# Patient Record
Sex: MALE | Race: WHITE | NOT HISPANIC OR LATINO | Employment: FULL TIME | URBAN - METROPOLITAN AREA
[De-identification: names, ages, dates, MRNs, and addresses within clinical notes are randomized per-mention and may not be internally consistent; named-entity substitution may affect disease eponyms.]

---

## 2017-01-03 ENCOUNTER — TELEPHONE (OUTPATIENT)
Dept: RHEUMATOLOGY | Facility: PHYSICIAN GROUP | Age: 52
End: 2017-01-03

## 2017-01-03 DIAGNOSIS — M06.9 RHEUMATOID ARTHRITIS, INVOLVING UNSPECIFIED SITE, UNSPECIFIED RHEUMATOID FACTOR PRESENCE: ICD-10-CM

## 2017-01-03 DIAGNOSIS — R79.89 LFT ELEVATION: ICD-10-CM

## 2017-01-03 NOTE — TELEPHONE ENCOUNTER
Call patient notified patient of elevated liver functions, patient states he was drinking a lot of alcohol over the holiday and that the LV liver functions most likely secondary to alcohol, as patient to hold any alcohol for at least 3 days so that we can recheck his liver functions, liver functions continue be elevated he'll need to do further evaluation for hepatitis C as well as a liver ultrasound.  Patient states understanding

## 2017-01-03 NOTE — TELEPHONE ENCOUNTER
Was the patient seen in the last year in this department? Yes    LABS IN YOUR IN BOX- NEG QUANT GOLD  Does patient have an active prescription for medications requested? No     Received Request Via: Patient

## 2017-01-12 ENCOUNTER — OFFICE VISIT (OUTPATIENT)
Dept: RHEUMATOLOGY | Facility: PHYSICIAN GROUP | Age: 52
End: 2017-01-12
Payer: COMMERCIAL

## 2017-01-12 VITALS — SYSTOLIC BLOOD PRESSURE: 120 MMHG | BODY MASS INDEX: 30.83 KG/M2 | WEIGHT: 218 LBS | DIASTOLIC BLOOD PRESSURE: 80 MMHG

## 2017-01-12 DIAGNOSIS — E29.1 HYPOGONADISM IN MALE: ICD-10-CM

## 2017-01-12 DIAGNOSIS — M05.9 SEROPOSITIVE RHEUMATOID ARTHRITIS (HCC): ICD-10-CM

## 2017-01-12 DIAGNOSIS — Z96.643 S/P HIP REPLACEMENT, BILATERAL: ICD-10-CM

## 2017-01-12 DIAGNOSIS — M51.9 LUMBAR DISC DISEASE: ICD-10-CM

## 2017-01-12 DIAGNOSIS — M06.9 RHEUMATOID ARTHRITIS, INVOLVING UNSPECIFIED SITE, UNSPECIFIED RHEUMATOID FACTOR PRESENCE: ICD-10-CM

## 2017-01-12 PROCEDURE — 99214 OFFICE O/P EST MOD 30 MIN: CPT | Performed by: INTERNAL MEDICINE

## 2017-01-12 NOTE — PROGRESS NOTES
Chief Complaint- joint pain    Subjective:   Milton Aguila is a 51 y.o. male here today for follow up of rheumatological issues     This is a follow-up visit for this patient who was last seen here in this clinic about 9 months ago March 28, 2016, for rheumatoid arthritis, patient is on Enbrel 50 mg subcutaneous every week, takes occasional Celebrex and hydrocodone, but note patient also takes Xanax daily at bedtime for sleeping. Patient status post bilateral HERVE about 2008 years ago patient states that his rheumatoid arthritis started at about 31 years of age. Patient denies any iritis uveitis but does have some dry eye, has some occasional back issues for which he takes Soma when necessary, also sees [facility] spine for tender point injections.    Very long discussion today regarding patient's Xanax and Norco, patient adamant about getting Norco for his injuries, especially his back issues, we had a long discussion regarding the FDA regulations now recommending against combining Xanax and Norco because of risk of coma and recommendation to change Xanax to a non-benzodiazepine antianxiety medication, patient resistant and chooses not to change from Xanax. Patient also admits to daily alcohol (during the holidays)  use most likely resulting in his elevated liver function seen at last lab, and patient also admits to marijuana use. Patient also states that his father was an alcoholic but denies any alcoholic tendencies himself.    S/p Humira  S/p Plaquenil  S/p MTX    Bilateral HERVE     Uric acid 6.7 6/2016 [facility]  Hep B neg 6/2016 [facility]  Quantiferon Gold neg 12/2016 [facility]    Current medicines (including changes today)  Current Outpatient Prescriptions   Medication Sig Dispense Refill   • Etanercept (ENBREL SURECLICK) 50 MG/ML Solution Auto-injector Inject 50 mg as instructed every 7 days. 12 Syringe 1   • celecoxib (CELEBREX) 200 MG Cap Take 1 Cap by mouth 2 times a day. 180 Cap 0   •  carisoprodol (SOMA) 350 MG Tab 1 tab up to tid prn severe muscle spasm 90 Tab 0   • alprazolam (XANAX) 0.25 MG Tab Take 1 Tab by mouth at bedtime as needed for Sleep. 30 Tab 0   • alprazolam (XANAX) 1 MG Tab Take 1 Tab by mouth at bedtime as needed for Sleep. Indications: Feeling Anxious 30 Tab 3   • TESTOSTERONE by Does not apply route.       No current facility-administered medications for this visit.     He  has a past medical history of Arthritis, rheumatic, acute or subacute.    ROS   Other than what is mentioned in HPI or physical exam, there is no history of headaches, double vision or blurred vision. No temporal tenderness or jaw claudication. No history of cataracts or glaucoma. No trouble swallowing difficulties or sore throats. No history of thyroid disease. No chest complaints including chest pain, cough or sputum production. No shortness of breath. No GI complaints including nausea, vomiting, change in bowel habits, or past peptic ulcer disease. No history of blood in the stools. No urinary complaints including dysuria or frequency. No history of rash including psoriasis. No history of alopecia, photosensitivity, oral ulcerations, Raynaud's phenomena, or swollen joints. No history of gout. No back complaints. No history of low blood counts.       Objective:     Blood pressure 120/80, weight 98.884 kg (218 lb). Body mass index is 30.83 kg/(m^2).   Physical Exam:  GENERAL: Alert and oriented x 3, No apparent distress, well-developed, well-nourished SKIN: No inflammation, normal turgor, no rashes. HEENT: Atraumatic, unremarkable. PERRLA, extraocular movements are intact. Conjunctiva clear. Fundi not examined. Temporal arteries are palpable and non-tender. THROAT: Clear. NECK: Supple with good range of motion including flexion, extension, lateral rotation and bending. No JVD, thyromegaly or adenopathy is appreciated. Carotids are palpable, no bruits. CHEST: Clear to ausculation and percussion bilaterally,  no rales or rhonchi. Respiratory efforts good. BREASTS: Not examined. COR: Regular rate and rhythm. No murmurs, rubs or gallops. ABDOMEN: Soft, non-tender, non-distended. Normal active bowel sounds. No organomegaly appreciated. No hepatomegaly. EXTREMITIES: No clubbing, cyanosis, edema. MUSCULOSKELETAL: Both shoulders have full range of motion including internal and external rotation and abduction. Both elbows have full range of motion without active active synovitis. The wrists have good range of motion without limitations. The small joints of the hands are unremarkable without significant swelling or tenderness. There are no tophi or nodules appreciated. The hips, knees, ankles and feet all have good range of motion. Patient able to walk on toes and heels without any problem, excellent balance. Low back is normal. There is no SI tenderness to compression or palpation. There is good lumbar flexion. There are tattoos bilateral ventral wrists, and excoriation right wrist RECTAL: Not done. : Not done. NEUROLOGICAL: Grossly intact. Motor and sensory examinations are normal. PULSES. Intact    Lab Results   Component Value Date/Time    SODIUM 142 01/30/2013 11:25 AM    POTASSIUM 3.8 01/30/2013 11:25 AM    CHLORIDE 104 01/30/2013 11:25 AM    CO2 32 01/30/2013 11:25 AM    GLUCOSE 77 01/30/2013 11:25 AM    BUN 8 01/30/2013 11:25 AM    CREATININE 0.89 01/30/2013 11:25 AM      Lab Results   Component Value Date/Time    WBC 5.2 01/30/2013 11:25 AM    RBC 5.14 01/30/2013 11:25 AM    HEMOGLOBIN 16.5 01/30/2013 11:25 AM    HEMATOCRIT 49.4 01/30/2013 11:25 AM    MCV 96.1 01/30/2013 11:25 AM    MCH 32.1 01/30/2013 11:25 AM    MCHC 33.4 01/30/2013 11:25 AM    MPV 8.1 01/30/2013 11:25 AM    NEUTROPHILS-POLYS 65.2 01/30/2013 11:25 AM    LYMPHOCYTES 23.2 01/30/2013 11:25 AM    MONOCYTES 8.9 01/30/2013 11:25 AM    EOSINOPHILS 2.4 01/30/2013 11:25 AM    BASOPHILS 0.4 01/30/2013 11:25 AM      Lab Results   Component Value Date/Time     CALCIUM 9.2 01/30/2013 11:25 AM    AST(SGOT) 25 02/07/2008 07:15 PM    ALT(SGPT) 41 02/07/2008 07:15 PM    ALKALINE PHOSPHATASE 51 02/07/2008 07:15 PM    TOTAL BILIRUBIN 1.7* 02/07/2008 07:15 PM    ALBUMIN 3.9 02/07/2008 07:15 PM    TOTAL PROTEIN 6.9 02/07/2008 07:15 PM     Lab Results   Component Value Date/Time    COLOR Lt. Yellow 01/30/2013 11:25 AM    SPECIFIC GRAVITY 1.009 01/30/2013 11:25 AM    PH 7.0 01/30/2013 11:25 AM    GLUCOSE Negative 01/30/2013 11:25 AM    KETONES Negative 01/30/2013 11:25 AM    PROTEIN Negative 01/30/2013 11:25 AM         Assessment and Plan:     1. Rheumatoid arthritis, involving unspecified site, unspecified rheumatoid factor presence (HCC)  Clinically doing well with Enbrel 50 mg subcutaneous every week, patient's clinical exam today shows no inflammation at all, patient's been noncompliant and has not done x-rays requested March 2016 of hands and feet for documentation of erosive arthritis as well as monitoring for any changes.  - Etanercept (ENBREL SURECLICK) 50 MG/ML Solution Auto-injector; Inject 50 mg as instructed every 7 days.  Dispense: 12 Syringe; Refill: 1    2. Lumbar disc disease  Patient is followed by [facility], patient wanted [place] refills from this clinic, but patient takes Xanax daily at bedtime, recommend patient change from a benzodiazepine antianxiety medication to a non-benzodiazepine medications and we can refill narcotics but cannot refill narcotics until he is no longer on benzodiazepine medications.     3. S/P hip replacement, bilateral  Patient still skis and jumps horses and hikes quite a bit    4. Anxiety  Patient continues to take Xanax daily at bedtime, refuses to consider alternatives. Recommend that if patient wants to have Norco for back pain then needs to change his antianxiety medication to a non-benzodiazepine medication per Federal Government FDA recommendations, recommended check with his primary care doctor Dr Jaime Parada for  alternatives.    5. Elevated LFTs  Patient admits to daily drinking over the holidays, patient has recently redone his labs and has abstained from alcohol for the last 3 days.  Patient states that his father was alcoholic but denies having any tendencies to alcoholism himself.    Followup: Return in about 6 months (around 7/12/2017). or sooner prn    Patient was seen 30 minutes face-to-face of which more than 50% of the time was spent counseling the patient (excluding time for procedures)  regarding  rheumatological condition and care. Therapy was discussed in detail.    Please note that this dictation was created using voice recognition software. I have made every reasonable attempt to correct obvious errors, but I expect that there are errors of grammar and possibly content that I did not discover before finalizing the note.               RF 14.2 (0-13.9) 10/2007 [facility]  CCP 18 ( 0-5) 10/2007 [facility]  MRI pelvis 7/2007-indicates advanced inflammatory arthropathy compatible's rheumatoid arthritis involving both hips with hypertrophic synovitis-[facility]  MRI LS spine 9/2009-indicates disc desiccation L3-4, L4-5, and L5-S1, left-sided neural foraminal narrowing at L5-S1, mild central stenosis at L4, and L3-4-[facility]  MRI Right hand/wrist 11/2009-negative, no erosions, no synovitis, no pathology-[facility]    3/7/2017 Phone call with patient today, patient says is not taking any benzodiazepine and not taking any Xanax,  Also called patient's primary care doctor in [place], [place] Dr. Dino Parada at [phone] and asked not to prescribe any benzodiazepine or any narcotics for this patient if this clinic is going to be prescribing Norco, nurse took message and stated complete understanding and will place notice on patient's chart at Dr. Parada's office.

## 2017-01-12 NOTE — MR AVS SNAPSHOT
Milton Aguila   2017 11:15 AM   Office Visit   MRN: [id number]    Department:  Rheumatology Med [facility]   Dept Phone:  [phone]    Description:  Male : 1965   Provider:  Aydee Barkley M.D.           Reason for Visit     Follow-Up           Allergies as of 2017     No Known Allergies      You were diagnosed with     Rheumatoid arthritis, involving unspecified site, unspecified rheumatoid factor presence (HCC)   [4016471]       Lumbar disc disease   [118880]       Hypogonadism in male   [6594117]       S/P hip replacement, bilateral   [[id number]]         Vital Signs     Blood Pressure Weight Smoking Status             120/80 mmHg 98.884 kg (218 lb) Never Smoker          Basic Information     Date Of Birth Sex Race Ethnicity Preferred Language    1965 Male White Non- English      Problem List              ICD-10-CM Priority Class Noted - Resolved    Seropositive rheumatoid arthritis (HCC) M05.9   3/31/2014 - Present    Hypogonadism [id number]   3/31/2014 - Present    Lumbar disc disease M51.9   2014 - Present    Overweight E66.3   12/15/2015 - Present    Rheumatoid arthritis (HCC) M06.9   12/15/2015 - Present    Insomnia G47.00   12/15/2015 - Present    S/P hip replacement Z96.649   12/15/2015 - Present    Hypogonadism in male E29.1   3/28/2016 - Present      Health Maintenance        Date Due Completion Dates    IMM DTaP/Tdap/Td Vaccine (1 - Tdap) 10/7/1985 ---    IMM INFLUENZA (1) 2016 10/1/2007    COLONOSCOPY 10/7/2016 ---            Current Immunizations     Influenza TIV (IM) 10/1/2007    Pneumococcal Vaccine (UF)Historical Data 10/1/2007      Below and/or attached are the medications your provider expects you to take. Review all of your home medications and newly ordered medications with your provider and/or pharmacist. Follow medication instructions as directed by your provider and/or pharmacist. Please keep your medication list with you and share with your provider.  Update the information when medications are discontinued, doses are changed, or new medications (including over-the-counter products) are added; and carry medication information at all times in the event of emergency situations     Allergies:  No Known Allergies          Medications  Valid as of: January 12, 2017 - 11:33 AM    Generic Name Brand Name Tablet Size Instructions for use    ALPRAZolam (Tab) XANAX 0.25 MG Take 1 Tab by mouth at bedtime as needed for Sleep.        ALPRAZolam (Tab) XANAX 1 MG Take 1 Tab by mouth at bedtime as needed for Sleep. Indications: Feeling Anxious        Carisoprodol   Take 350 mg by mouth as needed.        Carisoprodol (Tab) SOMA 350 MG 1 tab up to tid prn severe muscle spasm        Celecoxib (Cap) CELEBREX 200 MG Take 1 Cap by mouth 2 times a day.        Etanercept (Solution Auto-injector) Etanercept 50 MG/ML Inject 50 mg as instructed every 7 days.        Hydrocodone-Acetaminophen (Tab) NORCO 7.5-325 MG Take 1 Tab by mouth 2 times a day as needed.        Testosterone   by Does not apply route.        .                 Medicines prescribed today were sent to:     [id number] - [place], [place] - [address] AT [facility] [address] & [place] [address] [place] [address]    Phone: [phone] Fax: [phone]    Open 24 Hours?: No    [facility] - [place] [address]    Phone: [phone] Fax: [phone]    Open 24 Hours?: No    [id number] - [place] - [address] AT [facility]    [address] [place] [address]    Phone: [phone] Fax: [phone]    Open 24 Hours?: Yes    [facility] - [place] [address]    Phone: [phone] Fax: [phone]    Open 24 Hours?: No      Medication refill instructions:       If your prescription bottle indicates you have  medication refills left, it is not necessary to call your provider’s office. Please contact your pharmacy and they will refill your medication.    If your prescription bottle indicates you do not have any refills left, you may request refills at any time through one of the following ways: The online [facility] system (except Urgent Care), by calling your provider’s office, or by asking your pharmacy to contact your provider’s office with a refill request. Medication refills are processed only during regular business hours and may not be available until the next business day. Your provider may request additional information or to have a follow-up visit with you prior to refilling your medication.   *Please Note: Medication refills are assigned a new Rx number when refilled electronically. Your pharmacy may indicate that no refills were authorized even though a new prescription for the same medication is available at the pharmacy. Please request the medicine by name with the pharmacy before contacting your provider for a refill.           [facility] Access Code: [email or web address]  Expires: 2/11/2017 11:33 AM    Your email address is not on file at [facility].  Email Addresses are required for you to sign up for [facility], please contact [phone] to verify your personal information and to provide your email address prior to attempting to register for [facility].    Milton Aguila  [address] [place] [address]    [facility]  A secure, online tool to manage your health information     [facility]’s [facility]® is a secure, online tool that connects you to your personalized health information from the privacy of your home -- day or night - making it very easy for you to manage your healthcare. Once the activation process is completed, you can even access your medical information using the [facility] maria esther, which is available for free in the Apple Maria Esther store or Google Play store.     To learn more about [facility], visit  [email or web address]    There are two levels of access available (as shown below):   My Chart Features  [facility] Primary [facility] Doctor [facility]  Specialists [facility]  Urgent  Care Non-[facility] Primary Care Doctor   Email your healthcare team securely and privately 24/7 X X X    Manage appointments: schedule your next appointment; view details of past/upcoming appointments X      Request prescription refills. X      View recent personal medical records, including lab and immunizations X X X X   View health record, including health history, allergies, medications X X X X   Read reports about your outpatient visits, procedures, consult and ER notes X X X X   See your discharge summary, which is a recap of your hospital and/or ER visit that includes your diagnosis, lab results, and care plan X X  X     How to register for [facility] [employer]:  Once your e-mail address has been verified, follow the following steps to sign up for [employer].     1. Go to  [email or web address]  2. Click on the Sign Up Now box, which takes you to the New Member Sign Up page. You will need to provide the following information:  a. Enter your [employer] Access Code exactly as it appears at the top of this page. (You will not need to use this code after you’ve completed the sign-up process. If you do not sign up before the expiration date, you must request a new code.)   b. Enter your date of birth.   c. Enter your home email address.   d. Click Submit, and follow the next screen’s instructions.  3. Create a [employer] ID. This will be your [employer] login ID and cannot be changed, so think of one that is secure and easy to remember.  4. Create a [employer] password. You can change your password at any time.  5. Enter your Password Reset Question and Answer. This can be used at a later time if you forget your password.   6. Enter your e-mail address. This allows you to receive e-mail notifications when new information is available in [employer].  7. Click Sign Up. You can  now view your health information.    For assistance activating your [facility] [employer] [facility] account, call [phone]

## 2017-01-12 NOTE — Clinical Note
[facility]   [address], [address]  [place] [address]  Phone: [phone]  Fax: [phone]              Encounter Date: 1/12/2017    Dear Dr. Song ref. provider found,    It was a pleasure seeing your patient, Milton Aguila, on 1/12/2017. Diagnoses of Rheumatoid arthritis, involving unspecified site, unspecified rheumatoid factor presence (HCC), Lumbar disc disease, Hypogonadism in male, and S/P hip replacement, bilateral were pertinent to this visit.     Please find attached progress note which includes the history I obtained from Mr. Aguila, my physical examination findings, my impression and recommendations.      Once again, it was a pleasure participating in your patient's care.  Please feel free to contact me if you have any questions or if I can be of any further assistance to your patients.      Sincerely,    Aydee Barkley M.D.  Electronically Signed          PROGRESS NOTE:  No notes on file

## 2017-01-13 ENCOUNTER — TELEPHONE (OUTPATIENT)
Dept: RHEUMATOLOGY | Facility: PHYSICIAN GROUP | Age: 52
End: 2017-01-13

## 2017-01-13 DIAGNOSIS — M06.9 RHEUMATOID ARTHRITIS, INVOLVING UNSPECIFIED SITE, UNSPECIFIED RHEUMATOID FACTOR PRESENCE: ICD-10-CM

## 2017-01-13 DIAGNOSIS — R79.89 LFT ELEVATION: ICD-10-CM

## 2017-01-13 NOTE — TELEPHONE ENCOUNTER
Call patient, patient's liver functions have much improved although ALT still 81 which is twice normal, AST 42 with upper limit of normal 40,  Patient resolved to significantly cut down on alcohol now, and when he does labs he will wait at least 3 days from his last alcohol drink to do his blood tests.  Patient is interested in getting rechecked again, will recheck again in 2 weeks. Labs ordered in the computer.  Aryan, please mail lab orders to patient

## 2017-01-13 NOTE — TELEPHONE ENCOUNTER
Pt says he is on pins & needles and worried about the outcome of his lab results. He said he had them done at [facility]. Please call him ASAP, to discuss results.

## 2017-02-10 DIAGNOSIS — G47.00 INSOMNIA, UNSPECIFIED TYPE: ICD-10-CM

## 2017-02-10 RX ORDER — ALPRAZOLAM 1 MG/1
1 TABLET ORAL NIGHTLY PRN
Qty: 30 TAB | Refills: 3 | OUTPATIENT
Start: 2017-02-10

## 2017-02-10 NOTE — TELEPHONE ENCOUNTER
Was the patient seen in the last year in this department? Yes   Recent labs    Does patient have an active prescription for medications requested? No     Received Request Via: Patient     Pt said he is leaving for [place] in two days and needs a refill on his Xanax. He states he isnt taking the Norco anymore so you should be able to fill his xanax that he takes for sleep.  Thank you

## 2017-03-06 ENCOUNTER — TELEPHONE (OUTPATIENT)
Dept: RHEUMATOLOGY | Facility: PHYSICIAN GROUP | Age: 52
End: 2017-03-06

## 2017-03-06 DIAGNOSIS — M06.9 RHEUMATOID ARTHRITIS, INVOLVING UNSPECIFIED SITE, UNSPECIFIED RHEUMATOID FACTOR PRESENCE: ICD-10-CM

## 2017-03-06 NOTE — TELEPHONE ENCOUNTER
Pt called stating since being off of Enbrel his Right hand is flaring and he is having a lot of back pain. He wants RX for steroid, muscle relaxer and pain meds. And he would like a call from you.

## 2017-03-07 ENCOUNTER — TELEPHONE (OUTPATIENT)
Dept: RHEUMATOLOGY | Facility: PHYSICIAN GROUP | Age: 52
End: 2017-03-07

## 2017-03-07 DIAGNOSIS — M06.9 RHEUMATOID ARTHRITIS, INVOLVING UNSPECIFIED SITE, UNSPECIFIED RHEUMATOID FACTOR PRESENCE: ICD-10-CM

## 2017-03-07 DIAGNOSIS — M06.9 RHEUMATOID ARTHRITIS INVOLVING MULTIPLE SITES, UNSPECIFIED RHEUMATOID FACTOR PRESENCE: ICD-10-CM

## 2017-03-07 PROBLEM — F11.90 NARCOTIC DRUG USE: Status: ACTIVE | Noted: 2017-03-07

## 2017-03-07 RX ORDER — METHYLPREDNISOLONE 4 MG/1
TABLET ORAL
Qty: 21 TAB | Refills: 0 | Status: SHIPPED | OUTPATIENT
Start: 2017-03-07 | End: 2017-05-19

## 2017-03-07 RX ORDER — CARISOPRODOL 350 MG/1
TABLET ORAL
Qty: 90 TAB | Refills: 0 | Status: SHIPPED | OUTPATIENT
Start: 2017-03-07 | End: 2018-08-07

## 2017-03-07 RX ORDER — HYDROCODONE BITARTRATE AND ACETAMINOPHEN 5; 325 MG/1; MG/1
TABLET ORAL
Qty: 60 TAB | Refills: 0 | Status: SHIPPED | OUTPATIENT
Start: 2017-03-07 | End: 2018-02-21

## 2017-03-07 NOTE — TELEPHONE ENCOUNTER
Called pt, left message, will refill the enbrel and soma pt has on file in medication list  Asked pt to let us know about the need for prednisone

## 2017-03-07 NOTE — TELEPHONE ENCOUNTER
Phone call with patient today, patient says is not taking any benzodiazepine and not taking any Xanax,  Also called patient's primary care doctor in [place], [place] Dr. Dino Parada at [phone] and asked not to prescribe any benzodiazepine or any narcotics for this patient if this clinic is going to be prescribing Norco, nurse took message and stated complete understanding and will place notice on patient's chart at Dr. Parada's office.    [place] prescription written for patient, please notify patient.

## 2017-05-19 ENCOUNTER — OFFICE VISIT (OUTPATIENT)
Dept: RHEUMATOLOGY | Facility: PHYSICIAN GROUP | Age: 52
End: 2017-05-19
Payer: COMMERCIAL

## 2017-05-19 VITALS
OXYGEN SATURATION: 95 % | TEMPERATURE: 99.3 F | SYSTOLIC BLOOD PRESSURE: 148 MMHG | WEIGHT: 229 LBS | BODY MASS INDEX: 32.38 KG/M2 | RESPIRATION RATE: 14 BRPM | HEART RATE: 100 BPM | DIASTOLIC BLOOD PRESSURE: 80 MMHG

## 2017-05-19 DIAGNOSIS — R79.89 ELEVATED LFTS: ICD-10-CM

## 2017-05-19 DIAGNOSIS — M05.79 RHEUMATOID ARTHRITIS INVOLVING MULTIPLE SITES WITH POSITIVE RHEUMATOID FACTOR (HCC): ICD-10-CM

## 2017-05-19 DIAGNOSIS — M54.50 CHRONIC BILATERAL LOW BACK PAIN WITHOUT SCIATICA: ICD-10-CM

## 2017-05-19 DIAGNOSIS — Z96.643 S/P HIP REPLACEMENT, BILATERAL: ICD-10-CM

## 2017-05-19 DIAGNOSIS — Z78.9 ALCOHOL USE: ICD-10-CM

## 2017-05-19 DIAGNOSIS — F11.90 NARCOTIC DRUG USE: ICD-10-CM

## 2017-05-19 DIAGNOSIS — G89.29 CHRONIC BILATERAL LOW BACK PAIN WITHOUT SCIATICA: ICD-10-CM

## 2017-05-19 PROCEDURE — 99214 OFFICE O/P EST MOD 30 MIN: CPT | Performed by: INTERNAL MEDICINE

## 2017-05-19 RX ORDER — METHYLPREDNISOLONE 4 MG/1
TABLET ORAL
Qty: 21 TAB | Refills: 0 | Status: SHIPPED | OUTPATIENT
Start: 2017-05-19 | End: 2017-08-05 | Stop reason: SDUPTHER

## 2017-05-19 NOTE — Clinical Note
[facility]   [address], [address]  [place] [address]  Phone: [phone]  Fax: [phone]              Encounter Date: 5/19/2017    Dear Dr. Song ref. provider found,    It was a pleasure seeing your patient, Milton Aguila, on 5/19/2017. Diagnoses of Rheumatoid arthritis involving multiple sites with positive rheumatoid factor (CMS-[facility]), S/P hip replacement, bilateral, Chronic bilateral low back pain without sciatica, Narcotic drug use, Elevated LFTs, and Alcohol use were pertinent to this visit.     Please find attached progress note which includes the history I obtained from Mr. Aguila, my physical examination findings, my impression and recommendations.      Once again, it was a pleasure participating in your patient's care.  Please feel free to contact me if you have any questions or if I can be of any further assistance to your patients.      Sincerely,    Aydee Barkley M.D.  Electronically Signed          PROGRESS NOTE:  No notes on file

## 2017-05-19 NOTE — PROGRESS NOTES
Chief Complaint- joint pain    Subjective:   Milton Aguila is a 51 y.o. male here today for follow up of rheumatological issues    This is a follow-up visit for this patient who was last seen here in this clinic 3/7/2017 for rheumatoid arthritis, patient is on Enbrel 50 mg subcutaneous every week, takes occasional Celebrex and hydrocodone. Patient status post bilateral HERVE about 2008 years ago patient states that his rheumatoid arthritis started at about 31 years of age. Patient denies any iritis uveitis but does have some dry eye, has some occasional back issues for which he takes Soma when necessary, also sees [facility] spine for tender point injections. Patient fortunately missed 2 weeks of Inderal because of lack of compliance with getting labs, but patient now back on Enbrel but feels it isn't working as well as it had been in the past. Wonders about alternative treatments or possibly supplemental treatments. Patient states he is feeling more stiffness in his hands and his wrists and knees than previously.    Last visit (3/7/2017), we had a very long discussion today regarding patient's Xanax and Norco, patient adamant about getting Norco for his injuries, especially his back issues, we had a long discussion regarding the FDA regulations now recommending against combining Xanax and Norco because of risk of coma and recommendation to change Xanax to a non-benzodiazepine antianxiety medication, initially patient was patient resistant to changing the Xanax, however later that day patient called stating he would stop the Xanax and we had called patient's primary care doctor Dr Dino Parada at [phone] notifying to not prescribe any benzodiazepines if we are to prescribe any narcotics for this patient.  Patient also admits to daily alcohol (during the holidays)  use most likely resulting in his elevated liver function seen at last lab, and patient also admits to marijuana use. Patient also states  that his father was an alcoholic but denies any alcoholic tendencies himself.    S/p Humira  S/p Plaquenil  S/p MTX-contraindicated because of daily alcohol use  S/p leflunamide-contraindicated because of daily alcohol use    Bilateral HERVE     Uric acid 6.7 6/2016 [facility]  Hep B neg 6/2016 [facility]  Quantiferon Gold neg 12/2016 [facility]  RF 14.2 (0-13.9) 10/2007 [facility]  CCP 18 ( 0-5) 10/2007 [facility]  MRI pelvis 7/2007-indicates advanced inflammatory arthropathy compatible's rheumatoid arthritis involving both hips with hypertrophic synovitis-[facility]  MRI LS spine 9/2009-indicates disc desiccation L3-4, L4-5, and L5-S1, left-sided neural foraminal narrowing at L5-S1, mild central stenosis at L4, and L3-4-[facility]  MRI Right hand/wrist 11/2009-negative, no erosions, no synovitis, no pathology-[facility]       Current medicines (including changes today)  Current Outpatient Prescriptions   Medication Sig Dispense Refill   • methylPREDNISolone (MEDROL) 4 MG Tab 6 tabs po one day then 5 tabs po one day then 4 tabs po one day then 3 tabs po one day then 2 tabs po one day then 1 tab po for one day 21 Tab 0   • Etanercept (ENBREL SURECLICK) 50 MG/ML Solution Auto-injector Inject 50 mg as instructed every 7 days. 12 Syringe 1   • hydrocodone-acetaminophen (NORCO) 5-325 MG Tab per tablet 1 tab po bid prn SEVERE pain, NO DRIVING and NO ALCOHOL within 24 hrs of taking this medication, NO benzodiazepine medications, no selling or giving this medication to any other persons 60 Tab 0   • celecoxib (CELEBREX) 200 MG Cap Take 1 Cap by mouth 2 times a day. 180 Cap 0   • TESTOSTERONE by Does not apply route.     • carisoprodol (SOMA) 350 MG Tab 1 tab up to tid prn severe muscle spasm 90 Tab 0     No current facility-administered medications for this visit.     He  has a past medical history of Arthritis, rheumatic, acute or subacute.    ROS   Other than what is  mentioned in HPI or physical exam, there is no history of headaches, double vision or blurred vision. No temporal tenderness or jaw claudication. No history of cataracts or glaucoma. No trouble swallowing difficulties or sore throats. No history of thyroid disease. No chest complaints including chest pain, cough or sputum production. No shortness of breath. No GI complaints including nausea, vomiting, change in bowel habits, or past peptic ulcer disease. No history of blood in the stools. No urinary complaints including dysuria or frequency. No history of rash including psoriasis. No history of alopecia, photosensitivity, oral ulcerations, Raynaud's phenomena, or swollen joints. No history of gout. No back complaints. No history of low blood counts.       Objective:     Blood pressure 148/80, pulse 100, temperature 37.4 °C (99.3 °F), resp. rate 14, weight 103.874 kg (229 lb), SpO2 95 %. Body mass index is 32.38 kg/(m^2).   Physical Exam:  GENERAL: Alert and oriented x 3, No apparent distress, well-developed, well-nourished SKIN: No inflammation, normal turgor, no rashes, patient states he has some white patches on his chest but did not show them to us today.. HEENT: Atraumatic, unremarkable. PERRLA, extraocular movements are intact. Conjunctiva clear. Fundi not examined. Temporal arteries are palpable and non-tender. THROAT: Clear. NECK: Supple with good range of motion including flexion, extension, lateral rotation and bending. No JVD, thyromegaly or adenopathy is appreciated. Carotids are palpable, no bruits. CHEST: Clear to ausculation and percussion bilaterally, no rales or rhonchi. Respiratory efforts good. BREASTS: Not examined. COR: Regular rate and rhythm. No murmurs, rubs or gallops. ABDOMEN: Soft, non-tender, non-distended. Normal active bowel sounds. No organomegaly appreciated. No hepatomegaly. EXTREMITIES: No clubbing, cyanosis, edema. MUSCULOSKELETAL: Both shoulders have full range of motion including  internal and external rotation and abduction. Both elbows have full range of motion without active active synovitis. The wrists have good range of motion without limitations. The small joints of the hands are unremarkable without significant swelling or tenderness. There are no tophi or nodules appreciated. The hips, knees, ankles and feet all have good range of motion. Patient able to walk on toes and heels without any problem, excellent balance. Low back is normal. There is no SI tenderness to compression or palpation. There is good lumbar flexion. There are tattoos bilateral ventral wrists, and excoriation right wrist RECTAL: Not done. : Not done. NEUROLOGICAL: Grossly intact. Motor and sensory examinations are normal. PULSES. Intact     Lab Results   Component Value Date/Time    SODIUM 142 01/30/2013 11:25 AM    POTASSIUM 3.8 01/30/2013 11:25 AM    CHLORIDE 104 01/30/2013 11:25 AM    CO2 32 01/30/2013 11:25 AM    GLUCOSE 77 01/30/2013 11:25 AM    BUN 8 01/30/2013 11:25 AM    CREATININE 0.89 01/30/2013 11:25 AM      Lab Results   Component Value Date/Time    WBC 5.2 01/30/2013 11:25 AM    RBC 5.14 01/30/2013 11:25 AM    HEMOGLOBIN 16.5 01/30/2013 11:25 AM    HEMATOCRIT 49.4 01/30/2013 11:25 AM    MCV 96.1 01/30/2013 11:25 AM    MCH 32.1 01/30/2013 11:25 AM    MCHC 33.4 01/30/2013 11:25 AM    MPV 8.1 01/30/2013 11:25 AM    NEUTROPHILS-POLYS 65.2 01/30/2013 11:25 AM    LYMPHOCYTES 23.2 01/30/2013 11:25 AM    MONOCYTES 8.9 01/30/2013 11:25 AM    EOSINOPHILS 2.4 01/30/2013 11:25 AM    BASOPHILS 0.4 01/30/2013 11:25 AM      Lab Results   Component Value Date/Time    CALCIUM 9.2 01/30/2013 11:25 AM    AST(SGOT) 25 02/07/2008 07:15 PM    ALT(SGPT) 41 02/07/2008 07:15 PM    ALKALINE PHOSPHATASE 51 02/07/2008 07:15 PM    TOTAL BILIRUBIN 1.7* 02/07/2008 07:15 PM    ALBUMIN 3.9 02/07/2008 07:15 PM    TOTAL PROTEIN 6.9 02/07/2008 07:15 PM       Assessment and Plan:     1. Rheumatoid arthritis involving multiple sites with  positive rheumatoid factor (CMS-HCC)  At this point continue Enbrel 50 mg subcutaneous every week, did provide information for the patient regarding sulfasalazine possibly combination with Enbrel or possibly change in the biologic to Xeljanz or other. We did give patient a Medrol Dosepak to use when necessary, and when patient feels comfortable we will switch patient's biologic or add sulfasalazine. Of note, methotrexate and leflunomide contraindicated this patient does drink daily.  - methylPREDNISolone (MEDROL) 4 MG Tab; 6 tabs po one day then 5 tabs po one day then 4 tabs po one day then 3 tabs po one day then 2 tabs po one day then 1 tab po for one day  Dispense: 21 Tab; Refill: 0    2. S/P hip replacement, bilateral    3. Chronic bilateral low back pain without sciatica  [facility]    4. Narcotic drug use  3/7/2017 Phone call with patient , patient says is not taking any benzodiazepine and not taking any Xanax,  Also called patient's primary care doctor in [place], [place] Dr. Dino Parada at [phone] and asked not to prescribe any benzodiazepine or any narcotics for this patient if this clinic is going to be prescribing Norco, nurse took message and stated complete understanding and will place notice on patient's chart at Dr. Parada's office.    5. Elevated LFTs  Probably exacerbated by alcohol use    6. Alcohol use  [place]enders DMARD methotrexate and leflunomide contraindicated    Followup: Return in about 3 months (around 8/19/2017). or sooner prn    Patient was seen 30 minutes face-to-face of which more than 50% of the time was spent counseling the patient (excluding time for procedures)  regarding  rheumatological condition and care. Therapy was discussed in detail.    Please note that this dictation was created using voice recognition software. I have made every reasonable attempt to correct obvious errors, but I expect that there are errors of grammar and possibly content that  I did not discover before finalizing the note.               S/p sulfasalazine-possibly elevated liver functions (however patient also drinks alcohol quite heavily)

## 2017-05-19 NOTE — MR AVS SNAPSHOT
Milton Aguila   2017 10:45 AM   Office Visit   MRN: [id number]    Department:  Rheumatology Med [facility]   Dept Phone:  [phone]    Description:  Male : 1965   Provider:  Aydee Barkley M.D.           Reason for Visit     Follow-Up           Allergies as of 2017     No Known Allergies      You were diagnosed with     Rheumatoid arthritis involving multiple sites with positive rheumatoid factor (CMS-HCC)   [[id number]]         Vital Signs     Blood Pressure Pulse Temperature Respirations Weight Oxygen Saturation    148/80 mmHg 100 37.4 °C (99.3 °F) 14 103.874 kg (229 lb) 95%    Smoking Status                   Never Smoker            Basic Information     Date Of Birth Sex Race Ethnicity Preferred Language    1965 Male White Non- English      Problem List              ICD-10-CM Priority Class Noted - Resolved    Seropositive rheumatoid arthritis (CMS-HCC) M05.9   3/31/2014 - Present    Hypogonadism [id number]   3/31/2014 - Present    Lumbar disc disease M51.9   2014 - Present    Overweight E66.3   12/15/2015 - Present    Rheumatoid arthritis (CMS-HCC) M06.9   12/15/2015 - Present    Insomnia G47.00   12/15/2015 - Present    S/P hip replacement Z96.649   12/15/2015 - Present    Hypogonadism in male E29.1   3/28/2016 - Present    Narcotic drug use F11.90   3/7/2017 - Present      Health Maintenance        Date Due Completion Dates    IMM DTaP/Tdap/Td Vaccine (1 - Tdap) 10/7/1985 ---    COLONOSCOPY 10/7/2016 ---            Current Immunizations     Influenza TIV (IM) 10/1/2007    Pneumococcal Vaccine (UF)Historical Data 10/1/2007      Below and/or attached are the medications your provider expects you to take. Review all of your home medications and newly ordered medications with your provider and/or pharmacist. Follow medication instructions as directed by your provider and/or pharmacist. Please keep your medication list with you and share with your provider. Update the  information when medications are discontinued, doses are changed, or new medications (including over-the-counter products) are added; and carry medication information at all times in the event of emergency situations     Allergies:  No Known Allergies          Medications  Valid as of: May 19, 2017 - 11:41 AM    Generic Name Brand Name Tablet Size Instructions for use    Carisoprodol (Tab) SOMA 350 MG 1 tab up to tid prn severe muscle spasm        Celecoxib (Cap) CELEBREX 200 MG Take 1 Cap by mouth 2 times a day.        Etanercept (Solution Auto-injector) Etanercept 50 MG/ML Inject 50 mg as instructed every 7 days.        Hydrocodone-Acetaminophen (Tab) NORCO 5-325 MG 1 tab po bid prn SEVERE pain, NO DRIVING and NO ALCOHOL within 24 hrs of taking this medication, NO benzodiazepine medications, no selling or giving this medication to any other persons        MethylPREDNISolone (Tab) MEDROL 4 MG 6 tabs po one day then 5 tabs po one day then 4 tabs po one day then 3 tabs po one day then 2 tabs po one day then 1 tab po for one day        Testosterone   by Does not apply route.        .                 Medicines prescribed today were sent to:     [id number] - [place] - [address] [facility]    [facility] [place] [address]    Phone: [phone] Fax: [phone]    Open 24 Hours?: No    [facility] - [place], [place] - [address] [place] [address]    Phone: [phone] Fax: [phone]    Open 24 Hours?: No    [facility] - [place] - [address] [facility]    [address] [place] [address]    Phone: [phone] Fax: [phone]    Open 24 Hours?: Yes    [facility] - [place], [place] - [address] [place] [address]    Phone: [phone] Fax: [phone]    Open 24 Hours?: No      Medication refill instructions:       If  your prescription bottle indicates you have medication refills left, it is not necessary to call your provider’s office. Please contact your pharmacy and they will refill your medication.    If your prescription bottle indicates you do not have any refills left, you may request refills at any time through one of the following ways: The online [employer] system (except Urgent Care), by calling your provider’s office, or by asking your pharmacy to contact your provider’s office with a refill request. Medication refills are processed only during regular business hours and may not be available until the next business day. Your provider may request additional information or to have a follow-up visit with you prior to refilling your medication.   *Please Note: Medication refills are assigned a new Rx number when refilled electronically. Your pharmacy may indicate that no refills were authorized even though a new prescription for the same medication is available at the pharmacy. Please request the medicine by name with the pharmacy before contacting your provider for a refill.           [employer] Access Code: [email or web address]  Expires: 6/18/2017 11:41 AM    Your email address is not on file at [facility].  Email Addresses are required for you to sign up for [employer], please contact [phone] to verify your personal information and to provide your email address prior to attempting to register for [employer].    Milton Aguila  [address] [place] [address]    [employer]  A secure, online tool to manage your health information     [facility]’s [employer]® is a secure, online tool that connects you to your personalized health information from the privacy of your home -- day or night - making it very easy for you to manage your healthcare. Once the activation process is completed, you can even access your medical information using the [employer] maria esther, which is available for free in the Apple Maria Esther store or Google Play store.     To  learn more about [facility], visit [email or web address]    There are two levels of access available (as shown below):   My Chart Features  [facility] Doctor [facility]  Specialists [facility]  Urgent  Care Non-[facility] Primary Care Doctor   Email your healthcare team securely and privately 24/7 X X X    Manage appointments: schedule your next appointment; view details of past/upcoming appointments X      Request prescription refills. X      View recent personal medical records, including lab and immunizations X X X X   View health record, including health history, allergies, medications X X X X   Read reports about your outpatient visits, procedures, consult and ER notes X X X X   See your discharge summary, which is a recap of your hospital and/or ER visit that includes your diagnosis, lab results, and care plan X X  X     How to register for [employer]:  Once your e-mail address has been verified, follow the following steps to sign up for [employer].     1. Go to  [email or web address]  2. Click on the Sign Up Now box, which takes you to the New Member Sign Up page. You will need to provide the following information:  a. Enter your [employer] Access Code exactly as it appears at the top of this page. (You will not need to use this code after you’ve completed the sign-up process. If you do not sign up before the expiration date, you must request a new code.)   b. Enter your date of birth.   c. Enter your home email address.   d. Click Submit, and follow the next screen’s instructions.  3. Create a [employer] ID. This will be your [employer] login ID and cannot be changed, so think of one that is secure and easy to remember.  4. Create a [employer] password. You can change your password at any time.  5. Enter your Password Reset Question and Answer. This can be used at a later time if you forget your password.   6. Enter your e-mail address. This allows you to receive e-mail notifications when new information is available in  Dot.  7. Click Sign Up. You can now view your health information.    For assistance activating your [employer] account, call [phone]

## 2017-05-23 ENCOUNTER — TELEPHONE (OUTPATIENT)
Dept: RHEUMATOLOGY | Facility: PHYSICIAN GROUP | Age: 52
End: 2017-05-23

## 2017-05-23 DIAGNOSIS — M06.9 RHEUMATOID ARTHRITIS INVOLVING MULTIPLE SITES, UNSPECIFIED RHEUMATOID FACTOR PRESENCE: ICD-10-CM

## 2017-05-23 RX ORDER — SULFASALAZINE 500 MG/1
TABLET ORAL
Qty: 360 TAB | Refills: 0 | Status: SHIPPED | OUTPATIENT
Start: 2017-05-23 | End: 2017-08-18

## 2017-05-23 NOTE — TELEPHONE ENCOUNTER
Pt says you discussed starting him on Sulfasalazine, he said he is willing to try it. Please send RX to [facility] in [place].

## 2017-05-23 NOTE — TELEPHONE ENCOUNTER
Please notify patient we have called in the sulfasalazine 500 mg one tablet by mouth twice a day for one week then 2 tablets by mouth twice a day  If call that in to [facility] in [place]

## 2017-08-05 DIAGNOSIS — M05.9 SEROPOSITIVE RHEUMATOID ARTHRITIS (HCC): Primary | ICD-10-CM

## 2017-08-07 RX ORDER — METHYLPREDNISOLONE 4 MG/1
TABLET ORAL
Qty: 21 TAB | Refills: 0 | Status: SHIPPED | OUTPATIENT
Start: 2017-08-07 | End: 2018-02-21

## 2017-08-07 NOTE — TELEPHONE ENCOUNTER
Was the patient seen in the last year in this department? Yes  5/19/17, lab 1/13/17    Does patient have an active prescription for medications requested? Yes     Received Request Via: Pharmacy

## 2017-08-14 DIAGNOSIS — M05.9 SEROPOSITIVE RHEUMATOID ARTHRITIS (HCC): ICD-10-CM

## 2017-08-14 RX ORDER — CELECOXIB 200 MG/1
200 CAPSULE ORAL 2 TIMES DAILY
Qty: 180 CAP | Refills: 0 | OUTPATIENT
Start: 2017-08-14

## 2017-08-14 NOTE — TELEPHONE ENCOUNTER
Was the patient seen in the last year in this department? Yes     Does patient have an active prescription for medications requested? No     Received Request Via: Patient     Pt states he is having labs done Wed of this week that he has been waiting to have them done when he is not drinking so his liver numbers are normal... He states he is really enflamed and wants it refilled even if its just a half months worth of Celebrex.  Please see the 7/13/17 encounter to see the previous notes on this last request.  Thank you.

## 2017-08-18 DIAGNOSIS — R74.8 LIVER ENZYME ELEVATION: ICD-10-CM

## 2017-08-18 DIAGNOSIS — M05.9 SEROPOSITIVE RHEUMATOID ARTHRITIS (HCC): ICD-10-CM

## 2017-08-18 RX ORDER — CELECOXIB 200 MG/1
200 CAPSULE ORAL 2 TIMES DAILY
Qty: 180 CAP | Refills: 0 | OUTPATIENT
Start: 2017-08-18

## 2017-08-18 NOTE — TELEPHONE ENCOUNTER
Was the patient seen in the last year in this department? Yes   Labs in media from Aug    Does patient have an active prescription for medications requested? No     Received Request Via: Pharmacy

## 2017-08-18 NOTE — TELEPHONE ENCOUNTER
(sigh, patient probably an alcoholic) Please notify patient we got the Celebrex request and his liver functions from his last blood tests are extremely high, it would be dangerous for him to take Celebrex with liver function so high, I have ordered a CT scan of the abdomen to take a look at his liver and have referred him to a liver specialist.

## 2017-08-18 NOTE — TELEPHONE ENCOUNTER
Spoke with patient and relayed your message. He stated he had found an old prescription of Diclofenac and he had been taking that in place of the Celebrex that we wouldn't refill. I advised the pt not to take that medication either as it can increase his liver numbers just like the Celebrex. Jan wasn't happy and was very argumentative and didn't like what was said. I made him a follow up with Dr Barkley on Monday to discuss his options with her.   Thank you

## 2017-08-18 NOTE — TELEPHONE ENCOUNTER
Also, please have patient stop the sulfasalazine and we will recheck his liver functions again Monday or Tuesday, labs ordered at the computer

## 2017-08-23 ENCOUNTER — HOSPITAL ENCOUNTER (OUTPATIENT)
Dept: LAB | Facility: MEDICAL CENTER | Age: 52
End: 2017-08-23
Attending: INTERNAL MEDICINE
Payer: COMMERCIAL

## 2017-08-23 DIAGNOSIS — R74.8 LIVER ENZYME ELEVATION: ICD-10-CM

## 2017-08-23 DIAGNOSIS — M05.9 SEROPOSITIVE RHEUMATOID ARTHRITIS (HCC): ICD-10-CM

## 2017-08-23 LAB
ALT SERPL-CCNC: 203 U/L (ref 2–50)
AST SERPL-CCNC: 97 U/L (ref 12–45)

## 2017-08-23 PROCEDURE — 84450 TRANSFERASE (AST) (SGOT): CPT

## 2017-08-23 PROCEDURE — 36415 COLL VENOUS BLD VENIPUNCTURE: CPT

## 2017-08-23 PROCEDURE — 84460 ALANINE AMINO (ALT) (SGPT): CPT

## 2017-08-24 ENCOUNTER — TELEPHONE (OUTPATIENT)
Dept: RHEUMATOLOGY | Facility: PHYSICIAN GROUP | Age: 52
End: 2017-08-24

## 2017-08-24 DIAGNOSIS — R74.8 ELEVATED LIVER ENZYMES: ICD-10-CM

## 2017-08-24 NOTE — TELEPHONE ENCOUNTER
Discussion with patient, avoid NSAIDs especially with still elevated liver functions, give tumeric a trial, if needed we can do Medrol 4 mg by mouth daily but patient will need to be very careful about sugars including carbohydrates as Medrol in combination with high blood sugar will cause fatty liver, patient states understanding  Highly recommended patient avoid all alcohol, patient stated understanding  Patient would prefer to do ultrasound versus CT scan to avoid radiation if needed of liver when he gets back, will order ultrasound for patient.

## 2017-08-24 NOTE — TELEPHONE ENCOUNTER
Unfortunately all of the NSAIDs are potentially toxic to the liver, diclofenac especially  Patient can try holistic approach using turmeric  400-600 mg 3 times a day, this is used in holistic medicine as an anti-inflammatory and I don't believe ir affects the liver

## 2017-08-24 NOTE — TELEPHONE ENCOUNTER
I called pt and relayed your message. He was extremely rude and said you are making a choice for him to lose a joint in his hand.. And again repeated that you were making a life decision for him to lose a joint. I reminded him that with his liver function numbers so high that he couldnt live without a liver. He still wants/demands that you write a script for an N-Said. He stated he is leaving tomorrow for [place] and wants something called in.  He was very rude and very pushy.  Please Advise.

## 2017-08-24 NOTE — TELEPHONE ENCOUNTER
Was the patient seen in the last year in this department? Yes   yesterday    Does patient have an active prescription for medications requested? No     Received Request Via: Patient     Pt wants a refill on an N-said, he said if there is one that has less liver toxicity that would be fine to change him to that... He never did stop taking the old script of the Dicofenac.

## 2017-09-12 ENCOUNTER — HOSPITAL ENCOUNTER (OUTPATIENT)
Dept: LAB | Facility: MEDICAL CENTER | Age: 52
End: 2017-09-12
Attending: INTERNAL MEDICINE
Payer: COMMERCIAL

## 2017-09-12 DIAGNOSIS — R74.8 ELEVATED LIVER ENZYMES: ICD-10-CM

## 2017-09-12 LAB
ALT SERPL-CCNC: 98 U/L (ref 2–50)
AST SERPL-CCNC: 41 U/L (ref 12–45)
HBV SURFACE AG SER QL: NEGATIVE
HCV AB SER QL: NEGATIVE

## 2017-09-12 PROCEDURE — 84450 TRANSFERASE (AST) (SGOT): CPT

## 2017-09-12 PROCEDURE — 87340 HEPATITIS B SURFACE AG IA: CPT

## 2017-09-12 PROCEDURE — 84460 ALANINE AMINO (ALT) (SGPT): CPT

## 2017-09-12 PROCEDURE — 86803 HEPATITIS C AB TEST: CPT

## 2017-09-12 PROCEDURE — 36415 COLL VENOUS BLD VENIPUNCTURE: CPT

## 2017-09-13 ENCOUNTER — APPOINTMENT (OUTPATIENT)
Dept: RADIOLOGY | Facility: MEDICAL CENTER | Age: 52
End: 2017-09-13
Attending: INTERNAL MEDICINE
Payer: COMMERCIAL

## 2017-09-14 DIAGNOSIS — M05.9 SEROPOSITIVE RHEUMATOID ARTHRITIS (HCC): ICD-10-CM

## 2017-09-14 DIAGNOSIS — R74.01 ALT (SGPT) LEVEL RAISED: ICD-10-CM

## 2017-09-14 RX ORDER — CELECOXIB 200 MG/1
200 CAPSULE ORAL 2 TIMES DAILY
Qty: 180 CAP | Refills: 0 | OUTPATIENT
Start: 2017-09-14

## 2017-09-14 NOTE — TELEPHONE ENCOUNTER
Pt. Called in and left a VM stating that he would like his lab results released so that he can see them in [facility]. Thank you!

## 2017-09-14 NOTE — TELEPHONE ENCOUNTER
Was the patient seen in the last year in this department? Yes     Does patient have an active prescription for medications requested? No     Received Request Via: Patient     PT STATES HE WANTS HIS CELEBREX REFILLED AS HIS LIVER NUMBERS LOOK BETTER. IF YOUR NOT GOING TO REFILL IT HE WANTS TO TALK WITH YOU.

## 2017-09-15 NOTE — TELEPHONE ENCOUNTER
Reviewed chart    Called patient as requested.  Discussed notes regarding concern that NSAIDS can still cause elevated liver function.  His ALT is still elevated so will not be refilling this medication.  He states he has tried tumeric and it does not work.  He also states he has medrol but has not tried this.  He feels his numbers have normalized, however ALT is still elevated.    We will repeat LFT in a few weeks.  He will have ultrasound of the abdomen completed on Tuesday.

## 2017-09-20 ENCOUNTER — HOSPITAL ENCOUNTER (OUTPATIENT)
Dept: RADIOLOGY | Facility: MEDICAL CENTER | Age: 52
End: 2017-09-20
Attending: INTERNAL MEDICINE
Payer: COMMERCIAL

## 2017-09-22 ENCOUNTER — TELEPHONE (OUTPATIENT)
Dept: RHEUMATOLOGY | Facility: PHYSICIAN GROUP | Age: 52
End: 2017-09-22

## 2017-09-22 ENCOUNTER — HOSPITAL ENCOUNTER (OUTPATIENT)
Dept: RADIOLOGY | Facility: MEDICAL CENTER | Age: 52
End: 2017-09-22
Attending: INTERNAL MEDICINE
Payer: COMMERCIAL

## 2017-09-22 DIAGNOSIS — R74.8 ELEVATED LIVER ENZYMES: ICD-10-CM

## 2017-09-22 PROCEDURE — 76700 US EXAM ABDOM COMPLETE: CPT

## 2017-09-22 NOTE — TELEPHONE ENCOUNTER
Pt called stating he had US of liver done this am and wants the results of it, and wants you to release them so he can view them on my chart.   Thank you

## 2017-10-06 DIAGNOSIS — M06.9 RHEUMATOID ARTHRITIS INVOLVING MULTIPLE SITES, UNSPECIFIED RHEUMATOID FACTOR PRESENCE: ICD-10-CM

## 2017-10-20 DIAGNOSIS — M05.9 SEROPOSITIVE RHEUMATOID ARTHRITIS (HCC): ICD-10-CM

## 2017-10-20 RX ORDER — CELECOXIB 200 MG/1
200 CAPSULE ORAL 2 TIMES DAILY
Qty: 180 CAP | Refills: 0 | Status: SHIPPED | OUTPATIENT
Start: 2017-10-20 | End: 2018-02-21 | Stop reason: SDUPTHER

## 2018-02-21 ENCOUNTER — OFFICE VISIT (OUTPATIENT)
Dept: RHEUMATOLOGY | Facility: PHYSICIAN GROUP | Age: 53
End: 2018-02-21
Payer: COMMERCIAL

## 2018-02-21 VITALS
TEMPERATURE: 98.2 F | HEART RATE: 100 BPM | WEIGHT: 219 LBS | OXYGEN SATURATION: 93 % | RESPIRATION RATE: 14 BRPM | SYSTOLIC BLOOD PRESSURE: 120 MMHG | DIASTOLIC BLOOD PRESSURE: 80 MMHG | BODY MASS INDEX: 30.98 KG/M2

## 2018-02-21 DIAGNOSIS — M51.9 LUMBAR DISC DISEASE: ICD-10-CM

## 2018-02-21 DIAGNOSIS — Z96.643 STATUS POST BILATERAL HIP REPLACEMENTS: ICD-10-CM

## 2018-02-21 DIAGNOSIS — M05.79 RHEUMATOID ARTHRITIS INVOLVING MULTIPLE SITES WITH POSITIVE RHEUMATOID FACTOR (HCC): ICD-10-CM

## 2018-02-21 DIAGNOSIS — Z79.620 ON ETANERCEPT THERAPY: ICD-10-CM

## 2018-02-21 PROCEDURE — 99214 OFFICE O/P EST MOD 30 MIN: CPT | Performed by: INTERNAL MEDICINE

## 2018-02-21 RX ORDER — CELECOXIB 200 MG/1
200 CAPSULE ORAL 2 TIMES DAILY
Qty: 180 CAP | Refills: 1 | Status: SHIPPED | OUTPATIENT
Start: 2018-02-21 | End: 2018-08-19 | Stop reason: SDUPTHER

## 2018-02-21 NOTE — PROGRESS NOTES
Chief Complaint- joint pain    Subjective:   Milton Aguila is a 52 y.o. male here today for follow up of rheumatological issues    This is a follow-up visit for this patient who was last seen here in this clinic 5/19/2017 for rheumatoid arthritis, patient is on Enbrel 50 mg subcutaneous every week, takes occasional Celebrex. Patient status post bilateral HERVE about 2008 years ago patient states that his rheumatoid arthritis started at about 31 years of age. Patient denies any iritis uveitis but does have some dry eye.  Patient states he is feeling more stiffness in his hands and his wrists and knees than previously. At last visit we talked about changing Biologics but patient at that time wanted to stay on Enbrel so we added sulfasalazine but that caused elevation of his liver functions along with his alcohol intake.    Additional comorbidities include  occasional back issues for which he takes Soma when necessary, also sees [facility] for tender point injections. Patient also states that his mother just recently diagnosed with fibromuscular dysplasia.       Patient also admits to daily alcohol (during the holidays) which in the past had caused significant elevation of liver functions, and patient also admits to marijuana use. Patient also states that his father was an alcoholic but denies any alcoholic tendencies himself. Because of these risks, I consider patient at high risk abuser and will not prescribe narcotics for this patient this clinic. Will defer to pain management if needed.     S/p Humira  S/p Plaquenil  S/p MTX-contraindicated because of daily alcohol use  S/p leflunamide-contraindicated because of daily alcohol use  S/p sulfasalazine-elevated LFTs (with ETOH)       Bilateral HERVE     Uric acid 6.7 6/2016 [facility]  Hep B neg 6/2016 [facility]; HB neg 9/2017  Hep C neg 9/2017  Quantiferon Gold neg 12/2016 [facility]  RF 14.2 (0-13.9) 10/2007 [facility]  CCP 18 ( 0-5) 10/2007 [facility]  MRI  pelvis 7/2007-indicates advanced inflammatory arthropathy compatible's rheumatoid arthritis involving both hips with hypertrophic synovitis-[facility]  MRI LS spine 9/2009-indicates disc desiccation L3-4, L4-5, and L5-S1, left-sided neural foraminal narrowing at L5-S1, mild central stenosis at L4, and L3-4-[facility]  MRI Right hand/wrist 11/2009-negative, no erosions, no synovitis, no pathology-[facility]          Current medicines (including changes today)  Current Outpatient Prescriptions   Medication Sig Dispense Refill   • Tofacitinib Citrate 5 MG Tab Take 5 mg by mouth 2 Times a Day. 180 Tab 1   • celecoxib (CELEBREX) 200 MG Cap Take 1 Cap by mouth 2 times a day. 180 Cap 1   • ENBREL SURECLICK 50 MG/ML Solution Auto-injector INJECT 50 MG EVERY 7 DAYS AS INSTRUCTED 11.76 mL 1   • carisoprodol (SOMA) 350 MG Tab 1 tab up to tid prn severe muscle spasm 90 Tab 0   • TESTOSTERONE by Does not apply route.       No current facility-administered medications for this visit.      He  has a past medical history of Arthritis, rheumatic, acute or subacute.    ROS   Other than what is mentioned in HPI or physical exam, there is no history of headaches, double vision or blurred vision. No temporal tenderness or jaw claudication. No history of cataracts or glaucoma. No trouble swallowing difficulties or sore throats. No history of thyroid disease. No chest complaints including chest pain, cough or sputum production. No shortness of breath. No GI complaints including nausea, vomiting, change in bowel habits, or past peptic ulcer disease. No history of blood in the stools. No urinary complaints including dysuria or frequency. No history of rash including psoriasis. No history of alopecia, photosensitivity, oral ulcerations, Raynaud's phenomena, or swollen joints. No history of gout. No back complaints. No history of low blood counts.       Objective:     Blood  pressure 120/80, pulse 100, temperature 36.8 °C (98.2 °F), resp. rate 14, weight 99.3 kg (219 lb), SpO2 93 %. Body mass index is 30.98 kg/m².   Physical Exam:  GENERAL: Alert and oriented x 3, No apparent distress, well-developed, well-nourished SKIN: No inflammation, normal turgor, no rashes HEENT: Atraumatic, unremarkable. PERRLA, extraocular movements are intact. Conjunctiva clear. Fundi not examined. Temporal arteries are palpable and non-tender. THROAT: Clear. NECK: Supple with good range of motion including flexion, extension, lateral rotation and bending. No JVD, thyromegaly or adenopathy is appreciated. Carotids are palpable, no bruits. CHEST: Clear to ausculation and percussion bilaterally, no rales or rhonchi. Respiratory efforts good. BREASTS: Not examined. COR: Regular rate and rhythm. No murmurs, rubs or gallops. ABDOMEN: Soft, non-tender, non-distended. Normal active bowel sounds. No organomegaly appreciated. No hepatomegaly. EXTREMITIES: No clubbing, cyanosis, edema. MUSCULOSKELETAL: Both shoulders have full range of motion including internal and external rotation and abduction. Both elbows have full range of motion without active synovitis. The wrists have good range of motion without limitations. There is some tenderness to palpation along the MCP joints both hands and also along wrist joint lines but no lencho effusions.  There are no tophi or nodules appreciated. The hips, knees, ankles and feet all have good range of motion. Patient able to walk on toes and heels without any problem, excellent balance. Low back is normal. There is no SI tenderness to compression or palpation. There is good lumbar flexion. There are tattoos bilateral ventral wrists. RECTAL: Not done. : Not done. NEUROLOGICAL: Grossly intact. Motor and sensory examinations are normal. PULSES. Intact        Lab Results   Component Value Date/Time    HEPBSAG Negative 09/12/2017 10:20 AM     Lab Results   Component Value Date/Time     HEPCAB Negative 09/12/2017 10:20 AM     Lab Results   Component Value Date/Time    SODIUM 142 01/30/2013 11:25 AM    POTASSIUM 3.8 01/30/2013 11:25 AM    CHLORIDE 104 01/30/2013 11:25 AM    CO2 32 01/30/2013 11:25 AM    GLUCOSE 77 01/30/2013 11:25 AM    BUN 8 01/30/2013 11:25 AM    CREATININE 0.89 01/30/2013 11:25 AM    CREATININE 0.8 02/09/2008 08:50 AM      Lab Results   Component Value Date/Time    WBC 5.2 01/30/2013 11:25 AM    RBC 5.14 01/30/2013 11:25 AM    HEMOGLOBIN 16.5 01/30/2013 11:25 AM    HEMATOCRIT 49.4 01/30/2013 11:25 AM    MCV 96.1 01/30/2013 11:25 AM    MCH 32.1 01/30/2013 11:25 AM    MCHC 33.4 01/30/2013 11:25 AM    MPV 8.1 01/30/2013 11:25 AM    NEUTSPOLYS 65.2 01/30/2013 11:25 AM    LYMPHOCYTES 23.2 01/30/2013 11:25 AM    MONOCYTES 8.9 01/30/2013 11:25 AM    EOSINOPHILS 2.4 01/30/2013 11:25 AM    BASOPHILS 0.4 01/30/2013 11:25 AM      Lab Results   Component Value Date/Time    CALCIUM 9.2 01/30/2013 11:25 AM    ASTSGOT 41 09/12/2017 10:20 AM    ALTSGPT 98 (H) 09/12/2017 10:20 AM    ALKPHOSPHAT 51 02/07/2008 07:15 PM    TBILIRUBIN 1.7 (H) 02/07/2008 07:15 PM    ALBUMIN 3.9 02/07/2008 07:15 PM    TOTPROTEIN 6.9 02/07/2008 07:15 PM      Assessment and Plan:     1. Rheumatoid arthritis involving multiple sites with positive rheumatoid factor (CMS-HCC)  Discussion with patient today in regards to the continued use of Enbrel, patient is interested in trying something different we opted on doing a trial of XELJANZ at 5 mg by mouth twice a day, patient takes Celebrex on an intermittent basis, patient is due for labs, labs to be done every 6 months we'll also check rheumatoid factor and CCP as well  We will also do bilateral hand and feet x-rays compared to previous x-rays for evaluation of progression of arthritis.  - Tofacitinib Citrate 5 MG Tab; Take 5 mg by mouth 2 Times a Day.  Dispense: 180 Tab; Refill: 1  - CBC WITH DIFFERENTIAL; Future  - COMP METABOLIC PANEL; Future  - WESTERGREN SED RATE;  Future  - RHEUMATOID ARTHRITIS FACTOR; Future  - CCP ANTIBODY; Future  - celecoxib (CELEBREX) 200 MG Cap; Take 1 Cap by mouth 2 times a day.  Dispense: 180 Cap; Refill: 1  - DX-JOINT SURVEY-FEET SINGLE VIEW; Future  - DX-JOINT SURVEY-HANDS SINGLE VIEW; Future    2. On etanercept therapy  Were switching this to XELJANZ 5 mg by mouth twice a day with both medications risks were reviewed and information on XELJANZ was given to patient, patient up to date on monitoring and screening labs.    3. Lumbar disc disease  Followed intermittently by [facility]    4. Status post bilateral hip replacements    Followup: Return in about 3 months (around 5/21/2018). or sooner prn    Patient was seen 30 minutes face-to-face of which more than 50% of the time was spent counseling the patient (excluding time for procedures)  regarding  rheumatological condition and care. Therapy was discussed in detail.    Please note that this dictation was created using voice recognition software. I have made every reasonable attempt to correct obvious errors, but I expect that there are errors of grammar and possibly content that I did not discover before finalizing the note.

## 2018-02-21 NOTE — LETTER
[facility]   [address], [address]  [place] [address]  Phone: [phone]  Fax: [phone]              Encounter Date: 2/21/2018    Dear Dr. Song ref. provider found,    It was a pleasure seeing your patient, Milton Aguila, on 2/21/2018. Diagnoses of Rheumatoid arthritis involving multiple sites with positive rheumatoid factor (CMS-[facility]), On etanercept therapy, Lumbar disc disease, and Status post bilateral hip replacements were pertinent to this visit.     Please find attached progress note which includes the history I obtained from Mr. Aguila, my physical examination findings, my impression and recommendations.      Once again, it was a pleasure participating in your patient's care.  Please feel free to contact me if you have any questions or if I can be of any further assistance to your patients.      Sincerely,    Aydee Barkley M.D.  Electronically Signed          PROGRESS NOTE:  No notes on file

## 2018-03-02 ENCOUNTER — HOSPITAL ENCOUNTER (OUTPATIENT)
Dept: LAB | Facility: MEDICAL CENTER | Age: 53
End: 2018-03-02
Attending: INTERNAL MEDICINE
Payer: COMMERCIAL

## 2018-03-02 ENCOUNTER — HOSPITAL ENCOUNTER (OUTPATIENT)
Dept: LAB | Facility: MEDICAL CENTER | Age: 53
End: 2018-03-02
Attending: EMERGENCY MEDICINE
Payer: COMMERCIAL

## 2018-03-02 DIAGNOSIS — M05.79 RHEUMATOID ARTHRITIS INVOLVING MULTIPLE SITES WITH POSITIVE RHEUMATOID FACTOR (HCC): ICD-10-CM

## 2018-03-02 LAB
ALBUMIN SERPL BCP-MCNC: 4.9 G/DL (ref 3.2–4.9)
ALBUMIN SERPL BCP-MCNC: 5 G/DL (ref 3.2–4.9)
ALBUMIN/GLOB SERPL: 1.8 G/DL
ALBUMIN/GLOB SERPL: 1.8 G/DL
ALP SERPL-CCNC: 54 U/L (ref 30–99)
ALP SERPL-CCNC: 54 U/L (ref 30–99)
ALT SERPL-CCNC: 107 U/L (ref 2–50)
ALT SERPL-CCNC: 108 U/L (ref 2–50)
ANION GAP SERPL CALC-SCNC: 10 MMOL/L (ref 0–11.9)
ANION GAP SERPL CALC-SCNC: 10 MMOL/L (ref 0–11.9)
AST SERPL-CCNC: 65 U/L (ref 12–45)
AST SERPL-CCNC: 66 U/L (ref 12–45)
BASOPHILS # BLD AUTO: 0.5 % (ref 0–1.8)
BASOPHILS # BLD AUTO: 0.5 % (ref 0–1.8)
BASOPHILS # BLD: 0.02 K/UL (ref 0–0.12)
BASOPHILS # BLD: 0.02 K/UL (ref 0–0.12)
BILIRUB SERPL-MCNC: 1.6 MG/DL (ref 0.1–1.5)
BILIRUB SERPL-MCNC: 1.6 MG/DL (ref 0.1–1.5)
BUN SERPL-MCNC: 19 MG/DL (ref 8–22)
BUN SERPL-MCNC: 19 MG/DL (ref 8–22)
CALCIUM SERPL-MCNC: 9.9 MG/DL (ref 8.5–10.5)
CALCIUM SERPL-MCNC: 9.9 MG/DL (ref 8.5–10.5)
CHLORIDE SERPL-SCNC: 103 MMOL/L (ref 96–112)
CHLORIDE SERPL-SCNC: 104 MMOL/L (ref 96–112)
CHOLEST SERPL-MCNC: 243 MG/DL (ref 100–199)
CO2 SERPL-SCNC: 24 MMOL/L (ref 20–33)
CO2 SERPL-SCNC: 25 MMOL/L (ref 20–33)
CREAT SERPL-MCNC: 0.79 MG/DL (ref 0.5–1.4)
CREAT SERPL-MCNC: 0.8 MG/DL (ref 0.5–1.4)
EOSINOPHIL # BLD AUTO: 0.14 K/UL (ref 0–0.51)
EOSINOPHIL # BLD AUTO: 0.15 K/UL (ref 0–0.51)
EOSINOPHIL NFR BLD: 3.4 % (ref 0–6.9)
EOSINOPHIL NFR BLD: 3.7 % (ref 0–6.9)
ERYTHROCYTE [DISTWIDTH] IN BLOOD BY AUTOMATED COUNT: 43.7 FL (ref 35.9–50)
ERYTHROCYTE [DISTWIDTH] IN BLOOD BY AUTOMATED COUNT: 43.9 FL (ref 35.9–50)
ERYTHROCYTE [SEDIMENTATION RATE] IN BLOOD BY WESTERGREN METHOD: 9 MM/HOUR (ref 0–20)
EST. AVERAGE GLUCOSE BLD GHB EST-MCNC: 91 MG/DL
ESTRADIOL SERPL-MCNC: <20 PG/ML
GGT SERPL-CCNC: 30 U/L (ref 7–51)
GLOBULIN SER CALC-MCNC: 2.8 G/DL (ref 1.9–3.5)
GLOBULIN SER CALC-MCNC: 2.8 G/DL (ref 1.9–3.5)
GLUCOSE SERPL-MCNC: 100 MG/DL (ref 65–99)
GLUCOSE SERPL-MCNC: 101 MG/DL (ref 65–99)
HBA1C MFR BLD: 4.8 % (ref 0–5.6)
HCT VFR BLD AUTO: 49.4 % (ref 42–52)
HCT VFR BLD AUTO: 50.4 % (ref 42–52)
HCYS SERPL-SCNC: 8.61 UMOL/L
HDLC SERPL-MCNC: 48 MG/DL
HGB BLD-MCNC: 17 G/DL (ref 14–18)
HGB BLD-MCNC: 17.3 G/DL (ref 14–18)
IMM GRANULOCYTES # BLD AUTO: 0.01 K/UL (ref 0–0.11)
IMM GRANULOCYTES # BLD AUTO: 0.02 K/UL (ref 0–0.11)
IMM GRANULOCYTES NFR BLD AUTO: 0.2 % (ref 0–0.9)
IMM GRANULOCYTES NFR BLD AUTO: 0.5 % (ref 0–0.9)
LDH SERPL L TO P-CCNC: 210 U/L (ref 107–266)
LDLC SERPL CALC-MCNC: 167 MG/DL
LYMPHOCYTES # BLD AUTO: 1.05 K/UL (ref 1–4.8)
LYMPHOCYTES # BLD AUTO: 1.15 K/UL (ref 1–4.8)
LYMPHOCYTES NFR BLD: 25.6 % (ref 22–41)
LYMPHOCYTES NFR BLD: 27.7 % (ref 22–41)
MCH RBC QN AUTO: 32 PG (ref 27–33)
MCH RBC QN AUTO: 32.1 PG (ref 27–33)
MCHC RBC AUTO-ENTMCNC: 34.3 G/DL (ref 33.7–35.3)
MCHC RBC AUTO-ENTMCNC: 34.4 G/DL (ref 33.7–35.3)
MCV RBC AUTO: 93 FL (ref 81.4–97.8)
MCV RBC AUTO: 93.5 FL (ref 81.4–97.8)
MONOCYTES # BLD AUTO: 0.49 K/UL (ref 0–0.85)
MONOCYTES # BLD AUTO: 0.5 K/UL (ref 0–0.85)
MONOCYTES NFR BLD AUTO: 11.8 % (ref 0–13.4)
MONOCYTES NFR BLD AUTO: 12.2 % (ref 0–13.4)
NEUTROPHILS # BLD AUTO: 2.33 K/UL (ref 1.82–7.42)
NEUTROPHILS # BLD AUTO: 2.37 K/UL (ref 1.82–7.42)
NEUTROPHILS NFR BLD: 56.1 % (ref 44–72)
NEUTROPHILS NFR BLD: 57.8 % (ref 44–72)
NRBC # BLD AUTO: 0 K/UL
NRBC # BLD AUTO: 0 K/UL
NRBC BLD-RTO: 0 /100 WBC
NRBC BLD-RTO: 0 /100 WBC
PHOSPHATE SERPL-MCNC: 3.2 MG/DL (ref 2.5–4.5)
PLATELET # BLD AUTO: 211 K/UL (ref 164–446)
PLATELET # BLD AUTO: 212 K/UL (ref 164–446)
PMV BLD AUTO: 9.6 FL (ref 9–12.9)
PMV BLD AUTO: 9.7 FL (ref 9–12.9)
POTASSIUM SERPL-SCNC: 4 MMOL/L (ref 3.6–5.5)
POTASSIUM SERPL-SCNC: 4.1 MMOL/L (ref 3.6–5.5)
PROT SERPL-MCNC: 7.7 G/DL (ref 6–8.2)
PROT SERPL-MCNC: 7.8 G/DL (ref 6–8.2)
PSA SERPL-MCNC: 0.54 NG/ML (ref 0–4)
RBC # BLD AUTO: 5.31 M/UL (ref 4.7–6.1)
RBC # BLD AUTO: 5.39 M/UL (ref 4.7–6.1)
RHEUMATOID FACT SER IA-ACNC: 64 IU/ML (ref 0–14)
SODIUM SERPL-SCNC: 137 MMOL/L (ref 135–145)
SODIUM SERPL-SCNC: 139 MMOL/L (ref 135–145)
T3FREE SERPL-MCNC: 4.39 PG/ML (ref 2.4–4.2)
T4 SERPL-MCNC: 7.5 UG/DL (ref 4–12)
TRIGL SERPL-MCNC: 138 MG/DL (ref 0–149)
TSH SERPL DL<=0.005 MIU/L-ACNC: 1.88 UIU/ML (ref 0.38–5.33)
URATE SERPL-MCNC: 7 MG/DL (ref 2.5–8.3)
WBC # BLD AUTO: 4.1 K/UL (ref 4.8–10.8)
WBC # BLD AUTO: 4.2 K/UL (ref 4.8–10.8)

## 2018-03-02 PROCEDURE — 82977 ASSAY OF GGT: CPT

## 2018-03-02 PROCEDURE — 84436 ASSAY OF TOTAL THYROXINE: CPT

## 2018-03-02 PROCEDURE — 36415 COLL VENOUS BLD VENIPUNCTURE: CPT

## 2018-03-02 PROCEDURE — 83615 LACTATE (LD) (LDH) ENZYME: CPT

## 2018-03-02 PROCEDURE — 84443 ASSAY THYROID STIM HORMONE: CPT

## 2018-03-02 PROCEDURE — 85025 COMPLETE CBC W/AUTO DIFF WBC: CPT | Mod: 91

## 2018-03-02 PROCEDURE — 85025 COMPLETE CBC W/AUTO DIFF WBC: CPT

## 2018-03-02 PROCEDURE — 84550 ASSAY OF BLOOD/URIC ACID: CPT

## 2018-03-02 PROCEDURE — 80061 LIPID PANEL: CPT

## 2018-03-02 PROCEDURE — 83036 HEMOGLOBIN GLYCOSYLATED A1C: CPT

## 2018-03-02 PROCEDURE — 84403 ASSAY OF TOTAL TESTOSTERONE: CPT

## 2018-03-02 PROCEDURE — 86200 CCP ANTIBODY: CPT

## 2018-03-02 PROCEDURE — 84481 FREE ASSAY (FT-3): CPT

## 2018-03-02 PROCEDURE — 86431 RHEUMATOID FACTOR QUANT: CPT

## 2018-03-02 PROCEDURE — 84153 ASSAY OF PSA TOTAL: CPT

## 2018-03-02 PROCEDURE — 80053 COMPREHEN METABOLIC PANEL: CPT

## 2018-03-02 PROCEDURE — 84270 ASSAY OF SEX HORMONE GLOBUL: CPT

## 2018-03-02 PROCEDURE — 83090 ASSAY OF HOMOCYSTEINE: CPT

## 2018-03-02 PROCEDURE — 85652 RBC SED RATE AUTOMATED: CPT

## 2018-03-02 PROCEDURE — 84402 ASSAY OF FREE TESTOSTERONE: CPT

## 2018-03-02 PROCEDURE — 80053 COMPREHEN METABOLIC PANEL: CPT | Mod: 91

## 2018-03-02 PROCEDURE — 82670 ASSAY OF TOTAL ESTRADIOL: CPT

## 2018-03-02 PROCEDURE — 84100 ASSAY OF PHOSPHORUS: CPT

## 2018-03-02 PROCEDURE — 84305 ASSAY OF SOMATOMEDIN: CPT

## 2018-03-03 LAB
IGF-I SERPL-MCNC: 332 NG/ML (ref 66–225)
IGF-I Z-SCORE SERPL: 3.2
SHBG SERPL-SCNC: 82 NMOL/L (ref 11–80)
TESTOST FREE MFR SERPL: 1 % (ref 1.6–2.9)
TESTOST FREE SERPL-MCNC: 40 PG/ML (ref 47–244)
TESTOST SERPL-MCNC: 404 NG/DL (ref 300–890)

## 2018-03-04 LAB — CCP IGG SERPL-ACNC: 120 UNITS (ref 0–19)

## 2018-03-05 ENCOUNTER — TELEPHONE (OUTPATIENT)
Dept: RHEUMATOLOGY | Facility: PHYSICIAN GROUP | Age: 53
End: 2018-03-05

## 2018-03-05 DIAGNOSIS — R79.89 LFT ELEVATION: ICD-10-CM

## 2018-03-06 NOTE — TELEPHONE ENCOUNTER
Please notify patient that we receive the results of his blood tests  His inflammatory marker is nice and low at 9 which is really good news in regards to his arthritis activity  However his liver function still continued to be elevated  Patient had a liver ultrasound which was normal  I'm going to order some labs to see if this is autoimmune liver inflammation that can happen with patients and rheumatoid arthritis, he does not need to be fasting to do these blood tests  And I'm also going to refer him to a GI specialist for further evaluation

## 2018-03-06 NOTE — TELEPHONE ENCOUNTER
I spoke with patient and relayed your message. He was upset that you continue to order lab work on his liver when running high is just his normal....  He wasn't very happy about it however I did give him your messages and sent the lab slips and referral to his home address. Thank you

## 2018-06-26 ENCOUNTER — HOSPITAL ENCOUNTER (OUTPATIENT)
Dept: LAB | Facility: MEDICAL CENTER | Age: 53
End: 2018-06-26
Attending: EMERGENCY MEDICINE
Payer: COMMERCIAL

## 2018-06-26 LAB
ALBUMIN SERPL BCP-MCNC: 4.2 G/DL (ref 3.2–4.9)
ALBUMIN/GLOB SERPL: 1.6 G/DL
ALP SERPL-CCNC: 51 U/L (ref 30–99)
ALT SERPL-CCNC: 55 U/L (ref 2–50)
ANION GAP SERPL CALC-SCNC: 6 MMOL/L (ref 0–11.9)
AST SERPL-CCNC: 41 U/L (ref 12–45)
BASOPHILS # BLD AUTO: 0.9 % (ref 0–1.8)
BASOPHILS # BLD: 0.03 K/UL (ref 0–0.12)
BILIRUB SERPL-MCNC: 1.2 MG/DL (ref 0.1–1.5)
BUN SERPL-MCNC: 13 MG/DL (ref 8–22)
CALCIUM SERPL-MCNC: 9.3 MG/DL (ref 8.5–10.5)
CHLORIDE SERPL-SCNC: 106 MMOL/L (ref 96–112)
CHOLEST SERPL-MCNC: 180 MG/DL (ref 100–199)
CO2 SERPL-SCNC: 25 MMOL/L (ref 20–33)
CREAT SERPL-MCNC: 0.84 MG/DL (ref 0.5–1.4)
EOSINOPHIL # BLD AUTO: 0.2 K/UL (ref 0–0.51)
EOSINOPHIL NFR BLD: 5.9 % (ref 0–6.9)
ERYTHROCYTE [DISTWIDTH] IN BLOOD BY AUTOMATED COUNT: 45.3 FL (ref 35.9–50)
ESTRADIOL SERPL-MCNC: 44 PG/ML
GGT SERPL-CCNC: 21 U/L (ref 7–51)
GLOBULIN SER CALC-MCNC: 2.7 G/DL (ref 1.9–3.5)
GLUCOSE SERPL-MCNC: 105 MG/DL (ref 65–99)
HCT VFR BLD AUTO: 45.1 % (ref 42–52)
HDLC SERPL-MCNC: 52 MG/DL
HGB BLD-MCNC: 15.9 G/DL (ref 14–18)
IMM GRANULOCYTES # BLD AUTO: 0.01 K/UL (ref 0–0.11)
IMM GRANULOCYTES NFR BLD AUTO: 0.3 % (ref 0–0.9)
LDH SERPL L TO P-CCNC: 157 U/L (ref 107–266)
LDLC SERPL CALC-MCNC: 114 MG/DL
LYMPHOCYTES # BLD AUTO: 0.95 K/UL (ref 1–4.8)
LYMPHOCYTES NFR BLD: 27.9 % (ref 22–41)
MCH RBC QN AUTO: 33.5 PG (ref 27–33)
MCHC RBC AUTO-ENTMCNC: 35.3 G/DL (ref 33.7–35.3)
MCV RBC AUTO: 95.1 FL (ref 81.4–97.8)
MONOCYTES # BLD AUTO: 0.41 K/UL (ref 0–0.85)
MONOCYTES NFR BLD AUTO: 12 % (ref 0–13.4)
NEUTROPHILS # BLD AUTO: 1.81 K/UL (ref 1.82–7.42)
NEUTROPHILS NFR BLD: 53 % (ref 44–72)
NRBC # BLD AUTO: 0 K/UL
NRBC BLD-RTO: 0 /100 WBC
PHOSPHATE SERPL-MCNC: 2.8 MG/DL (ref 2.5–4.5)
PLATELET # BLD AUTO: 192 K/UL (ref 164–446)
PMV BLD AUTO: 9.8 FL (ref 9–12.9)
POTASSIUM SERPL-SCNC: 4 MMOL/L (ref 3.6–5.5)
PROT SERPL-MCNC: 6.9 G/DL (ref 6–8.2)
PSA SERPL-MCNC: 1.18 NG/ML (ref 0–4)
RBC # BLD AUTO: 4.74 M/UL (ref 4.7–6.1)
SODIUM SERPL-SCNC: 137 MMOL/L (ref 135–145)
TRIGL SERPL-MCNC: 71 MG/DL (ref 0–149)
URATE SERPL-MCNC: 6.2 MG/DL (ref 2.5–8.3)
WBC # BLD AUTO: 3.4 K/UL (ref 4.8–10.8)

## 2018-06-26 PROCEDURE — 36415 COLL VENOUS BLD VENIPUNCTURE: CPT

## 2018-06-26 PROCEDURE — 84550 ASSAY OF BLOOD/URIC ACID: CPT

## 2018-06-26 PROCEDURE — 85025 COMPLETE CBC W/AUTO DIFF WBC: CPT

## 2018-06-26 PROCEDURE — 84270 ASSAY OF SEX HORMONE GLOBUL: CPT

## 2018-06-26 PROCEDURE — 82977 ASSAY OF GGT: CPT

## 2018-06-26 PROCEDURE — 82670 ASSAY OF TOTAL ESTRADIOL: CPT

## 2018-06-26 PROCEDURE — 80061 LIPID PANEL: CPT

## 2018-06-26 PROCEDURE — 84100 ASSAY OF PHOSPHORUS: CPT

## 2018-06-26 PROCEDURE — 84305 ASSAY OF SOMATOMEDIN: CPT

## 2018-06-26 PROCEDURE — 81003 URINALYSIS AUTO W/O SCOPE: CPT

## 2018-06-26 PROCEDURE — 84403 ASSAY OF TOTAL TESTOSTERONE: CPT

## 2018-06-26 PROCEDURE — 80053 COMPREHEN METABOLIC PANEL: CPT

## 2018-06-26 PROCEDURE — 84153 ASSAY OF PSA TOTAL: CPT

## 2018-06-26 PROCEDURE — 83615 LACTATE (LD) (LDH) ENZYME: CPT

## 2018-06-27 LAB
APPEARANCE UR: NORMAL
BILIRUB UR QL STRIP.AUTO: NORMAL
COLOR UR: NORMAL
GLUCOSE UR STRIP.AUTO-MCNC: NORMAL MG/DL
IGF-I SERPL-MCNC: 176 NG/ML (ref 66–225)
IGF-I Z-SCORE SERPL: 1
KETONES UR STRIP.AUTO-MCNC: NORMAL MG/DL
LEUKOCYTE ESTERASE UR QL STRIP.AUTO: NORMAL
MICRO URNS: NORMAL
NITRITE UR QL STRIP.AUTO: NORMAL
PH UR STRIP.AUTO: NORMAL [PH]
PROT UR QL STRIP: NORMAL MG/DL
RBC UR QL AUTO: NORMAL
SHBG SERPL-SCNC: 54 NMOL/L (ref 11–80)
SP GR UR STRIP.AUTO: NORMAL
TESTOST FREE MFR SERPL: 1.4 % (ref 1.6–2.9)
TESTOST FREE SERPL-MCNC: 65 PG/ML (ref 47–244)
TESTOST SERPL-MCNC: 470 NG/DL (ref 300–890)
UROBILINOGEN UR STRIP.AUTO-MCNC: NORMAL MG/DL

## 2018-08-07 ENCOUNTER — OFFICE VISIT (OUTPATIENT)
Dept: RHEUMATOLOGY | Facility: PHYSICIAN GROUP | Age: 53
End: 2018-08-07
Payer: COMMERCIAL

## 2018-08-07 VITALS
TEMPERATURE: 97.9 F | SYSTOLIC BLOOD PRESSURE: 120 MMHG | RESPIRATION RATE: 12 BRPM | WEIGHT: 217 LBS | HEART RATE: 80 BPM | OXYGEN SATURATION: 96 % | BODY MASS INDEX: 30.7 KG/M2 | DIASTOLIC BLOOD PRESSURE: 70 MMHG

## 2018-08-07 DIAGNOSIS — G89.29 CHRONIC BILATERAL LOW BACK PAIN WITH LEFT-SIDED SCIATICA: ICD-10-CM

## 2018-08-07 DIAGNOSIS — M05.79 RHEUMATOID ARTHRITIS INVOLVING MULTIPLE SITES WITH POSITIVE RHEUMATOID FACTOR (HCC): ICD-10-CM

## 2018-08-07 DIAGNOSIS — Z79.620 ON ETANERCEPT THERAPY: ICD-10-CM

## 2018-08-07 DIAGNOSIS — M54.42 CHRONIC BILATERAL LOW BACK PAIN WITH LEFT-SIDED SCIATICA: ICD-10-CM

## 2018-08-07 DIAGNOSIS — Z79.1 NSAID LONG-TERM USE: ICD-10-CM

## 2018-08-07 PROCEDURE — 99214 OFFICE O/P EST MOD 30 MIN: CPT | Performed by: INTERNAL MEDICINE

## 2018-08-07 RX ORDER — TIZANIDINE 4 MG/1
TABLET ORAL
Qty: 120 TAB | Refills: 1 | Status: SHIPPED | OUTPATIENT
Start: 2018-08-07 | End: 2019-12-30

## 2018-08-07 NOTE — PROGRESS NOTES
Chief Complaint- joint pain    Subjective:   Milton Aguila is a 52 y.o. male here today for follow up of rheumatological issues    This is a follow-up visit for this patient who we follow for rheumatoid arthritis diagnosed at about 31 years of age, patient is currently on Enbrel at 50 mg subcu every week with occasional Celebrex.  At last visit we want to try Xeljanz but patient did not follow through.  Patient states that he thinks that the Enbrel is doing fine,  patient denies any side effects from the medication, denies any unexplained weight loss, denies any fevers of unknown etiology, denies any GI upset, denies any rashes, denies any new joint swelling, denies recurrent infections.      Additional comorbidities include  occasional back issues and sciatica for which he takes a muscle relaxer when necessary, also sees [facility] for tender point injections.      Patient also admits to frequent alcohol use which in the past had caused significant elevation of liver functions, and patient also admits to marijuana use. Patient also states that his father was an alcoholic but denies any alcoholic tendencies himself. Because of these risks, I consider patient at high risk abuser and will not prescribe narcotics for this patient this clinic.      S/p Humira  S/p Plaquenil  S/p MTX-contraindicated because of daily alcohol use  S/p leflunamide-contraindicated because of daily alcohol use  S/p sulfasalazine-elevated LFTs (with ETOH)        Bilateral HERVE     Uric acid 6.7 6/2016 [facility]  Hep B neg 6/2016 [facility]; HB neg 9/2017  Hep C neg 9/2017  Quantiferon Gold neg 12/2016 [facility]  RF 14.2 (0-13.9) 10/2007 [facility]  CCP 18 ( 0-5) 10/2007 [facility]  MRI pelvis 7/2007-indicates advanced inflammatory arthropathy compatible's rheumatoid arthritis involving both hips with hypertrophic synovitis-[facility]  MRI LS spine 9/2009-indicates disc desiccation L3-4, L4-5, and L5-S1,  left-sided neural foraminal narrowing at L5-S1, mild central stenosis at L4, and L3-4-[facility]  MRI Right hand/wrist 11/2009-negative, no erosions, no synovitis, no pathology-[facility]        Current medicines (including changes today)  Current Outpatient Prescriptions   Medication Sig Dispense Refill   • Etanercept (ENBREL SURECLICK) 50 MG/ML Solution Auto-injector Inject 50 mg as instructed every 7 days. 11.76 mL 1   • tizanidine (ZANAFLEX) 4 MG Tab 1 tab po q6 hrs prn back spasm 120 Tab 1   • celecoxib (CELEBREX) 200 MG Cap Take 1 Cap by mouth 2 times a day. 180 Cap 1   • TESTOSTERONE by Does not apply route.       No current facility-administered medications for this visit.      He  has a past medical history of Arthritis, rheumatic, acute or subacute.    ROS   Other than what is mentioned in HPI or physical exam, there is no history of headaches, double vision or blurred vision. No temporal tenderness or jaw claudication. No history of cataracts or glaucoma. No trouble swallowing difficulties or sore throats.  No chest complaints including chest pain, cough or sputum production. No GI complaints including nausea, vomiting, change in bowel habits, or past peptic ulcer disease. No history of blood in the stools. No urinary complaints including dysuria or frequency. No history of alopecia, photosensitivity, oral ulcerations, Raynaud's phenomena.       Objective:     Blood pressure 120/70, pulse 80, temperature 36.6 °C (97.9 °F), resp. rate 12, weight 98.4 kg (217 lb), SpO2 96 %. Body mass index is 30.7 kg/m².   Physical Exam:    Constitutional: Alert and oriented X3, no distress, patient is talkative with good eye contact.Skin: Warm, dry, good turgor, no rashes in visible areas, no psoriatic lesions, no petechial lesions, no malar rashes  .Eye: Equal, round and reactive, conjunctiva clear, lids normal EOM intactENMT: Lips without lesions, good dentition, no  oropharyngeal ulcers, moist buccal mucosa, pinna without deformityNeck: Trachea midline, no masses, no thyromegaly.Lymph:  No cervical lymphadenopathy, no axillary lymphadenopathy, no supraclavicular lymphadenopathyRespiratory: Unlabored respiratory effort, lungs clear to auscultation, no wheezes, no ronchi.Cardiovascular: Normal S1, S2, no murmur, no edema.Abdomen: Soft, non-tender, no masses, no hepatosplenomegaly.Psych: Alert and oriented x3, normal affect and mood.Neuro: Cranial nerves 2-12 are grossly intact, no loss of sensation LEExt:no joint laxity noted in bilateral arms, no joint laxity noted in bilateral legs, both shoulders full range of motion including internal and external rotation and abduction, both elbows full range of motion without active synovitis, the wrists with good range of motion without limitations, no tenderness to palpation along the MCP or PIP joints today patient is developing evidence of Heberden's nodes on most DIP joints of the hands.  No effusions in the knees, no effusions in the ankles, no Achilles inflammation, the feet without evidence of splay toes and no crossover toes, no SI tenderness to compression or palpation    Lab Results   Component Value Date/Time    HEPBSAG Negative 09/12/2017 10:20 AM     Lab Results   Component Value Date/Time    HEPCAB Negative 09/12/2017 10:20 AM     Lab Results   Component Value Date/Time    SODIUM 137 06/26/2018 08:48 AM    POTASSIUM 4.0 06/26/2018 08:48 AM    CHLORIDE 106 06/26/2018 08:48 AM    CO2 25 06/26/2018 08:48 AM    GLUCOSE 105 (H) 06/26/2018 08:48 AM    BUN 13 06/26/2018 08:48 AM    CREATININE 0.84 06/26/2018 08:48 AM    CREATININE 0.8 02/09/2008 08:50 AM      Lab Results   Component Value Date/Time    WBC 3.4 (L) 06/26/2018 08:48 AM    RBC 4.74 06/26/2018 08:48 AM    HEMOGLOBIN 15.9 06/26/2018 08:48 AM    HEMATOCRIT 45.1 06/26/2018 08:48 AM    MCV 95.1 06/26/2018 08:48 AM    MCH 33.5 (H) 06/26/2018 08:48 AM    MCHC 35.3 06/26/2018  08:48 AM    MPV 9.8 06/26/2018 08:48 AM    NEUTSPOLYS 53.00 06/26/2018 08:48 AM    LYMPHOCYTES 27.90 06/26/2018 08:48 AM    MONOCYTES 12.00 06/26/2018 08:48 AM    EOSINOPHILS 5.90 06/26/2018 08:48 AM    BASOPHILS 0.90 06/26/2018 08:48 AM      Lab Results   Component Value Date/Time    CALCIUM 9.3 06/26/2018 08:48 AM    ASTSGOT 41 06/26/2018 08:48 AM    ALTSGPT 55 (H) 06/26/2018 08:48 AM    ALKPHOSPHAT 51 06/26/2018 08:48 AM    TBILIRUBIN 1.2 06/26/2018 08:48 AM    ALBUMIN 4.2 06/26/2018 08:48 AM    TOTPROTEIN 6.9 06/26/2018 08:48 AM     Lab Results   Component Value Date/Time    URICACID 6.2 06/26/2018 08:48 AM    RHEUMFACTN 64 (H) 03/02/2018 09:37 AM    CCPANTIBODY 120 (H) 03/02/2018 09:37 AM     Lab Results   Component Value Date/Time    SEDRATEWES 9 03/02/2018 09:37 AM     Lab Results   Component Value Date/Time    HBA1C 4.8 03/02/2018 09:34 AM      Assessment and Plan:     1. Rheumatoid arthritis involving multiple sites with positive rheumatoid factor (HCC)  Patient continues to be on Enbrel at 50 mg subcu every week, we have refilled that today patient will be due for labs December 2018, labs ordered for patient  - Etanercept (ENBREL SURECLICK) 50 MG/ML Solution Auto-injector; Inject 50 mg as instructed every 7 days.  Dispense: 11.76 mL; Refill: 1  - tizanidine (ZANAFLEX) 4 MG Tab; 1 tab po q6 hrs prn back spasm  Dispense: 120 Tab; Refill: 1  - CBC WITH DIFFERENTIAL; Future  - COMP METABOLIC PANEL; Future  - WESTERGREN SED RATE; Future    2. On etanercept therapy  Patient up-to-date on screening labs, patient will need monitoring labs again December 2018, labs ordered for patient  - Etanercept (ENBREL SURECLICK) 50 MG/ML Solution Auto-injector; Inject 50 mg as instructed every 7 days.  Dispense: 11.76 mL; Refill: 1  - tizanidine (ZANAFLEX) 4 MG Tab; 1 tab po q6 hrs prn back spasm  Dispense: 120 Tab; Refill: 1  - CBC WITH DIFFERENTIAL; Future  - COMP METABOLIC PANEL; Future  - WESTERGREN SED RATE; Future    3.  NSAID long-term use  Celebrex occasionally will need labs every 6 months next labs due December 2018  - Etanercept (ENBREL SURECLICK) 50 MG/ML Solution Auto-injector; Inject 50 mg as instructed every 7 days.  Dispense: 11.76 mL; Refill: 1  - tizanidine (ZANAFLEX) 4 MG Tab; 1 tab po q6 hrs prn back spasm  Dispense: 120 Tab; Refill: 1  - CBC WITH DIFFERENTIAL; Future  - COMP METABOLIC PANEL; Future  - WESTERGREN SED RATE; Future    4. Chronic bilateral low back pain with left-sided sciatica  Refilled muscle relaxer tizanidine 4 mg up to every 6 hours as needed  - Etanercept (ENBREL SURECLICK) 50 MG/ML Solution Auto-injector; Inject 50 mg as instructed every 7 days.  Dispense: 11.76 mL; Refill: 1  - tizanidine (ZANAFLEX) 4 MG Tab; 1 tab po q6 hrs prn back spasm  Dispense: 120 Tab; Refill: 1    Followup: Return in about 6 months (around 2/7/2019). or sooner prn    Milton Aguila was seen 30 minutes face-to-face of which more than 50% of the time was spent counseling the patient (excluding time for procedures)  regarding  rheumatological condition and care. Therapy was discussed in detail.    Please note that this dictation was created using voice recognition software. I have made every reasonable attempt to correct obvious errors, but I expect that there are errors of grammar and possibly content that I did not discover before finalizing the note.

## 2018-08-07 NOTE — LETTER
[facility]   [address], [address]  [place] [address]  Phone: [phone]  Fax: [phone]              Encounter Date: 8/7/2018    Dear Dr. Song ref. provider found,    It was a pleasure seeing your patient, Milton Aguila, on 8/7/2018. Diagnoses of Rheumatoid arthritis involving multiple sites with positive rheumatoid factor (HCC), On etanercept therapy, NSAID long-term use, and Chronic bilateral low back pain with left-sided sciatica were pertinent to this visit.     Please find attached progress note which includes the history I obtained from Mr. Aguila, my physical examination findings, my impression and recommendations.      Once again, it was a pleasure participating in your patient's care.  Please feel free to contact me if you have any questions or if I can be of any further assistance to your patients.      Sincerely,    Aydee Barkley M.D.  Electronically Signed          PROGRESS NOTE:  No notes on file

## 2018-11-02 ENCOUNTER — HOSPITAL ENCOUNTER (OUTPATIENT)
Dept: LAB | Facility: MEDICAL CENTER | Age: 53
End: 2018-11-02
Attending: EMERGENCY MEDICINE
Payer: COMMERCIAL

## 2018-11-02 LAB
25(OH)D3 SERPL-MCNC: 21 NG/ML (ref 30–100)
CRP SERPL HS-MCNC: 1.6 MG/L (ref 0–7.5)
DHEA-S SERPL-MCNC: 90.8 UG/DL (ref 44.3–331)
EST. AVERAGE GLUCOSE BLD GHB EST-MCNC: 111 MG/DL
HBA1C MFR BLD: 5.5 % (ref 0–5.6)
HCYS SERPL-SCNC: 11.01 UMOL/L
T3FREE SERPL-MCNC: 3.58 PG/ML (ref 2.4–4.2)
T4 SERPL-MCNC: 8.6 UG/DL (ref 4–12)
TSH SERPL DL<=0.005 MIU/L-ACNC: 2.59 UIU/ML (ref 0.38–5.33)

## 2018-11-02 PROCEDURE — 84481 FREE ASSAY (FT-3): CPT

## 2018-11-02 PROCEDURE — 86141 C-REACTIVE PROTEIN HS: CPT

## 2018-11-02 PROCEDURE — 83036 HEMOGLOBIN GLYCOSYLATED A1C: CPT

## 2018-11-02 PROCEDURE — 82627 DEHYDROEPIANDROSTERONE: CPT

## 2018-11-02 PROCEDURE — 36415 COLL VENOUS BLD VENIPUNCTURE: CPT

## 2018-11-02 PROCEDURE — 82306 VITAMIN D 25 HYDROXY: CPT

## 2018-11-02 PROCEDURE — 83090 ASSAY OF HOMOCYSTEINE: CPT

## 2018-11-02 PROCEDURE — 84443 ASSAY THYROID STIM HORMONE: CPT

## 2018-11-02 PROCEDURE — 84436 ASSAY OF TOTAL THYROXINE: CPT

## 2018-11-19 ENCOUNTER — TELEPHONE (OUTPATIENT)
Dept: RHEUMATOLOGY | Facility: MEDICAL CENTER | Age: 53
End: 2018-11-19

## 2018-11-19 NOTE — TELEPHONE ENCOUNTER
Please notify patient we have refilled his Celebrex but he really needs to do the labs were ordered August 18, 2018 for more refills after this, labs do not need to be fasting

## 2018-11-19 NOTE — TELEPHONE ENCOUNTER
I called and spoke with pt. He argued and complained, as normal when being told he needed to have lab work. He said he didn't think he needed to have it done ect.. The was rude ect.

## 2018-12-10 ENCOUNTER — OFFICE VISIT (OUTPATIENT)
Dept: URGENT CARE | Facility: PHYSICIAN GROUP | Age: 53
End: 2018-12-10
Payer: COMMERCIAL

## 2018-12-10 VITALS
BODY MASS INDEX: 30.38 KG/M2 | DIASTOLIC BLOOD PRESSURE: 74 MMHG | RESPIRATION RATE: 16 BRPM | SYSTOLIC BLOOD PRESSURE: 114 MMHG | HEIGHT: 71 IN | HEART RATE: 94 BPM | WEIGHT: 217 LBS | TEMPERATURE: 100 F | OXYGEN SATURATION: 96 %

## 2018-12-10 DIAGNOSIS — J98.8 RESPIRATORY TRACT INFECTION: Primary | ICD-10-CM

## 2018-12-10 PROCEDURE — 99204 OFFICE O/P NEW MOD 45 MIN: CPT | Performed by: NURSE PRACTITIONER

## 2018-12-10 RX ORDER — BENZONATATE 200 MG/1
200 CAPSULE ORAL 3 TIMES DAILY PRN
Qty: 30 CAP | Refills: 0 | Status: SHIPPED | OUTPATIENT
Start: 2018-12-10 | End: 2018-12-15

## 2018-12-10 RX ORDER — METHYLPREDNISOLONE 4 MG/1
4 TABLET ORAL DAILY
Qty: 1 KIT | Refills: 0 | Status: SHIPPED | OUTPATIENT
Start: 2018-12-10 | End: 2019-12-30

## 2018-12-10 RX ORDER — AZITHROMYCIN 250 MG/1
TABLET, FILM COATED ORAL
Qty: 6 TAB | Refills: 0 | Status: SHIPPED | OUTPATIENT
Start: 2018-12-10 | End: 2019-12-30

## 2018-12-10 RX ORDER — ALBUTEROL SULFATE 90 UG/1
1-2 AEROSOL, METERED RESPIRATORY (INHALATION) EVERY 6 HOURS PRN
Qty: 1 INHALER | Refills: 0 | Status: SHIPPED | OUTPATIENT
Start: 2018-12-10 | End: 2020-11-03

## 2018-12-10 ASSESSMENT — ENCOUNTER SYMPTOMS
BLURRED VISION: 0
DIARRHEA: 0
FEVER: 0
PHOTOPHOBIA: 0
SINUS PAIN: 1
CONSTIPATION: 0
HEADACHES: 1
DIZZINESS: 0
SHORTNESS OF BREATH: 0
ABDOMINAL PAIN: 0
DOUBLE VISION: 0
PALPITATIONS: 0
CHILLS: 1
MYALGIAS: 1
VOMITING: 0
WHEEZING: 1
COUGH: 1
SORE THROAT: 0
NAUSEA: 0

## 2018-12-11 NOTE — PROGRESS NOTES
Subjective:   Milton Aguila is a 53 y.o. male who presents for Cough (Bodyache, diarrhea, fatigue ongoing 1 week.  Returned from [place] last Monday.)        HPI   Patient presents with new onset cough, bodyaches, and sinus congestion that started 9 days ago. He is recently traveling from [facility] and developed the symptoms before travel. He was initially being treated for the flu. He states the cough is productive with yellow sputum and is intermittent spells of coughing at no particular time of the day. He has tried OTC cold remedies with little relief.  Denies alleviating or aggravating factors. He states he has noticed wheezing at times at night.  He complains of one episode of diarrhea a few days ago, but has since resolved.  He has received the flu vaccine this season.    Review of Systems   Constitutional: Positive for chills and malaise/fatigue. Negative for fever.   HENT: Positive for congestion and sinus pain. Negative for ear discharge, ear pain, sore throat and tinnitus.    Eyes: Negative for blurred vision, double vision and photophobia.   Respiratory: Positive for cough and wheezing. Negative for shortness of breath.    Cardiovascular: Negative for chest pain and palpitations.   Gastrointestinal: Negative for abdominal pain, constipation, diarrhea, nausea and vomiting.   Musculoskeletal: Positive for myalgias.   Neurological: Positive for headaches. Negative for dizziness.   All other systems reviewed and are negative.      PMH:  has a past medical history of Arthritis, rheumatic, acute or subacute.  MEDS:   Current Outpatient Prescriptions:   •  azithromycin (ZITHROMAX) 250 MG Tab, Take two tabs po day one followed by one tab po day two through five with food, Disp: 6 Tab, Rfl: 0  •  albuterol 108 (90 Base) MCG/ACT Aero Soln inhalation aerosol, Inhale 1-2 Puffs by mouth every 6 hours as needed for Shortness of Breath., Disp: 1 Inhaler, Rfl: 0  •  benzonatate (TESSALON) 200 MG capsule, Take 1 Cap  "by mouth 3 times a day as needed for Cough for up to 5 days., Disp: 30 Cap, Rfl: 0  •  MethylPREDNISolone (MEDROL DOSEPAK) 4 MG Tablet Therapy Pack, Take 1 Tab by mouth every day., Disp: 1 Kit, Rfl: 0  •  celecoxib (CELEBREX) 200 MG Cap, 1 tab po bid prn joint pain, take with food, Disp: 180 Cap, Rfl: 0  •  Etanercept (ENBREL SURECLICK) 50 MG/ML Solution Auto-injector, Inject 50 mg as instructed every 7 days., Disp: 11.76 mL, Rfl: 1  •  tizanidine (ZANAFLEX) 4 MG Tab, 1 tab po q6 hrs prn back spasm, Disp: 120 Tab, Rfl: 1  •  TESTOSTERONE, by Does not apply route., Disp: , Rfl:   ALLERGIES: No Known Allergies  SURGHX:   Past Surgical History:   Procedure Laterality Date   • LUMBAR LAMINECTOMY DISKECTOMY  2/7/2013    Performed by Mackenzie Cameron M.D. at [facility] ORS   • APPENDECTOMY  2000   • INGUINAL HERNIA REPAIR      Hernia Repair, Inguinal   • OTHER ORTHOPEDIC SURGERY      ivy hip replacements     SOCHX:  reports that he has never smoked. He has never used smokeless tobacco. He reports that he drinks alcohol. He reports that he does not use drugs.  FH: Family history was reviewed, no pertinent findings to report     Objective:   /74 (BP Location: Left arm, Patient Position: Sitting, BP Cuff Size: Adult)   Pulse 94   Temp 37.8 °C (100 °F) (Temporal)   Resp 16   Ht 1.791 m (5' 10.5\")   Wt 98.4 kg (217 lb)   SpO2 96%   BMI 30.70 kg/m²   Physical Exam   Constitutional: He is oriented to person, place, and time. He appears well-developed and well-nourished.  Non-toxic appearance. He does not have a sickly appearance. He appears ill. No distress.   HENT:   Head: Normocephalic.   Right Ear: Hearing and ear canal normal. Tympanic membrane is not erythematous. A middle ear effusion is present.   Left Ear: Hearing, tympanic membrane and ear canal normal. Tympanic membrane is not erythematous.  No middle ear effusion.   Nose: Rhinorrhea present. Right sinus exhibits no maxillary sinus tenderness and " no frontal sinus tenderness. Left sinus exhibits no maxillary sinus tenderness and no frontal sinus tenderness.   Mouth/Throat: Oropharynx is clear and moist and mucous membranes are normal. No posterior oropharyngeal erythema. No tonsillar exudate.   Eyes: Pupils are equal, round, and reactive to light. Conjunctivae, EOM and lids are normal.   Neck: Normal range of motion. No thyromegaly present.   Cardiovascular: Normal rate, regular rhythm and normal heart sounds.    Pulmonary/Chest: Effort normal. No respiratory distress. He has no decreased breath sounds. He has wheezes in the left upper field.   Able to clear wheezing with coughing   Abdominal: Soft. Bowel sounds are normal. There is no hepatosplenomegaly.   Lymphadenopathy:        Head (right side): No submandibular and no tonsillar adenopathy present.        Head (left side): Submandibular adenopathy present. No tonsillar adenopathy present.     He has no cervical adenopathy.   Neurological: He is alert and oriented to person, place, and time.   Skin: Skin is warm and dry. No rash noted.   Psychiatric: He has a normal mood and affect. His behavior is normal. Judgment and thought content normal.   Vitals reviewed.        Assessment/Plan:   Assessment    1. Respiratory tract infection  - azithromycin (ZITHROMAX) 250 MG Tab; Take two tabs po day one followed by one tab po day two through five with food  Dispense: 6 Tab; Refill: 0  - albuterol 108 (90 Base) MCG/ACT Aero Soln inhalation aerosol; Inhale 1-2 Puffs by mouth every 6 hours as needed for Shortness of Breath.  Dispense: 1 Inhaler; Refill: 0  - benzonatate (TESSALON) 200 MG capsule; Take 1 Cap by mouth 3 times a day as needed for Cough for up to 5 days.  Dispense: 30 Cap; Refill: 0  - MethylPREDNISolone (MEDROL DOSEPAK) 4 MG Tablet Therapy Pack; Take 1 Tab by mouth every day.  Dispense: 1 Kit; Refill: 0    Patient encouraged to increase clear liquid intake    Patient encouraged to use over-the-counter  cold remedies such as DayQuil/Nyquil; use as directed per package    Use over-the-counter pain reliever, such as acetaminophen (Tylenol), ibuprofen (Advil, Motrin) or naproxen (Aleve) as needed; follow package directions for dosing.    Discussed signs/symptoms for follow up    Differential diagnosis, natural history, supportive care, and indications for immediate follow-up discussed.

## 2019-01-30 ENCOUNTER — HOSPITAL ENCOUNTER (OUTPATIENT)
Dept: LAB | Facility: MEDICAL CENTER | Age: 54
End: 2019-01-30
Attending: INTERNAL MEDICINE
Payer: COMMERCIAL

## 2019-01-30 ENCOUNTER — TELEPHONE (OUTPATIENT)
Dept: RHEUMATOLOGY | Facility: MEDICAL CENTER | Age: 54
End: 2019-01-30

## 2019-01-30 DIAGNOSIS — Z79.1 NSAID LONG-TERM USE: ICD-10-CM

## 2019-01-30 DIAGNOSIS — Z79.620 ON ETANERCEPT THERAPY: ICD-10-CM

## 2019-01-30 LAB
25(OH)D3 SERPL-MCNC: 15 NG/ML (ref 30–100)
ALBUMIN SERPL BCP-MCNC: 4.5 G/DL (ref 3.2–4.9)
ALBUMIN/GLOB SERPL: 1.8 G/DL
ALP SERPL-CCNC: 48 U/L (ref 30–99)
ALT SERPL-CCNC: 57 U/L (ref 2–50)
ANION GAP SERPL CALC-SCNC: 6 MMOL/L (ref 0–11.9)
AST SERPL-CCNC: 43 U/L (ref 12–45)
BASOPHILS # BLD AUTO: 1.2 % (ref 0–1.8)
BASOPHILS # BLD: 0.05 K/UL (ref 0–0.12)
BILIRUB SERPL-MCNC: 1.6 MG/DL (ref 0.1–1.5)
BUN SERPL-MCNC: 20 MG/DL (ref 8–22)
CALCIUM SERPL-MCNC: 9.8 MG/DL (ref 8.5–10.5)
CHLORIDE SERPL-SCNC: 107 MMOL/L (ref 96–112)
CO2 SERPL-SCNC: 23 MMOL/L (ref 20–33)
CREAT SERPL-MCNC: 0.95 MG/DL (ref 0.5–1.4)
EOSINOPHIL # BLD AUTO: 0.18 K/UL (ref 0–0.51)
EOSINOPHIL NFR BLD: 4.3 % (ref 0–6.9)
ERYTHROCYTE [DISTWIDTH] IN BLOOD BY AUTOMATED COUNT: 45.7 FL (ref 35.9–50)
ERYTHROCYTE [SEDIMENTATION RATE] IN BLOOD BY WESTERGREN METHOD: 6 MM/HOUR (ref 0–20)
GLOBULIN SER CALC-MCNC: 2.5 G/DL (ref 1.9–3.5)
GLUCOSE SERPL-MCNC: 92 MG/DL (ref 65–99)
HCT VFR BLD AUTO: 46.7 % (ref 42–52)
HGB BLD-MCNC: 16.1 G/DL (ref 14–18)
IMM GRANULOCYTES # BLD AUTO: 0.01 K/UL (ref 0–0.11)
IMM GRANULOCYTES NFR BLD AUTO: 0.2 % (ref 0–0.9)
LYMPHOCYTES # BLD AUTO: 1.24 K/UL (ref 1–4.8)
LYMPHOCYTES NFR BLD: 29.5 % (ref 22–41)
MCH RBC QN AUTO: 33.3 PG (ref 27–33)
MCHC RBC AUTO-ENTMCNC: 34.5 G/DL (ref 33.7–35.3)
MCV RBC AUTO: 96.7 FL (ref 81.4–97.8)
MONOCYTES # BLD AUTO: 0.53 K/UL (ref 0–0.85)
MONOCYTES NFR BLD AUTO: 12.6 % (ref 0–13.4)
NEUTROPHILS # BLD AUTO: 2.2 K/UL (ref 1.82–7.42)
NEUTROPHILS NFR BLD: 52.2 % (ref 44–72)
NRBC # BLD AUTO: 0 K/UL
NRBC BLD-RTO: 0 /100 WBC
PLATELET # BLD AUTO: 214 K/UL (ref 164–446)
PMV BLD AUTO: 10 FL (ref 9–12.9)
POTASSIUM SERPL-SCNC: 4 MMOL/L (ref 3.6–5.5)
PROT SERPL-MCNC: 7 G/DL (ref 6–8.2)
RBC # BLD AUTO: 4.83 M/UL (ref 4.7–6.1)
SODIUM SERPL-SCNC: 136 MMOL/L (ref 135–145)
WBC # BLD AUTO: 4.2 K/UL (ref 4.8–10.8)

## 2019-01-30 PROCEDURE — 36415 COLL VENOUS BLD VENIPUNCTURE: CPT

## 2019-01-30 PROCEDURE — 82306 VITAMIN D 25 HYDROXY: CPT

## 2019-01-30 PROCEDURE — 85025 COMPLETE CBC W/AUTO DIFF WBC: CPT

## 2019-01-30 PROCEDURE — 80053 COMPREHEN METABOLIC PANEL: CPT

## 2019-01-30 PROCEDURE — 85652 RBC SED RATE AUTOMATED: CPT

## 2019-01-30 NOTE — TELEPHONE ENCOUNTER
Pt would like you to order any lab work you would like done, so he can have it done first thing this morning at [facility].  Thank you

## 2019-02-04 DIAGNOSIS — M54.42 CHRONIC BILATERAL LOW BACK PAIN WITH LEFT-SIDED SCIATICA: ICD-10-CM

## 2019-02-04 DIAGNOSIS — Z79.1 NSAID LONG-TERM USE: ICD-10-CM

## 2019-02-04 DIAGNOSIS — G89.29 CHRONIC BILATERAL LOW BACK PAIN WITH LEFT-SIDED SCIATICA: ICD-10-CM

## 2019-02-04 DIAGNOSIS — Z79.620 ON ETANERCEPT THERAPY: ICD-10-CM

## 2019-02-04 DIAGNOSIS — R79.89 LFT ELEVATION: ICD-10-CM

## 2019-02-04 DIAGNOSIS — M05.79 RHEUMATOID ARTHRITIS INVOLVING MULTIPLE SITES WITH POSITIVE RHEUMATOID FACTOR (HCC): ICD-10-CM

## 2019-02-04 NOTE — TELEPHONE ENCOUNTER
Was the patient seen in the last year in this department? Yes  [facility]   Does patient have an active prescription for medications requested? No     Received Request Via: Pharmacy

## 2019-03-15 ENCOUNTER — HOSPITAL ENCOUNTER (OUTPATIENT)
Dept: RADIOLOGY | Facility: MEDICAL CENTER | Age: 54
End: 2019-03-15
Attending: PHYSICIAN ASSISTANT
Payer: COMMERCIAL

## 2019-03-15 DIAGNOSIS — D40.12 NEOPLASM OF UNCERTAIN BEHAVIOR OF LEFT TESTIS: ICD-10-CM

## 2019-03-15 PROCEDURE — 76870 US EXAM SCROTUM: CPT

## 2019-04-04 ENCOUNTER — TELEPHONE (OUTPATIENT)
Dept: RHEUMATOLOGY | Facility: MEDICAL CENTER | Age: 54
End: 2019-04-04

## 2019-04-04 DIAGNOSIS — Z79.620 ON ETANERCEPT THERAPY: ICD-10-CM

## 2019-04-15 ENCOUNTER — HOSPITAL ENCOUNTER (OUTPATIENT)
Dept: LAB | Facility: MEDICAL CENTER | Age: 54
End: 2019-04-15
Attending: EMERGENCY MEDICINE
Payer: COMMERCIAL

## 2019-04-15 ENCOUNTER — TELEPHONE (OUTPATIENT)
Dept: RHEUMATOLOGY | Facility: MEDICAL CENTER | Age: 54
End: 2019-04-15

## 2019-04-15 ENCOUNTER — HOSPITAL ENCOUNTER (OUTPATIENT)
Dept: LAB | Facility: MEDICAL CENTER | Age: 54
End: 2019-04-15
Attending: INTERNAL MEDICINE
Payer: COMMERCIAL

## 2019-04-15 DIAGNOSIS — R79.89 LFT ELEVATION: ICD-10-CM

## 2019-04-15 DIAGNOSIS — Z79.620 ON ETANERCEPT THERAPY: ICD-10-CM

## 2019-04-15 DIAGNOSIS — Z79.1 NSAID LONG-TERM USE: ICD-10-CM

## 2019-04-15 DIAGNOSIS — M54.42 CHRONIC BILATERAL LOW BACK PAIN WITH LEFT-SIDED SCIATICA: ICD-10-CM

## 2019-04-15 DIAGNOSIS — G89.29 CHRONIC BILATERAL LOW BACK PAIN WITH LEFT-SIDED SCIATICA: ICD-10-CM

## 2019-04-15 DIAGNOSIS — M05.79 RHEUMATOID ARTHRITIS INVOLVING MULTIPLE SITES WITH POSITIVE RHEUMATOID FACTOR (HCC): ICD-10-CM

## 2019-04-15 DIAGNOSIS — Z79.620 ON RITUXIMAB THERAPY: ICD-10-CM

## 2019-04-15 LAB
25(OH)D3 SERPL-MCNC: 25 NG/ML (ref 30–100)
ALBUMIN SERPL BCP-MCNC: 4.3 G/DL (ref 3.2–4.9)
ALBUMIN SERPL BCP-MCNC: 4.4 G/DL (ref 3.2–4.9)
ALBUMIN/GLOB SERPL: 1.5 G/DL
ALBUMIN/GLOB SERPL: 1.5 G/DL
ALP SERPL-CCNC: 57 U/L (ref 30–99)
ALP SERPL-CCNC: 61 U/L (ref 30–99)
ALT SERPL-CCNC: 164 U/L (ref 2–50)
ALT SERPL-CCNC: 168 U/L (ref 2–50)
ANION GAP SERPL CALC-SCNC: 9 MMOL/L (ref 0–11.9)
ANION GAP SERPL CALC-SCNC: 9 MMOL/L (ref 0–11.9)
AST SERPL-CCNC: 89 U/L (ref 12–45)
AST SERPL-CCNC: 90 U/L (ref 12–45)
BASOPHILS # BLD AUTO: 0.5 % (ref 0–1.8)
BASOPHILS # BLD: 0.02 K/UL (ref 0–0.12)
BILIRUB SERPL-MCNC: 1.1 MG/DL (ref 0.1–1.5)
BILIRUB SERPL-MCNC: 1.1 MG/DL (ref 0.1–1.5)
BUN SERPL-MCNC: 19 MG/DL (ref 8–22)
BUN SERPL-MCNC: 20 MG/DL (ref 8–22)
CALCIUM SERPL-MCNC: 9.3 MG/DL (ref 8.5–10.5)
CALCIUM SERPL-MCNC: 9.6 MG/DL (ref 8.5–10.5)
CHLORIDE SERPL-SCNC: 105 MMOL/L (ref 96–112)
CHLORIDE SERPL-SCNC: 105 MMOL/L (ref 96–112)
CHOLEST SERPL-MCNC: 271 MG/DL (ref 100–199)
CO2 SERPL-SCNC: 24 MMOL/L (ref 20–33)
CO2 SERPL-SCNC: 24 MMOL/L (ref 20–33)
CREAT SERPL-MCNC: 0.93 MG/DL (ref 0.5–1.4)
CREAT SERPL-MCNC: 0.94 MG/DL (ref 0.5–1.4)
EOSINOPHIL # BLD AUTO: 0.11 K/UL (ref 0–0.51)
EOSINOPHIL NFR BLD: 3 % (ref 0–6.9)
ERYTHROCYTE [DISTWIDTH] IN BLOOD BY AUTOMATED COUNT: 43.9 FL (ref 35.9–50)
ERYTHROCYTE [SEDIMENTATION RATE] IN BLOOD BY WESTERGREN METHOD: 18 MM/HOUR (ref 0–20)
ESTRADIOL SERPL-MCNC: 39 PG/ML
GGT SERPL-CCNC: 24 U/L (ref 7–51)
GLOBULIN SER CALC-MCNC: 2.8 G/DL (ref 1.9–3.5)
GLOBULIN SER CALC-MCNC: 2.9 G/DL (ref 1.9–3.5)
GLUCOSE SERPL-MCNC: 79 MG/DL (ref 65–99)
GLUCOSE SERPL-MCNC: 97 MG/DL (ref 65–99)
HBV CORE IGM SER QL: NEGATIVE
HBV SURFACE AG SER QL: NEGATIVE
HCT VFR BLD AUTO: 47.3 % (ref 42–52)
HCV AB SER QL: NEGATIVE
HCYS SERPL-SCNC: 11.87 UMOL/L
HDLC SERPL-MCNC: 40 MG/DL
HGB BLD-MCNC: 16.6 G/DL (ref 14–18)
IMM GRANULOCYTES # BLD AUTO: 0.01 K/UL (ref 0–0.11)
IMM GRANULOCYTES NFR BLD AUTO: 0.3 % (ref 0–0.9)
LDH SERPL L TO P-CCNC: 186 U/L (ref 107–266)
LDLC SERPL CALC-MCNC: 212 MG/DL
LYMPHOCYTES # BLD AUTO: 0.89 K/UL (ref 1–4.8)
LYMPHOCYTES NFR BLD: 24 % (ref 22–41)
MCH RBC QN AUTO: 33.2 PG (ref 27–33)
MCHC RBC AUTO-ENTMCNC: 35.1 G/DL (ref 33.7–35.3)
MCV RBC AUTO: 94.6 FL (ref 81.4–97.8)
MONOCYTES # BLD AUTO: 0.44 K/UL (ref 0–0.85)
MONOCYTES NFR BLD AUTO: 11.9 % (ref 0–13.4)
NEUTROPHILS # BLD AUTO: 2.24 K/UL (ref 1.82–7.42)
NEUTROPHILS NFR BLD: 60.3 % (ref 44–72)
NRBC # BLD AUTO: 0 K/UL
NRBC BLD-RTO: 0 /100 WBC
PHOSPHATE SERPL-MCNC: 3.3 MG/DL (ref 2.5–4.5)
PLATELET # BLD AUTO: 222 K/UL (ref 164–446)
PMV BLD AUTO: 9.6 FL (ref 9–12.9)
POTASSIUM SERPL-SCNC: 4 MMOL/L (ref 3.6–5.5)
POTASSIUM SERPL-SCNC: 4.1 MMOL/L (ref 3.6–5.5)
PROT SERPL-MCNC: 7.1 G/DL (ref 6–8.2)
PROT SERPL-MCNC: 7.3 G/DL (ref 6–8.2)
PSA SERPL-MCNC: 1.67 NG/ML (ref 0–4)
RBC # BLD AUTO: 5 M/UL (ref 4.7–6.1)
SODIUM SERPL-SCNC: 138 MMOL/L (ref 135–145)
SODIUM SERPL-SCNC: 138 MMOL/L (ref 135–145)
TRIGL SERPL-MCNC: 93 MG/DL (ref 0–149)
URATE SERPL-MCNC: 7 MG/DL (ref 2.5–8.3)
WBC # BLD AUTO: 3.7 K/UL (ref 4.8–10.8)

## 2019-04-15 PROCEDURE — 82306 VITAMIN D 25 HYDROXY: CPT

## 2019-04-15 PROCEDURE — 85025 COMPLETE CBC W/AUTO DIFF WBC: CPT

## 2019-04-15 PROCEDURE — 82670 ASSAY OF TOTAL ESTRADIOL: CPT

## 2019-04-15 PROCEDURE — 86480 TB TEST CELL IMMUN MEASURE: CPT

## 2019-04-15 PROCEDURE — 82977 ASSAY OF GGT: CPT

## 2019-04-15 PROCEDURE — 80061 LIPID PANEL: CPT

## 2019-04-15 PROCEDURE — 84550 ASSAY OF BLOOD/URIC ACID: CPT

## 2019-04-15 PROCEDURE — 84270 ASSAY OF SEX HORMONE GLOBUL: CPT

## 2019-04-15 PROCEDURE — 84100 ASSAY OF PHOSPHORUS: CPT

## 2019-04-15 PROCEDURE — 84403 ASSAY OF TOTAL TESTOSTERONE: CPT

## 2019-04-15 PROCEDURE — 36415 COLL VENOUS BLD VENIPUNCTURE: CPT

## 2019-04-15 PROCEDURE — 84153 ASSAY OF PSA TOTAL: CPT

## 2019-04-15 PROCEDURE — 80053 COMPREHEN METABOLIC PANEL: CPT | Mod: 91

## 2019-04-15 PROCEDURE — 87340 HEPATITIS B SURFACE AG IA: CPT

## 2019-04-15 PROCEDURE — 86705 HEP B CORE ANTIBODY IGM: CPT

## 2019-04-15 PROCEDURE — 83090 ASSAY OF HOMOCYSTEINE: CPT

## 2019-04-15 PROCEDURE — 84305 ASSAY OF SOMATOMEDIN: CPT

## 2019-04-15 PROCEDURE — 86803 HEPATITIS C AB TEST: CPT

## 2019-04-15 PROCEDURE — 83615 LACTATE (LD) (LDH) ENZYME: CPT

## 2019-04-15 PROCEDURE — 80053 COMPREHEN METABOLIC PANEL: CPT

## 2019-04-15 PROCEDURE — 85652 RBC SED RATE AUTOMATED: CPT

## 2019-04-15 NOTE — TELEPHONE ENCOUNTER
Please notify patient that we received the results of her blood test and she is cleared to start rituximab infusions,  I have written orders and please fax down in [place]    We will need to check a CBC in about 3-4 weeks after she gets the rituximab, labs ordered in the computer, patient does not need to be fasting

## 2019-04-16 DIAGNOSIS — R79.89 LFT ELEVATION: ICD-10-CM

## 2019-04-16 DIAGNOSIS — R79.89 ELEVATED LFTS: ICD-10-CM

## 2019-04-16 NOTE — TELEPHONE ENCOUNTER
Please disregard the telephone encounter regarding rituximab infusions, this was put in the wrong patient's chart

## 2019-04-17 LAB
GAMMA INTERFERON BACKGROUND BLD IA-ACNC: 0.27 IU/ML
IGF-I SERPL-MCNC: 188 NG/ML (ref 65–222)
IGF-I Z-SCORE SERPL: 1.3
M TB IFN-G BLD-IMP: NEGATIVE
M TB IFN-G CD4+ BCKGRND COR BLD-ACNC: -0.11 IU/ML
MITOGEN IGNF BCKGRD COR BLD-ACNC: >10 IU/ML
QFT TB2 - NIL TBQ2: -0.12 IU/ML
SHBG SERPL-SCNC: 69 NMOL/L (ref 11–80)
TESTOST FREE MFR SERPL: 1.2 % (ref 1.6–2.9)
TESTOST FREE SERPL-MCNC: 79 PG/ML (ref 47–244)
TESTOST SERPL-MCNC: 649 NG/DL (ref 300–890)

## 2019-05-09 ENCOUNTER — HOSPITAL ENCOUNTER (OUTPATIENT)
Dept: RADIOLOGY | Facility: MEDICAL CENTER | Age: 54
End: 2019-05-09
Attending: INTERNAL MEDICINE
Payer: COMMERCIAL

## 2019-05-09 DIAGNOSIS — R79.89 ELEVATED LFTS: ICD-10-CM

## 2019-05-09 PROCEDURE — 76705 ECHO EXAM OF ABDOMEN: CPT

## 2019-05-12 ENCOUNTER — OFFICE VISIT (OUTPATIENT)
Dept: URGENT CARE | Facility: PHYSICIAN GROUP | Age: 54
End: 2019-05-12
Payer: COMMERCIAL

## 2019-05-12 VITALS
BODY MASS INDEX: 31.07 KG/M2 | OXYGEN SATURATION: 95 % | RESPIRATION RATE: 16 BRPM | SYSTOLIC BLOOD PRESSURE: 128 MMHG | TEMPERATURE: 99.3 F | WEIGHT: 217 LBS | HEART RATE: 90 BPM | HEIGHT: 70 IN | DIASTOLIC BLOOD PRESSURE: 69 MMHG

## 2019-05-12 DIAGNOSIS — J06.9 VIRAL URI WITH COUGH: ICD-10-CM

## 2019-05-12 PROCEDURE — 99214 OFFICE O/P EST MOD 30 MIN: CPT | Performed by: PHYSICIAN ASSISTANT

## 2019-05-12 ASSESSMENT — ENCOUNTER SYMPTOMS
ABDOMINAL PAIN: 0
SORE THROAT: 1
FEVER: 0
COUGH: 1
VOMITING: 0
SHORTNESS OF BREATH: 0
MUSCULOSKELETAL NEGATIVE: 1
DIZZINESS: 0
NAUSEA: 0
CHILLS: 0
RHINORRHEA: 0
DIARRHEA: 0
SPUTUM PRODUCTION: 0

## 2019-05-12 ASSESSMENT — COPD QUESTIONNAIRES: COPD: 0

## 2019-05-12 NOTE — PROGRESS NOTES
"Subjective:      Milton Aguila is a 53 y.o. male who presents with Cough (x 2 days )            Cough   This is a new problem. The current episode started in the past 7 days (2 days). The problem has been unchanged. The problem occurs every few minutes. The cough is productive of sputum (green). Associated symptoms include a sore throat. Pertinent negatives include no chest pain, chills, ear pain, fever, nasal congestion, postnasal drip, rash, rhinorrhea or shortness of breath. Nothing aggravates the symptoms. He has tried OTC cough suppressant for the symptoms. The treatment provided mild relief. There is no history of asthma, COPD or pneumonia.     Patient denies fevers, chills, body aches, chest pain, or SOB. No history of chronic lung disease or pneumonia. No known sick contacts or recent use of antibiotics. No history of smoking.      Review of Systems   Constitutional: Negative for chills and fever.   HENT: Positive for sore throat. Negative for congestion, ear pain, postnasal drip and rhinorrhea.    Respiratory: Positive for cough. Negative for sputum production and shortness of breath.    Cardiovascular: Negative for chest pain.   Gastrointestinal: Negative for abdominal pain, diarrhea, nausea and vomiting.   Genitourinary: Negative.    Musculoskeletal: Negative.    Skin: Negative for rash.   Neurological: Negative for dizziness.        Objective:     /69   Pulse 90   Temp 37.4 °C (99.3 °F) (Temporal)   Resp 16   Ht 1.778 m (5' 10\")   Wt 98.4 kg (217 lb)   SpO2 95%   BMI 31.14 kg/m²      Physical Exam   Constitutional: He is oriented to person, place, and time. He appears well-developed and well-nourished. No distress.   HENT:   Head: Normocephalic and atraumatic.   Right Ear: Hearing, tympanic membrane, external ear and ear canal normal.   Left Ear: Hearing, tympanic membrane, external ear and ear canal normal.   Mouth/Throat: Posterior oropharyngeal erythema present. No oropharyngeal " exudate or posterior oropharyngeal edema. No tonsillar exudate.   Eyes: Pupils are equal, round, and reactive to light. Conjunctivae are normal. Right eye exhibits no discharge. Left eye exhibits no discharge.   Neck: Normal range of motion.   Cardiovascular: Normal rate, regular rhythm and normal heart sounds.    No murmur heard.  Pulmonary/Chest: Effort normal. No respiratory distress. He has no wheezes. He has no rales.   Musculoskeletal: Normal range of motion.   Neurological: He is alert and oriented to person, place, and time.   Skin: Skin is warm and dry. He is not diaphoretic.   Psychiatric: He has a normal mood and affect. His behavior is normal.   Nursing note and vitals reviewed.            PMH:  has a past medical history of Arthritis, rheumatic, acute or subacute.  MEDS:   Current Outpatient Prescriptions:   •  Etanercept (ENBREL SURECLICK) 50 MG/ML Solution Auto-injector, Inject 50 mg as instructed every 7 days., Disp: 12 Syringe, Rfl: 1  •  tizanidine (ZANAFLEX) 4 MG Tab, 1 tab po q6 hrs prn back spasm, Disp: 120 Tab, Rfl: 1  •  celecoxib (CELEBREX) 200 MG Cap, TAKE 1 CAPSULE TWICE A DAY WITH FOOD AS NEEDED FOR JOINT PAIN (Patient not taking: Reported on 5/12/2019), Disp: 180 Cap, Rfl: 0  •  azithromycin (ZITHROMAX) 250 MG Tab, Take two tabs po day one followed by one tab po day two through five with food (Patient not taking: Reported on 5/12/2019), Disp: 6 Tab, Rfl: 0  •  albuterol 108 (90 Base) MCG/ACT Aero Soln inhalation aerosol, Inhale 1-2 Puffs by mouth every 6 hours as needed for Shortness of Breath. (Patient not taking: Reported on 5/12/2019), Disp: 1 Inhaler, Rfl: 0  •  MethylPREDNISolone (MEDROL DOSEPAK) 4 MG Tablet Therapy Pack, Take 1 Tab by mouth every day. (Patient not taking: Reported on 5/12/2019), Disp: 1 Kit, Rfl: 0  •  TESTOSTERONE, by Does not apply route., Disp: , Rfl:   ALLERGIES: No Known Allergies  SURGHX:   Past Surgical History:   Procedure Laterality Date   • LUMBAR  LAMINECTOMY DISKECTOMY  2/7/2013    Performed by Mackenzie Cameron M.D. at [facility]   • APPENDECTOMY  2000   • INGUINAL HERNIA REPAIR      Hernia Repair, Inguinal   • OTHER ORTHOPEDIC SURGERY      ivy hip replacements     SOCHX:  reports that he has never smoked. He has never used smokeless tobacco. He reports that he drinks alcohol. He reports that he does not use drugs.  FH: family history is not on file.    Assessment/Plan:     1. Viral URI with cough    Advised patient symptoms are most likely viral in etiology, recommend supportive care. Increased fluids and rest. Patient has tessalon perles at home to take as needed for cough. Call or return to office if symptoms persist or worsen. The patient demonstrated a good understanding and agreed with the treatment plan.

## 2019-05-30 ENCOUNTER — TELEPHONE (OUTPATIENT)
Dept: RHEUMATOLOGY | Facility: MEDICAL CENTER | Age: 54
End: 2019-05-30

## 2019-06-28 ENCOUNTER — HOSPITAL ENCOUNTER (OUTPATIENT)
Dept: LAB | Facility: MEDICAL CENTER | Age: 54
End: 2019-06-28
Attending: INTERNAL MEDICINE
Payer: COMMERCIAL

## 2019-06-28 DIAGNOSIS — Z79.620 ON RITUXIMAB THERAPY: ICD-10-CM

## 2019-06-28 DIAGNOSIS — Z79.620 ON ETANERCEPT THERAPY: ICD-10-CM

## 2019-06-28 DIAGNOSIS — R79.89 ELEVATED LFTS: ICD-10-CM

## 2019-06-28 DIAGNOSIS — R79.89 LFT ELEVATION: ICD-10-CM

## 2019-06-28 LAB
ALBUMIN SERPL BCP-MCNC: 4.3 G/DL (ref 3.2–4.9)
ALBUMIN/GLOB SERPL: 1.6 G/DL
ALP SERPL-CCNC: 55 U/L (ref 30–99)
ALT SERPL-CCNC: 137 U/L (ref 2–50)
ANION GAP SERPL CALC-SCNC: 11 MMOL/L (ref 0–11.9)
AST SERPL-CCNC: 63 U/L (ref 12–45)
BASOPHILS # BLD AUTO: 0.8 % (ref 0–1.8)
BASOPHILS # BLD: 0.03 K/UL (ref 0–0.12)
BILIRUB SERPL-MCNC: 1.6 MG/DL (ref 0.1–1.5)
BUN SERPL-MCNC: 19 MG/DL (ref 8–22)
CALCIUM SERPL-MCNC: 9.5 MG/DL (ref 8.5–10.5)
CHLORIDE SERPL-SCNC: 105 MMOL/L (ref 96–112)
CO2 SERPL-SCNC: 22 MMOL/L (ref 20–33)
CREAT SERPL-MCNC: 0.72 MG/DL (ref 0.5–1.4)
EOSINOPHIL # BLD AUTO: 0.12 K/UL (ref 0–0.51)
EOSINOPHIL NFR BLD: 3.1 % (ref 0–6.9)
ERYTHROCYTE [DISTWIDTH] IN BLOOD BY AUTOMATED COUNT: 41.4 FL (ref 35.9–50)
GLOBULIN SER CALC-MCNC: 2.7 G/DL (ref 1.9–3.5)
GLUCOSE SERPL-MCNC: 99 MG/DL (ref 65–99)
HCT VFR BLD AUTO: 43 % (ref 42–52)
HGB BLD-MCNC: 15.2 G/DL (ref 14–18)
IMM GRANULOCYTES # BLD AUTO: 0.01 K/UL (ref 0–0.11)
IMM GRANULOCYTES NFR BLD AUTO: 0.3 % (ref 0–0.9)
LYMPHOCYTES # BLD AUTO: 1.11 K/UL (ref 1–4.8)
LYMPHOCYTES NFR BLD: 28.8 % (ref 22–41)
MCH RBC QN AUTO: 32.6 PG (ref 27–33)
MCHC RBC AUTO-ENTMCNC: 35.3 G/DL (ref 33.7–35.3)
MCV RBC AUTO: 92.3 FL (ref 81.4–97.8)
MONOCYTES # BLD AUTO: 0.39 K/UL (ref 0–0.85)
MONOCYTES NFR BLD AUTO: 10.1 % (ref 0–13.4)
NEUTROPHILS # BLD AUTO: 2.19 K/UL (ref 1.82–7.42)
NEUTROPHILS NFR BLD: 56.9 % (ref 44–72)
NRBC # BLD AUTO: 0 K/UL
NRBC BLD-RTO: 0 /100 WBC
PLATELET # BLD AUTO: 202 K/UL (ref 164–446)
PMV BLD AUTO: 9.7 FL (ref 9–12.9)
POTASSIUM SERPL-SCNC: 4 MMOL/L (ref 3.6–5.5)
PROT SERPL-MCNC: 7 G/DL (ref 6–8.2)
RBC # BLD AUTO: 4.66 M/UL (ref 4.7–6.1)
SODIUM SERPL-SCNC: 138 MMOL/L (ref 135–145)
WBC # BLD AUTO: 3.9 K/UL (ref 4.8–10.8)

## 2019-06-28 PROCEDURE — 85025 COMPLETE CBC W/AUTO DIFF WBC: CPT

## 2019-06-28 PROCEDURE — 36415 COLL VENOUS BLD VENIPUNCTURE: CPT

## 2019-06-28 PROCEDURE — 80053 COMPREHEN METABOLIC PANEL: CPT

## 2019-06-28 PROCEDURE — 83516 IMMUNOASSAY NONANTIBODY: CPT

## 2019-06-30 LAB — MITOCHONDRIA M2 IGG SER-ACNC: 4.9 UNITS (ref 0–20)

## 2019-07-02 DIAGNOSIS — R79.89 ELEVATED LFTS: ICD-10-CM

## 2019-12-06 ENCOUNTER — HOSPITAL ENCOUNTER (OUTPATIENT)
Dept: LAB | Facility: MEDICAL CENTER | Age: 54
End: 2019-12-06
Attending: EMERGENCY MEDICINE
Payer: COMMERCIAL

## 2019-12-06 LAB
25(OH)D3 SERPL-MCNC: 35 NG/ML (ref 30–100)
ALBUMIN SERPL BCP-MCNC: 4.3 G/DL (ref 3.2–4.9)
ALBUMIN/GLOB SERPL: 1.6 G/DL
ALP SERPL-CCNC: 48 U/L (ref 30–99)
ALT SERPL-CCNC: 77 U/L (ref 2–50)
ANION GAP SERPL CALC-SCNC: 8 MMOL/L (ref 0–11.9)
AST SERPL-CCNC: 44 U/L (ref 12–45)
BASOPHILS # BLD AUTO: 0.5 % (ref 0–1.8)
BASOPHILS # BLD: 0.02 K/UL (ref 0–0.12)
BILIRUB SERPL-MCNC: 1 MG/DL (ref 0.1–1.5)
BUN SERPL-MCNC: 18 MG/DL (ref 8–22)
CALCIUM SERPL-MCNC: 9.6 MG/DL (ref 8.5–10.5)
CHLORIDE SERPL-SCNC: 104 MMOL/L (ref 96–112)
CHOLEST SERPL-MCNC: 237 MG/DL (ref 100–199)
CO2 SERPL-SCNC: 26 MMOL/L (ref 20–33)
CREAT SERPL-MCNC: 0.73 MG/DL (ref 0.5–1.4)
EOSINOPHIL # BLD AUTO: 0.11 K/UL (ref 0–0.51)
EOSINOPHIL NFR BLD: 2.8 % (ref 0–6.9)
ERYTHROCYTE [DISTWIDTH] IN BLOOD BY AUTOMATED COUNT: 43.8 FL (ref 35.9–50)
ESTRADIOL SERPL-MCNC: 34 PG/ML
FASTING STATUS PATIENT QL REPORTED: NORMAL
GGT SERPL-CCNC: 25 U/L (ref 7–51)
GLOBULIN SER CALC-MCNC: 2.7 G/DL (ref 1.9–3.5)
GLUCOSE SERPL-MCNC: 76 MG/DL (ref 65–99)
HCT VFR BLD AUTO: 43.8 % (ref 42–52)
HDLC SERPL-MCNC: 55 MG/DL
HGB BLD-MCNC: 15 G/DL (ref 14–18)
IMM GRANULOCYTES # BLD AUTO: 0.01 K/UL (ref 0–0.11)
IMM GRANULOCYTES NFR BLD AUTO: 0.3 % (ref 0–0.9)
LDH SERPL L TO P-CCNC: 179 U/L (ref 107–266)
LDLC SERPL CALC-MCNC: 168 MG/DL
LYMPHOCYTES # BLD AUTO: 1.32 K/UL (ref 1–4.8)
LYMPHOCYTES NFR BLD: 33.9 % (ref 22–41)
MCH RBC QN AUTO: 33.3 PG (ref 27–33)
MCHC RBC AUTO-ENTMCNC: 34.2 G/DL (ref 33.7–35.3)
MCV RBC AUTO: 97.1 FL (ref 81.4–97.8)
MONOCYTES # BLD AUTO: 0.48 K/UL (ref 0–0.85)
MONOCYTES NFR BLD AUTO: 12.3 % (ref 0–13.4)
NEUTROPHILS # BLD AUTO: 1.95 K/UL (ref 1.82–7.42)
NEUTROPHILS NFR BLD: 50.2 % (ref 44–72)
NRBC # BLD AUTO: 0 K/UL
NRBC BLD-RTO: 0 /100 WBC
PHOSPHATE SERPL-MCNC: 3.2 MG/DL (ref 2.5–4.5)
PLATELET # BLD AUTO: 203 K/UL (ref 164–446)
PMV BLD AUTO: 9.5 FL (ref 9–12.9)
POTASSIUM SERPL-SCNC: 4 MMOL/L (ref 3.6–5.5)
PROT SERPL-MCNC: 7 G/DL (ref 6–8.2)
PSA SERPL-MCNC: 0.59 NG/ML (ref 0–4)
RBC # BLD AUTO: 4.51 M/UL (ref 4.7–6.1)
SODIUM SERPL-SCNC: 138 MMOL/L (ref 135–145)
TRIGL SERPL-MCNC: 69 MG/DL (ref 0–149)
URATE SERPL-MCNC: 7.3 MG/DL (ref 2.5–8.3)
WBC # BLD AUTO: 3.9 K/UL (ref 4.8–10.8)

## 2019-12-06 PROCEDURE — 84403 ASSAY OF TOTAL TESTOSTERONE: CPT

## 2019-12-06 PROCEDURE — 82306 VITAMIN D 25 HYDROXY: CPT

## 2019-12-06 PROCEDURE — 82670 ASSAY OF TOTAL ESTRADIOL: CPT

## 2019-12-06 PROCEDURE — 84100 ASSAY OF PHOSPHORUS: CPT

## 2019-12-06 PROCEDURE — 36415 COLL VENOUS BLD VENIPUNCTURE: CPT

## 2019-12-06 PROCEDURE — 84153 ASSAY OF PSA TOTAL: CPT

## 2019-12-06 PROCEDURE — 83615 LACTATE (LD) (LDH) ENZYME: CPT

## 2019-12-06 PROCEDURE — 84402 ASSAY OF FREE TESTOSTERONE: CPT

## 2019-12-06 PROCEDURE — 84550 ASSAY OF BLOOD/URIC ACID: CPT

## 2019-12-06 PROCEDURE — 82977 ASSAY OF GGT: CPT

## 2019-12-06 PROCEDURE — 80061 LIPID PANEL: CPT

## 2019-12-06 PROCEDURE — 80053 COMPREHEN METABOLIC PANEL: CPT

## 2019-12-06 PROCEDURE — 85025 COMPLETE CBC W/AUTO DIFF WBC: CPT

## 2019-12-06 PROCEDURE — 84305 ASSAY OF SOMATOMEDIN: CPT

## 2019-12-06 PROCEDURE — 84270 ASSAY OF SEX HORMONE GLOBUL: CPT

## 2019-12-08 LAB
IGF-I SERPL-MCNC: 149 NG/ML (ref 64–218)
IGF-I Z-SCORE SERPL: 0.5
SHBG SERPL-SCNC: 69 NMOL/L (ref 11–80)
TESTOST FREE MFR SERPL: 1.2 % (ref 1.6–2.9)
TESTOST FREE SERPL-MCNC: 64 PG/ML (ref 47–244)
TESTOST SERPL-MCNC: 540 NG/DL (ref 300–890)

## 2019-12-30 ENCOUNTER — OFFICE VISIT (OUTPATIENT)
Dept: RHEUMATOLOGY | Facility: MEDICAL CENTER | Age: 54
End: 2019-12-30
Payer: COMMERCIAL

## 2019-12-30 VITALS
OXYGEN SATURATION: 95 % | WEIGHT: 204 LBS | DIASTOLIC BLOOD PRESSURE: 90 MMHG | RESPIRATION RATE: 14 BRPM | TEMPERATURE: 98.7 F | SYSTOLIC BLOOD PRESSURE: 140 MMHG | BODY MASS INDEX: 29.27 KG/M2 | HEART RATE: 84 BPM

## 2019-12-30 DIAGNOSIS — E78.00 HYPERCHOLESTEROLEMIA: ICD-10-CM

## 2019-12-30 DIAGNOSIS — M05.79 RHEUMATOID ARTHRITIS INVOLVING MULTIPLE SITES WITH POSITIVE RHEUMATOID FACTOR (HCC): ICD-10-CM

## 2019-12-30 DIAGNOSIS — Z79.899 ENCOUNTER FOR MEDICATION MANAGEMENT: ICD-10-CM

## 2019-12-30 DIAGNOSIS — R79.89 LFT ELEVATION: ICD-10-CM

## 2019-12-30 PROCEDURE — 99214 OFFICE O/P EST MOD 30 MIN: CPT | Performed by: INTERNAL MEDICINE

## 2019-12-30 RX ORDER — METHYLPREDNISOLONE 4 MG/1
TABLET ORAL
Qty: 21 TAB | Refills: 0 | Status: SHIPPED | OUTPATIENT
Start: 2019-12-30 | End: 2020-10-29

## 2019-12-30 ASSESSMENT — JOINT PAIN
TOTAL NUMBER OF SWOLLEN JOINTS: 3
TOTAL NUMBER OF TENDER JOINTS: 8

## 2019-12-30 NOTE — PROGRESS NOTES
Chief Complaint- joint pain     Subjective:   Milton Aguila is a 54 y.o. male here today for follow up of rheumatological issues    This is a follow-up visit for this patient who is seen in this clinic for rheumatoid arthritis, patient was last seen in this clinic over a year and a half ago August 2018.  At last visit patient wanted to follow-up with an alternative rheumatologist and since last visit patient tried to establish with Dr. Roberts who is not taking new patients.  Patient has not tried alternative rheumatologist.  Patient is here wanting to follow-up regarding his Enbrel queries about alternative treatment for his rheumatoid arthritis.  Patient is currently on Enbrel 50 mg subcu every week however patient is inconsistent with his dosing and forgets Enbrel occasionally.    Additional comorbidities include elevated liver function secondary to excessive alcohol use admitted by patient.     Additional comorbidities include  occasional back issues and sciatica for which he takes a muscle relaxer when necessary, also sees [facility] spine for tender point injections.      Patient also admits to frequent alcohol use which causes significant elevation of liver functions, and patient also admits to marijuana use. Patient also states that his father was an alcoholic but denies any alcoholic tendencies himself. Because of these risks, I consider patient at high risk abuser and will not be able to prescribe narcotic pain medication for this patient in this clinic.      S/p Humira  S/p Plaquenil  S/p MTX-contraindicated because of daily alcohol use  S/p leflunamide-contraindicated because of daily alcohol use  S/p sulfasalazine-elevated LFTs (with ETOH)  S/p xeljanz-patient did not want to try for fear of elevated cholesterol levels.     Bilateral HERVE     Uric acid 6.7 6/2016 [facility]; Uric acid 7.3 12/2019  HBsAg/HBcAbIgM neg 4/2019  HCV neg 4/2019  Quantiferon Gold neg 4/2019  RF 14.2 (0-13.9)  10/2007 [facility]; RF 64 3/2018  CCP 18 ( 0-5) 10/2007 [facility];  3/2018  MRI pelvis 7/2007-indicates advanced inflammatory arthropathy compatible's rheumatoid arthritis involving both hips with hypertrophic synovitis-[facility]  MRI LS spine 9/2009-indicates disc desiccation L3-4, L4-5, and L5-S1, left-sided neural foraminal narrowing at L5-S1, mild central stenosis at L4, and L3-4-[facility]  MRI Right hand/wrist 11/2009-negative, no erosions, no synovitis, no pathology-[facility]     Current medicines (including changes today)  Current Outpatient Medications   Medication Sig Dispense Refill   • Upadacitinib ER 15 MG TABLET SR 24 HR Take 15 mg by mouth every day. 90 Tab 0   • methylPREDNISolone (MEDROL DOSEPAK) 4 MG Tablet Therapy Pack 6 tabs po one day then 5 tabs po one day then 4 tabs po one day then 3 tabs po one day then 2 tabs po one day then 1 tab po for one day 21 Tab 0   • Etanercept (ENBREL SURECLICK) 50 MG/ML Solution Auto-injector Inject 50 mg as instructed every 7 days. 12 Syringe 1   • TESTOSTERONE by Does not apply route.     • celecoxib (CELEBREX) 200 MG Cap TAKE 1 CAPSULE TWICE A DAY WITH FOOD AS NEEDED FOR JOINT PAIN (Patient not taking: Reported on 5/12/2019) 180 Cap 0   • albuterol 108 (90 Base) MCG/ACT Aero Soln inhalation aerosol Inhale 1-2 Puffs by mouth every 6 hours as needed for Shortness of Breath. (Patient not taking: Reported on 5/12/2019) 1 Inhaler 0     No current facility-administered medications for this visit.      He  has a past medical history of Arthritis, rheumatic, acute or subacute.    ROS   Other than what is mentioned in HPI or physical exam, there is no history of headaches, double vision or blurred vision. No temporal tenderness or jaw claudication. No trouble swallowing difficulties or sore throats.  No chest complaints including chest pain, cough or sputum production. No GI complaints including  nausea, vomiting, change in bowel habits, or past peptic ulcer disease. No history of blood in the stools. No urinary complaints including dysuria or frequency. No history of alopecia, photosensitivity, oral ulcerations, Raynaud's phenomena.       Objective:     /90   Pulse 84   Temp 37.1 °C (98.7 °F) (Temporal)   Resp 14   Wt 92.5 kg (204 lb)   SpO2 95%  Body mass index is 29.27 kg/m².   Physical Exam:    Constitutional: Alert and oriented X3, patient is talkative with good eye contact.Skin: Warm, dry, good turgor, no rashes in visible areas.Eye: Equal, round and reactive, conjunctiva clear, lids normal EOM intactENMT: Lips without lesions, good dentition, no oropharyngeal ulcers, moist buccal mucosa, pinna without deformityNeck: Trachea midline, no masses, no thyromegaly.Lymph:  No cervical lymphadenopathy, no axillary lymphadenopathy, no supraclavicular lymphadenopathyRespiratory: Unlabored respiratory effort, lungs clear to auscultation, no wheezes, no ronchi.Cardiovascular: Normal S1, S2, no murmur, no edema.Abdomen: Soft, non-tender, no masses, no hepatosplenomegaly.Psych: Alert and oriented x3, normal affect and mood.Neuro: Cranial nerves 2-12 are grossly intact, no loss of sensation LEExt:no joint laxity noted in bilateral arms, no joint laxity noted in bilateral legs, no evidence of swan-neck or boutonniere deformities no sausage digits no dactylitis            Lab Results   Component Value Date/Time    HEPBCORIGM Negative 04/15/2019 09:16 AM    HEPBSAG Negative 04/15/2019 09:16 AM     Lab Results   Component Value Date/Time    HEPCAB Negative 04/15/2019 09:16 AM     Lab Results   Component Value Date/Time    SODIUM 138 12/06/2019 01:00 PM    POTASSIUM 4.0 12/06/2019 01:00 PM    CHLORIDE 104 12/06/2019 01:00 PM    CO2 26 12/06/2019 01:00 PM    GLUCOSE 76 12/06/2019 01:00 PM    BUN 18 12/06/2019 01:00 PM    CREATININE 0.73 12/06/2019 01:00 PM    CREATININE 0.8 02/09/2008 08:50 AM      Lab Results     Component Value Date/Time    WBC 3.9 (L) 12/06/2019 01:00 PM    RBC 4.51 (L) 12/06/2019 01:00 PM    HEMOGLOBIN 15.0 12/06/2019 01:00 PM    HEMATOCRIT 43.8 12/06/2019 01:00 PM    MCV 97.1 12/06/2019 01:00 PM    MCH 33.3 (H) 12/06/2019 01:00 PM    MCHC 34.2 12/06/2019 01:00 PM    MPV 9.5 12/06/2019 01:00 PM    NEUTSPOLYS 50.20 12/06/2019 01:00 PM    LYMPHOCYTES 33.90 12/06/2019 01:00 PM    MONOCYTES 12.30 12/06/2019 01:00 PM    EOSINOPHILS 2.80 12/06/2019 01:00 PM    BASOPHILS 0.50 12/06/2019 01:00 PM      Lab Results   Component Value Date/Time    CALCIUM 9.6 12/06/2019 01:00 PM    ASTSGOT 44 12/06/2019 01:00 PM    ALTSGPT 77 (H) 12/06/2019 01:00 PM    ALKPHOSPHAT 48 12/06/2019 01:00 PM    TBILIRUBIN 1.0 12/06/2019 01:00 PM    ALBUMIN 4.3 12/06/2019 01:00 PM    TOTPROTEIN 7.0 12/06/2019 01:00 PM     Lab Results   Component Value Date/Time    URICACID 7.3 12/06/2019 01:00 PM    RHEUMFACTN 64 (H) 03/02/2018 09:37 AM    CCPANTIBODY 120 (H) 03/02/2018 09:37 AM     Lab Results   Component Value Date/Time    SEDRATEWES 18 04/15/2019 09:16 AM     Lab Results   Component Value Date/Time    HBA1C 5.5 11/02/2018 11:27 AM     Lab Results   Component Value Date/Time    ANTIMITOCHO 4.9 06/28/2019 11:03 AM      Assessment and Plan:     1. Rheumatoid arthritis involving multiple sites with positive rheumatoid factor (HCC)  Not under great control with evidence of inflammation, query secondary to noncompliance with Enbrel dosing i.e. not taking Enbrel every week, or if Enbrel becoming ineffective, very long discussion with patient regarding treatment options we opted to do a trial of Rinvoq 15 mg p.o. daily We reviewed risks of biological medications with patient including hematological pathology, cancer risks, neurological and infection issues, patient states understanding.  - Upadacitinib ER 15 MG TABLET SR 24 HR; Take 15 mg by mouth every day.  Dispense: 90 Tab; Refill: 0  - LIPID PANEL  - methylPREDNISolone (MEDROL DOSEPAK) 4  MG Tablet Therapy Pack; 6 tabs po one day then 5 tabs po one day then 4 tabs po one day then 3 tabs po one day then 2 tabs po one day then 1 tab po for one day  Dispense: 21 Tab; Refill: 0    2. Encounter for medication management  Start Rinvoq 15 mg p.o. daily of note screening labs are up-to-date, patient will need monitoring labs every 6 months next labs will be due June 2020  We reviewed risks of biological medications with patient including hematological pathology, cancer risks, neurological and infection issues, patient states understanding.    3. Hypercholesterolemia  Patient states he has borderline high cholesterol, we discussed side effects of statin therapy, patient queries about Rinvoq and elevated cholesterol will check cholesterol levels about 3 months after starting Rinvoq  - Upadacitinib ER 15 MG TABLET SR 24 HR; Take 15 mg by mouth every day.  Dispense: 90 Tab; Refill: 0  - LIPID PANEL    4. LFT elevation  Secondary to alcohol abuse, liver function still slightly elevated, liver ultrasound indicates fatty liver, patient's been referred to GI multiple times, September 2019 we received a letter from [facility] stating patient has no showed times 2   - methylPREDNISolone (MEDROL DOSEPAK) 4 MG Tablet Therapy Pack; 6 tabs po one day then 5 tabs po one day then 4 tabs po one day then 3 tabs po one day then 2 tabs po one day then 1 tab po for one day  Dispense: 21 Tab; Refill: 0    Followup: Return in about 3 months (around 3/30/2020), or patient wants to f/u with Dr Kayden weeks. or sooner prn, patient to sign a medical record release to Dr.Karr weeks with [facility]    Milton Aguila  was seen 30 minutes face-to-face of which more than 50% of the time was spent counseling the patient (excluding time for procedures)  regarding  rheumatological condition and care. Therapy was discussed in detail.      Please note that this dictation was created using voice recognition  software. I have made every reasonable attempt to correct obvious errors, but I expect that there are errors of grammar and possibly content that I did not discover before finalizing the note.

## 2020-03-07 ENCOUNTER — HOSPITAL ENCOUNTER (OUTPATIENT)
Facility: MEDICAL CENTER | Age: 55
End: 2020-03-07
Attending: NURSE PRACTITIONER
Payer: COMMERCIAL

## 2020-03-07 ENCOUNTER — OFFICE VISIT (OUTPATIENT)
Dept: URGENT CARE | Facility: CLINIC | Age: 55
End: 2020-03-07
Payer: COMMERCIAL

## 2020-03-07 VITALS
DIASTOLIC BLOOD PRESSURE: 82 MMHG | WEIGHT: 205 LBS | BODY MASS INDEX: 28.7 KG/M2 | HEIGHT: 71 IN | RESPIRATION RATE: 16 BRPM | SYSTOLIC BLOOD PRESSURE: 120 MMHG | OXYGEN SATURATION: 95 % | HEART RATE: 67 BPM | TEMPERATURE: 97.8 F

## 2020-03-07 DIAGNOSIS — Z13.9 ENCOUNTER FOR MEDICAL SCREENING EXAMINATION: ICD-10-CM

## 2020-03-07 DIAGNOSIS — J06.9 URI WITH COUGH AND CONGESTION: ICD-10-CM

## 2020-03-07 LAB
C PNEUM DNA SPEC QL NAA+PROBE: NOT DETECTED
FLUAV H1 2009 PAND RNA SPEC QL NAA+PROBE: NOT DETECTED
FLUAV H1 RNA SPEC QL NAA+PROBE: NOT DETECTED
FLUAV H3 RNA SPEC QL NAA+PROBE: NOT DETECTED
FLUAV RNA SPEC QL NAA+PROBE: NOT DETECTED
FLUAV+FLUBV AG SPEC QL IA: NEGATIVE
FLUBV RNA SPEC QL NAA+PROBE: NOT DETECTED
HADV DNA SPEC QL NAA+PROBE: NOT DETECTED
HCOV RNA SPEC QL NAA+PROBE: NOT DETECTED
HMPV RNA SPEC QL NAA+PROBE: NOT DETECTED
HPIV1 RNA SPEC QL NAA+PROBE: NOT DETECTED
HPIV2 RNA SPEC QL NAA+PROBE: NOT DETECTED
HPIV3 RNA SPEC QL NAA+PROBE: NOT DETECTED
HPIV4 RNA SPEC QL NAA+PROBE: NOT DETECTED
INT CON NEG: NEGATIVE
INT CON NEG: NEGATIVE
INT CON POS: POSITIVE
INT CON POS: POSITIVE
M PNEUMO DNA SPEC QL NAA+PROBE: NOT DETECTED
RSV A RNA SPEC QL NAA+PROBE: NOT DETECTED
RSV B RNA SPEC QL NAA+PROBE: NOT DETECTED
RV+EV RNA SPEC QL NAA+PROBE: DETECTED
S PYO AG THROAT QL: NEGATIVE

## 2020-03-07 PROCEDURE — 87633 RESP VIRUS 12-25 TARGETS: CPT

## 2020-03-07 PROCEDURE — 87880 STREP A ASSAY W/OPTIC: CPT | Performed by: NURSE PRACTITIONER

## 2020-03-07 PROCEDURE — 87581 M.PNEUMON DNA AMP PROBE: CPT

## 2020-03-07 PROCEDURE — 87804 INFLUENZA ASSAY W/OPTIC: CPT | Performed by: NURSE PRACTITIONER

## 2020-03-07 PROCEDURE — 99214 OFFICE O/P EST MOD 30 MIN: CPT | Performed by: NURSE PRACTITIONER

## 2020-03-07 PROCEDURE — 87486 CHLMYD PNEUM DNA AMP PROBE: CPT

## 2020-03-07 ASSESSMENT — ENCOUNTER SYMPTOMS
COUGH: 1
CONSTITUTIONAL NEGATIVE: 1
RHINORRHEA: 1
SORE THROAT: 1

## 2020-03-07 ASSESSMENT — PAIN SCALES - GENERAL: PAINLEVEL: 2=MINIMAL-SLIGHT

## 2020-03-07 ASSESSMENT — FIBROSIS 4 INDEX: FIB4 SCORE: 1.31

## 2020-03-07 NOTE — PROGRESS NOTES
Subjective:     Milton Aguila is a 54 y.o. male who presents for URI (Exam Room 3 - ISO Screened. SILVER DUONG TRUCK Runny Nose, Cough, Scratchy Throat, No fever, x 3 days. Travelled to [place], [facility], [facility], [facility], [place] in February 2020)       URI    This is a new problem. Episode onset: 3 days ago. Associated symptoms include congestion, coughing, rhinorrhea and a sore throat. He has tried nothing for the symptoms.     Patient reporting recent travel to [place], [facility], [facility], [facility], and [place].  Reports that he was in [place] for a stop over which lasted about 4 hours, once on 2/13/2020 and once again on 3/2/2020.  Reports leaving the aircraft to change flights but stayed inside the airport.    Reports concern due to his history of RA and he is currently on medication for RA which suppresses his immune system.    Has had his flu shot this season.    PMH:  has a past medical history of Arthritis, rheumatic, acute or subacute.    MEDS:   Current Outpatient Medications:   •  celecoxib (CELEBREX) 200 MG Cap, TAKE 1 CAPSULE TWICE A DAY WITH FOOD AS NEEDED FOR JOINT PAIN, Disp: 180 Cap, Rfl: 0  •  Upadacitinib ER 15 MG TABLET SR 24 HR, Take 15 mg by mouth every day. (Patient not taking: Reported on 3/7/2020), Disp: 90 Tab, Rfl: 0  •  methylPREDNISolone (MEDROL DOSEPAK) 4 MG Tablet Therapy Pack, 6 tabs po one day then 5 tabs po one day then 4 tabs po one day then 3 tabs po one day then 2 tabs po one day then 1 tab po for one day (Patient not taking: Reported on 3/7/2020), Disp: 21 Tab, Rfl: 0  •  Etanercept (ENBREL SURECLICK) 50 MG/ML Solution Auto-injector, Inject 50 mg as instructed every 7 days. (Patient not taking: Reported on 3/7/2020), Disp: 12 Syringe, Rfl: 1  •  albuterol 108 (90 Base) MCG/ACT Aero Soln inhalation aerosol, Inhale 1-2 Puffs by mouth every 6 hours as needed for Shortness of Breath. (Patient not taking: Reported on 5/12/2019), Disp: 1 Inhaler, Rfl: 0  •   "TESTOSTERONE, by Does not apply route., Disp: , Rfl:     ALLERGIES: No Known Allergies    SURGHX:   Past Surgical History:   Procedure Laterality Date   • LUMBAR LAMINECTOMY DISKECTOMY  2/7/2013    Performed by Mackenzie Cameron M.D. at SURGERY [facility] ORS   • APPENDECTOMY  2000   • INGUINAL HERNIA REPAIR      Hernia Repair, Inguinal   • OTHER ORTHOPEDIC SURGERY      ivy hip replacements     SOCHX:  reports that he has never smoked. He has never used smokeless tobacco. He reports current alcohol use. He reports that he does not use drugs.     FH: Reviewed with patient, not pertinent to this visit.    Review of Systems   Constitutional: Negative.    HENT: Positive for congestion, rhinorrhea and sore throat.    Respiratory: Positive for cough.    All other systems reviewed and are negative.    Additional details per HPI.    Objective:     /82 (BP Location: Left arm, Patient Position: Sitting)   Pulse 67   Temp 36.6 °C (97.8 °F) (Oral)   Resp 16   Ht 1.791 m (5' 10.5\")   Wt 93 kg (205 lb)   SpO2 95%   BMI 29.00 kg/m²     Physical Exam  Vitals signs reviewed.   Constitutional:       General: He is not in acute distress.     Appearance: He is well-developed. He is not ill-appearing, toxic-appearing or diaphoretic.   HENT:      Head: Normocephalic.      Right Ear: Tympanic membrane and external ear normal.      Left Ear: Tympanic membrane and external ear normal.      Nose: Mucosal edema and rhinorrhea present. Rhinorrhea is clear.      Mouth/Throat:      Mouth: Mucous membranes are moist.      Pharynx: Oropharynx is clear. Uvula midline. No oropharyngeal exudate.   Eyes:      Extraocular Movements: Extraocular movements intact.      Conjunctiva/sclera: Conjunctivae normal.      Pupils: Pupils are equal, round, and reactive to light.   Neck:      Musculoskeletal: Normal range of motion.   Cardiovascular:      Rate and Rhythm: Normal rate and regular rhythm.      Heart sounds: Normal heart sounds. "   Pulmonary:      Effort: Pulmonary effort is normal. No respiratory distress.      Breath sounds: Normal breath sounds. No decreased breath sounds, wheezing, rhonchi or rales.   Abdominal:      General: Bowel sounds are normal.   Musculoskeletal: Normal range of motion.         General: No deformity.   Lymphadenopathy:      Cervical: No cervical adenopathy.   Skin:     General: Skin is warm and dry.      Coloration: Skin is not pale.   Neurological:      Mental Status: He is alert and oriented to person, place, and time.      Sensory: No sensory deficit.      Coordination: Coordination normal.   Psychiatric:         Behavior: Behavior normal. Behavior is cooperative.       Rapid Strep A swab: negative    Influenza A/B swab: negative       Assessment/Plan:     1. URI with cough and congestion    2. Encounter for medical screening examination  - POCT Influenza A/B  - POCT Rapid Strep A  - Respiratory Panel By PCR; Future    Recent foreign travel to multiple countries. Recent layovers in [place].  Respiratory symptoms.    Patient was immediately masked and placed in a room with the door closed.  Using PPE consisting of a gown, gloves, booties, and a PAPR, I conducted a history and physical on the patient and collected initial swabs.    Various differentials discussed including allergic vs viral vs bacterial etiologies.    Patient discharged with mask.    Rapid strep and influenza swabs negative.     [facility]  Patient to self isolate for now.  Respiratory panel sent.  They will follow-up patient's chart and check results of respiratory panel.  They will follow-up with the patient.      Attempted to phone [facility].  Voicemail left.     Clinic leadership informed and on board.    The above information was discussed with the patient via telephone call who verbalizes understanding of the plan of care.  Patient reports he was already called by the [facility]  district and he will remain in contact with them.    Discussed close monitoring and supportive measures including increasing fluids and rest as well as OTC symptom management per 's instructions.    Vital signs stable, afebrile, asymptomatic, no acute distress at time of discharge.    Warning signs reviewed. Return precautions discussed.    Patient advised to: Return for 1) Symptoms don't improve or worsen, or go to ER, 2) Follow up with primary care in 7-10 days.    Differential diagnosis, natural history, supportive care, and indications for immediate follow-up discussed. All questions answered. Patient agrees with the plan of care.    Billing note for MDM: moderate risk due to 1) undiagnosed new problem, and problem points due to 2) new, further work up planned.

## 2020-06-02 DIAGNOSIS — M05.79 RHEUMATOID ARTHRITIS INVOLVING MULTIPLE SITES WITH POSITIVE RHEUMATOID FACTOR (HCC): ICD-10-CM

## 2020-10-29 ENCOUNTER — APPOINTMENT (OUTPATIENT)
Dept: RADIOLOGY | Facility: IMAGING CENTER | Age: 55
End: 2020-10-29
Attending: INTERNAL MEDICINE
Payer: COMMERCIAL

## 2020-10-29 ENCOUNTER — OFFICE VISIT (OUTPATIENT)
Dept: RHEUMATOLOGY | Facility: MEDICAL CENTER | Age: 55
End: 2020-10-29
Payer: COMMERCIAL

## 2020-10-29 VITALS
TEMPERATURE: 97.9 F | SYSTOLIC BLOOD PRESSURE: 138 MMHG | DIASTOLIC BLOOD PRESSURE: 88 MMHG | RESPIRATION RATE: 14 BRPM | HEART RATE: 100 BPM | BODY MASS INDEX: 31.12 KG/M2 | WEIGHT: 220 LBS | OXYGEN SATURATION: 94 %

## 2020-10-29 DIAGNOSIS — Z79.899 ENCOUNTER FOR MEDICATION MANAGEMENT: ICD-10-CM

## 2020-10-29 DIAGNOSIS — M05.79 RHEUMATOID ARTHRITIS INVOLVING MULTIPLE SITES WITH POSITIVE RHEUMATOID FACTOR (HCC): ICD-10-CM

## 2020-10-29 DIAGNOSIS — M79.641 HAND PAIN, RIGHT: ICD-10-CM

## 2020-10-29 DIAGNOSIS — E78.00 HYPERCHOLESTEROLEMIA: ICD-10-CM

## 2020-10-29 DIAGNOSIS — R79.89 LFT ELEVATION: ICD-10-CM

## 2020-10-29 PROCEDURE — 77077 JOINT SURVEY SINGLE VIEW: CPT | Mod: TC | Performed by: INTERNAL MEDICINE

## 2020-10-29 PROCEDURE — 20600 DRAIN/INJ JOINT/BURSA W/O US: CPT | Mod: F7 | Performed by: INTERNAL MEDICINE

## 2020-10-29 PROCEDURE — 99214 OFFICE O/P EST MOD 30 MIN: CPT | Mod: 25 | Performed by: INTERNAL MEDICINE

## 2020-10-29 RX ORDER — OXYCODONE HYDROCHLORIDE AND ACETAMINOPHEN 5; 325 MG/1; MG/1
1 TABLET ORAL EVERY 4 HOURS PRN
COMMUNITY
End: 2023-04-24

## 2020-10-29 RX ORDER — METHOCARBAMOL 750 MG/1
750 TABLET, FILM COATED ORAL 4 TIMES DAILY
COMMUNITY
End: 2023-04-24

## 2020-10-29 RX ORDER — METHYLPREDNISOLONE ACETATE 40 MG/ML
10 INJECTION, SUSPENSION INTRA-ARTICULAR; INTRALESIONAL; INTRAMUSCULAR; SOFT TISSUE ONCE
Status: DISCONTINUED | OUTPATIENT
Start: 2020-10-29 | End: 2020-10-29

## 2020-10-29 RX ORDER — METHYLPREDNISOLONE ACETATE 40 MG/ML
10 INJECTION, SUSPENSION INTRA-ARTICULAR; INTRALESIONAL; INTRAMUSCULAR; SOFT TISSUE ONCE
Status: COMPLETED | OUTPATIENT
Start: 2020-10-29 | End: 2020-10-29

## 2020-10-29 RX ORDER — UPADACITINIB 15 MG/1
1 TABLET, EXTENDED RELEASE ORAL DAILY
Qty: 90 TAB | Refills: 1 | Status: SHIPPED | OUTPATIENT
Start: 2020-10-29 | End: 2021-06-28 | Stop reason: SDUPTHER

## 2020-10-29 RX ADMIN — METHYLPREDNISOLONE ACETATE 10 MG: 40 INJECTION, SUSPENSION INTRA-ARTICULAR; INTRALESIONAL; INTRAMUSCULAR; SOFT TISSUE at 15:34

## 2020-10-29 ASSESSMENT — JOINT PAIN
TOTAL NUMBER OF TENDER JOINTS: 4
TOTAL NUMBER OF SWOLLEN JOINTS: 1

## 2020-10-29 ASSESSMENT — FIBROSIS 4 INDEX: FIB4 SCORE: 1.33

## 2020-10-29 NOTE — PROGRESS NOTES
Chief Complaint- joint pain     Subjective:   Milton Aguila is a 55 y.o. male here today for follow up of rheumatological issues    This is a follow-up visit for this patient who is seen in this clinic for rheumatoid arthritis.  Patient is currently on Rinvoq at 15 mg p.o. daily.  Patient is unsure if he is taking on a regular basis, does come in today complaining of pain in his right hand especially his right third knuckle and wonders about getting a corticosteroid injection today.    Complicating matters is that patient was bucked off a horse suffered multiple rib fractures on his left side and also a subdural hematoma on the right for which patient is currently being monitored for.       Additional comorbidities include elevated liver function secondary to excessive alcohol use admitted by patient.     Additional comorbidities include  occasional back issues and sciatica for which he takes a muscle relaxer when necessary, also sees [facility] spine for tender point injections.      Patient also admits to frequent alcohol use which causes significant elevation of liver functions, and patient also admits to marijuana use. Patient also states that his father was an alcoholic but denies any alcoholic tendencies himself. Because of these risks, I consider patient at high risk abuser and will not be able to prescribe narcotic pain medication for this patient in this clinic.      S/p Humira  S/p Plaquenil  S/p MTX-contraindicated because of daily alcohol use  S/p leflunamide-contraindicated because of daily alcohol use  S/p sulfasalazine-elevated LFTs (with ETOH)  S/p xeljanz-patient did not want to try for fear of elevated cholesterol levels.     Bilateral HERVE      Addendum 3/15/2021   3/2021  RF 36 3/2021  HBsAg/HBcAb neg 3/2021  HCV neg 3/2021     Uric acid 6.7 6/2016 [facility]; Uric acid 7.3 12/2019  HBsAg/HBcAbIgM neg 4/2019  HCV neg 4/2019  Quantiferon Gold neg 4/2019  RF 14.2 (0-13.9)  10/2007 [facility]; RF 64 3/2018  CCP 18 ( 0-5) 10/2007 [facility];  3/2018  MRI pelvis 7/2007-indicates advanced inflammatory arthropathy compatible's rheumatoid arthritis involving both hips with hypertrophic synovitis-[facility]  MRI LS spine 9/2009-indicates disc desiccation L3-4, L4-5, and L5-S1, left-sided neural foraminal narrowing at L5-S1, mild central stenosis at L4, and L3-4-[facility]  MRI Right hand/wrist 11/2009-negative, no erosions, no synovitis, no pathology-[facility]        Current medicines (including changes today)  Current Outpatient Medications   Medication Sig Dispense Refill   • methocarbamol (ROBAXIN) 750 MG Tab Take 750 mg by mouth 4 times a day.     • oxyCODONE-acetaminophen (PERCOCET) 5-325 MG Tab Take 1 Tab by mouth every four hours as needed.     • Upadacitinib ER (RINVOQ) 15 MG TABLET SR 24 HR Take 1 Tab by mouth every day. 90 Tab 1   • celecoxib (CELEBREX) 200 MG Cap TAKE 1 CAPSULE TWICE A DAY WITH FOOD AS NEEDED FOR JOINT PAIN 180 Cap 0   • TESTOSTERONE by Does not apply route.     • albuterol 108 (90 Base) MCG/ACT Aero Soln inhalation aerosol Inhale 1-2 Puffs by mouth every 6 hours as needed for Shortness of Breath. (Patient not taking: Reported on 5/12/2019) 1 Inhaler 0     No current facility-administered medications for this visit.      He  has a past medical history of Arthritis, rheumatic, acute or subacute.    ROS   Other than what is mentioned in HPI or physical exam, there is no history of headaches, double vision or blurred vision. No temporal tenderness or jaw claudication. No trouble swallowing difficulties or sore throats.  No chest complaints including chest pain, cough or sputum production. No GI complaints including nausea, vomiting, change in bowel habits, or past peptic ulcer disease. No history of blood in the stools. No urinary complaints including dysuria or frequency. No history of alopecia,  photosensitivity, oral ulcerations, Raynaud's phenomena.       Objective:     /88   Pulse 100   Temp 36.6 °C (97.9 °F) (Temporal)   Resp 14   Wt 99.8 kg (220 lb)   SpO2 94%  Body mass index is 31.12 kg/m².   Physical Exam:    Constitutional: Alert and oriented X3, patient is talkative with good eye contact.Skin: Warm, dry, good turgor, no rashes in visible areas.Eye: Equal, round and reactive, conjunctiva clear, lids normal EOM intactENMT: Lips without lesions, good dentition, no oropharyngeal ulcers, moist buccal mucosa, pinna without deformityNeck: Trachea midline, no masses, no thyromegaly.Lymph:  No cervical lymphadenopathy, no axillary lymphadenopathy, no supraclavicular lymphadenopathyRespiratory: Unlabored respiratory effort, lungs clear to auscultation, no wheezes, no ronchi.Cardiovascular: Normal S1, S2, no murmur, no edema.Abdomen: Soft, non-tender, no masses, no hepatosplenomegaly.Psych: Alert and oriented x3, normal affect and mood.Neuro: Cranial nerves 2-12 are grossly intact, no loss of sensation LEExt:no joint laxity noted in bilateral arms, no joint laxity noted in bilateral legs          Lab Results   Component Value Date/Time    HEPBCORIGM Negative 04/15/2019 09:16 AM    HEPBSAG Negative 04/15/2019 09:16 AM     Lab Results   Component Value Date/Time    HEPCAB Negative 04/15/2019 09:16 AM     Lab Results   Component Value Date/Time    SODIUM 138 12/06/2019 01:00 PM    POTASSIUM 4.0 12/06/2019 01:00 PM    CHLORIDE 104 12/06/2019 01:00 PM    CO2 26 12/06/2019 01:00 PM    GLUCOSE 76 12/06/2019 01:00 PM    BUN 18 12/06/2019 01:00 PM    CREATININE 0.73 12/06/2019 01:00 PM    CREATININE 0.8 02/09/2008 08:50 AM      Lab Results   Component Value Date/Time    WBC 3.9 (L) 12/06/2019 01:00 PM    RBC 4.51 (L) 12/06/2019 01:00 PM    HEMOGLOBIN 15.0 12/06/2019 01:00 PM    HEMATOCRIT 43.8 12/06/2019 01:00 PM    MCV 97.1 12/06/2019 01:00 PM    MCH 33.3 (H) 12/06/2019 01:00 PM    MCHC 34.2 12/06/2019  01:00 PM    MPV 9.5 12/06/2019 01:00 PM    NEUTSPOLYS 50.20 12/06/2019 01:00 PM    LYMPHOCYTES 33.90 12/06/2019 01:00 PM    MONOCYTES 12.30 12/06/2019 01:00 PM    EOSINOPHILS 2.80 12/06/2019 01:00 PM    BASOPHILS 0.50 12/06/2019 01:00 PM      Lab Results   Component Value Date/Time    CALCIUM 9.6 12/06/2019 01:00 PM    ASTSGOT 44 12/06/2019 01:00 PM    ALTSGPT 77 (H) 12/06/2019 01:00 PM    ALKPHOSPHAT 48 12/06/2019 01:00 PM    TBILIRUBIN 1.0 12/06/2019 01:00 PM    ALBUMIN 4.3 12/06/2019 01:00 PM    TOTPROTEIN 7.0 12/06/2019 01:00 PM     Lab Results   Component Value Date/Time    URICACID 7.3 12/06/2019 01:00 PM    RHEUMFACTN 64 (H) 03/02/2018 09:37 AM    CCPANTIBODY 120 (H) 03/02/2018 09:37 AM     Lab Results   Component Value Date/Time    SEDRATEWES 18 04/15/2019 09:16 AM     Lab Results   Component Value Date/Time    HBA1C 5.5 11/02/2018 11:27 AM     Lab Results   Component Value Date/Time    ANTIMITOCHO 4.9 06/28/2019 11:03 AM       Assessment and Plan:     1. Rheumatoid arthritis involving multiple sites with positive rheumatoid factor (HCC)  Currently on Rinvoq at 15 mg p.o. daily monotherapy, we are wondering as to whether Rinvoq is a note for the patient, patient wants to continue the current treatment as is and possible be a little bit more compliant with the daily dosing today we will do bilateral hand and feet x-rays for evaluation of any progression of erosive arthritis  - DX-JOINT SURVEY-HANDS SINGLE VIEW; Future  - DX-JOINT SURVEY-FEET SINGLE VIEW; Future  - Comp Metabolic Panel; Future  - CBC WITH DIFFERENTIAL; Future  - Sed Rate; Future  - HEP B CORE AB IGM; Future  - HEP B SURFACE ANTIGEN; Future  - HEP C VIRUS ANTIBODY; Future  - Quantiferon Gold Plus TB (4 tube); Future  - Upadacitinib ER (RINVOQ) 15 MG TABLET SR 24 HR; Take 1 Tab by mouth every day.  Dispense: 90 Tab; Refill: 1  - methylPREDNISolone acetate (DEPO-MEDROL) injection 10 mg    2. Hand pain, right  In the right third knuckle, with a  flare secondary to rheumatoid arthritis, today we will do a right third MCP joint corticosteroid injection  - methylPREDNISolone acetate (DEPO-MEDROL) injection 10 mg    3. Encounter for medication management  On Rinvoq 15 mg p.o. daily  Patient will need monitoring labs every 6 months, next labs are due now, we reprinted lab orders for patient patient also due for any labs about April 2021, will order those for patient as well  We reviewed risks of biological medications with patient including hematological pathology, cancer risks, neurological and infection issues, patient states understanding.  - DX-JOINT SURVEY-HANDS SINGLE VIEW; Future  - DX-JOINT SURVEY-FEET SINGLE VIEW; Future  - Comp Metabolic Panel; Future  - CBC WITH DIFFERENTIAL; Future  - Sed Rate; Future  - HEP B CORE AB IGM; Future  - HEP B SURFACE ANTIGEN; Future  - HEP C VIRUS ANTIBODY; Future  - Quantiferon Gold Plus TB (4 tube); Future  - Upadacitinib ER (RINVOQ) 15 MG TABLET SR 24 HR; Take 1 Tab by mouth every day.  Dispense: 90 Tab; Refill: 1    4. LFT elevation  Has been a chronic problem for the patient admittedly to alcohol use, ultrasound indicates fatty liver, patient's been referred to GI multiple times, September 2019 received a letter for GI consult stating the patient has no showed at least twice  - Comp Metabolic Panel; Future  - CBC WITH DIFFERENTIAL; Future  - Sed Rate; Future  - HEP B CORE AB IGM; Future  - HEP B SURFACE ANTIGEN; Future  - HEP C VIRUS ANTIBODY; Future  - Quantiferon Gold Plus TB (4 tube); Future    Procedure: Right third knuckle corticosteroid injection    The procedure was explained to the patient in detail including potential damage to area being injected including risk of avascular necrosis, all questions were answered, verbal consent to do procedure was obtained. Risks and benefits were reviewed with patient and patient states understanding including risks of steroid injections including bleeding, infections,  subcutaneous lipolysis and/or hypopigmentation at site of injection, and risk of avascular necrosis. Verbal consent was again obtained.  Patient denies any allergies to lidocaine, Betadine/iodine or corticosteroids.  The patient was positioned appropriately. Anatomical landmarks were identified.    Dorsal aspect of right third knuckle/MCP joint was prepped with betadine x 3, local anesthetic with ethyl chloride and 1% Xylocaine lot #[id number], Exp April 2023 and using sterile technique,  injected 10 mg of Depomedrol Lot #[id number], Exp July 2021 dorsal approach right third knuckle    EBL 0  The patient tolerated the procedure well, was observed in the office and there were no complications     Patient was asked to rest right hand for 3 days, patient states understanding and states will comply.          Followup: Return in about 3 months (around 1/29/2021). or sooner myla Aguila  was seen 30 minutes face-to-face of which more than 50% of the time was spent counseling the patient (excluding time for procedures)  regarding  rheumatological condition and care. Therapy was discussed in detail.      Please note that this dictation was created using voice recognition software. I have made every reasonable attempt to correct obvious errors, but I expect that there are errors of grammar and possibly content that I did not discover before finalizing the note.

## 2020-11-03 ENCOUNTER — OFFICE VISIT (OUTPATIENT)
Dept: URGENT CARE | Facility: PHYSICIAN GROUP | Age: 55
End: 2020-11-03
Payer: COMMERCIAL

## 2020-11-03 ENCOUNTER — HOSPITAL ENCOUNTER (EMERGENCY)
Dept: HOSPITAL 8 - ED | Age: 55
Discharge: HOME | End: 2020-11-03
Payer: COMMERCIAL

## 2020-11-03 VITALS
OXYGEN SATURATION: 97 % | TEMPERATURE: 98.7 F | HEART RATE: 78 BPM | HEIGHT: 70 IN | DIASTOLIC BLOOD PRESSURE: 72 MMHG | WEIGHT: 210 LBS | SYSTOLIC BLOOD PRESSURE: 108 MMHG | BODY MASS INDEX: 30.06 KG/M2

## 2020-11-03 VITALS — SYSTOLIC BLOOD PRESSURE: 130 MMHG | DIASTOLIC BLOOD PRESSURE: 89 MMHG

## 2020-11-03 VITALS — BODY MASS INDEX: 31.12 KG/M2 | WEIGHT: 217.38 LBS | HEIGHT: 70 IN
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
## 2020-11-03 DIAGNOSIS — R07.89: ICD-10-CM

## 2020-11-03 DIAGNOSIS — S20.219A: Primary | ICD-10-CM

## 2020-11-03 DIAGNOSIS — R05: ICD-10-CM
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
## 2020-11-03 DIAGNOSIS — R07.89 STERNUM PAIN: ICD-10-CM
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
## 2020-11-03 PROCEDURE — 99213 OFFICE O/P EST LOW 20 MIN: CPT | Performed by: PHYSICIAN ASSISTANT

## 2020-11-03 PROCEDURE — 71046 X-RAY EXAM CHEST 2 VIEWS: CPT

## 2020-11-03 PROCEDURE — 99283 EMERGENCY DEPT VISIT LOW MDM: CPT

## 2020-11-03 ASSESSMENT — FIBROSIS 4 INDEX: FIB4 SCORE: 1.33

## 2020-11-03 ASSESSMENT — ENCOUNTER SYMPTOMS
SHORTNESS OF BREATH: 1
CHILLS: 0
HEADACHES: 1
COUGH: 0
PALPITATIONS: 0
FEVER: 0

## 2020-11-03 NOTE — PROGRESS NOTES
Subjective:   Milton Aguila is a 55 y.o. male who presents today with   Chief Complaint   Patient presents with   • Bump     Bucked off a horse 2 weeks ago.  4 Broken ribs on the left side.  Developed a lump on the sterum a couple days ago.  Would like to know what it is.       Other  This is a new problem. The current episode started in the past 7 days. The problem occurs constantly. The problem has been gradually worsening. Associated symptoms include headaches. Pertinent negatives include no chest pain, chills, congestion, coughing or fever. Associated symptoms comments: Shortness of breath. Nothing aggravates the symptoms. He has tried nothing for the symptoms. The treatment provided no relief.     Over the past week patient has had increase in swelling and pain in the sternal area.  Patient was seen in the ER in the [place] area on the 18th after being thrown off of a horse.  He did have subdural hematoma at that time which is going down his latest CT scan of the head showed that it was at 5 mm where it had started at 14 mm initially.  CT of the chest showed fractures of the fourth fifth sixth and seventh ribs on the anterior lateral portion of the left side.  PMH:  has a past medical history of Arthritis, rheumatic, acute or subacute.  MEDS:   Current Outpatient Medications:   •  methocarbamol (ROBAXIN) 750 MG Tab, Take 750 mg by mouth 4 times a day., Disp: , Rfl:   •  oxyCODONE-acetaminophen (PERCOCET) 5-325 MG Tab, Take 1 Tab by mouth every four hours as needed., Disp: , Rfl:   •  celecoxib (CELEBREX) 200 MG Cap, TAKE 1 CAPSULE TWICE A DAY WITH FOOD AS NEEDED FOR JOINT PAIN, Disp: 180 Cap, Rfl: 0  •  Upadacitinib ER (RINVOQ) 15 MG TABLET SR 24 HR, Take 1 Tab by mouth every day., Disp: 90 Tab, Rfl: 1  •  TESTOSTERONE, by Does not apply route., Disp: , Rfl:   ALLERGIES: No Known Allergies  SURGHX:   Past Surgical History:   Procedure Laterality Date   • LUMBAR LAMINECTOMY DISKECTOMY  2/7/2013    "Performed by Mackenzie Cameron M.D. at SURGERY [facility] ORS   • APPENDECTOMY  2000   • INGUINAL HERNIA REPAIR      Hernia Repair, Inguinal   • OTHER ORTHOPEDIC SURGERY      ivy hip replacements     SOCHX:  reports that he has never smoked. He has never used smokeless tobacco. He reports current alcohol use. He reports that he does not use drugs.  FH: Reviewed with patient, not pertinent to this visit.       Review of Systems   Constitutional: Negative for chills and fever.   HENT: Negative for congestion.    Respiratory: Positive for shortness of breath. Negative for cough.    Cardiovascular: Negative for chest pain and palpitations.   Musculoskeletal:        Sternum pain   Neurological: Positive for headaches.   All other systems reviewed and are negative.       Objective:   /72 (BP Location: Left arm, Patient Position: Sitting, BP Cuff Size: Adult)   Pulse 78   Temp 37.1 °C (98.7 °F) (Temporal)   Ht 1.778 m (5' 10\")   Wt 95.3 kg (210 lb)   SpO2 97%   BMI 30.13 kg/m²   Physical Exam  Vitals signs and nursing note reviewed.   Constitutional:       General: He is not in acute distress.     Appearance: He is well-developed.   HENT:      Head: Normocephalic and atraumatic.      Right Ear: Hearing normal.      Left Ear: Hearing normal.   Eyes:      Extraocular Movements: Extraocular movements intact.      Pupils: Pupils are equal, round, and reactive to light.   Cardiovascular:      Rate and Rhythm: Normal rate and regular rhythm.      Heart sounds: Normal heart sounds.   Pulmonary:      Effort: Pulmonary effort is normal.   Chest:      Chest wall: Tenderness present. No crepitus.          Comments: Tenderness to the anterior lateral left ribs.  Also significant tenderness to the sternum specifically the xiphoid process.  There is swelling over the sternum. No ecchymosis or erythema.  Musculoskeletal:      Comments: Normal movement in all 4 extremities   Skin:     General: Skin is warm and dry.   "   Neurological:      General: No focal deficit present.      Mental Status: He is alert.      Motor: No weakness.      Coordination: Coordination normal.   Psychiatric:         Mood and Affect: Mood normal.           Assessment/Plan:   Assessment    1. Sternum pain  Given significant history of trauma and multiple rib fractures along with new swelling recommend patient go to the ER for further level evaluation.  Offered CT scan for patient to be ordered outpatient only but he would like to be seen in the ER as he is concerned that it potentially may need drained if there is fluid in the area.  Also offered getting an x ray initially but he would like to go to the ER for higher level of care. Concern for potential fluid buildup following multiple rib fractures, i.e. flail chest although it did not show fractured sternum initially on the CT scan from his initial visit.  Please note that this dictation was created using voice recognition software. I have made every reasonable attempt to correct obvious errors, but I expect that there are errors of grammar and possibly content that I did not discover before finalizing the note.    Blair Carballo PA-C

## 2020-11-03 NOTE — NUR
PT RETURNED FROM XRAY. UPRIGHT ON [place] ON CELL PHONE, NAD, VSS. PT DENIES ANY 
NEEDS AT THIS TIME. CALL LIGHT IN REACH. WILL CONTINUE TO MONITOR.

## 2021-01-12 ENCOUNTER — TELEPHONE (OUTPATIENT)
Dept: RHEUMATOLOGY | Facility: MEDICAL CENTER | Age: 56
End: 2021-01-12

## 2021-01-12 NOTE — TELEPHONE ENCOUNTER
Regarding: RE: Non-Urgent Medical Question  Contact: [phone]  Brian LAI    Can you have your assistant to try and fax it here first. eFax [phone]. If this doesn't work I will get her to come by.   eFax [phone]  Thanks  J  ----- Message -----  From: Aydee Barkley M.D.  Sent: 1/11/21, 3:55 PM  To: Milton Aguila  Subject: RE: Non-Urgent Medical Question    Brian Montoya,  We do not have any way of emailing the letter to you, we will have the latter waiting for your girlfriend at the .  Thankyou :)  Aydee Barkley MD  Rheumatology        ----- Message -----       From:Milton Aguila       Sent:1/11/2021  2:42 PM PST         To:Aydee Barkley M.D.    Subject:RE: Non-Urgent Medical Question    Thank you. Is it possible to email it?  If so please send it to [email or web address]    If not I can have my girlfriend stop by and pick it up.     Thank you.   J      ----- Message -----       From:Aydee Barkley M.D.       Sent:1/11/2021 11:56 AM PST         To:Milton Aguila    Subject:RE: Non-Urgent Medical Question    Brian Montoya,  We have written a letter for you, where should we send the letter?  Aydee Barkley MD  Rheumatology        ----- Message -----       From:Milton Aguila       Sent:1/9/2021 10:07 AM PST         To:Aydee Barkley M.D.    Subject:RE: Non-Urgent Medical Question    Brian Barkley,  I have been told that I need some documentation from your office. Would you be able to provide a letter for me?    Carloz Montoya      ----- Message -----       From:Aydee Barkley M.D.       Sent:1/7/2021  9:42 AM PST         To:Milton Aguila    Subject:RE: Non-Urgent Medical Question    Brian Montoya,  We can certainly provide information but you should not have to, just listing the medications you are on should be proof enough.  Thankyokel for checking:)  Aydee Barkley MD  Rheumatology        ----- Message -----       From:Milton Aguila        Sent:1/7/2021  9:19 AM PST         To:Aydee Barkley M.D.    Subject:RE: Non-Urgent Medical Question    Thanks for getting back to me so quickly. My normal  Primary care is in [place]. Up in [place]. My anti aging/gp is in [place]. Would I need info from you showing I am on immunosuppressive therapy?    Thanks again    J      ----- Message -----       From:Aydee Barkley M.D.       Sent:1/7/2021  9:11 AM PST         To:Milton Aguila    Subject:RE: Non-Urgent Medical Question    Hi Jan,  From my understanding so far, you would be tier 2 which I think is scheduled to occur either late February or March but that can change so that is currently a tentative timeline.  We will not have the vaccines here in the office, you will need to get the vaccine through your primary care physician.  Thankyou for checking:)  Aydee Barkley MD  Rheumatology        ----- Message -----       From:Milton Aguila       Sent:1/6/2021  6:57 AM PST         To:Aydee Barkley M.D.    Subject:RE: Non-Urgent Medical Question    Hi Dr. Barkley,    I hope your well and getting a vaccination soon. Any updates on when immuno comprised folk will be able to get one?    Thanks  J      ----- Message -----       From:Aydee Barkley M.D.       Sent:12/10/2020  4:07 PM PST         To:Milton Aguila    Subject:RE: Non-Urgent Medical Question    Hi Sandrine,  There is no update on the vaccine list, the vaccine still has not been approved, when and if it gets improved than hospital staff and nursing home patients will get the vaccine first.  After that there has not been any priority set as of yet.  Aydee Barkley MD  Rheumatology        ----- Message -----       From:Milton Aguila       Sent:12/10/2020  3:20 PM PST         To:Aydee Barkley M.D.    Subject:RE: Non-Urgent Medical Question    Good morning,    I'm finishing my quarantine in [place] right now for work and to stay away from the virus. They have a  total of 1400 cases over the whole pandemic. That said I need to come back in late January for work. Just want to see about where I might end up on that vaccine list. I know you probably don't know yet but thought I'd just check in.    Carloz Montoya       ----- Message -----       From:Aydee Barkley M.D.       Sent:11/23/2020  4:20 PM PST         To:Milton Aguila    Subject:RE: Non-Urgent Medical Question    Brian Montoya,  We do not know anything specific yet about the vaccine, until we actually get a vaccine we won't know anything about distribution etc.  We'll let you know as soon as we know anything.  Next for checking :-)  Aydee Barkley MD  Rheumatology        ----- Message -----       From:Milton Aguila       Sent:11/23/2020  1:28 PM PST         To:Aydee Barkley M.D.    Subject:Non-Urgent Medical Question    Any idea how this vaccine roll out is going to happen. 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 They are saying people with underlying conditions. I figure I qualify for that one. I'm planning to go work in [place] unless there is a potential snd getting a shot in December.     Thanks. Just thought I'd ask    SILVERIO

## 2021-02-02 ENCOUNTER — APPOINTMENT (OUTPATIENT)
Dept: RHEUMATOLOGY | Facility: MEDICAL CENTER | Age: 56
End: 2021-02-02

## 2021-03-11 ENCOUNTER — HOSPITAL ENCOUNTER (OUTPATIENT)
Dept: LAB | Facility: MEDICAL CENTER | Age: 56
End: 2021-03-11
Attending: INTERNAL MEDICINE
Payer: COMMERCIAL

## 2021-03-11 ENCOUNTER — HOSPITAL ENCOUNTER (OUTPATIENT)
Dept: LAB | Facility: MEDICAL CENTER | Age: 56
End: 2021-03-11
Attending: EMERGENCY MEDICINE
Payer: COMMERCIAL

## 2021-03-11 DIAGNOSIS — Z79.899 ENCOUNTER FOR MEDICATION MANAGEMENT: ICD-10-CM

## 2021-03-11 DIAGNOSIS — R79.89 LFT ELEVATION: ICD-10-CM

## 2021-03-11 DIAGNOSIS — M05.79 RHEUMATOID ARTHRITIS INVOLVING MULTIPLE SITES WITH POSITIVE RHEUMATOID FACTOR (HCC): ICD-10-CM

## 2021-03-11 LAB
ALBUMIN SERPL BCP-MCNC: 4.3 G/DL (ref 3.2–4.9)
ALBUMIN SERPL BCP-MCNC: 4.4 G/DL (ref 3.2–4.9)
ALBUMIN/GLOB SERPL: 1.7 G/DL
ALBUMIN/GLOB SERPL: 1.7 G/DL
ALP SERPL-CCNC: 69 U/L (ref 30–99)
ALP SERPL-CCNC: 69 U/L (ref 30–99)
ALT SERPL-CCNC: 141 U/L (ref 2–50)
ALT SERPL-CCNC: 141 U/L (ref 2–50)
ANION GAP SERPL CALC-SCNC: 13 MMOL/L (ref 7–16)
ANION GAP SERPL CALC-SCNC: 13 MMOL/L (ref 7–16)
AST SERPL-CCNC: 91 U/L (ref 12–45)
AST SERPL-CCNC: 91 U/L (ref 12–45)
BASOPHILS # BLD AUTO: 0.6 % (ref 0–1.8)
BASOPHILS # BLD AUTO: 0.6 % (ref 0–1.8)
BASOPHILS # BLD: 0.02 K/UL (ref 0–0.12)
BASOPHILS # BLD: 0.02 K/UL (ref 0–0.12)
BILIRUB SERPL-MCNC: 1.1 MG/DL (ref 0.1–1.5)
BILIRUB SERPL-MCNC: 1.2 MG/DL (ref 0.1–1.5)
BUN SERPL-MCNC: 15 MG/DL (ref 8–22)
BUN SERPL-MCNC: 15 MG/DL (ref 8–22)
CALCIUM SERPL-MCNC: 9.6 MG/DL (ref 8.5–10.5)
CALCIUM SERPL-MCNC: 9.6 MG/DL (ref 8.5–10.5)
CHLORIDE SERPL-SCNC: 101 MMOL/L (ref 96–112)
CHLORIDE SERPL-SCNC: 102 MMOL/L (ref 96–112)
CHOLEST SERPL-MCNC: 225 MG/DL (ref 100–199)
CO2 SERPL-SCNC: 23 MMOL/L (ref 20–33)
CO2 SERPL-SCNC: 24 MMOL/L (ref 20–33)
CREAT SERPL-MCNC: 0.64 MG/DL (ref 0.5–1.4)
CREAT SERPL-MCNC: 0.69 MG/DL (ref 0.5–1.4)
EOSINOPHIL # BLD AUTO: 0.07 K/UL (ref 0–0.51)
EOSINOPHIL # BLD AUTO: 0.08 K/UL (ref 0–0.51)
EOSINOPHIL NFR BLD: 2.1 % (ref 0–6.9)
EOSINOPHIL NFR BLD: 2.4 % (ref 0–6.9)
ERYTHROCYTE [DISTWIDTH] IN BLOOD BY AUTOMATED COUNT: 43.4 FL (ref 35.9–50)
ERYTHROCYTE [DISTWIDTH] IN BLOOD BY AUTOMATED COUNT: 44.6 FL (ref 35.9–50)
ERYTHROCYTE [SEDIMENTATION RATE] IN BLOOD BY WESTERGREN METHOD: 1 MM/HOUR (ref 0–20)
ESTRADIOL SERPL-MCNC: 45.3 PG/ML
FASTING STATUS PATIENT QL REPORTED: NORMAL
FASTING STATUS PATIENT QL REPORTED: NORMAL
GGT SERPL-CCNC: 32 U/L (ref 7–51)
GLOBULIN SER CALC-MCNC: 2.6 G/DL (ref 1.9–3.5)
GLOBULIN SER CALC-MCNC: 2.6 G/DL (ref 1.9–3.5)
GLUCOSE SERPL-MCNC: 92 MG/DL (ref 65–99)
GLUCOSE SERPL-MCNC: 92 MG/DL (ref 65–99)
HBV CORE IGM SER QL: NORMAL
HBV SURFACE AG SER QL: NORMAL
HCT VFR BLD AUTO: 46 % (ref 42–52)
HCT VFR BLD AUTO: 46.2 % (ref 42–52)
HCV AB SER QL: NORMAL
HCYS SERPL-SCNC: 10.56 UMOL/L
HDLC SERPL-MCNC: 61 MG/DL
HGB BLD-MCNC: 15.9 G/DL (ref 14–18)
HGB BLD-MCNC: 16 G/DL (ref 14–18)
IMM GRANULOCYTES # BLD AUTO: 0.01 K/UL (ref 0–0.11)
IMM GRANULOCYTES # BLD AUTO: 0.01 K/UL (ref 0–0.11)
IMM GRANULOCYTES NFR BLD AUTO: 0.3 % (ref 0–0.9)
IMM GRANULOCYTES NFR BLD AUTO: 0.3 % (ref 0–0.9)
LDH SERPL L TO P-CCNC: 174 U/L (ref 107–266)
LDLC SERPL CALC-MCNC: 146 MG/DL
LYMPHOCYTES # BLD AUTO: 0.9 K/UL (ref 1–4.8)
LYMPHOCYTES # BLD AUTO: 0.91 K/UL (ref 1–4.8)
LYMPHOCYTES NFR BLD: 27.2 % (ref 22–41)
LYMPHOCYTES NFR BLD: 27.5 % (ref 22–41)
MCH RBC QN AUTO: 32.9 PG (ref 27–33)
MCH RBC QN AUTO: 33.3 PG (ref 27–33)
MCHC RBC AUTO-ENTMCNC: 34.6 G/DL (ref 33.7–35.3)
MCHC RBC AUTO-ENTMCNC: 34.6 G/DL (ref 33.7–35.3)
MCV RBC AUTO: 95.2 FL (ref 81.4–97.8)
MCV RBC AUTO: 96.3 FL (ref 81.4–97.8)
MONOCYTES # BLD AUTO: 0.37 K/UL (ref 0–0.85)
MONOCYTES # BLD AUTO: 0.38 K/UL (ref 0–0.85)
MONOCYTES NFR BLD AUTO: 11.2 % (ref 0–13.4)
MONOCYTES NFR BLD AUTO: 11.5 % (ref 0–13.4)
NEUTROPHILS # BLD AUTO: 1.92 K/UL (ref 1.82–7.42)
NEUTROPHILS # BLD AUTO: 1.93 K/UL (ref 1.82–7.42)
NEUTROPHILS NFR BLD: 58 % (ref 44–72)
NEUTROPHILS NFR BLD: 58.3 % (ref 44–72)
NRBC # BLD AUTO: 0 K/UL
NRBC # BLD AUTO: 0 K/UL
NRBC BLD-RTO: 0 /100 WBC
NRBC BLD-RTO: 0 /100 WBC
PHOSPHATE SERPL-MCNC: 3.4 MG/DL (ref 2.5–4.5)
PLATELET # BLD AUTO: 156 K/UL (ref 164–446)
PLATELET # BLD AUTO: 165 K/UL (ref 164–446)
PMV BLD AUTO: 10.2 FL (ref 9–12.9)
PMV BLD AUTO: 9.8 FL (ref 9–12.9)
POTASSIUM SERPL-SCNC: 3.9 MMOL/L (ref 3.6–5.5)
POTASSIUM SERPL-SCNC: 4 MMOL/L (ref 3.6–5.5)
PROT SERPL-MCNC: 6.9 G/DL (ref 6–8.2)
PROT SERPL-MCNC: 7 G/DL (ref 6–8.2)
PSA SERPL-MCNC: 1.3 NG/ML (ref 0–4)
RBC # BLD AUTO: 4.8 M/UL (ref 4.7–6.1)
RBC # BLD AUTO: 4.83 M/UL (ref 4.7–6.1)
RHEUMATOID FACT SER IA-ACNC: 36 IU/ML (ref 0–14)
SODIUM SERPL-SCNC: 138 MMOL/L (ref 135–145)
SODIUM SERPL-SCNC: 138 MMOL/L (ref 135–145)
TRIGL SERPL-MCNC: 88 MG/DL (ref 0–149)
URATE SERPL-MCNC: 7 MG/DL (ref 2.5–8.3)
WBC # BLD AUTO: 3.3 K/UL (ref 4.8–10.8)
WBC # BLD AUTO: 3.3 K/UL (ref 4.8–10.8)

## 2021-03-11 PROCEDURE — 86431 RHEUMATOID FACTOR QUANT: CPT

## 2021-03-11 PROCEDURE — 86480 TB TEST CELL IMMUN MEASURE: CPT

## 2021-03-11 PROCEDURE — 80053 COMPREHEN METABOLIC PANEL: CPT

## 2021-03-11 PROCEDURE — 84153 ASSAY OF PSA TOTAL: CPT

## 2021-03-11 PROCEDURE — 84305 ASSAY OF SOMATOMEDIN: CPT

## 2021-03-11 PROCEDURE — 84550 ASSAY OF BLOOD/URIC ACID: CPT

## 2021-03-11 PROCEDURE — 82977 ASSAY OF GGT: CPT

## 2021-03-11 PROCEDURE — 83090 ASSAY OF HOMOCYSTEINE: CPT

## 2021-03-11 PROCEDURE — 87340 HEPATITIS B SURFACE AG IA: CPT

## 2021-03-11 PROCEDURE — 86705 HEP B CORE ANTIBODY IGM: CPT

## 2021-03-11 PROCEDURE — 83615 LACTATE (LD) (LDH) ENZYME: CPT

## 2021-03-11 PROCEDURE — 84402 ASSAY OF FREE TESTOSTERONE: CPT

## 2021-03-11 PROCEDURE — 86200 CCP ANTIBODY: CPT

## 2021-03-11 PROCEDURE — 84270 ASSAY OF SEX HORMONE GLOBUL: CPT

## 2021-03-11 PROCEDURE — 36415 COLL VENOUS BLD VENIPUNCTURE: CPT

## 2021-03-11 PROCEDURE — 86803 HEPATITIS C AB TEST: CPT

## 2021-03-11 PROCEDURE — 84403 ASSAY OF TOTAL TESTOSTERONE: CPT

## 2021-03-11 PROCEDURE — 85025 COMPLETE CBC W/AUTO DIFF WBC: CPT

## 2021-03-11 PROCEDURE — 80053 COMPREHEN METABOLIC PANEL: CPT | Mod: 91

## 2021-03-11 PROCEDURE — 85652 RBC SED RATE AUTOMATED: CPT

## 2021-03-11 PROCEDURE — 80061 LIPID PANEL: CPT

## 2021-03-11 PROCEDURE — 85025 COMPLETE CBC W/AUTO DIFF WBC: CPT | Mod: 91

## 2021-03-11 PROCEDURE — 82670 ASSAY OF TOTAL ESTRADIOL: CPT

## 2021-03-11 PROCEDURE — 84100 ASSAY OF PHOSPHORUS: CPT

## 2021-03-12 LAB
GAMMA INTERFERON BACKGROUND BLD IA-ACNC: 0.02 IU/ML
M TB IFN-G BLD-IMP: NEGATIVE
M TB IFN-G CD4+ BCKGRND COR BLD-ACNC: 0 IU/ML
MITOGEN IGNF BCKGRD COR BLD-ACNC: 7.58 IU/ML
QFT TB2 - NIL TBQ2: 0 IU/ML

## 2021-03-13 LAB
CCP IGG SERPL-ACNC: 144 UNITS (ref 0–19)
IGF-I SERPL-MCNC: 152 NG/ML (ref 62–214)
IGF-I Z-SCORE SERPL: 0.6
SHBG SERPL-SCNC: 61 NMOL/L (ref 11–80)
TESTOST FREE MFR SERPL: 1.6 % (ref 1.6–2.9)
TESTOST FREE SERPL-MCNC: 240 PG/ML (ref 47–244)
TESTOST SERPL-MCNC: 1467 NG/DL (ref 300–890)

## 2021-06-28 ENCOUNTER — OFFICE VISIT (OUTPATIENT)
Dept: RHEUMATOLOGY | Facility: MEDICAL CENTER | Age: 56
End: 2021-06-28
Payer: COMMERCIAL

## 2021-06-28 VITALS
RESPIRATION RATE: 14 BRPM | WEIGHT: 212 LBS | TEMPERATURE: 98.1 F | BODY MASS INDEX: 30.42 KG/M2 | HEART RATE: 86 BPM | OXYGEN SATURATION: 94 % | SYSTOLIC BLOOD PRESSURE: 122 MMHG | DIASTOLIC BLOOD PRESSURE: 72 MMHG

## 2021-06-28 DIAGNOSIS — Z79.899 ENCOUNTER FOR MEDICATION MANAGEMENT: ICD-10-CM

## 2021-06-28 DIAGNOSIS — M05.79 RHEUMATOID ARTHRITIS INVOLVING MULTIPLE SITES WITH POSITIVE RHEUMATOID FACTOR (HCC): ICD-10-CM

## 2021-06-28 DIAGNOSIS — R79.89 LFT ELEVATION: ICD-10-CM

## 2021-06-28 PROCEDURE — 99214 OFFICE O/P EST MOD 30 MIN: CPT | Performed by: INTERNAL MEDICINE

## 2021-06-28 RX ORDER — UPADACITINIB 15 MG/1
1 TABLET, EXTENDED RELEASE ORAL DAILY
Qty: 90 TABLET | Refills: 1 | Status: SHIPPED | OUTPATIENT
Start: 2021-06-28 | End: 2021-07-08 | Stop reason: SDUPTHER

## 2021-06-28 RX ORDER — CELECOXIB 200 MG/1
CAPSULE ORAL
Qty: 180 CAPSULE | Refills: 0 | Status: SHIPPED | OUTPATIENT
Start: 2021-06-28 | End: 2022-02-28

## 2021-06-28 ASSESSMENT — JOINT PAIN
TOTAL NUMBER OF TENDER JOINTS: 3
TOTAL NUMBER OF SWOLLEN JOINTS: 0

## 2021-06-28 ASSESSMENT — FIBROSIS 4 INDEX: FIB4 SCORE: 2.51

## 2021-06-28 NOTE — PROGRESS NOTES
Chief Complaint- joint pain     Subjective:   Milton Aguila is a 55 y.o. male here today for follow up of rheumatological issues      This is a follow-up visit for this patient who we see in this clinic for rheumatoid arthritis.  Patient is currently on Rinvoq at 15 mg p.o. daily with great benefits.  However patient ran out of medication and has been off for 3 weeks and does feel little bit stiff.   Patient denies any side effects from the medication, denies any unexplained weight loss, denies any fevers of unknown etiology, denies any GI upset, denies any rashes, denies any new joint swelling, denies recurrent infections.     Patient recently back from [place] for which he was staying to avoid [place]id[place] infections in the [place].  [place] the [place] last 2 [place].  Patient states he did get some tattoos in [place].     Additional comorbidities include elevated liver function secondary to excessive alcohol use admitted by patient.     Additional comorbidities include  occasional back issues and sciatica for which he takes a muscle relaxer when necessary, also sees [facility] spine for tender point injections.      Patient also admits to frequent alcohol use which causes significant elevation of liver functions, and patient also admits to marijuana use. Patient also states that his father was an alcoholic but denies any alcoholic tendencies himself. Because of these risks, I consider patient at high risk abuser and will not be able to prescribe narcotic pain medication for this patient in this clinic.      S/p Humira  S/p Plaquenil  S/p MTX-contraindicated because of daily alcohol use  S/p leflunamide-contraindicated because of daily alcohol use  S/p sulfasalazine-elevated LFTs (with ETOH)  S/p xeljanz-patient did not want to try for fear of elevated cholesterol levels.     Bilateral HERVE     HBsAg/HBcAb neg 3/2021  HCV neg 3/2021  Quantiferon Gold neg 3/2021  Uric acid 6.7 6/2016 [facility]; Uric acid 7.3 12/2019  RF 14.2 (0-13.9) 10/2007 [facility]; RF 64 3/2018; RF 36 3/2021  CCP 18 ( 0-5) 10/2007 [facility];  3/2018;  3/2021  MRI pelvis 7/2007-indicates advanced inflammatory arthropathy compatible's rheumatoid arthritis involving both hips with hypertrophic synovitis-[facility]  MRI LS spine 9/2009-indicates disc desiccation L3-4, L4-5, and L5-S1, left-sided neural foraminal narrowing at L5-S1, mild central stenosis at L4, and L3-4-[facility]  MRI Right hand/wrist 11/2009-negative, no erosions, no synovitis, no pathology-[facility]      Current Outpatient Medications   Medication Sig Dispense Refill   • Upadacitinib ER (RINVOQ) 15 MG TABLET SR 24 HR Take 1 tablet by mouth every day. 90 tablet 1   • celecoxib (CELEBREX) 200 MG Cap TAKE 1 CAPSULE TWICE A DAY WITH FOOD AS NEEDED FOR JOINT PAIN 180 capsule 0   • methocarbamol (ROBAXIN) 750 MG Tab Take 750 mg by mouth 4 times a day.     • TESTOSTERONE by Does not apply route.     • oxyCODONE-acetaminophen (PERCOCET) 5-325 MG Tab Take 1 Tab by mouth every four hours as needed. (Patient not taking: Reported on 6/28/2021)       No current facility-administered medications for this visit.     He  has a past medical history of Arthritis, rheumatic, acute or subacute.    ROS   Other than what is mentioned in HPI or physical exam, there is no history of headaches, double vision or blurred vision. No temporal tenderness or jaw claudication. No trouble swallowing difficulties or sore throats.  No chest complaints including chest pain, cough or sputum production. No GI complaints including nausea, vomiting, change in bowel habits, or past peptic ulcer disease. No history of blood in the stools. No urinary complaints including dysuria or frequency. No history of alopecia, photosensitivity, oral ulcerations, Raynaud's phenomena.       Objective:     /72   Pulse 86   Temp 36.7 °C  (98.1 °F) (Temporal)   Resp 14   Wt 96.2 kg (212 lb)   SpO2 94%  Body mass index is 30.42 kg/m².   Physical Exam:    Constitutional: Alert and oriented X3, patient is talkative with good eye contact.Skin: Warm, dry, good turgor, no rashes in visible areas, multiple tattoos.Eye: Equal, round and reactive, conjunctiva clear, lids normal EOM intactENMT: Lips without lesions, good dentition, no oropharyngeal ulcers, moist buccal mucosa, pinna without deformityNeck: Trachea midline, no masses, no thyromegaly.Lymph:  No cervical lymphadenopathy, no axillary lymphadenopathy, no supraclavicular lymphadenopathyRespiratory: Unlabored respiratory effort, lungs clear to auscultation, no wheezes, no ronchi.Cardiovascular: Normal S1, S2, no murmur, no edema.Abdomen: Soft, non-tender, no masses, no hepatosplenomegaly.Psych: Alert and oriented x3, normal affect and mood.Neuro: Cranial nerves 2-12 are grossly intact, no loss of sensation LEExt:no joint laxity noted in bilateral arms, no joint laxity noted in bilateral legs, no dactylitis no swan-neck or boutonniere deformities, no sausage digits no crossover toes no splay toes          Lab Results   Component Value Date/Time    HEPBCORIGM Non-Reactive 03/11/2021 07:35 AM    HEPBSAG Non-Reactive 03/11/2021 07:35 AM     Lab Results   Component Value Date/Time    HEPCAB Non-Reactive 03/11/2021 07:35 AM     Lab Results   Component Value Date/Time    SODIUM 138 03/11/2021 07:35 AM    SODIUM 138 03/11/2021 07:35 AM    POTASSIUM 3.9 03/11/2021 07:35 AM    POTASSIUM 4.0 03/11/2021 07:35 AM    CHLORIDE 101 03/11/2021 07:35 AM    CHLORIDE 102 03/11/2021 07:35 AM    CO2 24 03/11/2021 07:35 AM    CO2 23 03/11/2021 07:35 AM    GLUCOSE 92 03/11/2021 07:35 AM    GLUCOSE 92 03/11/2021 07:35 AM    BUN 15 03/11/2021 07:35 AM    BUN 15 03/11/2021 07:35 AM    CREATININE 0.64 03/11/2021 07:35 AM    CREATININE 0.69 03/11/2021 07:35 AM    CREATININE 0.8 02/09/2008 08:50 AM      Lab Results   Component  Value Date/Time    WBC 3.3 (L) 03/11/2021 07:35 AM    WBC 3.3 (L) 03/11/2021 07:35 AM    RBC 4.80 03/11/2021 07:35 AM    RBC 4.83 03/11/2021 07:35 AM    HEMOGLOBIN 16.0 03/11/2021 07:35 AM    HEMOGLOBIN 15.9 03/11/2021 07:35 AM    HEMATOCRIT 46.2 03/11/2021 07:35 AM    HEMATOCRIT 46.0 03/11/2021 07:35 AM    MCV 96.3 03/11/2021 07:35 AM    MCV 95.2 03/11/2021 07:35 AM    MCH 33.3 (H) 03/11/2021 07:35 AM    MCH 32.9 03/11/2021 07:35 AM    MCHC 34.6 03/11/2021 07:35 AM    MCHC 34.6 03/11/2021 07:35 AM    MPV 9.8 03/11/2021 07:35 AM    MPV 10.2 03/11/2021 07:35 AM    NEUTSPOLYS 58.30 03/11/2021 07:35 AM    NEUTSPOLYS 58.00 03/11/2021 07:35 AM    LYMPHOCYTES 27.50 03/11/2021 07:35 AM    LYMPHOCYTES 27.20 03/11/2021 07:35 AM    MONOCYTES 11.20 03/11/2021 07:35 AM    MONOCYTES 11.50 03/11/2021 07:35 AM    EOSINOPHILS 2.10 03/11/2021 07:35 AM    EOSINOPHILS 2.40 03/11/2021 07:35 AM    BASOPHILS 0.60 03/11/2021 07:35 AM    BASOPHILS 0.60 03/11/2021 07:35 AM      Lab Results   Component Value Date/Time    CALCIUM 9.6 03/11/2021 07:35 AM    CALCIUM 9.6 03/11/2021 07:35 AM    ASTSGOT 91 (H) 03/11/2021 07:35 AM    ASTSGOT 91 (H) 03/11/2021 07:35 AM    ALTSGPT 141 (H) 03/11/2021 07:35 AM    ALTSGPT 141 (H) 03/11/2021 07:35 AM    ALKPHOSPHAT 69 03/11/2021 07:35 AM    ALKPHOSPHAT 69 03/11/2021 07:35 AM    TBILIRUBIN 1.2 03/11/2021 07:35 AM    TBILIRUBIN 1.1 03/11/2021 07:35 AM    ALBUMIN 4.3 03/11/2021 07:35 AM    ALBUMIN 4.4 03/11/2021 07:35 AM    TOTPROTEIN 6.9 03/11/2021 07:35 AM    TOTPROTEIN 7.0 03/11/2021 07:35 AM     Lab Results   Component Value Date/Time    URICACID 7.0 03/11/2021 07:35 AM    RHEUMFACTN 36 (H) 03/11/2021 07:35 AM    CCPANTIBODY 144 (H) 03/11/2021 07:35 AM     Lab Results   Component Value Date/Time    SEDRATEWES 1 03/11/2021 07:35 AM     Lab Results   Component Value Date/Time    HBA1C 5.5 11/02/2018 11:27 AM     Lab Results   Component Value Date/Time    ANTIMITOCHO 4.9 06/28/2019 11:03 AM     Assessment  and Plan:     1. Rheumatoid arthritis involving multiple sites with positive rheumatoid factor (HCC)  Continue Rinvoq 15 mg p.o. daily  - Upadacitinib ER (RINVOQ) 15 MG TABLET SR 24 HR; Take 1 tablet by mouth every day.  Dispense: 90 tablet; Refill: 1  - celecoxib (CELEBREX) 200 MG Cap; TAKE 1 CAPSULE TWICE A DAY WITH FOOD AS NEEDED FOR JOINT PAIN  Dispense: 180 capsule; Refill: 0  - Comp Metabolic Panel; Future  - CBC WITH DIFFERENTIAL; Future  - Sed Rate; Future  - HEP B CORE AB IGM  - HEP B SURFACE ANTIGEN; Future  - HEP C VIRUS ANTIBODY; Future  - Quantiferon Gold Plus TB (4 tube); Future    2. Encounter for medication management  Currently on Rinvoq 15 mg p.o. daily, patient recently returned from [place] we need to recheck hepatitis B hepatitis C and TB tests as patient did get tattoos as well while in [place].  Patient needs monitoring labs every 6 months next labs due about September 2021, labs ordered for patient  We reviewed risks of biological medications with patient including hematological pathology, cancer risks, neurological and infection issues especially in the Covid-19 pandemic environment, patient states understanding.  Risk of VTE reviewed with patient patient states understanding, recommend baby aspirin a day.  - Upadacitinib ER (RINVOQ) 15 MG TABLET SR 24 HR; Take 1 tablet by mouth every day.  Dispense: 90 tablet; Refill: 1  - celecoxib (CELEBREX) 200 MG Cap; TAKE 1 CAPSULE TWICE A DAY WITH FOOD AS NEEDED FOR JOINT PAIN  Dispense: 180 capsule; Refill: 0  - Comp Metabolic Panel; Future  - CBC WITH DIFFERENTIAL; Future  - Sed Rate; Future  - HEP B CORE AB IGM  - HEP B SURFACE ANTIGEN; Future  - HEP C VIRUS ANTIBODY; Future  - Quantiferon Gold Plus TB (4 tube); Future    3. LFT elevation  Has been a chronic problem for the patient, patient admits to alcohol use, ultrasound indicates fatty liver.  Patient has been referred to GI multiple times, patient has no-show to GI appointments per letter is  received by GI.  Discussed the risk of elevated liver functions with Rinvoq, patient states understanding, liver functions seem to be stable at this time.  - Comp Metabolic Panel; Future  - CBC WITH DIFFERENTIAL; Future  - Sed Rate; Future  - HEP B CORE AB IGM  - HEP B SURFACE ANTIGEN; Future  - HEP C VIRUS ANTIBODY; Future  - Quantiferon Gold Plus TB (4 tube); Future    Followup: Return in about 6 months (around 12/28/2021). or sooner myla Aguila  was seen 30 minutes face-to-face of which more than 50% of the time was spent counseling the patient (excluding time for procedures)  regarding  rheumatological condition and care. Therapy was discussed in detail.      Please note that this dictation was created using voice recognition software. I have made every reasonable attempt to correct obvious errors, but I expect that there are errors of grammar and possibly content that I did not discover before finalizing the note.

## 2021-07-08 DIAGNOSIS — Z79.899 ENCOUNTER FOR MEDICATION MANAGEMENT: ICD-10-CM

## 2021-07-08 DIAGNOSIS — M05.79 RHEUMATOID ARTHRITIS INVOLVING MULTIPLE SITES WITH POSITIVE RHEUMATOID FACTOR (HCC): ICD-10-CM

## 2021-07-08 RX ORDER — UPADACITINIB 15 MG/1
1 TABLET, EXTENDED RELEASE ORAL DAILY
Qty: 90 TABLET | Refills: 1 | Status: SHIPPED | OUTPATIENT
Start: 2021-07-08 | End: 2021-12-27

## 2021-07-08 NOTE — TELEPHONE ENCOUNTER
pts pharmacy has changed.. please send Rinvoq script to [facility]    Thank you

## 2021-08-16 ENCOUNTER — OFFICE VISIT (OUTPATIENT)
Dept: RHEUMATOLOGY | Facility: MEDICAL CENTER | Age: 56
End: 2021-08-16
Payer: COMMERCIAL

## 2021-08-16 ENCOUNTER — HOSPITAL ENCOUNTER (OUTPATIENT)
Dept: LAB | Facility: MEDICAL CENTER | Age: 56
End: 2021-08-16
Attending: INTERNAL MEDICINE
Payer: COMMERCIAL

## 2021-08-16 ENCOUNTER — HOSPITAL ENCOUNTER (OUTPATIENT)
Dept: LAB | Facility: MEDICAL CENTER | Age: 56
End: 2021-08-16
Attending: EMERGENCY MEDICINE
Payer: COMMERCIAL

## 2021-08-16 VITALS
HEART RATE: 99 BPM | WEIGHT: 212 LBS | TEMPERATURE: 97.9 F | SYSTOLIC BLOOD PRESSURE: 140 MMHG | DIASTOLIC BLOOD PRESSURE: 70 MMHG | RESPIRATION RATE: 14 BRPM | OXYGEN SATURATION: 97 % | BODY MASS INDEX: 30.42 KG/M2

## 2021-08-16 DIAGNOSIS — Z79.899 ENCOUNTER FOR MEDICATION MANAGEMENT: ICD-10-CM

## 2021-08-16 DIAGNOSIS — M05.79 RHEUMATOID ARTHRITIS INVOLVING MULTIPLE SITES WITH POSITIVE RHEUMATOID FACTOR (HCC): ICD-10-CM

## 2021-08-16 DIAGNOSIS — R79.89 LFT ELEVATION: ICD-10-CM

## 2021-08-16 DIAGNOSIS — M79.644 FINGER PAIN, RIGHT: ICD-10-CM

## 2021-08-16 LAB
ALBUMIN SERPL BCP-MCNC: 4.4 G/DL (ref 3.2–4.9)
ALBUMIN SERPL BCP-MCNC: 4.4 G/DL (ref 3.2–4.9)
ALBUMIN/GLOB SERPL: 1.8 G/DL
ALBUMIN/GLOB SERPL: 1.8 G/DL
ALP SERPL-CCNC: 65 U/L (ref 30–99)
ALP SERPL-CCNC: 65 U/L (ref 30–99)
ALT SERPL-CCNC: 81 U/L (ref 2–50)
ALT SERPL-CCNC: 84 U/L (ref 2–50)
ANION GAP SERPL CALC-SCNC: 12 MMOL/L (ref 7–16)
ANION GAP SERPL CALC-SCNC: 12 MMOL/L (ref 7–16)
AST SERPL-CCNC: 55 U/L (ref 12–45)
AST SERPL-CCNC: 55 U/L (ref 12–45)
BASOPHILS # BLD AUTO: 0.4 % (ref 0–1.8)
BASOPHILS # BLD AUTO: 0.6 % (ref 0–1.8)
BASOPHILS # BLD: 0.02 K/UL (ref 0–0.12)
BASOPHILS # BLD: 0.03 K/UL (ref 0–0.12)
BILIRUB SERPL-MCNC: 1.7 MG/DL (ref 0.1–1.5)
BILIRUB SERPL-MCNC: 1.7 MG/DL (ref 0.1–1.5)
BUN SERPL-MCNC: 15 MG/DL (ref 8–22)
BUN SERPL-MCNC: 15 MG/DL (ref 8–22)
CALCIUM SERPL-MCNC: 9.4 MG/DL (ref 8.5–10.5)
CALCIUM SERPL-MCNC: 9.4 MG/DL (ref 8.5–10.5)
CHLORIDE SERPL-SCNC: 105 MMOL/L (ref 96–112)
CHLORIDE SERPL-SCNC: 106 MMOL/L (ref 96–112)
CHOLEST SERPL-MCNC: 246 MG/DL (ref 100–199)
CO2 SERPL-SCNC: 21 MMOL/L (ref 20–33)
CO2 SERPL-SCNC: 21 MMOL/L (ref 20–33)
CREAT SERPL-MCNC: 0.61 MG/DL (ref 0.5–1.4)
CREAT SERPL-MCNC: 0.63 MG/DL (ref 0.5–1.4)
EOSINOPHIL # BLD AUTO: 0.08 K/UL (ref 0–0.51)
EOSINOPHIL # BLD AUTO: 0.1 K/UL (ref 0–0.51)
EOSINOPHIL NFR BLD: 1.7 % (ref 0–6.9)
EOSINOPHIL NFR BLD: 2.1 % (ref 0–6.9)
ERYTHROCYTE [DISTWIDTH] IN BLOOD BY AUTOMATED COUNT: 45.6 FL (ref 35.9–50)
ERYTHROCYTE [DISTWIDTH] IN BLOOD BY AUTOMATED COUNT: 46.2 FL (ref 35.9–50)
ERYTHROCYTE [SEDIMENTATION RATE] IN BLOOD BY WESTERGREN METHOD: 3 MM/HOUR (ref 0–20)
FASTING STATUS PATIENT QL REPORTED: NORMAL
FASTING STATUS PATIENT QL REPORTED: NORMAL
GLOBULIN SER CALC-MCNC: 2.5 G/DL (ref 1.9–3.5)
GLOBULIN SER CALC-MCNC: 2.5 G/DL (ref 1.9–3.5)
GLUCOSE SERPL-MCNC: 100 MG/DL (ref 65–99)
GLUCOSE SERPL-MCNC: 101 MG/DL (ref 65–99)
HBV CORE IGM SER QL: NORMAL
HBV SURFACE AG SER QL: NORMAL
HCT VFR BLD AUTO: 48.1 % (ref 42–52)
HCT VFR BLD AUTO: 49 % (ref 42–52)
HCV AB SER QL: NORMAL
HCYS SERPL-SCNC: 16.34 UMOL/L
HDLC SERPL-MCNC: 60 MG/DL
HGB BLD-MCNC: 17.1 G/DL (ref 14–18)
HGB BLD-MCNC: 17.1 G/DL (ref 14–18)
IMM GRANULOCYTES # BLD AUTO: 0.02 K/UL (ref 0–0.11)
IMM GRANULOCYTES # BLD AUTO: 0.02 K/UL (ref 0–0.11)
IMM GRANULOCYTES NFR BLD AUTO: 0.4 % (ref 0–0.9)
IMM GRANULOCYTES NFR BLD AUTO: 0.4 % (ref 0–0.9)
LDH SERPL L TO P-CCNC: 192 U/L (ref 107–266)
LDLC SERPL CALC-MCNC: 159 MG/DL
LYMPHOCYTES # BLD AUTO: 0.81 K/UL (ref 1–4.8)
LYMPHOCYTES # BLD AUTO: 0.86 K/UL (ref 1–4.8)
LYMPHOCYTES NFR BLD: 17.2 % (ref 22–41)
LYMPHOCYTES NFR BLD: 18.2 % (ref 22–41)
MCH RBC QN AUTO: 33.7 PG (ref 27–33)
MCH RBC QN AUTO: 34.3 PG (ref 27–33)
MCHC RBC AUTO-ENTMCNC: 34.9 G/DL (ref 33.7–35.3)
MCHC RBC AUTO-ENTMCNC: 35.6 G/DL (ref 33.7–35.3)
MCV RBC AUTO: 96.4 FL (ref 81.4–97.8)
MCV RBC AUTO: 96.6 FL (ref 81.4–97.8)
MONOCYTES # BLD AUTO: 0.49 K/UL (ref 0–0.85)
MONOCYTES # BLD AUTO: 0.52 K/UL (ref 0–0.85)
MONOCYTES NFR BLD AUTO: 10.4 % (ref 0–13.4)
MONOCYTES NFR BLD AUTO: 11 % (ref 0–13.4)
NEUTROPHILS # BLD AUTO: 3.23 K/UL (ref 1.82–7.42)
NEUTROPHILS # BLD AUTO: 3.27 K/UL (ref 1.82–7.42)
NEUTROPHILS NFR BLD: 68.3 % (ref 44–72)
NEUTROPHILS NFR BLD: 69.3 % (ref 44–72)
NRBC # BLD AUTO: 0 K/UL
NRBC # BLD AUTO: 0 K/UL
NRBC BLD-RTO: 0 /100 WBC
NRBC BLD-RTO: 0 /100 WBC
PHOSPHATE SERPL-MCNC: 2.9 MG/DL (ref 2.5–4.5)
PLATELET # BLD AUTO: 179 K/UL (ref 164–446)
PLATELET # BLD AUTO: 180 K/UL (ref 164–446)
PMV BLD AUTO: 10.2 FL (ref 9–12.9)
PMV BLD AUTO: 9.9 FL (ref 9–12.9)
POTASSIUM SERPL-SCNC: 4 MMOL/L (ref 3.6–5.5)
POTASSIUM SERPL-SCNC: 4 MMOL/L (ref 3.6–5.5)
PROT SERPL-MCNC: 6.9 G/DL (ref 6–8.2)
PROT SERPL-MCNC: 6.9 G/DL (ref 6–8.2)
RBC # BLD AUTO: 4.99 M/UL (ref 4.7–6.1)
RBC # BLD AUTO: 5.07 M/UL (ref 4.7–6.1)
SODIUM SERPL-SCNC: 138 MMOL/L (ref 135–145)
SODIUM SERPL-SCNC: 139 MMOL/L (ref 135–145)
TRIGL SERPL-MCNC: 135 MG/DL (ref 0–149)
URATE SERPL-MCNC: 5.9 MG/DL (ref 2.5–8.3)
WBC # BLD AUTO: 4.7 K/UL (ref 4.8–10.8)
WBC # BLD AUTO: 4.7 K/UL (ref 4.8–10.8)

## 2021-08-16 PROCEDURE — 84153 ASSAY OF PSA TOTAL: CPT

## 2021-08-16 PROCEDURE — 86803 HEPATITIS C AB TEST: CPT

## 2021-08-16 PROCEDURE — 85025 COMPLETE CBC W/AUTO DIFF WBC: CPT

## 2021-08-16 PROCEDURE — 99214 OFFICE O/P EST MOD 30 MIN: CPT | Mod: 25 | Performed by: INTERNAL MEDICINE

## 2021-08-16 PROCEDURE — 84100 ASSAY OF PHOSPHORUS: CPT

## 2021-08-16 PROCEDURE — 87340 HEPATITIS B SURFACE AG IA: CPT

## 2021-08-16 PROCEDURE — 84403 ASSAY OF TOTAL TESTOSTERONE: CPT

## 2021-08-16 PROCEDURE — 82670 ASSAY OF TOTAL ESTRADIOL: CPT

## 2021-08-16 PROCEDURE — 20600 DRAIN/INJ JOINT/BURSA W/O US: CPT | Mod: F7 | Performed by: INTERNAL MEDICINE

## 2021-08-16 PROCEDURE — 80053 COMPREHEN METABOLIC PANEL: CPT | Mod: 91

## 2021-08-16 PROCEDURE — 80053 COMPREHEN METABOLIC PANEL: CPT

## 2021-08-16 PROCEDURE — 84402 ASSAY OF FREE TESTOSTERONE: CPT

## 2021-08-16 PROCEDURE — 80061 LIPID PANEL: CPT

## 2021-08-16 PROCEDURE — 36415 COLL VENOUS BLD VENIPUNCTURE: CPT

## 2021-08-16 PROCEDURE — 85025 COMPLETE CBC W/AUTO DIFF WBC: CPT | Mod: 91

## 2021-08-16 PROCEDURE — 85652 RBC SED RATE AUTOMATED: CPT

## 2021-08-16 PROCEDURE — 84550 ASSAY OF BLOOD/URIC ACID: CPT

## 2021-08-16 PROCEDURE — 82977 ASSAY OF GGT: CPT

## 2021-08-16 PROCEDURE — 83615 LACTATE (LD) (LDH) ENZYME: CPT

## 2021-08-16 PROCEDURE — 86705 HEP B CORE ANTIBODY IGM: CPT

## 2021-08-16 PROCEDURE — 84270 ASSAY OF SEX HORMONE GLOBUL: CPT

## 2021-08-16 PROCEDURE — 83090 ASSAY OF HOMOCYSTEINE: CPT

## 2021-08-16 PROCEDURE — 86480 TB TEST CELL IMMUN MEASURE: CPT

## 2021-08-16 PROCEDURE — 84305 ASSAY OF SOMATOMEDIN: CPT

## 2021-08-16 RX ORDER — METHYLPREDNISOLONE ACETATE 40 MG/ML
10 INJECTION, SUSPENSION INTRA-ARTICULAR; INTRALESIONAL; INTRAMUSCULAR; SOFT TISSUE ONCE
Status: COMPLETED | OUTPATIENT
Start: 2021-08-16 | End: 2021-08-16

## 2021-08-16 RX ADMIN — METHYLPREDNISOLONE ACETATE 10 MG: 40 INJECTION, SUSPENSION INTRA-ARTICULAR; INTRALESIONAL; INTRAMUSCULAR; SOFT TISSUE at 12:14

## 2021-08-16 ASSESSMENT — FIBROSIS 4 INDEX: FIB4 SCORE: 2.51

## 2021-08-16 ASSESSMENT — JOINT PAIN
TOTAL NUMBER OF TENDER JOINTS: 1
TOTAL NUMBER OF SWOLLEN JOINTS: 1

## 2021-08-16 NOTE — PROGRESS NOTES
Chief Complaint- joint pain     Subjective:   Milton Aguila is a 55 y.o. male here today for follow up of rheumatological issues    This is a follow-up visit for this patient who is seen in this clinic for rheumatoid arthritis.  Patient is currently on Rinvoq 15 mg p.o. daily with great benefit.   Patient denies any side effects from the medication, denies any unexplained weight loss, denies any fevers of unknown etiology, denies any GI upset, denies any rashes, denies any new joint swelling, denies recurrent infections.  Lab results are pending.    Patient here today complaining of right third MCP joint swelling and pain wonders about getting a cortisone shot in the right third MCP joint     Additional comorbidities include elevated liver function secondary to excessive alcohol use admitted by patient.     Additional comorbidities include  occasional back issues and sciatica for which he takes a muscle relaxer when necessary, also sees [facility] spine for tender point injections.      Patient also admits to frequent alcohol use which causes significant elevation of liver functions, and patient also admits to marijuana use. Patient also states that his father was an alcoholic but denies any alcoholic tendencies himself. Because of these risks, I consider patient at high risk abuser and will not be able to prescribe narcotic pain medication for this patient in this clinic.     Addendum 7/12/2022  MyChart email from patient-Medrol Dosepak was prescribed for patient  My right hand has been very swollen, to the point where my pinky and index finger get cramps back and pull towards the palm of my hand. It’s like a spasm that happens from know exactly but I’m sure the swelling that’s happening in their hand currently         You  Milton Aguila 1 hour ago (2:29 PM)           Brian Montoya,  We received a request for Medrol Dosepak, can you let us know, symptoms you are having?     Thankyou:)  Aydee  Rubia RANDALL       S/p Humira  S/p Plaquenil  S/p MTX-contraindicated because of daily alcohol use  S/p leflunamide-contraindicated because of daily alcohol use  S/p sulfasalazine-elevated LFTs (with ETOH)  S/p xeljanz-patient did not want to try for fear of elevated cholesterol levels.     Bilateral HERVE     HBsAg/HBcAb neg 3/2021  HCV neg 3/2021  Quantiferon Gold neg 3/2021  Uric acid 6.7 6/2016 [facility]; Uric acid 7.3 12/2019  RF 14.2 (0-13.9) 10/2007 [facility]; RF 64 3/2018; RF 36 3/2021  CCP 18 ( 0-5) 10/2007 [facility];  3/2018;  3/2021  MRI pelvis 7/2007-indicates advanced inflammatory arthropathy compatible's rheumatoid arthritis involving both hips with hypertrophic synovitis-[facility]  MRI LS spine 9/2009-indicates disc desiccation L3-4, L4-5, and L5-S1, left-sided neural foraminal narrowing at L5-S1, mild central stenosis at L4, and L3-4-[facility]  MRI Right hand/wrist 11/2009-negative, no erosions, no synovitis, no pathology-[facility]       Current Outpatient Medications   Medication Sig Dispense Refill   • Upadacitinib ER (RINVOQ) 15 MG TABLET SR 24 HR Take 1 tablet by mouth every day. 90 tablet 1   • celecoxib (CELEBREX) 200 MG Cap TAKE 1 CAPSULE TWICE A DAY WITH FOOD AS NEEDED FOR JOINT PAIN 180 capsule 0   • methocarbamol (ROBAXIN) 750 MG Tab Take 750 mg by mouth 4 times a day.     • TESTOSTERONE by Does not apply route.     • oxyCODONE-acetaminophen (PERCOCET) 5-325 MG Tab Take 1 Tab by mouth every four hours as needed. (Patient not taking: Reported on 6/28/2021)       No current facility-administered medications for this visit.     He  has a past medical history of Arthritis, rheumatic, acute or subacute.    ROS   Other than what is mentioned in HPI or physical exam, there is no history of headaches, double vision or blurred vision. No temporal tenderness or jaw claudication. No trouble swallowing difficulties or sore  throats.  No chest complaints including chest pain, cough or sputum production. No GI complaints including nausea, vomiting, change in bowel habits, or past peptic ulcer disease. No history of blood in the stools. No urinary complaints including dysuria or frequency. No history of alopecia, photosensitivity, oral ulcerations, Raynaud's phenomena.       Objective:     /70   Pulse 99   Temp 36.6 °C (97.9 °F) (Temporal)   Resp 14   Wt 96.2 kg (212 lb)   SpO2 97%  Body mass index is 30.42 kg/m².   Physical Exam:    Constitutional: Alert and oriented X3, patient is talkative with good eye contact.Skin: Warm, dry, good turgor, no rashes in visible areas.Eye: Equal, round and reactive, conjunctiva clear, lids normal EOM intactENMT: Lips without lesions, good dentition, no oropharyngeal ulcers, moist buccal mucosa, pinna without deformityNeck: Trachea midline, no masses, no thyromegaly.Lymph:  No cervical lymphadenopathy, no axillary lymphadenopathy, no supraclavicular lymphadenopathyRespiratory: Unlabored respiratory effort, lungs clear to auscultation, no wheezes, no ronchi.Cardiovascular: Normal S1, S2, no murmur, no edema.Abdomen: Soft, non-tender, no masses, no hepatosplenomegaly.Psych: Alert and oriented x3, normal affect and mood.Neuro: Cranial nerves 2-12 are grossly intact, no loss of sensation LEExt:no joint laxity noted in bilateral arms, no joint laxity noted in bilateral legs          Lab Results   Component Value Date/Time    HEPBCORIGM Non-Reactive 03/11/2021 07:35 AM    HEPBSAG Non-Reactive 03/11/2021 07:35 AM     Lab Results   Component Value Date/Time    HEPCAB Non-Reactive 03/11/2021 07:35 AM     Lab Results   Component Value Date/Time    SODIUM 138 03/11/2021 07:35 AM    SODIUM 138 03/11/2021 07:35 AM    POTASSIUM 3.9 03/11/2021 07:35 AM    POTASSIUM 4.0 03/11/2021 07:35 AM    CHLORIDE 101 03/11/2021 07:35 AM    CHLORIDE 102 03/11/2021 07:35 AM    CO2 24 03/11/2021 07:35 AM    CO2 23  03/11/2021 07:35 AM    GLUCOSE 92 03/11/2021 07:35 AM    GLUCOSE 92 03/11/2021 07:35 AM    BUN 15 03/11/2021 07:35 AM    BUN 15 03/11/2021 07:35 AM    CREATININE 0.64 03/11/2021 07:35 AM    CREATININE 0.69 03/11/2021 07:35 AM    CREATININE 0.8 02/09/2008 08:50 AM      Lab Results   Component Value Date/Time    WBC 3.3 (L) 03/11/2021 07:35 AM    WBC 3.3 (L) 03/11/2021 07:35 AM    RBC 4.80 03/11/2021 07:35 AM    RBC 4.83 03/11/2021 07:35 AM    HEMOGLOBIN 16.0 03/11/2021 07:35 AM    HEMOGLOBIN 15.9 03/11/2021 07:35 AM    HEMATOCRIT 46.2 03/11/2021 07:35 AM    HEMATOCRIT 46.0 03/11/2021 07:35 AM    MCV 96.3 03/11/2021 07:35 AM    MCV 95.2 03/11/2021 07:35 AM    MCH 33.3 (H) 03/11/2021 07:35 AM    MCH 32.9 03/11/2021 07:35 AM    MCHC 34.6 03/11/2021 07:35 AM    MCHC 34.6 03/11/2021 07:35 AM    MPV 9.8 03/11/2021 07:35 AM    MPV 10.2 03/11/2021 07:35 AM    NEUTSPOLYS 58.30 03/11/2021 07:35 AM    NEUTSPOLYS 58.00 03/11/2021 07:35 AM    LYMPHOCYTES 27.50 03/11/2021 07:35 AM    LYMPHOCYTES 27.20 03/11/2021 07:35 AM    MONOCYTES 11.20 03/11/2021 07:35 AM    MONOCYTES 11.50 03/11/2021 07:35 AM    EOSINOPHILS 2.10 03/11/2021 07:35 AM    EOSINOPHILS 2.40 03/11/2021 07:35 AM    BASOPHILS 0.60 03/11/2021 07:35 AM    BASOPHILS 0.60 03/11/2021 07:35 AM      Lab Results   Component Value Date/Time    CALCIUM 9.6 03/11/2021 07:35 AM    CALCIUM 9.6 03/11/2021 07:35 AM    ASTSGOT 91 (H) 03/11/2021 07:35 AM    ASTSGOT 91 (H) 03/11/2021 07:35 AM    ALTSGPT 141 (H) 03/11/2021 07:35 AM    ALTSGPT 141 (H) 03/11/2021 07:35 AM    ALKPHOSPHAT 69 03/11/2021 07:35 AM    ALKPHOSPHAT 69 03/11/2021 07:35 AM    TBILIRUBIN 1.2 03/11/2021 07:35 AM    TBILIRUBIN 1.1 03/11/2021 07:35 AM    ALBUMIN 4.3 03/11/2021 07:35 AM    ALBUMIN 4.4 03/11/2021 07:35 AM    TOTPROTEIN 6.9 03/11/2021 07:35 AM    TOTPROTEIN 7.0 03/11/2021 07:35 AM     Lab Results   Component Value Date/Time    URICACID 7.0 03/11/2021 07:35 AM    RHEUMFACTN 36 (H) 03/11/2021 07:35 AM     CCPANTIBODY 144 (H) 03/11/2021 07:35 AM     Lab Results   Component Value Date/Time    SEDRATEWES 1 03/11/2021 07:35 AM     Lab Results   Component Value Date/Time    HBA1C 5.5 11/02/2018 11:27 AM     Lab Results   Component Value Date/Time    ANTIMITOCHO 4.9 06/28/2019 11:03 AM     Assessment and Plan:     1. Rheumatoid arthritis involving multiple sites with positive rheumatoid factor (HCC)  Stable on Rinvoq 15 mg p.o. daily, will continue as such    2. Encounter for medication management  On Rinvoq 15 mg p.o. daily  Screening labs are up-to-date, next screening labs due March 2023, patient needs monitoring labs every 6 months, patient did labs today  We reviewed risks of biological medications with patient including hematological pathology, cancer risks, neurological and infection issues especially in the Covid-19 pandemic environment, patient states understanding.  Discussed risks of VTE while on Rinvoq with patient patient states understanding recommend baby aspirin a day.  Patient states understanding    3. LFT elevation  This is been a chronic problem for this patient, patient admits to alcohol use, ultrasound indicates fatty liver, patient has been referred to GI multiple times first patient has been a no-show to GI appointments per letter received by GI.    4. Finger pain, right  Right third MCP joint, today we will do a right third MCP joint corticosteroid injection    Procedure: Right third MCP joint corticosteroid injection    The procedure was explained to the patient in detail including potential damage to area being injected including risk of avascular necrosis, all questions were answered, verbal consent to do procedure was obtained. Risks and benefits were reviewed with patient and patient states understanding including risks of steroid injections including bleeding, infections, subcutaneous lipolysis and/or hypopigmentation at site of injection, and risk of avascular necrosis. Verbal consent was  again obtained.  Patient denies any allergies to lidocaine, Betadine/iodine or corticosteroids.  The patient was positioned appropriately. Anatomical landmarks were identified.    Dorsal aspect of right third MCP joint was prepped with betadine x 3, local anesthetic with ethyl chloride and 1% Xylocaine lot #[id number], Exp February 2024 and using sterile technique,  injected 10 mg of Depomedrol Lot #[id number], Exp November 2021 dorsal approach right third MCP joint    EBL 0  The patient tolerated the procedure well, was observed in the office and there were no complications     Patient was asked to rest right third finger for 3 days, patient states understanding and states will comply.        Followup: Return in about 6 months (around 2/16/2022). or sooner myla Aguila  was seen 30 minutes face-to-face of which more than 50% of the time was spent counseling the patient (excluding time for procedures)  regarding  rheumatological condition and care. Therapy was discussed in detail.      Please note that this dictation was created using voice recognition software. I have made every reasonable attempt to correct obvious errors, but I expect that there are errors of grammar and possibly content that I did not discover before finalizing the note.

## 2021-08-17 LAB
GAMMA INTERFERON BACKGROUND BLD IA-ACNC: 0.05 IU/ML
GGT SERPL-CCNC: 29 U/L (ref 7–51)
M TB IFN-G BLD-IMP: NEGATIVE
M TB IFN-G CD4+ BCKGRND COR BLD-ACNC: -0.01 IU/ML
MITOGEN IGNF BCKGRD COR BLD-ACNC: >10 IU/ML
PSA SERPL-MCNC: 0.7 NG/ML (ref 0–4)
QFT TB2 - NIL TBQ2: -0.01 IU/ML

## 2021-08-18 LAB
ESTRADIOL SERPL-MCNC: <20 PG/ML
SHBG SERPL-SCNC: 58 NMOL/L (ref 11–80)
TESTOST FREE MFR SERPL: 1.3 % (ref 1.6–2.9)
TESTOST FREE SERPL-MCNC: 40 PG/ML (ref 47–244)
TESTOST SERPL-MCNC: 313 NG/DL (ref 300–890)

## 2021-08-19 LAB
IGF-I SERPL-MCNC: 168 NG/ML (ref 62–214)
IGF-I Z-SCORE SERPL: 1

## 2021-10-04 ENCOUNTER — HOSPITAL ENCOUNTER (OUTPATIENT)
Dept: LAB | Facility: MEDICAL CENTER | Age: 56
End: 2021-10-04
Attending: ORTHOPAEDIC SURGERY
Payer: COMMERCIAL

## 2021-10-04 DIAGNOSIS — Z96.641 PAIN DUE TO RIGHT HIP JOINT PROSTHESIS, SUBSEQUENT ENCOUNTER: ICD-10-CM

## 2021-10-04 DIAGNOSIS — T84.84XD PAIN DUE TO RIGHT HIP JOINT PROSTHESIS, SUBSEQUENT ENCOUNTER: ICD-10-CM

## 2021-10-04 DIAGNOSIS — Z96.641 HX OF TOTAL HIP ARTHROPLASTY, RIGHT: ICD-10-CM

## 2021-10-04 PROCEDURE — 83018 HEAVY METAL QUAN EACH NES: CPT

## 2021-10-04 PROCEDURE — 36415 COLL VENOUS BLD VENIPUNCTURE: CPT

## 2021-10-04 PROCEDURE — 82495 ASSAY OF CHROMIUM: CPT

## 2021-10-07 LAB
COBALT SERPL-MCNC: 2.2 UG/L
CR SERPL-MCNC: 1.6 UG/L

## 2021-12-27 DIAGNOSIS — M05.79 RHEUMATOID ARTHRITIS INVOLVING MULTIPLE SITES WITH POSITIVE RHEUMATOID FACTOR (HCC): ICD-10-CM

## 2021-12-27 DIAGNOSIS — Z79.899 ENCOUNTER FOR MEDICATION MANAGEMENT: ICD-10-CM

## 2022-02-28 DIAGNOSIS — Z79.899 ENCOUNTER FOR MEDICATION MANAGEMENT: ICD-10-CM

## 2022-02-28 DIAGNOSIS — M05.79 RHEUMATOID ARTHRITIS INVOLVING MULTIPLE SITES WITH POSITIVE RHEUMATOID FACTOR (HCC): ICD-10-CM

## 2022-02-28 RX ORDER — CELECOXIB 200 MG/1
CAPSULE ORAL
Qty: 60 CAPSULE | Refills: 0 | Status: SHIPPED | OUTPATIENT
Start: 2022-02-28 | End: 2022-04-18 | Stop reason: SDUPTHER

## 2022-02-28 NOTE — TELEPHONE ENCOUNTER
Received request via: Pharmacy aug labs    Was the patient seen in the last year in this department? Yes    Does the patient have an active prescription (recently filled or refills available) for medication(s) requested? No

## 2022-04-05 ENCOUNTER — HOSPITAL ENCOUNTER (OUTPATIENT)
Dept: RADIOLOGY | Facility: MEDICAL CENTER | Age: 57
End: 2022-04-05
Payer: COMMERCIAL

## 2022-04-06 ENCOUNTER — HOSPITAL ENCOUNTER (OUTPATIENT)
Dept: RADIOLOGY | Facility: MEDICAL CENTER | Age: 57
End: 2022-04-06
Payer: COMMERCIAL

## 2022-04-12 ENCOUNTER — HOSPITAL ENCOUNTER (OUTPATIENT)
Dept: RADIOLOGY | Facility: MEDICAL CENTER | Age: 57
End: 2022-04-12
Attending: ORTHOPAEDIC SURGERY
Payer: COMMERCIAL

## 2022-04-12 DIAGNOSIS — M71.351 OTHER BURSAL CYST, RIGHT HIP: ICD-10-CM

## 2022-04-12 LAB
GRAM STN SPEC: NORMAL
SIGNIFICANT IND 70042: NORMAL
SITE SITE: NORMAL
SOURCE SOURCE: NORMAL

## 2022-04-12 PROCEDURE — 87205 SMEAR GRAM STAIN: CPT

## 2022-04-12 PROCEDURE — 87070 CULTURE OTHR SPECIMN AEROBIC: CPT

## 2022-04-12 PROCEDURE — 10160 PNXR ASPIR ABSC HMTMA BULLA: CPT

## 2022-04-12 NOTE — PROGRESS NOTES
"Patient given [facility] \"Preventing the Spread of Infection\" brochure upon arrival.     US guided therapeutic left hip aspiration done by Dr. Cooper;(no H&P required as this is a NON SEDATION procedure Left hip access site; 80 mL obtained and discarded; 20 mL sent to lab pt tolerated the procedure well; pt hemodynamically stable pre/intra/post procedure; all questions and concerns answered prior to d/c; patient provided with all appropriate education for procedure; pt d/c home.     "

## 2022-04-15 LAB
BACTERIA WND AEROBE CULT: NORMAL
GRAM STN SPEC: NORMAL
SIGNIFICANT IND 70042: NORMAL
SITE SITE: NORMAL
SOURCE SOURCE: NORMAL

## 2022-04-16 ENCOUNTER — HOSPITAL ENCOUNTER (OUTPATIENT)
Dept: LAB | Facility: MEDICAL CENTER | Age: 57
End: 2022-04-16
Attending: EMERGENCY MEDICINE
Payer: COMMERCIAL

## 2022-04-16 ENCOUNTER — HOSPITAL ENCOUNTER (OUTPATIENT)
Dept: LAB | Facility: MEDICAL CENTER | Age: 57
End: 2022-04-16
Attending: INTERNAL MEDICINE
Payer: COMMERCIAL

## 2022-04-16 ENCOUNTER — HOSPITAL ENCOUNTER (OUTPATIENT)
Dept: LAB | Facility: MEDICAL CENTER | Age: 57
End: 2022-04-16
Attending: PEDIATRICS
Payer: COMMERCIAL

## 2022-04-16 DIAGNOSIS — M05.79 RHEUMATOID ARTHRITIS INVOLVING MULTIPLE SITES WITH POSITIVE RHEUMATOID FACTOR (HCC): ICD-10-CM

## 2022-04-16 DIAGNOSIS — Z79.899 ENCOUNTER FOR MEDICATION MANAGEMENT: ICD-10-CM

## 2022-04-16 LAB
ALBUMIN SERPL BCP-MCNC: 4.2 G/DL (ref 3.2–4.9)
ALBUMIN/GLOB SERPL: 1.8 G/DL
ALP SERPL-CCNC: 58 U/L (ref 30–99)
ALP SERPL-CCNC: 58 U/L (ref 30–99)
ALP SERPL-CCNC: 59 U/L (ref 30–99)
ALT SERPL-CCNC: 55 U/L (ref 2–50)
ALT SERPL-CCNC: 57 U/L (ref 2–50)
ALT SERPL-CCNC: 59 U/L (ref 2–50)
ANION GAP SERPL CALC-SCNC: 10 MMOL/L (ref 7–16)
ANION GAP SERPL CALC-SCNC: 10 MMOL/L (ref 7–16)
ANION GAP SERPL CALC-SCNC: 12 MMOL/L (ref 7–16)
AST SERPL-CCNC: 48 U/L (ref 12–45)
AST SERPL-CCNC: 50 U/L (ref 12–45)
AST SERPL-CCNC: 51 U/L (ref 12–45)
BASOPHILS # BLD AUTO: 0.3 % (ref 0–1.8)
BASOPHILS # BLD AUTO: 0.6 % (ref 0–1.8)
BASOPHILS # BLD: 0.01 K/UL (ref 0–0.12)
BASOPHILS # BLD: 0.02 K/UL (ref 0–0.12)
BILIRUB SERPL-MCNC: 1.1 MG/DL (ref 0.1–1.5)
BUN SERPL-MCNC: 17 MG/DL (ref 8–22)
CALCIUM SERPL-MCNC: 9.5 MG/DL (ref 8.5–10.5)
CHLORIDE SERPL-SCNC: 105 MMOL/L (ref 96–112)
CHLORIDE SERPL-SCNC: 106 MMOL/L (ref 96–112)
CHLORIDE SERPL-SCNC: 107 MMOL/L (ref 96–112)
CHOLEST SERPL-MCNC: 243 MG/DL (ref 100–199)
CHOLEST SERPL-MCNC: 247 MG/DL (ref 100–199)
CO2 SERPL-SCNC: 22 MMOL/L (ref 20–33)
CO2 SERPL-SCNC: 24 MMOL/L (ref 20–33)
CO2 SERPL-SCNC: 24 MMOL/L (ref 20–33)
CREAT SERPL-MCNC: 0.68 MG/DL (ref 0.5–1.4)
CREAT SERPL-MCNC: 0.74 MG/DL (ref 0.5–1.4)
CREAT SERPL-MCNC: 0.75 MG/DL (ref 0.5–1.4)
CRP SERPL HS-MCNC: 1.1 MG/L (ref 0–3)
EOSINOPHIL # BLD AUTO: 0.07 K/UL (ref 0–0.51)
EOSINOPHIL # BLD AUTO: 0.09 K/UL (ref 0–0.51)
EOSINOPHIL NFR BLD: 2.1 % (ref 0–6.9)
EOSINOPHIL NFR BLD: 2.6 % (ref 0–6.9)
ERYTHROCYTE [DISTWIDTH] IN BLOOD BY AUTOMATED COUNT: 45.2 FL (ref 35.9–50)
ERYTHROCYTE [DISTWIDTH] IN BLOOD BY AUTOMATED COUNT: 45.8 FL (ref 35.9–50)
ERYTHROCYTE [SEDIMENTATION RATE] IN BLOOD BY WESTERGREN METHOD: 8 MM/HOUR (ref 0–20)
EST. AVERAGE GLUCOSE BLD GHB EST-MCNC: 103 MG/DL
ESTRADIOL SERPL-MCNC: <5 PG/ML
GFR SERPLBLD CREATININE-BSD FMLA CKD-EPI: 106 ML/MIN/1.73 M 2
GFR SERPLBLD CREATININE-BSD FMLA CKD-EPI: 107 ML/MIN/1.73 M 2
GFR SERPLBLD CREATININE-BSD FMLA CKD-EPI: 109 ML/MIN/1.73 M 2
GLOBULIN SER CALC-MCNC: 2.4 G/DL (ref 1.9–3.5)
GLUCOSE SERPL-MCNC: 96 MG/DL (ref 65–99)
GLUCOSE SERPL-MCNC: 96 MG/DL (ref 65–99)
GLUCOSE SERPL-MCNC: 97 MG/DL (ref 65–99)
HBA1C MFR BLD: 5.2 % (ref 4–5.6)
HCT VFR BLD AUTO: 42.2 % (ref 42–52)
HCT VFR BLD AUTO: 43.1 % (ref 42–52)
HCYS SERPL-SCNC: 9.89 UMOL/L
HDLC SERPL-MCNC: 59 MG/DL
HDLC SERPL-MCNC: 59 MG/DL
HGB BLD-MCNC: 14.7 G/DL (ref 14–18)
HGB BLD-MCNC: 15 G/DL (ref 14–18)
IMM GRANULOCYTES # BLD AUTO: 0.02 K/UL (ref 0–0.11)
IMM GRANULOCYTES # BLD AUTO: 0.03 K/UL (ref 0–0.11)
IMM GRANULOCYTES NFR BLD AUTO: 0.6 % (ref 0–0.9)
IMM GRANULOCYTES NFR BLD AUTO: 0.9 % (ref 0–0.9)
LDLC SERPL CALC-MCNC: 168 MG/DL
LDLC SERPL CALC-MCNC: 172 MG/DL
LYMPHOCYTES # BLD AUTO: 0.78 K/UL (ref 1–4.8)
LYMPHOCYTES # BLD AUTO: 0.85 K/UL (ref 1–4.8)
LYMPHOCYTES NFR BLD: 23.1 % (ref 22–41)
LYMPHOCYTES NFR BLD: 24.9 % (ref 22–41)
MCH RBC QN AUTO: 33.3 PG (ref 27–33)
MCH RBC QN AUTO: 33.5 PG (ref 27–33)
MCHC RBC AUTO-ENTMCNC: 34.8 G/DL (ref 33.7–35.3)
MCHC RBC AUTO-ENTMCNC: 34.8 G/DL (ref 33.7–35.3)
MCV RBC AUTO: 95.5 FL (ref 81.4–97.8)
MCV RBC AUTO: 96.2 FL (ref 81.4–97.8)
MONOCYTES # BLD AUTO: 0.37 K/UL (ref 0–0.85)
MONOCYTES # BLD AUTO: 0.41 K/UL (ref 0–0.85)
MONOCYTES NFR BLD AUTO: 11 % (ref 0–13.4)
MONOCYTES NFR BLD AUTO: 12 % (ref 0–13.4)
NEUTROPHILS # BLD AUTO: 2.03 K/UL (ref 1.82–7.42)
NEUTROPHILS # BLD AUTO: 2.11 K/UL (ref 1.82–7.42)
NEUTROPHILS NFR BLD: 59.3 % (ref 44–72)
NEUTROPHILS NFR BLD: 62.6 % (ref 44–72)
NRBC # BLD AUTO: 0 K/UL
NRBC # BLD AUTO: 0 K/UL
NRBC BLD-RTO: 0 /100 WBC
NRBC BLD-RTO: 0 /100 WBC
PLATELET # BLD AUTO: 182 K/UL (ref 164–446)
PLATELET # BLD AUTO: 186 K/UL (ref 164–446)
PMV BLD AUTO: 9.7 FL (ref 9–12.9)
PMV BLD AUTO: 9.8 FL (ref 9–12.9)
POTASSIUM SERPL-SCNC: 4.2 MMOL/L (ref 3.6–5.5)
PROT SERPL-MCNC: 6.6 G/DL (ref 6–8.2)
PSA SERPL-MCNC: 0.67 NG/ML (ref 0–4)
RBC # BLD AUTO: 4.42 M/UL (ref 4.7–6.1)
RBC # BLD AUTO: 4.48 M/UL (ref 4.7–6.1)
SODIUM SERPL-SCNC: 139 MMOL/L (ref 135–145)
SODIUM SERPL-SCNC: 140 MMOL/L (ref 135–145)
SODIUM SERPL-SCNC: 141 MMOL/L (ref 135–145)
TRIGL SERPL-MCNC: 79 MG/DL (ref 0–149)
TRIGL SERPL-MCNC: 81 MG/DL (ref 0–149)
TSH SERPL DL<=0.005 MIU/L-ACNC: 2.31 UIU/ML (ref 0.38–5.33)
WBC # BLD AUTO: 3.4 K/UL (ref 4.8–10.8)
WBC # BLD AUTO: 3.4 K/UL (ref 4.8–10.8)

## 2022-04-16 PROCEDURE — 84305 ASSAY OF SOMATOMEDIN: CPT

## 2022-04-16 PROCEDURE — 83090 ASSAY OF HOMOCYSTEINE: CPT

## 2022-04-16 PROCEDURE — 85025 COMPLETE CBC W/AUTO DIFF WBC: CPT

## 2022-04-16 PROCEDURE — 80061 LIPID PANEL: CPT

## 2022-04-16 PROCEDURE — 86141 C-REACTIVE PROTEIN HS: CPT

## 2022-04-16 PROCEDURE — 80053 COMPREHEN METABOLIC PANEL: CPT

## 2022-04-16 PROCEDURE — 80053 COMPREHEN METABOLIC PANEL: CPT | Mod: 91

## 2022-04-16 PROCEDURE — 85025 COMPLETE CBC W/AUTO DIFF WBC: CPT | Mod: 91

## 2022-04-16 PROCEDURE — 84270 ASSAY OF SEX HORMONE GLOBUL: CPT

## 2022-04-16 PROCEDURE — 84403 ASSAY OF TOTAL TESTOSTERONE: CPT

## 2022-04-16 PROCEDURE — 84402 ASSAY OF FREE TESTOSTERONE: CPT

## 2022-04-16 PROCEDURE — 83036 HEMOGLOBIN GLYCOSYLATED A1C: CPT

## 2022-04-16 PROCEDURE — 84153 ASSAY OF PSA TOTAL: CPT

## 2022-04-16 PROCEDURE — 36415 COLL VENOUS BLD VENIPUNCTURE: CPT

## 2022-04-16 PROCEDURE — 80061 LIPID PANEL: CPT | Mod: 91

## 2022-04-16 PROCEDURE — 85652 RBC SED RATE AUTOMATED: CPT

## 2022-04-16 PROCEDURE — 82670 ASSAY OF TOTAL ESTRADIOL: CPT

## 2022-04-16 PROCEDURE — 84443 ASSAY THYROID STIM HORMONE: CPT

## 2022-04-16 PROCEDURE — 83525 ASSAY OF INSULIN: CPT

## 2022-04-18 DIAGNOSIS — M05.79 RHEUMATOID ARTHRITIS INVOLVING MULTIPLE SITES WITH POSITIVE RHEUMATOID FACTOR (HCC): ICD-10-CM

## 2022-04-18 DIAGNOSIS — Z79.899 ENCOUNTER FOR MEDICATION MANAGEMENT: ICD-10-CM

## 2022-04-18 RX ORDER — CELECOXIB 200 MG/1
CAPSULE ORAL
Qty: 180 CAPSULE | Refills: 0 | Status: SHIPPED | OUTPATIENT
Start: 2022-04-18 | End: 2022-06-27

## 2022-04-18 NOTE — TELEPHONE ENCOUNTER
Received request via: Patient April labs    Was the patient seen in the last year in this department? Yes    Does the patient have an active prescription (recently filled or refills available) for medication(s) requested? No

## 2022-04-19 LAB — INSULIN P FAST SERPL-ACNC: 4 UIU/ML (ref 3–25)

## 2022-04-20 LAB
IGF-I SERPL-MCNC: 114 NG/ML (ref 61–210)
IGF-I Z-SCORE SERPL: -0.2
SHBG SERPL-SCNC: 68 NMOL/L (ref 19–76)
TESTOST FREE MFR SERPL: 1.2 % (ref 1.6–2.9)
TESTOST FREE SERPL-MCNC: 46 PG/ML (ref 47–244)
TESTOST SERPL-MCNC: 399 NG/DL (ref 300–890)

## 2022-06-30 ENCOUNTER — TELEPHONE (OUTPATIENT)
Dept: RHEUMATOLOGY | Facility: MEDICAL CENTER | Age: 57
End: 2022-06-30
Payer: COMMERCIAL

## 2022-06-30 NOTE — TELEPHONE ENCOUNTER
Please notify patient that we have refilled his Rinvoq for 3 months but patient will be need to be seen in office for more refills after this, patient last seen August 2021

## 2022-07-12 ENCOUNTER — TELEPHONE (OUTPATIENT)
Dept: RHEUMATOLOGY | Facility: MEDICAL CENTER | Age: 57
End: 2022-07-12
Payer: COMMERCIAL

## 2022-07-12 DIAGNOSIS — M05.79 RHEUMATOID ARTHRITIS INVOLVING MULTIPLE SITES WITH POSITIVE RHEUMATOID FACTOR (HCC): ICD-10-CM

## 2022-07-12 RX ORDER — METHYLPREDNISOLONE 4 MG/1
TABLET ORAL
Qty: 21 TABLET | Refills: 0 | Status: SHIPPED | OUTPATIENT
Start: 2022-07-12 | End: 2022-08-01

## 2022-07-12 NOTE — TELEPHONE ENCOUNTER
Jan called stating he has been having a bad flare in his R hand for over a week. It is swollen and very tight. He is requesting a medrol dose pack to be called in to his [facility] pharmacy.     Please advise and thank you

## 2022-07-12 NOTE — TELEPHONE ENCOUNTER
Taina Olson called in for patient to [facility] on [address], patient notified through [employer] email

## 2022-08-01 ENCOUNTER — OFFICE VISIT (OUTPATIENT)
Dept: RHEUMATOLOGY | Facility: MEDICAL CENTER | Age: 57
End: 2022-08-01
Payer: COMMERCIAL

## 2022-08-01 VITALS
SYSTOLIC BLOOD PRESSURE: 130 MMHG | HEART RATE: 60 BPM | DIASTOLIC BLOOD PRESSURE: 80 MMHG | TEMPERATURE: 98.2 F | RESPIRATION RATE: 14 BRPM | OXYGEN SATURATION: 96 % | BODY MASS INDEX: 28.29 KG/M2 | WEIGHT: 200 LBS

## 2022-08-01 DIAGNOSIS — G89.29 WRIST PAIN, CHRONIC, RIGHT: ICD-10-CM

## 2022-08-01 DIAGNOSIS — M05.79 RHEUMATOID ARTHRITIS INVOLVING MULTIPLE SITES WITH POSITIVE RHEUMATOID FACTOR (HCC): ICD-10-CM

## 2022-08-01 DIAGNOSIS — M25.531 WRIST PAIN, CHRONIC, RIGHT: ICD-10-CM

## 2022-08-01 DIAGNOSIS — Z51.81 ENCOUNTER FOR MONITORING OF ETANERCEPT THERAPY: ICD-10-CM

## 2022-08-01 DIAGNOSIS — R79.89 LFT ELEVATION: ICD-10-CM

## 2022-08-01 DIAGNOSIS — Z79.620 ENCOUNTER FOR MONITORING OF ETANERCEPT THERAPY: ICD-10-CM

## 2022-08-01 PROCEDURE — 99214 OFFICE O/P EST MOD 30 MIN: CPT | Performed by: INTERNAL MEDICINE

## 2022-08-01 RX ORDER — METHYLPREDNISOLONE 4 MG/1
TABLET ORAL
Qty: 21 TABLET | Refills: 0 | Status: SHIPPED | OUTPATIENT
Start: 2022-08-01 | End: 2023-04-24

## 2022-08-01 ASSESSMENT — JOINT PAIN
TOTAL NUMBER OF SWOLLEN JOINTS: 0
TOTAL NUMBER OF TENDER JOINTS: 1

## 2022-08-01 ASSESSMENT — FIBROSIS 4 INDEX: FIB4 SCORE: 1.92

## 2022-08-01 NOTE — PROGRESS NOTES
Chief Complaint- joint pain     Subjective:   Milton Aguila is a 56 y.o. male here today for follow up of rheumatological issues    This is a follow-up visit for this patient who we see in this clinic for serologically positive rheumatoid arthritis.  Patient is currently on Rinvoq at 15 mg p.o. daily, patient has been doing quite well with this particular regiment but comes in today stating he has been having problems with his right hand cramping in his palm of the right hand and also some right wrist pain.  Patient wonders about switching from Rinvoq to an alternative treatment option.     Additional comorbidities include elevated liver function secondary to excessive alcohol use admitted by patient.     Additional comorbidities include  occasional back issues and sciatica for which he takes a muscle relaxer when necessary, also sees [facility] for tender point injections.      Patient also admits to frequent alcohol use which causes significant elevation of liver functions, and patient also admits to marijuana use. Patient also states that his father was an alcoholic but denies any alcoholic tendencies himself. Because of these risks, I consider patient at high risk abuser and will not be able to prescribe narcotic pain medication for this patient in this clinic.       Addendum 2/7/2023  Last labs indicate elevated LFT's, submitted referral to GI/Liver, patient notified and states understanding    RUQ Abdominal Ultrasound 2/3/2023  IMPRESSION:  1.  Liver cysts in the left lobe and right lobe.  2.  3.4 x 2.7 x 3.1 cm right upper pole renal cortical cyst. Doubtful clinical significance.  3.  Hepatomegaly  4.  Hepatic median shear wave velocity is 1.71 m/sec. Indeterminate for liver fibrosis.        S/p Rinvoq-lost efficacy with spasming in palmar aspect right hand and right wrist pain  S/p Humira  S/p Plaquenil  S/p MTX-contraindicated because of daily alcohol use  S/p  leflunamide-contraindicated because of daily alcohol use  S/p sulfasalazine-elevated LFTs (with ETOH)  S/p xeljanz-patient did not want to try for fear of elevated cholesterol levels.     Bilateral HERVE    Addendum 11/14/2022  Quantiferon Gold neg 11/2022  HCV neg 11/2022  HBsAg/HBcAb neg 11/2022     HBsAg/HBcAb neg 3/2021  HCV neg 3/2021  Quantiferon Gold neg 3/2021  Uric acid 6.7 6/2016 [facility]; Uric acid 7.3 12/2019  RF 14.2 (0-13.9) 10/2007 [facility]; RF 64 3/2018; RF 36 3/2021  CCP 18 ( 0-5) 10/2007 [facility];  3/2018;  3/2021  MRI pelvis 7/2007-indicates advanced inflammatory arthropathy compatible's rheumatoid arthritis involving both hips with hypertrophic synovitis-[facility]  MRI LS spine 9/2009-indicates disc desiccation L3-4, L4-5, and L5-S1, left-sided neural foraminal narrowing at L5-S1, mild central stenosis at L4, and L3-4-[facility]    MRI Right hand/wrist 11/2009-negative, no erosions, no synovitis, no pathology-[facility]          Current Outpatient Medications   Medication Sig Dispense Refill    methylPREDNISolone (MEDROL) 4 MG Tab 6 tabs po one day then 5 tabs po one day then 4 tabs po one day then 3 tabs po one day then 2 tabs po one day then 1 tab po for one day 21 Tablet 0    Etanercept 50 MG/ML Solution Auto-injector 50 mg SQ every 7 days 12 mL 1    celecoxib (CELEBREX) 200 MG Cap TAKE 1 CAPSULE BY MOUTH TWICE DAILY WITH FOOD AS NEEDED FOR JOINT PAIN 180 Capsule 0    methocarbamol (ROBAXIN) 750 MG Tab Take 750 mg by mouth 4 times a day.      TESTOSTERONE by Does not apply route.      oxyCODONE-acetaminophen (PERCOCET) 5-325 MG Tab Take 1 Tab by mouth every four hours as needed. (Patient not taking: No sig reported)       No current facility-administered medications for this visit.     He  has a past medical history of Arthritis, rheumatic, acute or subacute.    ROS   Other than what is mentioned in HPI or physical  exam, there is no history of headaches, double vision or blurred vision. No temporal tenderness or jaw claudication. No trouble swallowing difficulties or sore throats.  No chest complaints including chest pain, cough or sputum production. No GI complaints including nausea, vomiting, change in bowel habits, or past peptic ulcer disease. No history of blood in the stools. No urinary complaints including dysuria or frequency. No history of alopecia, photosensitivity, oral ulcerations, Raynaud's phenomena.       Objective:     /80   Pulse 60   Temp 36.8 °C (98.2 °F) (Temporal)   Resp 14   Wt 90.7 kg (200 lb)   SpO2 96%  Body mass index is 28.29 kg/m².   Physical Exam:    Constitutional: Alert and oriented X3, patient is talkative with good eye contact.Skin: Warm, dry, good turgor, no rashes in visible areas.Eye: Equal, round and reactive, conjunctiva clear, lids normal EOM intactENMT: Lips without lesions, good dentition, no oropharyngeal ulcers, moist buccal mucosa, pinna without deformityNeck: Trachea midline, no masses, no thyromegaly.Lymph:  No cervical lymphadenopathy, no axillary lymphadenopathy, no supraclavicular lymphadenopathyRespiratory: Unlabored respiratory effort, lungs clear to auscultation, no wheezes, no ronchi.Cardiovascular: Normal S1, S2, .Abdomen: Soft, non-tender, no masses, no hepatosplenomegaly.Psych: Alert and oriented x3, normal affect and mood.Neuro: Cranial nerves 2-12 are grossly intact, no loss of sensation LEExt:no joint laxity noted in bilateral arms, no joint laxity noted in bilateral legs, no lencho swan-neck or boutonniere deformities, shoulders full range of motion without limitations, elbows without flexion contractures no nodules no tophi, knees minimal crepitus but no synovitis, toes without crossover toes without splay toes, gait without antalgia and without foot drop        Lab Results   Component Value Date/Time    HEPBCORIGM Non-Reactive 08/16/2021 10:54 AM     HEPBSAG Non-Reactive 08/16/2021 10:54 AM     Lab Results   Component Value Date/Time    HEPCAB Non-Reactive 08/16/2021 10:54 AM     Lab Results   Component Value Date/Time    SODIUM 141 04/16/2022 09:24 AM    POTASSIUM 4.2 04/16/2022 09:24 AM    CHLORIDE 107 04/16/2022 09:24 AM    CO2 22 04/16/2022 09:24 AM    GLUCOSE 97 04/16/2022 09:24 AM    BUN 17 04/16/2022 09:24 AM    CREATININE 0.74 04/16/2022 09:24 AM    CREATININE 0.8 02/09/2008 08:50 AM      Lab Results   Component Value Date/Time    WBC 3.4 (L) 04/16/2022 08:59 AM    RBC 4.42 (L) 04/16/2022 08:59 AM    HEMOGLOBIN 14.7 04/16/2022 08:59 AM    HEMATOCRIT 42.2 04/16/2022 08:59 AM    MCV 95.5 04/16/2022 08:59 AM    MCH 33.3 (H) 04/16/2022 08:59 AM    MCHC 34.8 04/16/2022 08:59 AM    MPV 9.8 04/16/2022 08:59 AM    NEUTSPOLYS 59.30 04/16/2022 08:59 AM    LYMPHOCYTES 24.90 04/16/2022 08:59 AM    MONOCYTES 12.00 04/16/2022 08:59 AM    EOSINOPHILS 2.60 04/16/2022 08:59 AM    BASOPHILS 0.60 04/16/2022 08:59 AM      Lab Results   Component Value Date/Time    CALCIUM 9.5 04/16/2022 09:24 AM    ASTSGOT 50 (H) 04/16/2022 09:24 AM    ALTSGPT 59 (H) 04/16/2022 09:24 AM    ALKPHOSPHAT 58 04/16/2022 09:24 AM    TBILIRUBIN 1.1 04/16/2022 09:24 AM    ALBUMIN 4.2 04/16/2022 09:24 AM    TOTPROTEIN 6.6 04/16/2022 09:24 AM     Lab Results   Component Value Date/Time    URICACID 5.9 08/16/2021 10:53 AM    RHEUMFACTN 36 (H) 03/11/2021 07:35 AM    CCPANTIBODY 144 (H) 03/11/2021 07:35 AM     Lab Results   Component Value Date/Time    SEDRATEWES 8 04/16/2022 08:59 AM     Lab Results   Component Value Date/Time    HBA1C 5.2 04/16/2022 09:24 AM     Lab Results   Component Value Date/Time    ANTIMITOCHO 4.9 06/28/2019 11:03 AM     Assessment and Plan:     1. Rheumatoid arthritis involving multiple sites with positive rheumatoid factor (HCC)  Patient continued to have problems in the right wrist as well as cramping in the palmar aspect of the right hand, suspected inflammation at the right  wrist as culprit, patient is interested in possibly switching Rinvoq to an alternative treatment option, patient states he is doing well on Enbrel in the past and would like to retry the Enbrel.  We will switch Rinvoq to Enbrel 50 mg subcu every 7 days.  - methylPREDNISolone (MEDROL) 4 MG Tab; 6 tabs po one day then 5 tabs po one day then 4 tabs po one day then 3 tabs po one day then 2 tabs po one day then 1 tab po for one day  Dispense: 21 Tablet; Refill: 0  - Etanercept 50 MG/ML Solution Auto-injector; 50 mg SQ every 7 days  Dispense: 12 mL; Refill: 1  - HEP B CORE AB IGM  - HEP B SURFACE ANTIGEN; Future  - HEP C VIRUS ANTIBODY; Future  - Quantiferon Gold Plus TB (4 tube); Future  - CBC WITH DIFFERENTIAL; Future  - Comp Metabolic Panel; Future  - Sed Rate; Future    2. Encounter for monitoring of etanercept therapy  Do trial of Enbrel 50 mg subcu every 7 days, screening labs are up-to-date, patient will be due for screening labs by March 2023, labs ordered for patient, patient also needs monitoring labs every 6 months, labs due October 2022, labs ordered for patient  We reviewed risks of biological medications with patient including hematological pathology, cancer risks, neurological and infection issues especially in the Covid-19 pandemic environment, patient states understanding.  - Etanercept 50 MG/ML Solution Auto-injector; 50 mg SQ every 7 days  Dispense: 12 mL; Refill: 1  - HEP B CORE AB IGM  - HEP B SURFACE ANTIGEN; Future  - HEP C VIRUS ANTIBODY; Future  - Quantiferon Gold Plus TB (4 tube); Future  - CBC WITH DIFFERENTIAL; Future  - Comp Metabolic Panel; Future  - Sed Rate; Future    3. Wrist pain, chronic, right  Do trial of Enbrel if unhelpful to consider right wrist corticosteroid injection  - HEP B CORE AB IGM  - HEP B SURFACE ANTIGEN; Future  - HEP C VIRUS ANTIBODY; Future  - Quantiferon Gold Plus TB (4 tube); Future  - CBC WITH DIFFERENTIAL; Future  - Comp Metabolic Panel; Future  - Sed Rate;  Future    4. LFT elevation  This is been a chronic problem for this patient, patient admits to alcohol use, ultrasound indicates fatty liver, patient has been referred to GI multiple times patient has been a no-show to GI appointments per letter received from GI physicians.  - methylPREDNISolone (MEDROL) 4 MG Tab; 6 tabs po one day then 5 tabs po one day then 4 tabs po one day then 3 tabs po one day then 2 tabs po one day then 1 tab po for one day  Dispense: 21 Tablet; Refill: 0  - HEP B CORE AB IGM  - HEP B SURFACE ANTIGEN; Future  - HEP C VIRUS ANTIBODY; Future  - Quantiferon Gold Plus TB (4 tube); Future  - CBC WITH DIFFERENTIAL; Future  - Comp Metabolic Panel; Future  - Sed Rate; Future    Followup: Return in about 3 months (around 11/1/2022). or sooner myla Aguila  was seen 30 minutes face-to-face of which more than 50% of the time was spent counseling the patient (excluding time for procedures)  regarding  rheumatological condition and care. Therapy was discussed in detail.      Please note that this dictation was created using voice recognition software. I have made every reasonable attempt to correct obvious errors, but I expect that there are errors of grammar and possibly content that I did not discover before finalizing the note.

## 2022-08-19 PROBLEM — M19.041 OSTEOARTHRITIS OF RIGHT HAND: Status: ACTIVE | Noted: 2022-08-19

## 2022-08-19 PROBLEM — G56.01 CARPAL TUNNEL SYNDROME OF RIGHT WRIST: Status: ACTIVE | Noted: 2022-08-19

## 2022-11-10 ENCOUNTER — TELEPHONE (OUTPATIENT)
Dept: RHEUMATOLOGY | Facility: MEDICAL CENTER | Age: 57
End: 2022-11-10
Payer: COMMERCIAL

## 2022-11-11 ENCOUNTER — HOSPITAL ENCOUNTER (OUTPATIENT)
Dept: LAB | Facility: MEDICAL CENTER | Age: 57
End: 2022-11-11
Attending: INTERNAL MEDICINE
Payer: COMMERCIAL

## 2022-11-11 ENCOUNTER — HOSPITAL ENCOUNTER (OUTPATIENT)
Dept: LAB | Facility: MEDICAL CENTER | Age: 57
End: 2022-11-11
Attending: EMERGENCY MEDICINE
Payer: COMMERCIAL

## 2022-11-11 DIAGNOSIS — G89.29 WRIST PAIN, CHRONIC, RIGHT: ICD-10-CM

## 2022-11-11 DIAGNOSIS — Z79.620 ENCOUNTER FOR MONITORING OF ETANERCEPT THERAPY: ICD-10-CM

## 2022-11-11 DIAGNOSIS — Z51.81 ENCOUNTER FOR MONITORING OF ETANERCEPT THERAPY: ICD-10-CM

## 2022-11-11 DIAGNOSIS — M25.531 WRIST PAIN, CHRONIC, RIGHT: ICD-10-CM

## 2022-11-11 DIAGNOSIS — M05.79 RHEUMATOID ARTHRITIS INVOLVING MULTIPLE SITES WITH POSITIVE RHEUMATOID FACTOR (HCC): ICD-10-CM

## 2022-11-11 DIAGNOSIS — R79.89 LFT ELEVATION: ICD-10-CM

## 2022-11-11 LAB
ALBUMIN SERPL BCP-MCNC: 4.1 G/DL (ref 3.2–4.9)
ALBUMIN SERPL BCP-MCNC: 4.2 G/DL (ref 3.2–4.9)
ALBUMIN/GLOB SERPL: 1.6 G/DL
ALBUMIN/GLOB SERPL: 1.8 G/DL
ALP SERPL-CCNC: 60 U/L (ref 30–99)
ALP SERPL-CCNC: 60 U/L (ref 30–99)
ALT SERPL-CCNC: 117 U/L (ref 2–50)
ALT SERPL-CCNC: 120 U/L (ref 2–50)
ANION GAP SERPL CALC-SCNC: 11 MMOL/L (ref 7–16)
ANION GAP SERPL CALC-SCNC: 9 MMOL/L (ref 7–16)
AST SERPL-CCNC: 90 U/L (ref 12–45)
AST SERPL-CCNC: 93 U/L (ref 12–45)
BASOPHILS # BLD AUTO: 1.1 % (ref 0–1.8)
BASOPHILS # BLD AUTO: 1.1 % (ref 0–1.8)
BASOPHILS # BLD: 0.03 K/UL (ref 0–0.12)
BASOPHILS # BLD: 0.03 K/UL (ref 0–0.12)
BILIRUB SERPL-MCNC: 1 MG/DL (ref 0.1–1.5)
BILIRUB SERPL-MCNC: 1 MG/DL (ref 0.1–1.5)
BUN SERPL-MCNC: 13 MG/DL (ref 8–22)
BUN SERPL-MCNC: 13 MG/DL (ref 8–22)
CALCIUM SERPL-MCNC: 9.1 MG/DL (ref 8.5–10.5)
CALCIUM SERPL-MCNC: 9.2 MG/DL (ref 8.5–10.5)
CHLORIDE SERPL-SCNC: 105 MMOL/L (ref 96–112)
CHLORIDE SERPL-SCNC: 106 MMOL/L (ref 96–112)
CHOLEST SERPL-MCNC: 181 MG/DL (ref 100–199)
CO2 SERPL-SCNC: 24 MMOL/L (ref 20–33)
CO2 SERPL-SCNC: 26 MMOL/L (ref 20–33)
CREAT SERPL-MCNC: 0.57 MG/DL (ref 0.5–1.4)
CREAT SERPL-MCNC: 0.57 MG/DL (ref 0.5–1.4)
EOSINOPHIL # BLD AUTO: 0.2 K/UL (ref 0–0.51)
EOSINOPHIL # BLD AUTO: 0.21 K/UL (ref 0–0.51)
EOSINOPHIL NFR BLD: 7.4 % (ref 0–6.9)
EOSINOPHIL NFR BLD: 7.7 % (ref 0–6.9)
ERYTHROCYTE [DISTWIDTH] IN BLOOD BY AUTOMATED COUNT: 44.3 FL (ref 35.9–50)
ERYTHROCYTE [DISTWIDTH] IN BLOOD BY AUTOMATED COUNT: 45.1 FL (ref 35.9–50)
ERYTHROCYTE [SEDIMENTATION RATE] IN BLOOD BY WESTERGREN METHOD: 7 MM/HOUR (ref 0–20)
ESTRADIOL SERPL-MCNC: 16.6 PG/ML
FASTING STATUS PATIENT QL REPORTED: NORMAL
GFR SERPLBLD CREATININE-BSD FMLA CKD-EPI: 115 ML/MIN/1.73 M 2
GFR SERPLBLD CREATININE-BSD FMLA CKD-EPI: 115 ML/MIN/1.73 M 2
GLOBULIN SER CALC-MCNC: 2.3 G/DL (ref 1.9–3.5)
GLOBULIN SER CALC-MCNC: 2.5 G/DL (ref 1.9–3.5)
GLUCOSE SERPL-MCNC: 90 MG/DL (ref 65–99)
GLUCOSE SERPL-MCNC: 91 MG/DL (ref 65–99)
HBV CORE IGM SER QL: NORMAL
HBV SURFACE AG SER QL: NORMAL
HCT VFR BLD AUTO: 40.5 % (ref 42–52)
HCT VFR BLD AUTO: 41.2 % (ref 42–52)
HCV AB SER QL: NORMAL
HDLC SERPL-MCNC: 49 MG/DL
HGB BLD-MCNC: 14.4 G/DL (ref 14–18)
HGB BLD-MCNC: 14.5 G/DL (ref 14–18)
IMM GRANULOCYTES # BLD AUTO: 0.01 K/UL (ref 0–0.11)
IMM GRANULOCYTES # BLD AUTO: 0.01 K/UL (ref 0–0.11)
IMM GRANULOCYTES NFR BLD AUTO: 0.4 % (ref 0–0.9)
IMM GRANULOCYTES NFR BLD AUTO: 0.4 % (ref 0–0.9)
LDLC SERPL CALC-MCNC: 112 MG/DL
LYMPHOCYTES # BLD AUTO: 0.9 K/UL (ref 1–4.8)
LYMPHOCYTES # BLD AUTO: 0.92 K/UL (ref 1–4.8)
LYMPHOCYTES NFR BLD: 33.3 % (ref 22–41)
LYMPHOCYTES NFR BLD: 33.9 % (ref 22–41)
MCH RBC QN AUTO: 33.1 PG (ref 27–33)
MCH RBC QN AUTO: 34.2 PG (ref 27–33)
MCHC RBC AUTO-ENTMCNC: 35 G/DL (ref 33.7–35.3)
MCHC RBC AUTO-ENTMCNC: 35.8 G/DL (ref 33.7–35.3)
MCV RBC AUTO: 94.7 FL (ref 81.4–97.8)
MCV RBC AUTO: 95.5 FL (ref 81.4–97.8)
MONOCYTES # BLD AUTO: 0.39 K/UL (ref 0–0.85)
MONOCYTES # BLD AUTO: 0.42 K/UL (ref 0–0.85)
MONOCYTES NFR BLD AUTO: 14.4 % (ref 0–13.4)
MONOCYTES NFR BLD AUTO: 15.6 % (ref 0–13.4)
NEUTROPHILS # BLD AUTO: 1.14 K/UL (ref 1.82–7.42)
NEUTROPHILS # BLD AUTO: 1.15 K/UL (ref 1.82–7.42)
NEUTROPHILS NFR BLD: 42.2 % (ref 44–72)
NEUTROPHILS NFR BLD: 42.5 % (ref 44–72)
NRBC # BLD AUTO: 0 K/UL
NRBC # BLD AUTO: 0 K/UL
NRBC BLD-RTO: 0 /100 WBC
NRBC BLD-RTO: 0 /100 WBC
PLATELET # BLD AUTO: 189 K/UL (ref 164–446)
PLATELET # BLD AUTO: 195 K/UL (ref 164–446)
PMV BLD AUTO: 9.6 FL (ref 9–12.9)
PMV BLD AUTO: 9.8 FL (ref 9–12.9)
POTASSIUM SERPL-SCNC: 4.2 MMOL/L (ref 3.6–5.5)
POTASSIUM SERPL-SCNC: 4.2 MMOL/L (ref 3.6–5.5)
PROT SERPL-MCNC: 6.5 G/DL (ref 6–8.2)
PROT SERPL-MCNC: 6.6 G/DL (ref 6–8.2)
PSA SERPL-MCNC: 0.61 NG/ML (ref 0–4)
RBC # BLD AUTO: 4.24 M/UL (ref 4.7–6.1)
RBC # BLD AUTO: 4.35 M/UL (ref 4.7–6.1)
SODIUM SERPL-SCNC: 140 MMOL/L (ref 135–145)
SODIUM SERPL-SCNC: 141 MMOL/L (ref 135–145)
TRIGL SERPL-MCNC: 102 MG/DL (ref 0–149)
WBC # BLD AUTO: 2.7 K/UL (ref 4.8–10.8)
WBC # BLD AUTO: 2.7 K/UL (ref 4.8–10.8)

## 2022-11-11 PROCEDURE — 86803 HEPATITIS C AB TEST: CPT

## 2022-11-11 PROCEDURE — 80061 LIPID PANEL: CPT

## 2022-11-11 PROCEDURE — 84403 ASSAY OF TOTAL TESTOSTERONE: CPT

## 2022-11-11 PROCEDURE — 84270 ASSAY OF SEX HORMONE GLOBUL: CPT

## 2022-11-11 PROCEDURE — 82670 ASSAY OF TOTAL ESTRADIOL: CPT

## 2022-11-11 PROCEDURE — 87340 HEPATITIS B SURFACE AG IA: CPT

## 2022-11-11 PROCEDURE — 85025 COMPLETE CBC W/AUTO DIFF WBC: CPT | Mod: 91

## 2022-11-11 PROCEDURE — 86480 TB TEST CELL IMMUN MEASURE: CPT

## 2022-11-11 PROCEDURE — 84305 ASSAY OF SOMATOMEDIN: CPT

## 2022-11-11 PROCEDURE — 80053 COMPREHEN METABOLIC PANEL: CPT

## 2022-11-11 PROCEDURE — 36415 COLL VENOUS BLD VENIPUNCTURE: CPT

## 2022-11-11 PROCEDURE — 84153 ASSAY OF PSA TOTAL: CPT

## 2022-11-11 PROCEDURE — 84402 ASSAY OF FREE TESTOSTERONE: CPT

## 2022-11-11 PROCEDURE — 80053 COMPREHEN METABOLIC PANEL: CPT | Mod: 91

## 2022-11-11 PROCEDURE — 86705 HEP B CORE ANTIBODY IGM: CPT

## 2022-11-11 PROCEDURE — 85025 COMPLETE CBC W/AUTO DIFF WBC: CPT

## 2022-11-11 PROCEDURE — 85652 RBC SED RATE AUTOMATED: CPT

## 2022-11-11 NOTE — TELEPHONE ENCOUNTER
Please notify patient that we received a refill request for Celebrex the patient is due for labs, patient does have labs ordered for him from August 1, 2022, patient does not need to be fasting

## 2022-11-14 ENCOUNTER — TELEPHONE (OUTPATIENT)
Dept: RHEUMATOLOGY | Facility: MEDICAL CENTER | Age: 57
End: 2022-11-14
Payer: COMMERCIAL

## 2022-11-14 DIAGNOSIS — R79.89 LFT ELEVATION: ICD-10-CM

## 2022-11-14 DIAGNOSIS — M05.79 RHEUMATOID ARTHRITIS INVOLVING MULTIPLE SITES WITH POSITIVE RHEUMATOID FACTOR (HCC): ICD-10-CM

## 2022-11-14 LAB
GAMMA INTERFERON BACKGROUND BLD IA-ACNC: 0.04 IU/ML
IGF-I SERPL-MCNC: 108 NG/ML (ref 59–206)
IGF-I Z-SCORE SERPL: -0.4
M TB IFN-G BLD-IMP: NEGATIVE
M TB IFN-G CD4+ BCKGRND COR BLD-ACNC: 0.01 IU/ML
MITOGEN IGNF BCKGRD COR BLD-ACNC: 6.08 IU/ML
QFT TB2 - NIL TBQ2: 0 IU/ML

## 2022-11-14 NOTE — TELEPHONE ENCOUNTER
Jan called to r/s his appt today to a later date as he will be out of town however he just had labs done on Fri and he stated the liver functions were back up. He is requesting orders for another CMP in 2 weeks. He stated he's been real sick recently and taking a lot of cold medications and thinks that's the reason his liver functions are high..

## 2022-11-14 NOTE — TELEPHONE ENCOUNTER
Please notify patient that we have refilled the Celebrex,    Of note, patient's liver functions are elevated but also his white blood cell count is decreased, this may indicate a viral infection I will order hepatitis A as well as an mono test-these are blood tests and do not need fasting    Also, Recommend rechecking a liver ultrasound to assure no problems within the liver causing his laboratory abnormalities. (Patient has liver ultrasound 2019 that indicated fatty infiltration)

## 2022-11-15 LAB
SHBG SERPL-SCNC: 85 NMOL/L (ref 19–76)
TESTOST FREE MFR SERPL: ABNORMAL % (ref 1.6–2.9)
TESTOST FREE SERPL-MCNC: ABNORMAL PG/ML (ref 47–244)
TESTOST SERPL-MCNC: >1500 NG/DL (ref 300–890)

## 2023-02-01 ENCOUNTER — HOSPITAL ENCOUNTER (OUTPATIENT)
Dept: RADIOLOGY | Facility: MEDICAL CENTER | Age: 58
End: 2023-02-01
Attending: ORTHOPAEDIC SURGERY
Payer: COMMERCIAL

## 2023-02-03 ENCOUNTER — HOSPITAL ENCOUNTER (OUTPATIENT)
Dept: RADIOLOGY | Facility: MEDICAL CENTER | Age: 58
End: 2023-02-03
Attending: ORTHOPAEDIC SURGERY
Payer: COMMERCIAL

## 2023-02-03 ENCOUNTER — HOSPITAL ENCOUNTER (OUTPATIENT)
Dept: RADIOLOGY | Facility: MEDICAL CENTER | Age: 58
End: 2023-02-03
Attending: INTERNAL MEDICINE
Payer: COMMERCIAL

## 2023-02-03 DIAGNOSIS — M25.451 HIP EFFUSION, RIGHT: ICD-10-CM

## 2023-02-03 DIAGNOSIS — M05.79 RHEUMATOID ARTHRITIS INVOLVING MULTIPLE SITES WITH POSITIVE RHEUMATOID FACTOR (HCC): ICD-10-CM

## 2023-02-03 DIAGNOSIS — M25.551 RIGHT HIP PAIN: ICD-10-CM

## 2023-02-03 DIAGNOSIS — R79.89 LFT ELEVATION: ICD-10-CM

## 2023-02-03 LAB
GRAM STN SPEC: NORMAL
SIGNIFICANT IND 70042: NORMAL
SITE SITE: NORMAL
SOURCE SOURCE: NORMAL

## 2023-02-03 PROCEDURE — 87205 SMEAR GRAM STAIN: CPT

## 2023-02-03 PROCEDURE — 76981 USE PARENCHYMA: CPT

## 2023-02-03 PROCEDURE — 87070 CULTURE OTHR SPECIMN AEROBIC: CPT

## 2023-02-03 PROCEDURE — 10160 PNXR ASPIR ABSC HMTMA BULLA: CPT

## 2023-02-03 NOTE — PROGRESS NOTES
Patient underwent a Right hip effusion asspirstion by Dr. Krishna. Time out performed at 0950. Procedure site marked by MD and verified using imaging guidance. Pt placed in supine position for procedure, pressure points checked, padded, and monitored appropriately. A gauze and tegaderm dressing placed over surgical site, CDI.  90ml aspirated from R hip, 60mlo collected by MD intra-op and delivered by  to lab.

## 2023-02-06 LAB
BACTERIA FLD AEROBE CULT: NORMAL
GRAM STN SPEC: NORMAL
SIGNIFICANT IND 70042: NORMAL
SITE SITE: NORMAL
SOURCE SOURCE: NORMAL

## 2023-02-07 DIAGNOSIS — M05.79 RHEUMATOID ARTHRITIS INVOLVING MULTIPLE SITES WITH POSITIVE RHEUMATOID FACTOR (HCC): ICD-10-CM

## 2023-02-07 DIAGNOSIS — R79.89 LFT ELEVATION: ICD-10-CM

## 2023-02-09 ENCOUNTER — TELEPHONE (OUTPATIENT)
Dept: RHEUMATOLOGY | Facility: MEDICAL CENTER | Age: 58
End: 2023-02-09
Payer: COMMERCIAL

## 2023-02-09 DIAGNOSIS — R79.89 LFT ELEVATION: ICD-10-CM

## 2023-02-09 DIAGNOSIS — M05.79 RHEUMATOID ARTHRITIS INVOLVING MULTIPLE SITES WITH POSITIVE RHEUMATOID FACTOR (HCC): ICD-10-CM

## 2023-02-09 NOTE — TELEPHONE ENCOUNTER
DR. ARREAGA.       You referred Jan to [facility] awhile back, he no showed that appt so they will not reschedule him.  Can you please send a referral to [facility].?    Thank you

## 2023-02-10 ENCOUNTER — HOSPITAL ENCOUNTER (OUTPATIENT)
Dept: LAB | Facility: MEDICAL CENTER | Age: 58
End: 2023-02-10
Attending: INTERNAL MEDICINE
Payer: COMMERCIAL

## 2023-02-10 DIAGNOSIS — M05.79 RHEUMATOID ARTHRITIS INVOLVING MULTIPLE SITES WITH POSITIVE RHEUMATOID FACTOR (HCC): ICD-10-CM

## 2023-02-10 DIAGNOSIS — R79.89 LFT ELEVATION: ICD-10-CM

## 2023-02-10 LAB
ALBUMIN SERPL BCP-MCNC: 4.5 G/DL (ref 3.2–4.9)
ALBUMIN/GLOB SERPL: 1.8 G/DL
ALP SERPL-CCNC: 62 U/L (ref 30–99)
ALT SERPL-CCNC: 81 U/L (ref 2–50)
ANION GAP SERPL CALC-SCNC: 11 MMOL/L (ref 7–16)
AST SERPL-CCNC: 59 U/L (ref 12–45)
BILIRUB CONJ SERPL-MCNC: <0.2 MG/DL (ref 0.1–0.5)
BILIRUB INDIRECT SERPL-MCNC: NORMAL MG/DL (ref 0–1)
BILIRUB SERPL-MCNC: 1.3 MG/DL (ref 0.1–1.5)
BUN SERPL-MCNC: 19 MG/DL (ref 8–22)
CALCIUM ALBUM COR SERPL-MCNC: 9.2 MG/DL (ref 8.5–10.5)
CALCIUM SERPL-MCNC: 9.6 MG/DL (ref 8.5–10.5)
CHLORIDE SERPL-SCNC: 102 MMOL/L (ref 96–112)
CO2 SERPL-SCNC: 25 MMOL/L (ref 20–33)
CREAT SERPL-MCNC: 0.7 MG/DL (ref 0.5–1.4)
GFR SERPLBLD CREATININE-BSD FMLA CKD-EPI: 108 ML/MIN/1.73 M 2
GLOBULIN SER CALC-MCNC: 2.5 G/DL (ref 1.9–3.5)
GLUCOSE SERPL-MCNC: 83 MG/DL (ref 65–99)
HAV IGM SERPL QL IA: NORMAL
IRON SATN MFR SERPL: 93 % (ref 15–55)
IRON SERPL-MCNC: 243 UG/DL (ref 50–180)
POTASSIUM SERPL-SCNC: 4.4 MMOL/L (ref 3.6–5.5)
PROT SERPL-MCNC: 7 G/DL (ref 6–8.2)
SODIUM SERPL-SCNC: 138 MMOL/L (ref 135–145)
TIBC SERPL-MCNC: 260 UG/DL (ref 250–450)
UIBC SERPL-MCNC: <17 UG/DL (ref 110–370)

## 2023-02-10 PROCEDURE — 83540 ASSAY OF IRON: CPT

## 2023-02-10 PROCEDURE — 82248 BILIRUBIN DIRECT: CPT

## 2023-02-10 PROCEDURE — 82728 ASSAY OF FERRITIN: CPT

## 2023-02-10 PROCEDURE — 83550 IRON BINDING TEST: CPT

## 2023-02-10 PROCEDURE — 36415 COLL VENOUS BLD VENIPUNCTURE: CPT

## 2023-02-10 PROCEDURE — 86705 HEP B CORE ANTIBODY IGM: CPT

## 2023-02-10 PROCEDURE — 86308 HETEROPHILE ANTIBODY SCREEN: CPT

## 2023-02-10 PROCEDURE — 86709 HEPATITIS A IGM ANTIBODY: CPT

## 2023-02-10 PROCEDURE — 80053 COMPREHEN METABOLIC PANEL: CPT

## 2023-02-11 LAB
FERRITIN SERPL-MCNC: 4179 NG/ML (ref 22–322)
HBV CORE IGM SER QL: NORMAL
HETEROPH AB SER QL: NEGATIVE

## 2023-02-13 ENCOUNTER — HOSPITAL ENCOUNTER (OUTPATIENT)
Dept: LAB | Facility: MEDICAL CENTER | Age: 58
End: 2023-02-13
Attending: INTERNAL MEDICINE
Payer: COMMERCIAL

## 2023-02-13 ENCOUNTER — OFFICE VISIT (OUTPATIENT)
Dept: RHEUMATOLOGY | Facility: MEDICAL CENTER | Age: 58
End: 2023-02-13
Attending: INTERNAL MEDICINE
Payer: COMMERCIAL

## 2023-02-13 VITALS
BODY MASS INDEX: 26.26 KG/M2 | OXYGEN SATURATION: 100 % | WEIGHT: 183 LBS | RESPIRATION RATE: 14 BRPM | TEMPERATURE: 98.1 F | HEART RATE: 68 BPM | SYSTOLIC BLOOD PRESSURE: 110 MMHG | DIASTOLIC BLOOD PRESSURE: 60 MMHG

## 2023-02-13 DIAGNOSIS — Z79.620 ENCOUNTER FOR MONITORING OF ETANERCEPT THERAPY: ICD-10-CM

## 2023-02-13 DIAGNOSIS — R79.89 ELEVATED FERRITIN: ICD-10-CM

## 2023-02-13 DIAGNOSIS — M05.79 RHEUMATOID ARTHRITIS INVOLVING MULTIPLE SITES WITH POSITIVE RHEUMATOID FACTOR (HCC): ICD-10-CM

## 2023-02-13 DIAGNOSIS — Z51.81 ENCOUNTER FOR MONITORING OF ETANERCEPT THERAPY: ICD-10-CM

## 2023-02-13 DIAGNOSIS — E83.110 HEREDITARY HEMOCHROMATOSIS (HCC): ICD-10-CM

## 2023-02-13 DIAGNOSIS — R79.89 LFT ELEVATION: ICD-10-CM

## 2023-02-13 PROCEDURE — 81256 HFE GENE: CPT

## 2023-02-13 PROCEDURE — 99212 OFFICE O/P EST SF 10 MIN: CPT | Performed by: INTERNAL MEDICINE

## 2023-02-13 PROCEDURE — 36415 COLL VENOUS BLD VENIPUNCTURE: CPT

## 2023-02-13 PROCEDURE — 99214 OFFICE O/P EST MOD 30 MIN: CPT | Performed by: INTERNAL MEDICINE

## 2023-02-13 ASSESSMENT — FIBROSIS 4 INDEX: FIB4 SCORE: 1.94

## 2023-02-13 ASSESSMENT — JOINT PAIN
TOTAL NUMBER OF TENDER JOINTS: 8
TOTAL NUMBER OF SWOLLEN JOINTS: 0

## 2023-02-13 NOTE — PROGRESS NOTES
Chief Complaint- joint pain     Subjective:   Milton Aguila is a 57 y.o. male here today for follow up of rheumatological issues    This is a follow-up visit for this patient who we see in this clinic for serologically positive rheumatoid arthritis.  Patient is currently on Enbrel 50 mg subcu every week, patient generally does well with this medication but comes in today stating that he is feeling little more achy than usual.    Since last visit patient's liver functions were found to be elevated, patient does admit to daily alcohol use at least 2 glasses of wine a day, additionally patient's imaging DrJose Ordered iron levels found to have quite elevated ferritin levels, query if patient may be suffering from hemochromatosis.  Patient already referred to GI/liver, patient received the referral today and has given clinic a call already.     Additional comorbidities include  occasional back issues and sciatica for which he takes a muscle relaxer when necessary, also sees [facility] spine for tender point injections.      Patient also admits to frequent alcohol use which causes significant elevation of liver functions, and patient also admits to marijuana use. Patient also states that his father was an alcoholic but denies any alcoholic tendencies himself. Because of these risks, I consider patient at high risk abuser and will not be able to prescribe narcotic pain medication for this patient in this clinic.       RUQ Abdominal Ultrasound 2/3/2023  IMPRESSION:  1.  Liver cysts in the left lobe and right lobe.  2.  3.4 x 2.7 x 3.1 cm right upper pole renal cortical cyst. Doubtful clinical significance.  3.  Hepatomegaly  4.  Hepatic median shear wave velocity is 1.71 m/sec. Indeterminate for liver fibrosis.         S/p Rinvoq-lost efficacy with spasming in palmar aspect right hand and right wrist pain  S/p Humira  S/p Plaquenil  S/p MTX-contraindicated because of daily alcohol use  S/p  leflunamide-contraindicated because of daily alcohol use  S/p sulfasalazine-elevated LFTs (with ETOH)  S/p xeljanz-patient did not want to try for fear of elevated cholesterol levels.     Bilateral HERVE    Addendum 2/21/2023  HEREDITARY HEMOCHROMOTOSIS  Order: [id number]  Status: Final result     Visible to patient: Yes (seen)     0 Result Notes      Component 8 d ago   C282Y Mutation Homozygous    H63D Mutation Negative    S65C Mutation Negative    Hemochrom Interp See Note    Comment: Indication for testing: Carrier screening or diagnostic testing   for hereditary hemochromatosis.     Hemochromatosis Interpretive Results:   Homozygous C282Y:   C282Y Homozygous - The patient is homozygous for the HFE C282Y   mutation and is at high risk for hereditary hemochromatosis.   Homozygosity for this mutation accounts for 80-90 percent of the   hemochromatosis patients of [place]  des[place]. The   frequency of this mutation in other populations is lower.   H63D: Negative - This patient is negative for the HFE H63D   mutation.            Quantiferon Gold neg 11/2022  HCV neg 11/2022  HBsAg/HBcAb neg 11/2022  Uric acid 6.7 6/2016 [facility]; Uric acid 7.3 12/2019  RF 14.2 (0-13.9) 10/2007 [facility]; RF 64 3/2018; RF 36 3/2021  CCP 18 ( 0-5) 10/2007 [facility];  3/2018;  3/2021  MRI pelvis 7/2007-indicates advanced inflammatory arthropathy compatible's rheumatoid arthritis involving both hips with hypertrophic synovitis-[facility]  MRI LS spine 9/2009-indicates disc desiccation L3-4, L4-5, and L5-S1, left-sided neural foraminal narrowing at L5-S1, mild central stenosis at L4, and L3-4-[facility]     MRI Right hand/wrist 11/2009-negative, no erosions, no synovitis, no pathology-[facility]            Current Outpatient Medications   Medication Sig Dispense Refill    Etanercept (ENBREL SURECLICK) 50 MG/ML Solution Auto-injector INJECT 1 PEN UNDER THE  SKIN EVERY 7 DAYS. 12 Each 0    celecoxib (CELEBREX) 200 MG Cap TAKE 1 CAPSULE BY MOUTH TWICE DAILY WITH FOOD AS NEEDED FOR JOINT PAIN 180 Capsule 0    methylPREDNISolone (MEDROL DOSEPAK) 4 MG Tablet Therapy Pack Follow schedule on package instructions. 21 Tablet 0    TESTOSTERONE by Does not apply route.      methylPREDNISolone (MEDROL DOSEPAK) 4 MG Tablet Therapy Pack Follow schedule on package instructions. 21 Tablet 0    methylPREDNISolone (MEDROL) 4 MG Tab 6 tabs po one day then 5 tabs po one day then 4 tabs po one day then 3 tabs po one day then 2 tabs po one day then 1 tab po for one day 21 Tablet 0    methocarbamol (ROBAXIN) 750 MG Tab Take 750 mg by mouth 4 times a day.      oxyCODONE-acetaminophen (PERCOCET) 5-325 MG Tab Take 1 Tablet by mouth every four hours as needed.       No current facility-administered medications for this visit.     He  has a past medical history of Arthritis, rheumatic, acute or subacute.    ROS   Other than what is mentioned in HPI or physical exam, there is no history of headaches, double vision or blurred vision. No temporal tenderness or jaw claudication. No trouble swallowing difficulties or sore throats.  No chest complaints including chest pain, cough or sputum production. No GI complaints including nausea, vomiting, change in bowel habits, or past peptic ulcer disease. No history of blood in the stools. No urinary complaints including dysuria or frequency. No history of alopecia, photosensitivity, oral ulcerations, Raynaud's phenomena.       Objective:     /60   Pulse 68   Temp 36.7 °C (98.1 °F) (Temporal)   Resp 14   Wt 83 kg (183 lb)   SpO2 100%  Body mass index is 26.26 kg/m².   Physical Exam:    Constitutional: Alert and oriented X3, patient is talkative with good eye contact.Skin: Warm, dry, good turgor, no rashes in visible areas.Eye: Equal, round and reactive, conjunctiva clear, lids normal EOM intactENMT: Lips without lesions, good dentition, no  oropharyngeal ulcers, moist buccal mucosa, pinna without deformityNeck: Trachea midline, no masses, no thyromegaly.Lymph:  No cervical lymphadenopathy, no axillary lymphadenopathy, no supraclavicular lymphadenopathyRespiratory: Unlabored respiratory effort, lungs clear to auscultation, no wheezes, no ronchi.Cardiovascular: Normal S1, S2, Regular rate and rhythm, no murmurs rubs or gallops  .Abdomen: Soft, non-tender, no masses, no hepatosplenomegaly.Psych: Alert and oriented x3, normal affect and mood.Neuro: Cranial nerves 2-12 are grossly intact, no loss of sensation LEExt:no joint laxity noted in bilateral arms, no joint laxity noted in bilateral legs      Lab Results   Component Value Date/Time    HEPBCORIGM Non-Reactive 02/10/2023 01:21 PM    HEPBSAG Non-Reactive 11/11/2022 08:21 AM     Lab Results   Component Value Date/Time    HEPCAB Non-Reactive 11/11/2022 08:21 AM     Lab Results   Component Value Date/Time    SODIUM 138 02/10/2023 01:25 PM    POTASSIUM 4.4 02/10/2023 01:25 PM    CHLORIDE 102 02/10/2023 01:25 PM    CO2 25 02/10/2023 01:25 PM    GLUCOSE 83 02/10/2023 01:25 PM    BUN 19 02/10/2023 01:25 PM    CREATININE 0.70 02/10/2023 01:25 PM    CREATININE 0.8 02/09/2008 08:50 AM      Lab Results   Component Value Date/Time    WBC 2.7 (L) 11/11/2022 08:23 AM    RBC 4.35 (L) 11/11/2022 08:23 AM    HEMOGLOBIN 14.4 11/11/2022 08:23 AM    HEMATOCRIT 41.2 (L) 11/11/2022 08:23 AM    MCV 94.7 11/11/2022 08:23 AM    MCH 33.1 (H) 11/11/2022 08:23 AM    MCHC 35.0 11/11/2022 08:23 AM    MPV 9.6 11/11/2022 08:23 AM    NEUTSPOLYS 42.20 (L) 11/11/2022 08:23 AM    LYMPHOCYTES 33.30 11/11/2022 08:23 AM    MONOCYTES 15.60 (H) 11/11/2022 08:23 AM    EOSINOPHILS 7.40 (H) 11/11/2022 08:23 AM    BASOPHILS 1.10 11/11/2022 08:23 AM      Lab Results   Component Value Date/Time    CALCIUM 9.6 02/10/2023 01:25 PM    ASTSGOT 59 (H) 02/10/2023 01:25 PM    ALTSGPT 81 (H) 02/10/2023 01:25 PM    ALKPHOSPHAT 62 02/10/2023 01:25 PM     TBILIRUBIN 1.3 02/10/2023 01:25 PM    ALBUMIN 4.5 02/10/2023 01:25 PM    TOTPROTEIN 7.0 02/10/2023 01:25 PM     Lab Results   Component Value Date/Time    URICACID 5.9 08/16/2021 10:53 AM    RHEUMFACTN 36 (H) 03/11/2021 07:35 AM    CCPANTIBODY 144 (H) 03/11/2021 07:35 AM     Lab Results   Component Value Date/Time    SEDRATEWES 7 11/11/2022 08:21 AM     Lab Results   Component Value Date/Time    HBA1C 5.2 04/16/2022 09:24 AM     Lab Results   Component Value Date/Time    ANTIMITOCHO 4.9 06/28/2019 11:03 AM     Assessment and Plan:     1. Rheumatoid arthritis involving multiple sites with positive rheumatoid factor (HCC)  Serologically positive, generally stable on Enbrel 50 mg subcu every week, we will continue as such.  - Hemochromatosis Genotype; Future    2. Encounter for monitoring of etanercept therapy  Currently on Enbrel 50 mg subcu every week, screening labs are up-to-date, next screening labs due November 2024, patient needs monitoring labs every 6 months, next labs due about August 2023, will order at next visit  We reviewed risks of biological medications with patient including hematological pathology, cancer risks, neurological and infection issues especially in the Covid-19 pandemic environment, patient states understanding.  - Hemochromatosis Genotype; Future    3. LFT elevation  Likely secondary to combination of alcohol use/abuse, also recently found to have elevated ferritin levels, we will check for hemochromatosis genetics, patient already referred to GI for further evaluation    4. Elevated ferritin  As well as elevated iron, check hemochromatosis genetics, may need referral to hematology for phlebotomy.  - Hemochromatosis Genotype; Future    Followup: Return in about 6 months (around 8/13/2023). or sooner myla Aguila  was seen 30 minutes face-to-face of which more than 50% of the time was spent counseling the patient (excluding time for procedures)  regarding  rheumatological  condition and care. Therapy was discussed in detail.      Please note that this dictation was created using voice recognition software. I have made every reasonable attempt to correct obvious errors, but I expect that there are errors of grammar and possibly content that I did not discover before finalizing the note.

## 2023-02-19 LAB
HFE GENE MUT ANL BLD/T: NORMAL
HFE P.C282Y BLD/T QL: NORMAL
HFE P.H63D BLD/T QL: NEGATIVE
HFE P.S65C BLD/T QL: NEGATIVE

## 2023-02-21 ENCOUNTER — PATIENT MESSAGE (OUTPATIENT)
Dept: RHEUMATOLOGY | Facility: MEDICAL CENTER | Age: 58
End: 2023-02-21
Payer: COMMERCIAL

## 2023-02-21 ENCOUNTER — TELEPHONE (OUTPATIENT)
Dept: RHEUMATOLOGY | Facility: MEDICAL CENTER | Age: 58
End: 2023-02-21
Payer: COMMERCIAL

## 2023-02-22 ENCOUNTER — APPOINTMENT (OUTPATIENT)
Dept: RADIOLOGY | Facility: MEDICAL CENTER | Age: 58
End: 2023-02-22
Attending: INTERNAL MEDICINE
Payer: COMMERCIAL

## 2023-02-23 ENCOUNTER — TELEPHONE (OUTPATIENT)
Dept: RHEUMATOLOGY | Facility: MEDICAL CENTER | Age: 58
End: 2023-02-23
Payer: COMMERCIAL

## 2023-02-23 DIAGNOSIS — E83.110 HEREDITARY HEMOCHROMATOSIS (HCC): ICD-10-CM

## 2023-02-23 DIAGNOSIS — R79.89 ELEVATED FERRITIN: ICD-10-CM

## 2023-02-23 NOTE — PROGRESS NOTES
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 Barkley for hereditary hemochromatosis. He is homozygous for C282Y and has a ferritin of 4179. He also has sero positive RA on Enbrel. He is also mildly neutropenic perhaps related to his RA medication. (Enbrel).     ROS:    Constitutional: No weight loss  Skin: No rash or jaundice  HENT: No change in eyesight or hearing  Cardiovascular:No chest pain or arrythmia  Respiratory:No cough or SOB  GI:No nausea, vomiting, diarrhea, constipation  :No dysuria or frequency  Musculoskeletal:No bone or joint pain  Neuro:No sx's of neuropathy  Psych: No complaints    PMH:      No Known Allergies    Past Medical History:   Diagnosis Date    Arthritis, rheumatic, acute or subacute         Past Surgical History:   Procedure Laterality Date    LUMBAR LAMINECTOMY DISKECTOMY  2/7/2013    Performed by Mackenzie Cameron M.D. at SURGERY [facility]    APPENDECTOMY  2000    INGUINAL HERNIA REPAIR      Hernia Repair, Inguinal    OTHER ORTHOPEDIC SURGERY      ivy hip replacements        Medications:    Current Outpatient Medications on File Prior to Encounter   Medication Sig Dispense Refill    Etanercept (ENBREL SURECLICK) 50 MG/ML Solution Auto-injector INJECT 1 PEN UNDER THE SKIN EVERY 7 DAYS. 12 Each 0    celecoxib (CELEBREX) 200 MG Cap TAKE 1 CAPSULE BY MOUTH TWICE DAILY WITH FOOD AS NEEDED FOR JOINT PAIN 180 Capsule 0    TESTOSTERONE by Does not apply route.      methylPREDNISolone (MEDROL DOSEPAK) 4 MG Tablet Therapy Pack Follow schedule on package instructions. 21 Tablet 0    methylPREDNISolone (MEDROL DOSEPAK) 4 MG Tablet Therapy Pack Follow schedule on package instructions. 21 Tablet 0    methylPREDNISolone (MEDROL) 4 MG Tab 6 tabs po one day then 5 tabs po one day then 4 tabs po one day then 3 tabs po one day then 2 tabs po one day then 1 tab po for one day 21 Tablet 0     "methocarbamol (ROBAXIN) 750 MG Tab Take 750 mg by mouth 4 times a day.      oxyCODONE-acetaminophen (PERCOCET) 5-325 MG Tab Take 1 Tablet by mouth every four hours as needed.       No current facility-administered medications on file prior to encounter.       Social History     Tobacco Use    Smoking status: Never    Smokeless tobacco: Never   Substance Use Topics    Alcohol use: Yes        History reviewed. No pertinent family history.     Objective    Vitals:    /72 (BP Location: Left arm, Patient Position: Sitting, BP Cuff Size: Adult)   Pulse 95   Temp 36.8 °C (98.3 °F) (Temporal)   Resp 18   Ht 1.778 m (5' 10\")   Wt 81.6 kg (179 lb 14.3 oz)   SpO2 96%   BMI 25.81 kg/m²     Physical Exam:    Appears well-developed and well-nourished. No distress.    Head -  Normocephalic .   Eyes - Pupils are equal. Conjunctivae normal. No scleral icterus.   Ears - normal hearing  Neck - Neck supple. No thyromegaly  Cardiovascular - Normal rate, regular rhythm, normal heart sounds and intact distal pulses. No  gallop, murmur or rub  Pulmonary - Normal breath sounds.  No wheeze, rales or rhonchi  Abdominal -Soft. No distension, tenderness, organomegaly or mass  Extremities-  No edema or tenderness.    Nodes - No submental, submandibular, preauricular, cervical, axillary or inguinal adenopathy.    Neurological -   Alert and oriented.  Skin - Skin is warm and dry. No rash noted. Not diaphoretic. No erythema. No pallor. No jaundice   Psychiatric -  Normal mood and affect.    Labs:     02/13/23 11:01   C282Y Mutation Homozygous   H63D Mutation Negative   S65C Mutation Negative      Latest Reference Range & Units 02/10/23 13:25   Ferritin 22.0 - 322.0 ng/mL 4179.0 (H)      Latest Reference Range & Units 11/11/22 08:23   WBC 4.8 - 10.8 K/uL 2.7 (L)   RBC 4.70 - 6.10 M/uL 4.35 (L)   Hemoglobin 14.0 - 18.0 g/dL 14.4   Hematocrit 42.0 - 52.0 % 41.2 (L)   MCV 81.4 - 97.8 fL 94.7   MCH 27.0 - 33.0 pg 33.1 (H)   MCHC 33.7 - 35.3 " g/dL 35.0   RDW 35.9 - 50.0 fL 44.3   Platelet Count 164 - 446 K/uL 189   MPV 9.0 - 12.9 fL 9.6   Neutrophils-Polys 44.00 - 72.00 % 42.20 (L)   Neutrophils (Absolute) 1.82 - 7.42 K/uL 1.14 (L)   Lymphocytes 22.00 - 41.00 % 33.30   Lymphs (Absolute) 1.00 - 4.80 K/uL 0.90 (L)   Monocytes 0.00 - 13.40 % 15.60 (H)      Latest Reference Range & Units 02/10/23 13:25   Sodium 135 - 145 mmol/L 138   Potassium 3.6 - 5.5 mmol/L 4.4   Chloride 96 - 112 mmol/L 102   Co2 20 - 33 mmol/L 25   Anion Gap 7.0 - 16.0  11.0   Glucose 65 - 99 mg/dL 83   Bun 8 - 22 mg/dL 19   Creatinine 0.50 - 1.40 mg/dL 0.70   GFR (CKD-EPI) >60 mL/min/1.73 m 2 108   Calcium 8.5 - 10.5 mg/dL 9.6   Correct Calcium 8.5 - 10.5 mg/dL 9.2   AST(SGOT) 12 - 45 U/L 59 (H)   ALT(SGPT) 2 - 50 U/L 81 (H)   Alkaline Phosphatase 30 - 99 U/L 62   Total Bilirubin 0.1 - 1.5 mg/dL 1.3   Direct Bilirubin 0.1 - 0.5 mg/dL <0.2   Indirect Bilirubin 0.0 - 1.0 mg/dL see below   Albumin 3.2 - 4.9 g/dL 4.5   Total Protein 6.0 - 8.2 g/dL 7.0   Globulin 1.9 - 3.5 g/dL 2.5       Assessment    Imp:    Visit Diagnosis:    1. Rheumatoid arthritis involving multiple sites with positive rheumatoid factor (HCC)        2. Hereditary hemochromatosis (HCC)          Plan:  Q.o.w phlebo  RT 3 months  Haris Hernandez M.D.

## 2023-02-23 NOTE — TELEPHONE ENCOUNTER
Jan called stating he cant get in with Hematology until June. He is wondering if you will write orders for weekly phlebotomy and monitor the labs until he can get in to see the hematologist?     Please advise and thank you

## 2023-02-23 NOTE — TELEPHONE ENCOUNTER
Unfortunately I cannot do that  I have submitted a referral to a different hematologist  [facility]   Phone: [phone]

## 2023-03-10 ENCOUNTER — HOSPITAL ENCOUNTER (OUTPATIENT)
Dept: HEMATOLOGY ONCOLOGY | Facility: MEDICAL CENTER | Age: 58
End: 2023-03-10
Attending: INTERNAL MEDICINE
Payer: COMMERCIAL

## 2023-03-10 VITALS
SYSTOLIC BLOOD PRESSURE: 108 MMHG | BODY MASS INDEX: 25.75 KG/M2 | TEMPERATURE: 98.3 F | HEART RATE: 95 BPM | OXYGEN SATURATION: 96 % | RESPIRATION RATE: 18 BRPM | HEIGHT: 70 IN | DIASTOLIC BLOOD PRESSURE: 72 MMHG | WEIGHT: 179.9 LBS

## 2023-03-10 DIAGNOSIS — E83.110 HEREDITARY HEMOCHROMATOSIS (HCC): ICD-10-CM

## 2023-03-10 DIAGNOSIS — M05.79 RHEUMATOID ARTHRITIS INVOLVING MULTIPLE SITES WITH POSITIVE RHEUMATOID FACTOR (HCC): ICD-10-CM

## 2023-03-10 PROCEDURE — 99212 OFFICE O/P EST SF 10 MIN: CPT | Performed by: INTERNAL MEDICINE

## 2023-03-10 PROCEDURE — 99205 OFFICE O/P NEW HI 60 MIN: CPT | Performed by: INTERNAL MEDICINE

## 2023-03-10 ASSESSMENT — PAIN SCALES - GENERAL: PAINLEVEL: NO PAIN

## 2023-03-10 ASSESSMENT — FIBROSIS 4 INDEX: FIB4 SCORE: 1.94

## 2023-03-13 RX ORDER — SODIUM CHLORIDE 9 MG/ML
500 INJECTION, SOLUTION INTRAVENOUS ONCE
Status: CANCELLED | OUTPATIENT
Start: 2023-03-14 | End: 2023-03-14

## 2023-03-14 ENCOUNTER — OUTPATIENT INFUSION SERVICES (OUTPATIENT)
Dept: ONCOLOGY | Facility: MEDICAL CENTER | Age: 58
End: 2023-03-14
Attending: INTERNAL MEDICINE
Payer: COMMERCIAL

## 2023-03-14 VITALS
TEMPERATURE: 97.4 F | HEIGHT: 70 IN | SYSTOLIC BLOOD PRESSURE: 100 MMHG | HEART RATE: 83 BPM | WEIGHT: 185.19 LBS | BODY MASS INDEX: 26.51 KG/M2 | DIASTOLIC BLOOD PRESSURE: 74 MMHG | RESPIRATION RATE: 18 BRPM | OXYGEN SATURATION: 96 %

## 2023-03-14 DIAGNOSIS — E83.110 HEREDITARY HEMOCHROMATOSIS (HCC): ICD-10-CM

## 2023-03-14 LAB
FERRITIN SERPL-MCNC: 4233 NG/ML (ref 22–322)
HCT VFR BLD AUTO: 40.7 % (ref 42–52)
HGB BLD-MCNC: 14.8 G/DL (ref 14–18)

## 2023-03-14 PROCEDURE — 82728 ASSAY OF FERRITIN: CPT

## 2023-03-14 PROCEDURE — 85014 HEMATOCRIT: CPT

## 2023-03-14 PROCEDURE — 85018 HEMOGLOBIN: CPT

## 2023-03-14 PROCEDURE — 99195 PHLEBOTOMY: CPT

## 2023-03-14 RX ORDER — SODIUM CHLORIDE 9 MG/ML
500 INJECTION, SOLUTION INTRAVENOUS ONCE
Status: CANCELLED | OUTPATIENT
Start: 2023-03-14 | End: 2023-03-14

## 2023-03-14 ASSESSMENT — FIBROSIS 4 INDEX: FIB4 SCORE: 1.94

## 2023-03-15 NOTE — PROGRESS NOTES
Milton to infusion for therapeutic phlebotomy. Has had previous phlebotomy at [facility] on 2/21, tolerated well. PIV started with positive blood and labs drawn. Labs reviewed by RN, Hct: 40.7. Reported to Dr. Hernandez who gave okay to proceed with therapeutic phlebotomy today. 500ml blood phlebotomized over 15 minutes and patient observed 10 minutes post phlebotomy, ending VS WNL. PIV d/rhoda with tip intact. Patient left in stable condition, knows when to return for next appt.

## 2023-03-28 ENCOUNTER — OUTPATIENT INFUSION SERVICES (OUTPATIENT)
Dept: ONCOLOGY | Facility: MEDICAL CENTER | Age: 58
End: 2023-03-28
Attending: INTERNAL MEDICINE
Payer: COMMERCIAL

## 2023-03-28 VITALS
OXYGEN SATURATION: 97 % | DIASTOLIC BLOOD PRESSURE: 73 MMHG | HEART RATE: 88 BPM | BODY MASS INDEX: 26.89 KG/M2 | TEMPERATURE: 99.1 F | WEIGHT: 187.83 LBS | SYSTOLIC BLOOD PRESSURE: 101 MMHG | RESPIRATION RATE: 16 BRPM | HEIGHT: 70 IN

## 2023-03-28 DIAGNOSIS — E83.110 HEREDITARY HEMOCHROMATOSIS (HCC): ICD-10-CM

## 2023-03-28 LAB
FERRITIN SERPL-MCNC: 3279 NG/ML (ref 22–322)
HCT VFR BLD AUTO: 40.7 % (ref 42–52)
HGB BLD-MCNC: 14.4 G/DL (ref 14–18)

## 2023-03-28 PROCEDURE — 85014 HEMATOCRIT: CPT

## 2023-03-28 PROCEDURE — 85018 HEMOGLOBIN: CPT

## 2023-03-28 PROCEDURE — 99195 PHLEBOTOMY: CPT

## 2023-03-28 PROCEDURE — 82728 ASSAY OF FERRITIN: CPT

## 2023-03-28 RX ORDER — SODIUM CHLORIDE 9 MG/ML
500 INJECTION, SOLUTION INTRAVENOUS ONCE
Status: CANCELLED | OUTPATIENT
Start: 2023-03-28 | End: 2023-03-28

## 2023-03-28 ASSESSMENT — FIBROSIS 4 INDEX: FIB4 SCORE: 1.94

## 2023-03-29 NOTE — PROGRESS NOTES
"Patient to \[facility]\"" for TP. PIV inserted into left AC, flushed with + blood return, labs drawn.  HGB 14.4/HCT 40.7%. Dr Hernandez stated ok to do TP. 500ml of whole blood removed. Orthostatic VS taken per protocol. /73   Pulse 88   Temp 37.3 °C (99.1 °F) (Temporal)   Resp 16   Ht 1.78 m (5' 10.08\")   Wt 85.2 kg (187 lb 13.3 oz)   SpO2 97%   BMI 26.89 kg/m²   Patient denies dizziness/ chest pain. PIV flushed with + blood return and removed. Gauze and coban applied as a dressing. Patient has future appointment. Patient to home in care of self.  "

## 2023-04-10 ENCOUNTER — OUTPATIENT INFUSION SERVICES (OUTPATIENT)
Dept: ONCOLOGY | Facility: MEDICAL CENTER | Age: 58
End: 2023-04-10
Attending: INTERNAL MEDICINE
Payer: COMMERCIAL

## 2023-04-10 VITALS
BODY MASS INDEX: 26.64 KG/M2 | HEIGHT: 70 IN | TEMPERATURE: 97.9 F | OXYGEN SATURATION: 97 % | SYSTOLIC BLOOD PRESSURE: 111 MMHG | RESPIRATION RATE: 18 BRPM | HEART RATE: 86 BPM | WEIGHT: 186.07 LBS | DIASTOLIC BLOOD PRESSURE: 71 MMHG

## 2023-04-10 DIAGNOSIS — E83.110 HEREDITARY HEMOCHROMATOSIS (HCC): ICD-10-CM

## 2023-04-10 LAB
FERRITIN SERPL-MCNC: 3640 NG/ML (ref 22–322)
HCT VFR BLD AUTO: 38.3 % (ref 42–52)
HGB BLD-MCNC: 14 G/DL (ref 14–18)

## 2023-04-10 PROCEDURE — 85018 HEMOGLOBIN: CPT

## 2023-04-10 PROCEDURE — 99195 PHLEBOTOMY: CPT

## 2023-04-10 PROCEDURE — 700105 HCHG RX REV CODE 258: Performed by: INTERNAL MEDICINE

## 2023-04-10 PROCEDURE — 82728 ASSAY OF FERRITIN: CPT

## 2023-04-10 PROCEDURE — 85014 HEMATOCRIT: CPT

## 2023-04-10 RX ORDER — SODIUM CHLORIDE 9 MG/ML
500 INJECTION, SOLUTION INTRAVENOUS ONCE
Status: COMPLETED | OUTPATIENT
Start: 2023-04-10 | End: 2023-04-10

## 2023-04-10 RX ORDER — SODIUM CHLORIDE 9 MG/ML
500 INJECTION, SOLUTION INTRAVENOUS ONCE
Status: CANCELLED | OUTPATIENT
Start: 2023-04-10 | End: 2023-04-10

## 2023-04-10 RX ADMIN — SODIUM CHLORIDE 500 ML: 9 INJECTION, SOLUTION INTRAVENOUS at 08:49

## 2023-04-10 ASSESSMENT — FIBROSIS 4 INDEX: FIB4 SCORE: 1.94

## 2023-04-10 NOTE — PROGRESS NOTES
Pt arrived ambulatory to [facility] for q 2 week H/H, ferritin possible TP. POC discussed with pt and he agrees with plan. Pt with call light in reach for safety. Pt verbalized understanding to call for needs/assist.    PIV established, brisk blood return noted. Labs drawn as ordered. H/H results reviewed, Hgb 14 today. Pt meets parameters for TP. Approximately 500ml whole blood phlebotomized. Pt requested NS bolus. Pt denies dizziness or s/s adverse reaction. Pt medicated per MAR. See flow sheet for vital signs. Pt tolerated TP without difficulty. PIC dc'd catheter tip intact, gauze and coban dressing applied. Pt discharged to self care, [facility]. Pt's next appt confirmed 4/24/2023.

## 2023-04-24 ENCOUNTER — OUTPATIENT INFUSION SERVICES (OUTPATIENT)
Dept: ONCOLOGY | Facility: MEDICAL CENTER | Age: 58
End: 2023-04-24
Attending: INTERNAL MEDICINE
Payer: COMMERCIAL

## 2023-04-24 VITALS
WEIGHT: 182.32 LBS | RESPIRATION RATE: 18 BRPM | TEMPERATURE: 98.1 F | SYSTOLIC BLOOD PRESSURE: 110 MMHG | HEART RATE: 79 BPM | HEIGHT: 70 IN | DIASTOLIC BLOOD PRESSURE: 79 MMHG | OXYGEN SATURATION: 95 % | BODY MASS INDEX: 26.1 KG/M2

## 2023-04-24 DIAGNOSIS — E83.110 HEREDITARY HEMOCHROMATOSIS (HCC): ICD-10-CM

## 2023-04-24 LAB
FERRITIN SERPL-MCNC: 4042 NG/ML (ref 22–322)
HCT VFR BLD AUTO: 42.7 % (ref 42–52)
HGB BLD-MCNC: 14.9 G/DL (ref 14–18)

## 2023-04-24 PROCEDURE — 85014 HEMATOCRIT: CPT

## 2023-04-24 PROCEDURE — 99195 PHLEBOTOMY: CPT

## 2023-04-24 PROCEDURE — 82728 ASSAY OF FERRITIN: CPT

## 2023-04-24 PROCEDURE — 85018 HEMOGLOBIN: CPT

## 2023-04-24 RX ORDER — SODIUM CHLORIDE 9 MG/ML
500 INJECTION, SOLUTION INTRAVENOUS ONCE
Status: CANCELLED | OUTPATIENT
Start: 2023-04-24 | End: 2023-04-24

## 2023-04-24 ASSESSMENT — FIBROSIS 4 INDEX: FIB4 SCORE: 1.94

## 2023-04-25 NOTE — PROGRESS NOTES
Milton arrives for the therapeutic phlebotomy. Labs drawn as ordered by MD.  Milton's Hgb 14.9 , Hct 42.7 %. 500 cc blood removed. Milton tolerated well. Orthostatic vitals stable, see flowsheet. Milton denies chest pain, shortness of breath, dizziness, or lightheadedness after treatment. Next appointment scheduled. Discharged to self care; no apparent distress noted.

## 2023-05-08 ENCOUNTER — OUTPATIENT INFUSION SERVICES (OUTPATIENT)
Dept: ONCOLOGY | Facility: MEDICAL CENTER | Age: 58
End: 2023-05-08
Attending: INTERNAL MEDICINE
Payer: COMMERCIAL

## 2023-05-08 VITALS
HEART RATE: 82 BPM | SYSTOLIC BLOOD PRESSURE: 113 MMHG | WEIGHT: 182.32 LBS | DIASTOLIC BLOOD PRESSURE: 87 MMHG | RESPIRATION RATE: 18 BRPM | HEIGHT: 70 IN | BODY MASS INDEX: 26.1 KG/M2 | OXYGEN SATURATION: 96 % | TEMPERATURE: 98.8 F

## 2023-05-08 DIAGNOSIS — E83.110 HEREDITARY HEMOCHROMATOSIS (HCC): ICD-10-CM

## 2023-05-08 LAB
FERRITIN SERPL-MCNC: 4253 NG/ML (ref 22–322)
HCT VFR BLD AUTO: 42 % (ref 42–52)
HGB BLD-MCNC: 14.7 G/DL (ref 14–18)

## 2023-05-08 PROCEDURE — 85018 HEMOGLOBIN: CPT

## 2023-05-08 PROCEDURE — 82728 ASSAY OF FERRITIN: CPT

## 2023-05-08 PROCEDURE — 85014 HEMATOCRIT: CPT

## 2023-05-08 PROCEDURE — 99195 PHLEBOTOMY: CPT

## 2023-05-08 RX ORDER — SODIUM CHLORIDE 9 MG/ML
500 INJECTION, SOLUTION INTRAVENOUS ONCE
Status: CANCELLED | OUTPATIENT
Start: 2023-05-08 | End: 2023-05-08

## 2023-05-08 ASSESSMENT — FIBROSIS 4 INDEX: FIB4 SCORE: 1.94

## 2023-05-09 NOTE — PROGRESS NOTES
Patient presents for lab draw and possible phlebotomy. PIV established blood drawn as ordered. Hemoglobin 14 and hematocrit 42 today. Call placed to Ivonne Young to proceed with phlebotomy. 500 mL whole blood phlebotomized. Patient observed for 15 minutes after phlebotomy. Patient remains stable. PIV removed tip intact compression dressing to site. Patient scheduled for his next appointment and released in no acute distress.

## 2023-05-22 ENCOUNTER — OUTPATIENT INFUSION SERVICES (OUTPATIENT)
Dept: ONCOLOGY | Facility: MEDICAL CENTER | Age: 58
End: 2023-05-22
Attending: INTERNAL MEDICINE
Payer: COMMERCIAL

## 2023-05-22 VITALS
TEMPERATURE: 98.5 F | HEART RATE: 65 BPM | OXYGEN SATURATION: 97 % | SYSTOLIC BLOOD PRESSURE: 143 MMHG | WEIGHT: 180.12 LBS | BODY MASS INDEX: 25.79 KG/M2 | DIASTOLIC BLOOD PRESSURE: 93 MMHG | HEIGHT: 70 IN | RESPIRATION RATE: 16 BRPM

## 2023-05-22 DIAGNOSIS — E83.110 HEREDITARY HEMOCHROMATOSIS (HCC): ICD-10-CM

## 2023-05-22 LAB
FERRITIN SERPL-MCNC: 2972 NG/ML (ref 22–322)
HCT VFR BLD AUTO: 39.8 % (ref 42–52)
HGB BLD-MCNC: 14.2 G/DL (ref 14–18)

## 2023-05-22 PROCEDURE — 85014 HEMATOCRIT: CPT

## 2023-05-22 PROCEDURE — 85018 HEMOGLOBIN: CPT

## 2023-05-22 PROCEDURE — 99195 PHLEBOTOMY: CPT

## 2023-05-22 PROCEDURE — 82728 ASSAY OF FERRITIN: CPT

## 2023-05-22 RX ORDER — SODIUM CHLORIDE 9 MG/ML
500 INJECTION, SOLUTION INTRAVENOUS ONCE
Status: CANCELLED | OUTPATIENT
Start: 2023-05-22 | End: 2023-05-22

## 2023-05-22 ASSESSMENT — FIBROSIS 4 INDEX: FIB4 SCORE: 1.94

## 2023-05-23 NOTE — PROGRESS NOTES
Pt arrived ambulatory to [facility] for H/H, ferritin and possible TP. POC discussed with pt and he agrees with plan.     PIV established, brisk blood return noted. Labs drawn as ordered. H/H results reviewed, Hgb 14.2 and HCT 39.8 today. Pt meets parameters for TP. 500ml whole blood phlebotomized. Pt tolerated TP without s/s adverse reaction. See flow sheet for post vital signs. PIV dc'd catheter tip intact, gauze and coban dressing applied. Pt discharged to self care, [facility]. Pt's next appt confirmed 6/5/2023.

## 2023-05-24 ENCOUNTER — TELEPHONE (OUTPATIENT)
Dept: GASTROENTEROLOGY | Facility: MEDICAL CENTER | Age: 58
End: 2023-05-24
Payer: COMMERCIAL

## 2023-05-24 NOTE — TELEPHONE ENCOUNTER
Jan called stating he has been having L hand pain, swelling and its warm to touch. He has an appt on 6-5-23 but would like a medrol dose pack as he cant even make a fist ect.       Please advise and thank you

## 2023-05-25 DIAGNOSIS — M05.79 RHEUMATOID ARTHRITIS INVOLVING MULTIPLE SITES WITH POSITIVE RHEUMATOID FACTOR (HCC): ICD-10-CM

## 2023-05-25 RX ORDER — METHYLPREDNISOLONE 4 MG/1
TABLET ORAL
Qty: 21 TABLET | Refills: 0 | Status: SHIPPED | OUTPATIENT
Start: 2023-05-25 | End: 2023-09-14

## 2023-06-05 ENCOUNTER — OFFICE VISIT (OUTPATIENT)
Dept: RHEUMATOLOGY | Facility: MEDICAL CENTER | Age: 58
End: 2023-06-05
Attending: INTERNAL MEDICINE
Payer: COMMERCIAL

## 2023-06-05 ENCOUNTER — OUTPATIENT INFUSION SERVICES (OUTPATIENT)
Dept: ONCOLOGY | Facility: MEDICAL CENTER | Age: 58
End: 2023-06-05
Attending: INTERNAL MEDICINE
Payer: COMMERCIAL

## 2023-06-05 VITALS
HEIGHT: 70 IN | SYSTOLIC BLOOD PRESSURE: 102 MMHG | HEART RATE: 93 BPM | BODY MASS INDEX: 25.15 KG/M2 | WEIGHT: 175.71 LBS | OXYGEN SATURATION: 94 % | RESPIRATION RATE: 18 BRPM | DIASTOLIC BLOOD PRESSURE: 73 MMHG | TEMPERATURE: 97.3 F

## 2023-06-05 VITALS
OXYGEN SATURATION: 97 % | HEART RATE: 66 BPM | SYSTOLIC BLOOD PRESSURE: 110 MMHG | WEIGHT: 175 LBS | BODY MASS INDEX: 25.05 KG/M2 | RESPIRATION RATE: 14 BRPM | TEMPERATURE: 97.9 F | DIASTOLIC BLOOD PRESSURE: 68 MMHG

## 2023-06-05 DIAGNOSIS — E83.119 HEMOCHROMATOSIS, UNSPECIFIED HEMOCHROMATOSIS TYPE: ICD-10-CM

## 2023-06-05 DIAGNOSIS — Z79.899 ENCOUNTER FOR MEDICATION MANAGEMENT: ICD-10-CM

## 2023-06-05 DIAGNOSIS — Z79.52 LONG TERM CURRENT USE OF SYSTEMIC STEROIDS: ICD-10-CM

## 2023-06-05 DIAGNOSIS — E83.110 HEREDITARY HEMOCHROMATOSIS (HCC): ICD-10-CM

## 2023-06-05 DIAGNOSIS — M05.79 RHEUMATOID ARTHRITIS INVOLVING MULTIPLE SITES WITH POSITIVE RHEUMATOID FACTOR (HCC): ICD-10-CM

## 2023-06-05 LAB
FERRITIN SERPL-MCNC: 3263 NG/ML (ref 22–322)
HCT VFR BLD AUTO: 42 % (ref 42–52)
HGB BLD-MCNC: 15.1 G/DL (ref 14–18)

## 2023-06-05 PROCEDURE — 99214 OFFICE O/P EST MOD 30 MIN: CPT | Performed by: INTERNAL MEDICINE

## 2023-06-05 PROCEDURE — 3078F DIAST BP <80 MM HG: CPT | Performed by: INTERNAL MEDICINE

## 2023-06-05 PROCEDURE — 85018 HEMOGLOBIN: CPT

## 2023-06-05 PROCEDURE — 99195 PHLEBOTOMY: CPT

## 2023-06-05 PROCEDURE — 85014 HEMATOCRIT: CPT

## 2023-06-05 PROCEDURE — 99211 OFF/OP EST MAY X REQ PHY/QHP: CPT | Performed by: INTERNAL MEDICINE

## 2023-06-05 PROCEDURE — 82728 ASSAY OF FERRITIN: CPT

## 2023-06-05 PROCEDURE — 3074F SYST BP LT 130 MM HG: CPT | Performed by: INTERNAL MEDICINE

## 2023-06-05 RX ORDER — SODIUM CHLORIDE 9 MG/ML
500 INJECTION, SOLUTION INTRAVENOUS ONCE
Status: CANCELLED | OUTPATIENT
Start: 2023-06-05 | End: 2023-06-05

## 2023-06-05 RX ORDER — METHYLPREDNISOLONE 4 MG/1
TABLET ORAL
Qty: 21 TABLET | Refills: 2 | Status: SHIPPED | OUTPATIENT
Start: 2023-06-05 | End: 2023-09-14

## 2023-06-05 ASSESSMENT — FIBROSIS 4 INDEX
FIB4 SCORE: 1.94
FIB4 SCORE: 1.94

## 2023-06-05 ASSESSMENT — JOINT PAIN
TOTAL NUMBER OF SWOLLEN JOINTS: 0
TOTAL NUMBER OF TENDER JOINTS: 2

## 2023-06-05 ASSESSMENT — PATIENT HEALTH QUESTIONNAIRE - PHQ9: CLINICAL INTERPRETATION OF PHQ2 SCORE: 0

## 2023-06-05 NOTE — PROGRESS NOTES
Milton arrives for the therapeutic phlebotomy. Labs drawn as ordered by MD. Labs within parameters.  Pt's Hgb 15.1, Hct 42 %. 500 cc blood removed. Pt tolerated well. Orthostatic vitals taken 15mins after and stable, see flowsheet. Pt denies chest pain, shortness of breath, dizziness, or lightheadedness after treatment. Emailing scheduling for more appointments. Pt discharged home to self care.

## 2023-06-05 NOTE — PROGRESS NOTES
Chief Complaint- joint pain     Subjective:   Milton Aguila is a 57 y.o. male here today for follow up of rheumatological issues    This is a follow-up visit for this patient who we see in this clinic for serologically positive rheumatoid arthritis.  Patient is currently on Enbrel 50 mg subcu every week for which patient states he continues to do well with this particular medication.    Since last visit patient's been diagnosed with hereditary hemochromatosis currently followed by Dr. Atkins in hematology and is currently getting phlebotomy every 1 to 2 weeks.     Additional comorbidities include  occasional back issues and sciatica for which he takes a muscle relaxer when necessary, also sees [facility] spine for tender point injections.        RUQ Abdominal Ultrasound 2/3/2023  IMPRESSION:  1.  Liver cysts in the left lobe and right lobe.  2.  3.4 x 2.7 x 3.1 cm right upper pole renal cortical cyst. Doubtful clinical significance.  3.  Hepatomegaly  4.  Hepatic median shear wave velocity is 1.71 m/sec. Indeterminate for liver fibrosis.         S/p Rinvoq-lost efficacy with spasming in palmar aspect right hand and right wrist pain  S/p Humira  S/p Plaquenil  S/p MTX-contraindicated because of daily alcohol use  S/p leflunamide-contraindicated because of daily alcohol use  S/p sulfasalazine-elevated LFTs (with ETOH)  S/p xeljanz-patient did not want to try for fear of elevated cholesterol levels.     Bilateral HERVE     HEREDITARY HEMOCHROMOTOSIS  Order: [id number]        Component 8 d ago   C282Y Mutation Homozygous    H63D Mutation Negative    S65C Mutation Negative    Hemochrom Interp See Note    Comment: Indication for testing: Carrier screening or diagnostic testing   for hereditary hemochromatosis.     Hemochromatosis Interpretive Results:   Homozygous C282Y:   C282Y Homozygous - The patient is homozygous for the HFE C282Y   mutation and is at high risk for hereditary hemochromatosis.    Homozygosity for this mutation accounts for 80-90 percent of the   hemochromatosis patients of [place]. The   frequency of this mutation in other populations is lower.   H63D: Negative - This patient is negative for the HFE H63D   mutation.             Quantiferon Gold neg 11/2022  HCV neg 11/2022  HBsAg/HBcAb neg 11/2022  Uric acid 6.7 6/2016 [facility]; Uric acid 7.3 12/2019  RF 14.2 (0-13.9) 10/2007 [facility]; RF 64 3/2018; RF 36 3/2021  CCP 18 ( 0-5) 10/2007 [facility];  3/2018;  3/2021  MRI pelvis 7/2007-indicates advanced inflammatory arthropathy compatible's rheumatoid arthritis involving both hips with hypertrophic synovitis-[facility]  MRI LS spine 9/2009-indicates disc desiccation L3-4, L4-5, and L5-S1, left-sided neural foraminal narrowing at L5-S1, mild central stenosis at L4, and L3-4-[facility]     MRI Right hand/wrist 11/2009-negative, no erosions, no synovitis, no pathology-[facility]           Current Outpatient Medications   Medication Sig Dispense Refill    methylPREDNISolone (MEDROL) 4 MG Tab 6 tabs po one day then 5 tabs po one day then 4 tabs po one day then 3 tabs po one day then 2 tabs po one day then 1 tab po for one day 21 Tablet 2    methylPREDNISolone (MEDROL) 4 MG Tab 6 tabs po one day then 5 tabs po one day then 4 tabs po one day then 3 tabs po one day then 2 tabs po one day then 1 tab po for one day 21 Tablet 0    celecoxib (CELEBREX) 200 MG Cap TAKE 1 CAPSULE BY MOUTH TWICE DAILY WITH FOOD AS NEEDED FOR JOINT PAIN 180 Capsule 0    Etanercept (ENBREL SURECLICK) 50 MG/ML Solution Auto-injector INJECT 1 PEN UNDER THE SKIN EVERY 7 DAYS. 12 Each 0    TESTOSTERONE by Does not apply route.       No current facility-administered medications for this visit.     He  has a past medical history of Arthritis, rheumatic, acute or subacute.    ROS   Other than what is mentioned in HPI or physical exam,  there is no history of headaches, double vision or blurred vision.  No trouble swallowing difficulties .  No chest complaints including chest pain, cough or sputum production. No GI complaints including nausea, vomiting, change in bowel habits, or past peptic ulcer disease. No history of blood in the stools. No urinary complaints including dysuria or frequency. No history of alopecia, photosensitivity     Objective:     /68   Pulse 66   Temp 36.6 °C (97.9 °F) (Temporal)   Resp 14   Wt 79.4 kg (175 lb)   SpO2 97%  Body mass index is 25.05 kg/m².   Physical Exam:    Constitutional: Alert and oriented X3, patient is talkative with good eye contact.Skin: Warm, dry, good turgor, no rashes in visible areas.Eye: Equal, round and reactive, conjunctiva clear, lids normal EOM intactENMT: Lips without lesions,  pinna without deformityNeck: Trachea midline, no masses, no thyromegaly.Lymph:  No cervical lymphadenopathy, no axillary lymphadenopathy, no supraclavicular lymphadenopathyRespiratory: Unlabored respiratory effort, lungs clear to auscultation, no wheezes, no ronchi.Cardiovascular: Normal S1, S2, Regular rate and rhythm, no murmurs rubs or gallops  .Abdomen: Soft, non-distended.Psych: Alert and oriented x3, normal affect and mood.Neuro: Cranial nerves 2-12 are grossly intact Ext:no joint laxity noted in bilateral arms, no joint laxity noted in bilateral legs, no lencho swan-neck or boutonniere deformities, no sausage digits no dactylitis, shoulders full range of motion without limitations, elbows without flexion contractures no nodules no tophi      Lab Results   Component Value Date/Time    HEPBCORIGM Non-Reactive 02/10/2023 01:21 PM    HEPBSAG Non-Reactive 11/11/2022 08:21 AM     Lab Results   Component Value Date/Time    HEPCAB Non-Reactive 11/11/2022 08:21 AM     Lab Results   Component Value Date/Time    SODIUM 138 02/10/2023 01:25 PM    POTASSIUM 4.4 02/10/2023 01:25 PM    CHLORIDE 102 02/10/2023 01:25 PM     CO2 25 02/10/2023 01:25 PM    GLUCOSE 83 02/10/2023 01:25 PM    BUN 19 02/10/2023 01:25 PM    CREATININE 0.70 02/10/2023 01:25 PM    CREATININE 0.8 02/09/2008 08:50 AM      Lab Results   Component Value Date/Time    WBC 2.7 (L) 11/11/2022 08:23 AM    RBC 4.35 (L) 11/11/2022 08:23 AM    HEMOGLOBIN 14.2 05/22/2023 04:46 PM    HEMATOCRIT 39.8 (L) 05/22/2023 04:46 PM    MCV 94.7 11/11/2022 08:23 AM    MCH 33.1 (H) 11/11/2022 08:23 AM    MCHC 35.0 11/11/2022 08:23 AM    MPV 9.6 11/11/2022 08:23 AM    NEUTSPOLYS 42.20 (L) 11/11/2022 08:23 AM    LYMPHOCYTES 33.30 11/11/2022 08:23 AM    MONOCYTES 15.60 (H) 11/11/2022 08:23 AM    EOSINOPHILS 7.40 (H) 11/11/2022 08:23 AM    BASOPHILS 1.10 11/11/2022 08:23 AM      Lab Results   Component Value Date/Time    CALCIUM 9.6 02/10/2023 01:25 PM    ASTSGOT 59 (H) 02/10/2023 01:25 PM    ALTSGPT 81 (H) 02/10/2023 01:25 PM    ALKPHOSPHAT 62 02/10/2023 01:25 PM    TBILIRUBIN 1.3 02/10/2023 01:25 PM    ALBUMIN 4.5 02/10/2023 01:25 PM    TOTPROTEIN 7.0 02/10/2023 01:25 PM     Lab Results   Component Value Date/Time    URICACID 5.9 08/16/2021 10:53 AM    RHEUMFACTN 36 (H) 03/11/2021 07:35 AM    CCPANTIBODY 144 (H) 03/11/2021 07:35 AM     Lab Results   Component Value Date/Time    SEDRATEWES 7 11/11/2022 08:21 AM     Lab Results   Component Value Date/Time    HBA1C 5.2 04/16/2022 09:24 AM     Lab Results   Component Value Date/Time    ANTIMITOCHO 4.9 06/28/2019 11:03 AM     Assessment and Plan:     1. Rheumatoid arthritis involving multiple sites with positive rheumatoid factor (HCC)  Clinically stable on Enbrel 50 mg subcu every week however patient states he sometimes forgets to take the Enbrel, also states that he has some times where he is more achy, we did discuss about switching medication patient opts out at this time wants to continue Enbrel 50 mg subcu every week.  Patient does use Medrol Dosepak from time to time when he is especially active  - methylPREDNISolone (MEDROL) 4 MG  Tab; 6 tabs po one day then 5 tabs po one day then 4 tabs po one day then 3 tabs po one day then 2 tabs po one day then 1 tab po for one day  Dispense: 21 Tablet; Refill: 2  - CBC WITH DIFFERENTIAL; Future  - Comp Metabolic Panel; Future  - Sed Rate; Future    2. Encounter for medication management  Currently on Enbrel 50 mg subcu every week, screening labs are up-to-date, next screening labs due November 2024, patient needs monitoring labs every 6 months, next labs due, labs ordered for patient  We reviewed risks of biological medications with patient including hematological pathology, cancer risks, neurological and infection issues especially in the Covid-19 pandemic environment, patient states understanding.  - methylPREDNISolone (MEDROL) 4 MG Tab; 6 tabs po one day then 5 tabs po one day then 4 tabs po one day then 3 tabs po one day then 2 tabs po one day then 1 tab po for one day  Dispense: 21 Tablet; Refill: 2  - CBC WITH DIFFERENTIAL; Future  - Comp Metabolic Panel; Future  - Sed Rate; Future    3. Long term current use of systemic steroids  Uses Medrol Dosepak intermittently for especially active times, we reviewed the risks of corticosteroids, patient states understanding  - methylPREDNISolone (MEDROL) 4 MG Tab; 6 tabs po one day then 5 tabs po one day then 4 tabs po one day then 3 tabs po one day then 2 tabs po one day then 1 tab po for one day  Dispense: 21 Tablet; Refill: 2  - CBC WITH DIFFERENTIAL; Future  - Comp Metabolic Panel; Future  - Sed Rate; Future    4. Hemochromatosis, unspecified hemochromatosis type  Positive genetic testing elevated ferritin and elevated iron levels currently followed by Dr. Atkins in hematology    Followup: Return in about 7 months (around 1/5/2024). or sooner prn    Miltno Aguila  was seen 30 minutes face-to-face of which more than 50% of the time was spent counseling the patient (excluding time for procedures)  regarding  rheumatological condition and care. Therapy  was discussed in detail.      Please note that this dictation was created using voice recognition software. I have made every reasonable attempt to correct obvious errors, but I expect that there are errors of grammar and possibly content that I did not discover before finalizing the note.

## 2023-06-08 ENCOUNTER — TELEPHONE (OUTPATIENT)
Dept: HEMATOLOGY ONCOLOGY | Facility: MEDICAL CENTER | Age: 58
End: 2023-06-08
Payer: COMMERCIAL

## 2023-06-08 NOTE — TELEPHONE ENCOUNTER
Pt left voicemail.  Returned call.  Pt is wanting to come in early for his appointment with Dr Hernandez next Tuesday 6/13 at 1030 to have a Ferritin lab draw so that he will have current labs for his appointment.  Will ask Dr Hernandez is he wants the lab draw.

## 2023-06-09 NOTE — PROGRESS NOTES
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 Concerned that ferritin still quite elevated. Was in [place] for a week and a half - a lot of alcohol and Vitamin C.  Has cut out meat.     ROS:    Constitutional: No weight loss  Skin: No rash or jaundice  HENT: No change in eyesight or hearing  Cardiovascular:No chest pain or arrythmia  Respiratory:No cough or SOB  GI:No nausea, vomiting, diarrhea, constipation  :No dysuria or frequency  Musculoskeletal:No bone or joint pain  Neuro:No sx's of neuropathy  Psych: No complaints    PMH:      No Known Allergies    Past Medical History:   Diagnosis Date    Arthritis, rheumatic, acute or subacute         Past Surgical History:   Procedure Laterality Date    LUMBAR LAMINECTOMY DISKECTOMY  2/7/2013    Performed by Mackenzie Cameron M.D. at SURGERY [facility]    APPENDECTOMY  2000    INGUINAL HERNIA REPAIR      Hernia Repair, Inguinal    OTHER ORTHOPEDIC SURGERY      [facility] hip replacements        Medications:    Current Outpatient Medications on File Prior to Visit   Medication Sig Dispense Refill    methylPREDNISolone (MEDROL) 4 MG Tab 6 tabs po one day then 5 tabs po one day then 4 tabs po one day then 3 tabs po one day then 2 tabs po one day then 1 tab po for one day 21 Tablet 2    methylPREDNISolone (MEDROL) 4 MG Tab 6 tabs po one day then 5 tabs po one day then 4 tabs po one day then 3 tabs po one day then 2 tabs po one day then 1 tab po for one day 21 Tablet 0    celecoxib (CELEBREX) 200 MG Cap TAKE 1 CAPSULE BY MOUTH TWICE DAILY WITH FOOD AS NEEDED FOR JOINT PAIN 180 Capsule 0    Etanercept (ENBREL SURECLICK) 50 MG/ML Solution Auto-injector INJECT 1 PEN UNDER THE SKIN EVERY 7 DAYS. 12 Each 0    TESTOSTERONE by Does not apply route.       No current facility-administered medications on file prior to visit.       Social History      Tobacco Use    Smoking status: Never    Smokeless tobacco: Never   Substance Use Topics    Alcohol use: Yes        No family history on file.     Objective    Vitals:    There were no vitals taken for this visit.    Physical Exam:    Appears well-developed and well-nourished. No distress.    Head -  Normocephalic .   Eyes - Pupils are equal. Conjunctivae normal. No scleral icterus.   Ears - normal hearing   Neurological -   Alert and oriented.  Skin - Skin is warm and dry. No rash noted. Not diaphoretic. No erythema. No pallor. No jaundice   Psychiatric -  Normal mood and affect.    Labs:     Latest Reference Range & Units 03/14/23 16:35 03/28/23 16:09 04/10/23 07:49 04/24/23 16:23 05/08/23 16:50 05/22/23 16:46 06/05/23 16:33   Ferritin 22.0 - 322.0 ng/mL 4233.0 (H) 3279.0 (H) 3640.0 (H) 4042.0 (H) 4253.0 (H) 2972.0 (H) 3263.0 (H)      Latest Reference Range & Units 03/14/23 16:35 03/28/23 16:09 04/10/23 07:49 04/24/23 16:23 05/08/23 16:40 05/22/23 16:46 06/05/23 16:33   Hemoglobin 14.0 - 18.0 g/dL 14.8 14.4 14.0 14.9 14.7 14.2 15.1   Hematocrit 42.0 - 52.0 % 40.7 (L) 40.7 (L) 38.3 (L) 42.7 42.0 39.8 (L) 42.0     Assessment    Imp:    Visit Diagnosis:    1. Hereditary hemochromatosis (HCC)        2. Rheumatoid arthritis involving multiple sites with positive rheumatoid factor (HCC)          Plan:  Wants to check his ferritin today.  3 months    Haris Hernandez M.D.

## 2023-06-09 NOTE — TELEPHONE ENCOUNTER
Spoke with the Pt letting him know that his diet changes will no affect his ferritin levels that fast since he has a draw on 6/5/23.  Educated that the labs will improve but that it will take time. Pt verbalized understanding.

## 2023-06-13 ENCOUNTER — HOSPITAL ENCOUNTER (OUTPATIENT)
Dept: HEMATOLOGY ONCOLOGY | Facility: MEDICAL CENTER | Age: 58
End: 2023-06-13
Attending: INTERNAL MEDICINE
Payer: COMMERCIAL

## 2023-06-13 ENCOUNTER — OFFICE VISIT (OUTPATIENT)
Dept: RHEUMATOLOGY | Facility: MEDICAL CENTER | Age: 58
End: 2023-06-13
Attending: INTERNAL MEDICINE
Payer: COMMERCIAL

## 2023-06-13 ENCOUNTER — HOSPITAL ENCOUNTER (OUTPATIENT)
Dept: LAB | Facility: MEDICAL CENTER | Age: 58
End: 2023-06-13
Attending: INTERNAL MEDICINE
Payer: COMMERCIAL

## 2023-06-13 VITALS
DIASTOLIC BLOOD PRESSURE: 72 MMHG | HEIGHT: 70 IN | HEART RATE: 69 BPM | WEIGHT: 177.69 LBS | SYSTOLIC BLOOD PRESSURE: 102 MMHG | BODY MASS INDEX: 25.44 KG/M2 | TEMPERATURE: 98.1 F | OXYGEN SATURATION: 98 % | RESPIRATION RATE: 18 BRPM

## 2023-06-13 DIAGNOSIS — M79.641 BILATERAL HAND PAIN: ICD-10-CM

## 2023-06-13 DIAGNOSIS — M79.642 BILATERAL HAND PAIN: ICD-10-CM

## 2023-06-13 DIAGNOSIS — E83.110 HEREDITARY HEMOCHROMATOSIS (HCC): ICD-10-CM

## 2023-06-13 DIAGNOSIS — Z79.899 ENCOUNTER FOR MEDICATION MANAGEMENT: ICD-10-CM

## 2023-06-13 DIAGNOSIS — E83.119 HEMOCHROMATOSIS, UNSPECIFIED HEMOCHROMATOSIS TYPE: ICD-10-CM

## 2023-06-13 DIAGNOSIS — M05.79 RHEUMATOID ARTHRITIS INVOLVING MULTIPLE SITES WITH POSITIVE RHEUMATOID FACTOR (HCC): ICD-10-CM

## 2023-06-13 DIAGNOSIS — Z79.52 LONG TERM CURRENT USE OF SYSTEMIC STEROIDS: ICD-10-CM

## 2023-06-13 LAB
ALBUMIN SERPL BCP-MCNC: 4.2 G/DL (ref 3.2–4.9)
ALBUMIN/GLOB SERPL: 1.9 G/DL
ALP SERPL-CCNC: 63 U/L (ref 30–99)
ALT SERPL-CCNC: 68 U/L (ref 2–50)
ANION GAP SERPL CALC-SCNC: 11 MMOL/L (ref 7–16)
AST SERPL-CCNC: 40 U/L (ref 12–45)
BASOPHILS # BLD AUTO: 0.5 % (ref 0–1.8)
BASOPHILS # BLD: 0.02 K/UL (ref 0–0.12)
BILIRUB SERPL-MCNC: 0.6 MG/DL (ref 0.1–1.5)
BUN SERPL-MCNC: 13 MG/DL (ref 8–22)
CALCIUM ALBUM COR SERPL-MCNC: 8.9 MG/DL (ref 8.5–10.5)
CALCIUM SERPL-MCNC: 9.1 MG/DL (ref 8.5–10.5)
CHLORIDE SERPL-SCNC: 106 MMOL/L (ref 96–112)
CO2 SERPL-SCNC: 24 MMOL/L (ref 20–33)
CREAT SERPL-MCNC: 0.6 MG/DL (ref 0.5–1.4)
EOSINOPHIL # BLD AUTO: 0.19 K/UL (ref 0–0.51)
EOSINOPHIL NFR BLD: 4.7 % (ref 0–6.9)
ERYTHROCYTE [DISTWIDTH] IN BLOOD BY AUTOMATED COUNT: 46.2 FL (ref 35.9–50)
ERYTHROCYTE [SEDIMENTATION RATE] IN BLOOD BY WESTERGREN METHOD: 6 MM/HOUR (ref 0–20)
FERRITIN SERPL-MCNC: 3182 NG/ML (ref 22–322)
GFR SERPLBLD CREATININE-BSD FMLA CKD-EPI: 113 ML/MIN/1.73 M 2
GLOBULIN SER CALC-MCNC: 2.2 G/DL (ref 1.9–3.5)
GLUCOSE SERPL-MCNC: 83 MG/DL (ref 65–99)
HCT VFR BLD AUTO: 41.8 % (ref 42–52)
HGB BLD-MCNC: 14.5 G/DL (ref 14–18)
IMM GRANULOCYTES # BLD AUTO: 0.01 K/UL (ref 0–0.11)
IMM GRANULOCYTES NFR BLD AUTO: 0.2 % (ref 0–0.9)
LYMPHOCYTES # BLD AUTO: 1.21 K/UL (ref 1–4.8)
LYMPHOCYTES NFR BLD: 29.9 % (ref 22–41)
MCH RBC QN AUTO: 34 PG (ref 27–33)
MCHC RBC AUTO-ENTMCNC: 34.7 G/DL (ref 32.3–36.5)
MCV RBC AUTO: 98.1 FL (ref 81.4–97.8)
MONOCYTES # BLD AUTO: 0.57 K/UL (ref 0–0.85)
MONOCYTES NFR BLD AUTO: 14.1 % (ref 0–13.4)
NEUTROPHILS # BLD AUTO: 2.05 K/UL (ref 1.82–7.42)
NEUTROPHILS NFR BLD: 50.6 % (ref 44–72)
NRBC # BLD AUTO: 0 K/UL
NRBC BLD-RTO: 0 /100 WBC (ref 0–0.2)
PLATELET # BLD AUTO: 211 K/UL (ref 164–446)
PMV BLD AUTO: 9.7 FL (ref 9–12.9)
POTASSIUM SERPL-SCNC: 4.3 MMOL/L (ref 3.6–5.5)
PROT SERPL-MCNC: 6.4 G/DL (ref 6–8.2)
RBC # BLD AUTO: 4.26 M/UL (ref 4.7–6.1)
SODIUM SERPL-SCNC: 141 MMOL/L (ref 135–145)
WBC # BLD AUTO: 4.1 K/UL (ref 4.8–10.8)

## 2023-06-13 PROCEDURE — 20600 DRAIN/INJ JOINT/BURSA W/O US: CPT | Mod: 50 | Performed by: INTERNAL MEDICINE

## 2023-06-13 PROCEDURE — 700111 HCHG RX REV CODE 636 W/ 250 OVERRIDE (IP): Performed by: INTERNAL MEDICINE

## 2023-06-13 PROCEDURE — 85652 RBC SED RATE AUTOMATED: CPT

## 2023-06-13 PROCEDURE — 99212 OFFICE O/P EST SF 10 MIN: CPT | Performed by: INTERNAL MEDICINE

## 2023-06-13 PROCEDURE — 99214 OFFICE O/P EST MOD 30 MIN: CPT | Mod: 25 | Performed by: INTERNAL MEDICINE

## 2023-06-13 PROCEDURE — 85025 COMPLETE CBC W/AUTO DIFF WBC: CPT

## 2023-06-13 PROCEDURE — 99213 OFFICE O/P EST LOW 20 MIN: CPT | Performed by: INTERNAL MEDICINE

## 2023-06-13 PROCEDURE — 80053 COMPREHEN METABOLIC PANEL: CPT

## 2023-06-13 PROCEDURE — 1125F AMNT PAIN NOTED PAIN PRSNT: CPT | Performed by: INTERNAL MEDICINE

## 2023-06-13 PROCEDURE — 36415 COLL VENOUS BLD VENIPUNCTURE: CPT

## 2023-06-13 PROCEDURE — 82728 ASSAY OF FERRITIN: CPT

## 2023-06-13 RX ORDER — ALPRAZOLAM 1 MG/1
TABLET ORAL
COMMUNITY
Start: 2023-05-09

## 2023-06-13 RX ORDER — METHYLPREDNISOLONE ACETATE 40 MG/ML
20 INJECTION, SUSPENSION INTRA-ARTICULAR; INTRALESIONAL; INTRAMUSCULAR; SOFT TISSUE ONCE
Status: COMPLETED | OUTPATIENT
Start: 2023-06-13 | End: 2023-06-13

## 2023-06-13 RX ORDER — TIRZEPATIDE 15 MG/.5ML
INJECTION, SOLUTION SUBCUTANEOUS
COMMUNITY
Start: 2023-05-10

## 2023-06-13 RX ADMIN — METHYLPREDNISOLONE ACETATE 20 MG: 40 INJECTION, SUSPENSION INTRA-ARTICULAR; INTRALESIONAL; INTRAMUSCULAR; SOFT TISSUE at 14:16

## 2023-06-13 ASSESSMENT — FIBROSIS 4 INDEX
FIB4 SCORE: 1.94
FIB4 SCORE: 1.94

## 2023-06-13 ASSESSMENT — JOINT PAIN
TOTAL NUMBER OF TENDER JOINTS: 2
TOTAL NUMBER OF SWOLLEN JOINTS: 0

## 2023-06-13 ASSESSMENT — PAIN SCALES - GENERAL: PAINLEVEL: 7=MODERATE-SEVERE PAIN

## 2023-06-13 NOTE — PROGRESS NOTES
Chief Complaint- joint pain     Subjective:   Milton Aguila is a 57 y.o. male here today for follow up of rheumatological issues      This is a follow-up visit for this patient who we see in this clinic for serologically positive rheumatoid arthritis.  Patient is currently on Enbrel at 50 mg subcu every week for which patient continues to do quite well with this particular regiment.    Patient is here today because he is having some problems with bilateral third MCP joints I would like to get bilateral third MCP joint corticosteroid injections.     Since last visit patient's been diagnosed with hereditary hemochromatosis currently followed by Dr. Atkins in hematology and is currently getting phlebotomy every 1 to 2 weeks.     Additional comorbidities include  occasional back issues and sciatica for which he takes a muscle relaxer when necessary, also sees [facility] for tender point injections.        RUQ Abdominal Ultrasound 2/3/2023  IMPRESSION:  1.  Liver cysts in the left lobe and right lobe.  2.  3.4 x 2.7 x 3.1 cm right upper pole renal cortical cyst. Doubtful clinical significance.  3.  Hepatomegaly  4.  Hepatic median shear wave velocity is 1.71 m/sec. Indeterminate for liver fibrosis.         S/p Rinvoq-lost efficacy with spasming in palmar aspect right hand and right wrist pain  S/p Humira  S/p Plaquenil  S/p MTX-contraindicated because of daily alcohol use  S/p leflunamide-contraindicated because of daily alcohol use  S/p sulfasalazine-elevated LFTs (with ETOH)  S/p xeljanz-patient did not want to try for fear of elevated cholesterol levels.     Bilateral HERVE     HEREDITARY HEMOCHROMOTOSIS  Order: [id number]        Component 8 d ago   C282Y Mutation Homozygous    H63D Mutation Negative    S65C Mutation Negative    Hemochrom Interp See Note    Comment: Indication for testing: Carrier screening or diagnostic testing   for hereditary hemochromatosis.     Hemochromatosis Interpretive  Results:   Homozygous C282Y:   C282Y Homozygous - The patient is homozygous for the HFE C282Y   mutation and is at high risk for hereditary hemochromatosis.   Homozygosity for this mutation accounts for 80-90 percent of the   hemochromatosis patients of [place]  des[place]. The   frequency of this mutation in other populations is lower.   H63D: Negative - This patient is negative for the HFE H63D   mutation.             Quantiferon Gold neg 11/2022  HCV neg 11/2022  HBsAg/HBcAb neg 11/2022  Uric acid 6.7 6/2016 [facility]; Uric acid 7.3 12/2019  RF 14.2 (0-13.9) 10/2007 [facility]; RF 64 3/2018; RF 36 3/2021  CCP 18 ( 0-5) 10/2007 [facility];  3/2018;  3/2021  MRI pelvis 7/2007-indicates advanced inflammatory arthropathy compatible's rheumatoid arthritis involving both hips with hypertrophic synovitis-[facility]  MRI LS spine 9/2009-indicates disc desiccation L3-4, L4-5, and L5-S1, left-sided neural foraminal narrowing at L5-S1, mild central stenosis at L4, and L3-4-[facility]     MRI Right hand/wrist 11/2009-negative, no erosions, no synovitis, no pathology-[facility]           Current Outpatient Medications   Medication Sig Dispense Refill    ALPRAZolam (XANAX) 1 MG Tab       MOUNJARO 15 MG/0.5ML Solution Pen-injector INJECT 15 MG UNDER THE SKIN ONE DAY A WEEK      celecoxib (CELEBREX) 200 MG Cap TAKE 1 CAPSULE BY MOUTH TWICE DAILY WITH FOOD AS NEEDED FOR JOINT PAIN 180 Capsule 0    Etanercept (ENBREL SURECLICK) 50 MG/ML Solution Auto-injector INJECT 1 PEN UNDER THE SKIN EVERY 7 DAYS. 12 Each 0    TESTOSTERONE by Does not apply route.      methylPREDNISolone (MEDROL) 4 MG Tab 6 tabs po one day then 5 tabs po one day then 4 tabs po one day then 3 tabs po one day then 2 tabs po one day then 1 tab po for one day (Patient not taking: Reported on 6/13/2023) 21 Tablet 2    methylPREDNISolone (MEDROL) 4 MG Tab 6 tabs po one day then 5 tabs po  one day then 4 tabs po one day then 3 tabs po one day then 2 tabs po one day then 1 tab po for one day 21 Tablet 0     No current facility-administered medications for this visit.     He  has a past medical history of Arthritis, rheumatic, acute or subacute.    ROS   Other than what is mentioned in HPI or physical exam, there is no history of headaches, double vision or blurred vision.  No trouble swallowing difficulties .  No chest complaints including chest pain, cough or sputum production. No GI complaints including nausea, vomiting, change in bowel habits, or past peptic ulcer disease. No history of blood in the stools. No urinary complaints including dysuria or frequency. No history of alopecia, photosensitivity     Objective:     There were no vitals taken for this visit. Body mass index is 25.11 kg/m² (pended).   Physical Exam:    Constitutional: Alert and oriented X3, patient is talkative with good eye contact.Skin: Warm, dry, good turgor, no rashes in visible areas.Eye: Equal, round and reactive, conjunctiva clear, lids normal EOM intactENMT: Lips without lesions,  pinna without deformityNeck: Trachea midline, no masses, no thyromegaly.Lymph:  No cervical lymphadenopathy, no axillary lymphadenopathy, no supraclavicular lymphadenopathyRespiratory: Unlabored respiratory effort, lungs clear to auscultation, no wheezes, no ronchi.Cardiovascular: Normal S1, S2, Regular rate and rhythm, no murmurs rubs or gallops  .Abdomen: Soft, non-distended.Psych: Alert and oriented x3, normal affect and mood.Neuro: Cranial nerves 2-12 are grossly intact Ext:no joint laxity noted in bilateral arms, no joint laxity noted in bilateral legs      Lab Results   Component Value Date/Time    HEPBCORIGM Non-Reactive 02/10/2023 01:21 PM    HEPBSAG Non-Reactive 11/11/2022 08:21 AM     Lab Results   Component Value Date/Time    HEPCAB Non-Reactive 11/11/2022 08:21 AM     Lab Results   Component Value Date/Time    SODIUM 138 02/10/2023  01:25 PM    POTASSIUM 4.4 02/10/2023 01:25 PM    CHLORIDE 102 02/10/2023 01:25 PM    CO2 25 02/10/2023 01:25 PM    GLUCOSE 83 02/10/2023 01:25 PM    BUN 19 02/10/2023 01:25 PM    CREATININE 0.70 02/10/2023 01:25 PM    CREATININE 0.8 02/09/2008 08:50 AM      Lab Results   Component Value Date/Time    WBC 2.7 (L) 11/11/2022 08:23 AM    RBC 4.35 (L) 11/11/2022 08:23 AM    HEMOGLOBIN 15.1 06/05/2023 04:33 PM    HEMATOCRIT 42.0 06/05/2023 04:33 PM    MCV 94.7 11/11/2022 08:23 AM    MCH 33.1 (H) 11/11/2022 08:23 AM    MCHC 35.0 11/11/2022 08:23 AM    MPV 9.6 11/11/2022 08:23 AM    NEUTSPOLYS 42.20 (L) 11/11/2022 08:23 AM    LYMPHOCYTES 33.30 11/11/2022 08:23 AM    MONOCYTES 15.60 (H) 11/11/2022 08:23 AM    EOSINOPHILS 7.40 (H) 11/11/2022 08:23 AM    BASOPHILS 1.10 11/11/2022 08:23 AM      Lab Results   Component Value Date/Time    CALCIUM 9.6 02/10/2023 01:25 PM    ASTSGOT 59 (H) 02/10/2023 01:25 PM    ALTSGPT 81 (H) 02/10/2023 01:25 PM    ALKPHOSPHAT 62 02/10/2023 01:25 PM    TBILIRUBIN 1.3 02/10/2023 01:25 PM    ALBUMIN 4.5 02/10/2023 01:25 PM    TOTPROTEIN 7.0 02/10/2023 01:25 PM     Lab Results   Component Value Date/Time    URICACID 5.9 08/16/2021 10:53 AM    RHEUMFACTN 36 (H) 03/11/2021 07:35 AM    CCPANTIBODY 144 (H) 03/11/2021 07:35 AM     Lab Results   Component Value Date/Time    SEDRATEWES 7 11/11/2022 08:21 AM     Lab Results   Component Value Date/Time    HBA1C 5.2 04/16/2022 09:24 AM     Lab Results   Component Value Date/Time    ANTIMITOCHO 4.9 06/28/2019 11:03 AM     Assessment and Plan:     1. Rheumatoid arthritis involving multiple sites with positive rheumatoid factor (HCC)  Continues to do quite well on Enbrel 50 mg subcu every week, patient also uses Medrol Dosepak occasionally, continue to monitor  - methylPREDNISolone acetate (DEPO-MEDROL) injection 20 mg    2. Encounter for medication management  Currently on Enbrel 50 mg subcu every week, screening labs are up-to-date, next screening labs due  about November 2024.  Patient needs monitoring labs about every 6 months, next labs will be due about August 2023.  We reviewed risks of biological medications with patient including hematological pathology, cancer risks, neurological and infection issues especially in the Covid-19 pandemic environment, patient states understanding.    3. Long term current use of systemic steroids  Patient uses Medrol Dosepak intermittently for specially active times, we had we reviewed the risks of corticosteroids continuous use, patient states understanding    4. Bilateral hand pain  Today we will do bilateral third MCP joint corticosteroid injections  - methylPREDNISolone acetate (DEPO-MEDROL) injection 20 mg    5. Hemochromatosis, unspecified hemochromatosis type  Currently followed by Dr. Atkins in hematology/oncology    Procedure: Bilateral third MCP joint corticosteroid injections    The procedure was explained to the patient in detail including potential damage to area being injected including risk of avascular necrosis, all questions were answered, verbal consent to do procedure was obtained. Risks and benefits were reviewed with patient and patient states understanding including risks of steroid injections including bleeding, infections, subcutaneous lipolysis and/or hypopigmentation at site of injection, and risk of avascular necrosis. Verbal consent was again obtained.  Patient denies any allergies to lidocaine, Betadine/iodine or corticosteroids.  The patient was positioned appropriately. Anatomical landmarks were identified.    Dorsal aspect of bilateral third MCP joints bilateral hands was prepped with betadine x 3, local anesthetic with ethyl chloride and 1% Xylocaine lot #[id number], Exp December 2026 and using sterile technique,  injected 10 mg of Depomedrol Lot #[id number], Exp August 2024 dorsal aspect bilateral third MCP joints bilateral hands    EBL 0  The patient tolerated the procedure well, was observed in the  office and there were no complications     Patient was asked to rest bilateral third fingers for 3 days, patient states understanding and states will comply.      Followup: Return in about 6 months (around 12/13/2023). or sooner myla Aguila  was seen 30 minutes face-to-face of which more than 50% of the time was spent counseling the patient (excluding time for procedures)  regarding  rheumatological condition and care. Therapy was discussed in detail.      Please note that this dictation was created using voice recognition software. I have made every reasonable attempt to correct obvious errors, but I expect that there are errors of grammar and possibly content that I did not discover before finalizing the note.

## 2023-06-24 ENCOUNTER — OUTPATIENT INFUSION SERVICES (OUTPATIENT)
Dept: ONCOLOGY | Facility: MEDICAL CENTER | Age: 58
End: 2023-06-24
Attending: INTERNAL MEDICINE
Payer: COMMERCIAL

## 2023-06-24 VITALS
OXYGEN SATURATION: 98 % | BODY MASS INDEX: 27.23 KG/M2 | WEIGHT: 183.86 LBS | SYSTOLIC BLOOD PRESSURE: 130 MMHG | HEIGHT: 69 IN | HEART RATE: 76 BPM | DIASTOLIC BLOOD PRESSURE: 90 MMHG | RESPIRATION RATE: 16 BRPM | TEMPERATURE: 98.2 F

## 2023-06-24 DIAGNOSIS — E83.110 HEREDITARY HEMOCHROMATOSIS (HCC): ICD-10-CM

## 2023-06-24 LAB
FERRITIN SERPL-MCNC: 2779 NG/ML (ref 22–322)
HCT VFR BLD AUTO: 38.3 % (ref 42–52)
HGB BLD-MCNC: 13.5 G/DL (ref 14–18)

## 2023-06-24 PROCEDURE — 99195 PHLEBOTOMY: CPT

## 2023-06-24 PROCEDURE — 85014 HEMATOCRIT: CPT

## 2023-06-24 PROCEDURE — 82728 ASSAY OF FERRITIN: CPT

## 2023-06-24 PROCEDURE — 85018 HEMOGLOBIN: CPT

## 2023-06-24 RX ORDER — SODIUM CHLORIDE 9 MG/ML
500 INJECTION, SOLUTION INTRAVENOUS ONCE
Status: CANCELLED | OUTPATIENT
Start: 2023-06-24 | End: 2023-06-24

## 2023-06-24 ASSESSMENT — FIBROSIS 4 INDEX: FIB4 SCORE: 1.29

## 2023-06-25 NOTE — PROGRESS NOTES
Tyler to infusion for Q2week therapeutic phlebotomy. Reports feeling well today aside from fatigue. PIV started to L AC and labs drawn. Labs reviewed by RN, Hgb: 13.5, Hct: 38.3, within parameters for treatment. 500ml blood removed over 10 minutes and patient observed 10 minutes post phlebotomy, tolerated well with no adverse effects, orthostatic VSS. PIV d/rhdoa with tip intact. Patient left in stable condition, knows when to return for next appt.

## 2023-07-08 ENCOUNTER — OUTPATIENT INFUSION SERVICES (OUTPATIENT)
Dept: ONCOLOGY | Facility: MEDICAL CENTER | Age: 58
End: 2023-07-08
Attending: INTERNAL MEDICINE
Payer: COMMERCIAL

## 2023-07-08 VITALS
TEMPERATURE: 97.6 F | HEART RATE: 76 BPM | RESPIRATION RATE: 16 BRPM | SYSTOLIC BLOOD PRESSURE: 119 MMHG | WEIGHT: 182.76 LBS | BODY MASS INDEX: 27.07 KG/M2 | OXYGEN SATURATION: 96 % | DIASTOLIC BLOOD PRESSURE: 76 MMHG | HEIGHT: 69 IN

## 2023-07-08 DIAGNOSIS — E83.110 HEREDITARY HEMOCHROMATOSIS (HCC): ICD-10-CM

## 2023-07-08 LAB
FERRITIN SERPL-MCNC: 2920 NG/ML (ref 22–322)
HCT VFR BLD AUTO: 42.4 % (ref 42–52)
HGB BLD-MCNC: 14.8 G/DL (ref 14–18)

## 2023-07-08 PROCEDURE — 85014 HEMATOCRIT: CPT

## 2023-07-08 PROCEDURE — 85018 HEMOGLOBIN: CPT

## 2023-07-08 PROCEDURE — 99195 PHLEBOTOMY: CPT

## 2023-07-08 PROCEDURE — 82728 ASSAY OF FERRITIN: CPT

## 2023-07-08 RX ORDER — SODIUM CHLORIDE 9 MG/ML
500 INJECTION, SOLUTION INTRAVENOUS ONCE
Status: CANCELLED | OUTPATIENT
Start: 2023-07-08 | End: 2023-07-08

## 2023-07-08 ASSESSMENT — FIBROSIS 4 INDEX: FIB4 SCORE: 1.29

## 2023-07-08 NOTE — PROGRESS NOTES
Milton arrives for the therapeutic phlebotomy. Labs drawn as ordered.  Milton's Hgb 14.8 , Hct 42.4 %. 500 cc blood removed. Milton tolerated well. Orthostatic vitals stable, see flowsheet. Milton denies chest pain, shortness of breath, dizziness, or lightheadedness after treatment. Next appointment scheduled, Milton discharged home to self care.

## 2023-07-22 ENCOUNTER — APPOINTMENT (OUTPATIENT)
Dept: ONCOLOGY | Facility: MEDICAL CENTER | Age: 58
End: 2023-07-22
Attending: INTERNAL MEDICINE
Payer: COMMERCIAL

## 2023-07-23 ENCOUNTER — OUTPATIENT INFUSION SERVICES (OUTPATIENT)
Dept: ONCOLOGY | Facility: MEDICAL CENTER | Age: 58
End: 2023-07-23
Attending: INTERNAL MEDICINE
Payer: COMMERCIAL

## 2023-07-23 VITALS
DIASTOLIC BLOOD PRESSURE: 82 MMHG | WEIGHT: 183.86 LBS | HEIGHT: 69 IN | HEART RATE: 84 BPM | TEMPERATURE: 98.1 F | OXYGEN SATURATION: 98 % | SYSTOLIC BLOOD PRESSURE: 114 MMHG | BODY MASS INDEX: 27.23 KG/M2 | RESPIRATION RATE: 17 BRPM

## 2023-07-23 DIAGNOSIS — E83.110 HEREDITARY HEMOCHROMATOSIS (HCC): ICD-10-CM

## 2023-07-23 LAB
FERRITIN SERPL-MCNC: 2589 NG/ML (ref 22–322)
HCT VFR BLD AUTO: 38.3 % (ref 42–52)
HGB BLD-MCNC: 13.9 G/DL (ref 14–18)

## 2023-07-23 PROCEDURE — 99195 PHLEBOTOMY: CPT

## 2023-07-23 PROCEDURE — 85018 HEMOGLOBIN: CPT

## 2023-07-23 PROCEDURE — 82728 ASSAY OF FERRITIN: CPT

## 2023-07-23 PROCEDURE — 85014 HEMATOCRIT: CPT

## 2023-07-23 RX ORDER — SODIUM CHLORIDE 9 MG/ML
500 INJECTION, SOLUTION INTRAVENOUS ONCE
Status: CANCELLED | OUTPATIENT
Start: 2023-07-23 | End: 2023-07-23

## 2023-07-23 ASSESSMENT — FIBROSIS 4 INDEX: FIB4 SCORE: 1.29

## 2023-07-24 NOTE — PROGRESS NOTES
Milton arrives for the therapeutic phlebotomy. Labs drawn as ordered. Milton's Hgb 13.9 , Hct 38.3 %. 500 cc blood removed. Milton tolerated well. Orthostatic vitals stable, see flowsheet. Milton denies chest pain, shortness of breath, dizziness, or lightheadedness after treatment. Next appointment scheduled, Milton discharged home to self care.

## 2023-08-06 ENCOUNTER — OUTPATIENT INFUSION SERVICES (OUTPATIENT)
Dept: ONCOLOGY | Facility: MEDICAL CENTER | Age: 58
End: 2023-08-06
Attending: INTERNAL MEDICINE
Payer: COMMERCIAL

## 2023-08-06 VITALS
HEIGHT: 70 IN | OXYGEN SATURATION: 98 % | DIASTOLIC BLOOD PRESSURE: 68 MMHG | WEIGHT: 180.78 LBS | HEART RATE: 87 BPM | RESPIRATION RATE: 18 BRPM | TEMPERATURE: 97 F | SYSTOLIC BLOOD PRESSURE: 103 MMHG | BODY MASS INDEX: 25.88 KG/M2

## 2023-08-06 DIAGNOSIS — E83.110 HEREDITARY HEMOCHROMATOSIS (HCC): ICD-10-CM

## 2023-08-06 LAB
FERRITIN SERPL-MCNC: 2307 NG/ML (ref 22–322)
HCT VFR BLD AUTO: 40.6 % (ref 42–52)
HGB BLD-MCNC: 14.3 G/DL (ref 14–18)

## 2023-08-06 PROCEDURE — 99195 PHLEBOTOMY: CPT

## 2023-08-06 PROCEDURE — 82728 ASSAY OF FERRITIN: CPT

## 2023-08-06 PROCEDURE — 700105 HCHG RX REV CODE 258: Mod: JZ | Performed by: INTERNAL MEDICINE

## 2023-08-06 PROCEDURE — 85018 HEMOGLOBIN: CPT

## 2023-08-06 PROCEDURE — 85014 HEMATOCRIT: CPT

## 2023-08-06 RX ORDER — SODIUM CHLORIDE 9 MG/ML
500 INJECTION, SOLUTION INTRAVENOUS ONCE
Status: COMPLETED | OUTPATIENT
Start: 2023-08-06 | End: 2023-08-06

## 2023-08-06 RX ORDER — SODIUM CHLORIDE 9 MG/ML
500 INJECTION, SOLUTION INTRAVENOUS ONCE
Status: CANCELLED | OUTPATIENT
Start: 2023-08-06 | End: 2023-08-06

## 2023-08-06 RX ADMIN — SODIUM CHLORIDE 250 ML: 9 INJECTION, SOLUTION INTRAVENOUS at 18:16

## 2023-08-06 ASSESSMENT — FIBROSIS 4 INDEX: FIB4 SCORE: 1.29

## 2023-08-07 NOTE — PROGRESS NOTES
Tyler arrives to [facility] for q 2 week therapeutic phlebotomy for hereditary hemochromatosis. Patient denies acute health concerns. 20g PIV placed to RFA, which flushes easily and has brisk blood return. H/H and ferritin drawn as ordered. Hgb 14.3; Hct 40.6%. 500 cc whole blood phlebotomized without issues. Patient denied CP, dyspnea, dizziness/lightheadedness. Patient required 250cc NS bolus due to orthostatic hypotension not resolving. After bolus, post-TP VSS. PIV flushed and removed with tip intact. Patient has his next appt. Discharged home to self care in no apparent distress.

## 2023-08-20 ENCOUNTER — OUTPATIENT INFUSION SERVICES (OUTPATIENT)
Dept: ONCOLOGY | Facility: MEDICAL CENTER | Age: 58
End: 2023-08-20
Attending: INTERNAL MEDICINE
Payer: COMMERCIAL

## 2023-08-20 VITALS
SYSTOLIC BLOOD PRESSURE: 106 MMHG | OXYGEN SATURATION: 96 % | HEIGHT: 69 IN | WEIGHT: 184.3 LBS | HEART RATE: 78 BPM | RESPIRATION RATE: 16 BRPM | TEMPERATURE: 97.4 F | BODY MASS INDEX: 27.3 KG/M2 | DIASTOLIC BLOOD PRESSURE: 74 MMHG

## 2023-08-20 DIAGNOSIS — E83.110 HEREDITARY HEMOCHROMATOSIS (HCC): ICD-10-CM

## 2023-08-20 LAB
FERRITIN SERPL-MCNC: 2096 NG/ML (ref 22–322)
HCT VFR BLD AUTO: 40.6 % (ref 42–52)
HGB BLD-MCNC: 14.7 G/DL (ref 14–18)

## 2023-08-20 PROCEDURE — 99195 PHLEBOTOMY: CPT

## 2023-08-20 PROCEDURE — 96360 HYDRATION IV INFUSION INIT: CPT

## 2023-08-20 PROCEDURE — 82728 ASSAY OF FERRITIN: CPT

## 2023-08-20 PROCEDURE — 85014 HEMATOCRIT: CPT

## 2023-08-20 PROCEDURE — 700105 HCHG RX REV CODE 258: Performed by: INTERNAL MEDICINE

## 2023-08-20 PROCEDURE — 85018 HEMOGLOBIN: CPT

## 2023-08-20 RX ORDER — SODIUM CHLORIDE 9 MG/ML
500 INJECTION, SOLUTION INTRAVENOUS ONCE
Status: COMPLETED | OUTPATIENT
Start: 2023-08-20 | End: 2023-08-20

## 2023-08-20 RX ORDER — SODIUM CHLORIDE 9 MG/ML
500 INJECTION, SOLUTION INTRAVENOUS ONCE
Status: CANCELLED | OUTPATIENT
Start: 2023-08-20 | End: 2023-08-20

## 2023-08-20 RX ADMIN — SODIUM CHLORIDE 500 ML: 9 INJECTION, SOLUTION INTRAVENOUS at 16:15

## 2023-08-20 ASSESSMENT — FIBROSIS 4 INDEX: FIB4 SCORE: 1.29

## 2023-08-20 NOTE — PROGRESS NOTES
Milton arrives for the therapeutic phlebotomy. Labs drawn as ordered by MD.  Milton's Hgb 14.7 , Hct 40.6 %. 500 cc blood removed. Milton tolerated well. Milton became lightheaded during orthostatic vitals. IV hydration given. Orthostatics then stable. Milton denies chest pain, shortness of breath, dizziness, or lightheadedness after treatment. Next appointment scheduled, Milton discharged home to self care.

## 2023-09-04 ENCOUNTER — OUTPATIENT INFUSION SERVICES (OUTPATIENT)
Dept: ONCOLOGY | Facility: MEDICAL CENTER | Age: 58
End: 2023-09-04
Attending: INTERNAL MEDICINE
Payer: COMMERCIAL

## 2023-09-04 VITALS
DIASTOLIC BLOOD PRESSURE: 80 MMHG | HEART RATE: 93 BPM | HEIGHT: 69 IN | TEMPERATURE: 97.7 F | WEIGHT: 186.29 LBS | BODY MASS INDEX: 27.59 KG/M2 | RESPIRATION RATE: 16 BRPM | SYSTOLIC BLOOD PRESSURE: 111 MMHG | OXYGEN SATURATION: 97 %

## 2023-09-04 DIAGNOSIS — E83.110 HEREDITARY HEMOCHROMATOSIS (HCC): ICD-10-CM

## 2023-09-04 LAB
FERRITIN SERPL-MCNC: 2299 NG/ML (ref 22–322)
HCT VFR BLD AUTO: 38.7 % (ref 42–52)
HGB BLD-MCNC: 14.1 G/DL (ref 14–18)

## 2023-09-04 PROCEDURE — 82728 ASSAY OF FERRITIN: CPT

## 2023-09-04 PROCEDURE — 99195 PHLEBOTOMY: CPT

## 2023-09-04 PROCEDURE — 85018 HEMOGLOBIN: CPT

## 2023-09-04 PROCEDURE — 85014 HEMATOCRIT: CPT

## 2023-09-04 RX ORDER — SODIUM CHLORIDE 9 MG/ML
500 INJECTION, SOLUTION INTRAVENOUS ONCE
Status: CANCELLED | OUTPATIENT
Start: 2023-09-04 | End: 2023-09-04

## 2023-09-04 ASSESSMENT — FIBROSIS 4 INDEX: FIB4 SCORE: 1.29

## 2023-09-05 NOTE — PROGRESS NOTES
Pt presented [facility] hospitals for [facility]lebotomy. Established PIV in LAC; brisk blood return observed and pt tolerated well. Labs drawn per orders and were within treatable parameters. 500 mLs of whole blood removed and pt tolerated well. Orthostatic vitals stable. PIV flushed with brisk blood return observed and removed; gauze/coband dressing applied. Next appointment confirmedand education provided. Pt discharged to self care with all personal belongings and in NAD.

## 2023-09-14 ENCOUNTER — OFFICE VISIT (OUTPATIENT)
Dept: RHEUMATOLOGY | Facility: MEDICAL CENTER | Age: 58
End: 2023-09-14
Attending: INTERNAL MEDICINE
Payer: COMMERCIAL

## 2023-09-14 VITALS
WEIGHT: 188 LBS | SYSTOLIC BLOOD PRESSURE: 122 MMHG | TEMPERATURE: 97.7 F | DIASTOLIC BLOOD PRESSURE: 70 MMHG | BODY MASS INDEX: 27.53 KG/M2 | OXYGEN SATURATION: 96 % | RESPIRATION RATE: 14 BRPM | HEART RATE: 79 BPM

## 2023-09-14 DIAGNOSIS — Z51.81 ENCOUNTER FOR MONITORING OF ETANERCEPT THERAPY: ICD-10-CM

## 2023-09-14 DIAGNOSIS — M05.79 RHEUMATOID ARTHRITIS INVOLVING MULTIPLE SITES WITH POSITIVE RHEUMATOID FACTOR (HCC): ICD-10-CM

## 2023-09-14 DIAGNOSIS — Z79.620 ENCOUNTER FOR MONITORING OF ETANERCEPT THERAPY: ICD-10-CM

## 2023-09-14 DIAGNOSIS — E83.119 HEMOCHROMATOSIS, UNSPECIFIED HEMOCHROMATOSIS TYPE: ICD-10-CM

## 2023-09-14 DIAGNOSIS — Z79.52 LONG TERM CURRENT USE OF SYSTEMIC STEROIDS: ICD-10-CM

## 2023-09-14 PROCEDURE — 3078F DIAST BP <80 MM HG: CPT | Performed by: INTERNAL MEDICINE

## 2023-09-14 PROCEDURE — 99212 OFFICE O/P EST SF 10 MIN: CPT | Performed by: INTERNAL MEDICINE

## 2023-09-14 PROCEDURE — 3074F SYST BP LT 130 MM HG: CPT | Performed by: INTERNAL MEDICINE

## 2023-09-14 PROCEDURE — 99214 OFFICE O/P EST MOD 30 MIN: CPT | Performed by: INTERNAL MEDICINE

## 2023-09-14 RX ORDER — MEDROXYPROGESTERONE ACETATE 150 MG/ML
INJECTION, SUSPENSION INTRAMUSCULAR
Qty: 12 EACH | Refills: 1 | Status: SHIPPED | OUTPATIENT
Start: 2023-09-14

## 2023-09-14 RX ORDER — PREDNISONE 10 MG/1
10 TABLET ORAL DAILY
COMMUNITY
End: 2023-09-14

## 2023-09-14 ASSESSMENT — FIBROSIS 4 INDEX: FIB4 SCORE: 1.29

## 2023-09-14 NOTE — PROGRESS NOTES
Chief Complaint- joint pain     Subjective:   Milton Aguila is a 57 y.o. male here today for follow up of rheumatological issues      This is a follow-up visit for this patient who we see in this clinic for serologically positive rheumatoid arthritis.  Patient is currently on Enbrel at 50 mg subcu every week, patient states he thinks he continues to do well with this particular medication.    Patient also is getting treated for hereditary hemochromatosis currently followed by Dr. Hernandez in hematology and getting phlebotomy every 1 to 2 weeks.  Patient is having some achiness in his hands and thinks it may be the arthritis associated with the hereditary hemochromatosis.     Additional comorbidities include  occasional back issues and sciatica for which he takes a muscle relaxer when necessary, also sees [facility] for tender point injections.     Soc Hx  Pos MJ use       RUQ Abdominal Ultrasound 2/3/2023  IMPRESSION:  1.  Liver cysts in the left lobe and right lobe.  2.  3.4 x 2.7 x 3.1 cm right upper pole renal cortical cyst. Doubtful clinical significance.  3.  Hepatomegaly  4.  Hepatic median shear wave velocity is 1.71 m/sec. Indeterminate for liver fibrosis.         S/p Rinvoq-lost efficacy with spasming in palmar aspect right hand and right wrist pain  S/p Humira  S/p Plaquenil  S/p MTX-contraindicated because of daily alcohol use  S/p leflunamide-contraindicated because of daily alcohol use  S/p sulfasalazine-elevated LFTs (with ETOH)  S/p xeljanz-patient did not want to try for fear of elevated cholesterol levels.     Bilateral HERVE     HEREDITARY HEMOCHROMOTOSIS  Order: [id number]        Component 8 d ago   C282Y Mutation Homozygous    H63D Mutation Negative    S65C Mutation Negative    Hemochrom Interp See Note    Comment: Indication for testing: Carrier screening or diagnostic testing   for hereditary hemochromatosis.     Hemochromatosis Interpretive Results:   Homozygous C282Y:    C282Y Homozygous - The patient is homozygous for the HFE C282Y   mutation and is at high risk for hereditary hemochromatosis.   Homozygosity for this mutation accounts for 80-90 percent of the   hemochromatosis patients of [place]. The   frequency of this mutation in other populations is lower.   H63D: Negative - This patient is negative for the HFE H63D   mutation.             Quantiferon Gold neg 11/2022  HCV neg 11/2022  HBsAg/HBcAb neg 11/2022  Uric acid 6.7 6/2016 [facility]; Uric acid 7.3 12/2019  RF 14.2 (0-13.9) 10/2007 [facility]; RF 64 3/2018; RF 36 3/2021  CCP 18 ( 0-5) 10/2007 [facility];  3/2018;  3/2021  MRI pelvis 7/2007-indicates advanced inflammatory arthropathy compatible's rheumatoid arthritis involving both hips with hypertrophic synovitis-[facility]  MRI LS spine 9/2009-indicates disc desiccation L3-4, L4-5, and L5-S1, left-sided neural foraminal narrowing at L5-S1, mild central stenosis at L4, and L3-4-[facility]     MRI Right hand/wrist 11/2009-negative, no erosions, no synovitis, no pathology-[facility]     Right Hand x-ray 8/2022-Narrative  X-rays of the right hand 4 view AP lateral oblique and carpal tunnel view   reveal some degenerative changes at the MCP joint of the middle finger.     The wrist joint radiocarpal joint appears to be reasonably   well-maintained.  No acute fracture dislocations noted.    Corticosteroid Therapy Informed Consent reviewed and signed 9/14/2023-copy given to patient     Current Outpatient Medications   Medication Sig Dispense Refill    Etanercept (ENBREL SURECLICK) 50 MG/ML Solution Auto-injector INJECT 1 PEN UNDER THE SKIN EVERY 7 DAYS. 12 Each 1    ALPRAZolam (XANAX) 1 MG Tab       TESTOSTERONE by Does not apply route.      MOUNJARO 15 MG/0.5ML Solution Pen-injector INJECT 15 MG UNDER THE SKIN ONE DAY A WEEK       No current facility-administered medications for  this visit.     He  has a past medical history of Arthritis, rheumatic, acute or subacute.    ROS   Other than what is mentioned in HPI or physical exam, there is no history of headaches, double vision or blurred vision.  No trouble swallowing difficulties .  No chest complaints including chest pain, cough or sputum production. No GI complaints including nausea, vomiting, change in bowel habits, or past peptic ulcer disease. No history of blood in the stools. No urinary complaints including dysuria or frequency. No history of alopecia, photosensitivity     Objective:     /70   Pulse 79   Temp 36.5 °C (97.7 °F) (Temporal)   Resp 14   Wt 85.3 kg (188 lb)   SpO2 96%  Body mass index is 27.53 kg/m².   Physical Exam:    Constitutional: Alert and oriented X3, patient is talkative with good eye contact.Skin: Warm, dry, good turgor, no rashes in visible areas.Eye: Equal, round and reactive, conjunctiva clear, lids normal EOM intactENMT: Lips without lesions,  pinna without deformityNeck: Trachea midline, no masses, no thyromegaly.Lymph:  No cervical lymphadenopathy, no axillary lymphadenopathy, no supraclavicular lymphadenopathyRespiratory: Unlabored respiratory effort, lungs clear to auscultation, no wheezes, no ronchi.Cardiovascular: Normal S1, S2, Regular rate and rhythm, no murmurs rubs or gallops  .Abdomen: Soft, non-distended.Psych: Alert and oriented x3, normal affect and mood.Neuro: Cranial nerves 2-12 are grossly intact Ext:no joint laxity noted in bilateral arms, no joint laxity noted in bilateral legs, patient can make a fist with both hands but feels his fingers are tight, elbows without flexion contractures no nodules no tophi, shoulders full range of motion without limitations, knees without synovitis, ankles not examined because patient did not take off his boots,    Lab Results   Component Value Date/Time    HEPBCORIGM Non-Reactive 02/10/2023 01:21 PM    HEPBSAG Non-Reactive 11/11/2022 08:21 AM      Lab Results   Component Value Date/Time    HEPCAB Non-Reactive 11/11/2022 08:21 AM     Lab Results   Component Value Date/Time    SODIUM 141 06/13/2023 11:26 AM    POTASSIUM 4.3 06/13/2023 11:26 AM    CHLORIDE 106 06/13/2023 11:26 AM    CO2 24 06/13/2023 11:26 AM    GLUCOSE 83 06/13/2023 11:26 AM    BUN 13 06/13/2023 11:26 AM    CREATININE 0.60 06/13/2023 11:26 AM    CREATININE 0.8 02/09/2008 08:50 AM      Lab Results   Component Value Date/Time    WBC 4.1 (L) 06/13/2023 11:26 AM    RBC 4.26 (L) 06/13/2023 11:26 AM    HEMOGLOBIN 14.1 09/04/2023 04:55 PM    HEMATOCRIT 38.7 (L) 09/04/2023 04:55 PM    MCV 98.1 (H) 06/13/2023 11:26 AM    MCH 34.0 (H) 06/13/2023 11:26 AM    MCHC 34.7 06/13/2023 11:26 AM    MPV 9.7 06/13/2023 11:26 AM    NEUTSPOLYS 50.60 06/13/2023 11:26 AM    LYMPHOCYTES 29.90 06/13/2023 11:26 AM    MONOCYTES 14.10 (H) 06/13/2023 11:26 AM    EOSINOPHILS 4.70 06/13/2023 11:26 AM    BASOPHILS 0.50 06/13/2023 11:26 AM      Lab Results   Component Value Date/Time    CALCIUM 9.1 06/13/2023 11:26 AM    ASTSGOT 40 06/13/2023 11:26 AM    ALTSGPT 68 (H) 06/13/2023 11:26 AM    ALKPHOSPHAT 63 06/13/2023 11:26 AM    TBILIRUBIN 0.6 06/13/2023 11:26 AM    ALBUMIN 4.2 06/13/2023 11:26 AM    TOTPROTEIN 6.4 06/13/2023 11:26 AM     Lab Results   Component Value Date/Time    URICACID 5.9 08/16/2021 10:53 AM    RHEUMFACTN 36 (H) 03/11/2021 07:35 AM    CCPANTIBODY 144 (H) 03/11/2021 07:35 AM     Lab Results   Component Value Date/Time    SEDRATEWES 6 06/13/2023 11:26 AM     Lab Results   Component Value Date/Time    HBA1C 5.2 04/16/2022 09:24 AM     Lab Results   Component Value Date/Time    ANTIMITOCHO 4.9 06/28/2019 11:03 AM     Assessment and Plan:     1. Rheumatoid arthritis involving multiple sites with positive rheumatoid factor (HCC)  Continue Enbrel 50 mg subcu every week,  - Etanercept (ENBREL SURECLICK) 50 MG/ML Solution Auto-injector; INJECT 1 PEN UNDER THE SKIN EVERY 7 DAYS.  Dispense: 12 Each; Refill: 1  -  CBC WITH DIFFERENTIAL; Future  - Comp Metabolic Panel; Future  - Sed Rate; Future    2. Encounter for monitoring of etanercept therapy  Currently on Enbrel 50 mg subcu every week, screening labs are up-to-date, next screening labs due November 2024, patient needs monitoring labs every 6 months, next labs due about December 2023, labs ordered for patient  We reviewed risks of biological medications with patient including hematological pathology, cancer risks, neurological and infection issues especially in the Covid-19 pandemic environment, patient states understanding.  - Etanercept (ENBREL SURECLICK) 50 MG/ML Solution Auto-injector; INJECT 1 PEN UNDER THE SKIN EVERY 7 DAYS.  Dispense: 12 Each; Refill: 1    3. Long term current use of systemic steroids  Patient uses Medrol intermittently, we reviewed the risks of intermittent chronic steroid use, patient states understanding  Corticosteroid Therapy Informed Consent reviewed and signed 9/14/2023-copy given to patient   - Etanercept (ENBREL SURECLICK) 50 MG/ML Solution Auto-injector; INJECT 1 PEN UNDER THE SKIN EVERY 7 DAYS.  Dispense: 12 Each; Refill: 1  - CBC WITH DIFFERENTIAL; Future  - Comp Metabolic Panel; Future  - Sed Rate; Future    4. Hemochromatosis, unspecified hemochromatosis type  Patient gets phlebotomy every 1 to 2 weeks currently followed by hematology/oncology  - CBC WITH DIFFERENTIAL; Future  - Comp Metabolic Panel; Future  - Sed Rate; Future    Followup: Return in about 7 months (around 4/14/2024). or sooner myla Aguila  was seen 30 minutes face-to-face of which more than 50% of the time was spent counseling the patient (excluding time for procedures)  regarding  rheumatological condition and care. Therapy was discussed in detail.      Please note that this dictation was created using voice recognition software. I have made every reasonable attempt to correct obvious errors, but I expect that there are errors of grammar and possibly  content that I did not discover before finalizing the note.

## 2023-09-15 NOTE — PROGRESS NOTES
"09/20/23    Subjective    Chief Complaint:  Follow up hereditary hemochromatosis    HPI:  57 male with C282Y homozygous hemochromatosis on q.o.w therapeutic phlebotomies. Ferritin was > 4200 in March of this year. Has arthritic pain in hands.     ROS:    Constitutional: No weight loss  Skin: No rash or jaundice  HENT: No change in eyesight or hearing  Cardiovascular:No chest pain or arrythmia  Respiratory:No cough or SOB  GI:No nausea, vomiting, diarrhea, constipation  :No dysuria or frequency  Musculoskeletal:No bone or joint pain  Neuro:No sx's of neuropathy  Psych: No complaints    PMH:      No Known Allergies    Past Medical History:   Diagnosis Date    Arthritis, rheumatic, acute or subacute         Past Surgical History:   Procedure Laterality Date    LUMBAR LAMINECTOMY DISKECTOMY  2/7/2013    Performed by Mackenzie Cameron M.D. at [facility] ORS    APPENDECTOMY  2000    INGUINAL HERNIA REPAIR      Hernia Repair, Inguinal    OTHER ORTHOPEDIC SURGERY      ivy hip replacements        Medications:    Current Outpatient Medications on File Prior to Encounter   Medication Sig Dispense Refill    Etanercept (ENBREL SURECLICK) 50 MG/ML Solution Auto-injector INJECT 1 PEN UNDER THE SKIN EVERY 7 DAYS. 12 Each 1    ALPRAZolam (XANAX) 1 MG Tab       TESTOSTERONE by Does not apply route.      MOUNJARO 15 MG/0.5ML Solution Pen-injector INJECT 15 MG UNDER THE SKIN ONE DAY A WEEK       No current facility-administered medications on file prior to encounter.       Social History     Tobacco Use    Smoking status: Never    Smokeless tobacco: Never   Substance Use Topics    Alcohol use: Yes        History reviewed. No pertinent family history.     Objective    Vitals:    /86 (BP Location: Left arm, Patient Position: Sitting, BP Cuff Size: Adult)   Pulse (!) 102   Temp 36.6 °C (97.9 °F) (Temporal)   Resp 17   Ht 1.753 m (5' 9\")   Wt 83.2 kg (183 lb 6.8 oz)   BMI 27.09 kg/m²     Physical Exam:    Appears " well-developed and well-nourished. No distress.    Head -  Normocephalic .   Eyes - Pupils are equal. Conjunctivae normal. No scleral icterus.   Ears - normal hearing  Neurological -   Alert and oriented.  Skin - Skin is warm and dry. No rash noted. Not diaphoretic. No erythema. No pallor. No jaundice   Psychiatric -  Normal mood and affect.    Labs:     Latest Reference Range & Units 07/23/23 16:40 08/06/23 17:04 08/20/23 15:23 09/04/23 16:55 09/17/23 16:20   Ferritin 22.0 - 322.0 ng/mL 2589.0 (H) 2307.0 (H) 2096.0 (H) 2299.0 (H) 1968.0 (H)      Latest Reference Range & Units 08/06/23 17:04 08/20/23 15:23 09/04/23 16:55 09/17/23 16:20 09/17/23 17:00   Hemoglobin 14.0 - 18.0 g/dL 14.3 14.7 14.1 12.9 (L) 13.0 (L)   Hematocrit 42.0 - 52.0 % 40.6 (L) 40.6 (L) 38.7 (L) 35.8 (L) 35.6 (L)       Assessment    Imp:    Visit Diagnosis:    1. Hereditary hemochromatosis (HCC)          Plan:  Took Hct out of the plan  He will go back on his testosterone  Continue q.o.w for now  8 weeks f/u       Haris Hernandez M.D.

## 2023-09-17 ENCOUNTER — OUTPATIENT INFUSION SERVICES (OUTPATIENT)
Dept: ONCOLOGY | Facility: MEDICAL CENTER | Age: 58
End: 2023-09-17
Attending: INTERNAL MEDICINE
Payer: COMMERCIAL

## 2023-09-17 VITALS
SYSTOLIC BLOOD PRESSURE: 124 MMHG | WEIGHT: 187.61 LBS | TEMPERATURE: 98.8 F | DIASTOLIC BLOOD PRESSURE: 78 MMHG | HEART RATE: 92 BPM | BODY MASS INDEX: 27.79 KG/M2 | HEIGHT: 69 IN | OXYGEN SATURATION: 96 % | RESPIRATION RATE: 16 BRPM

## 2023-09-17 DIAGNOSIS — E83.110 HEREDITARY HEMOCHROMATOSIS (HCC): ICD-10-CM

## 2023-09-17 LAB
FERRITIN SERPL-MCNC: 1968 NG/ML (ref 22–322)
HCT VFR BLD AUTO: 35.6 % (ref 42–52)
HCT VFR BLD AUTO: 35.8 % (ref 42–52)
HGB BLD-MCNC: 12.9 G/DL (ref 14–18)
HGB BLD-MCNC: 13 G/DL (ref 14–18)

## 2023-09-17 PROCEDURE — 36415 COLL VENOUS BLD VENIPUNCTURE: CPT

## 2023-09-17 PROCEDURE — 85014 HEMATOCRIT: CPT

## 2023-09-17 PROCEDURE — 85018 HEMOGLOBIN: CPT

## 2023-09-17 PROCEDURE — 82728 ASSAY OF FERRITIN: CPT

## 2023-09-17 RX ORDER — SODIUM CHLORIDE 9 MG/ML
500 INJECTION, SOLUTION INTRAVENOUS ONCE
Status: CANCELLED | OUTPATIENT
Start: 2023-09-17 | End: 2023-09-17

## 2023-09-17 ASSESSMENT — FIBROSIS 4 INDEX: FIB4 SCORE: 1.29

## 2023-09-18 NOTE — PROGRESS NOTES
Tyler came into infusion services today for therapeutic phlebotomy. No complaints. PIV started in left forearm, +blood return, labs drawn, flushed well. Hgb 12.9, Hct 35.8. Informed pt and pt was unhappy with the results. Pt requested a redrawn from other arm since he read an article that each arm can hold different results from blood draw. Informed pt that this is Dr. Hernandez's parameters and to talk to MD about parameters. Pt still unhappy and adamant that I draw a H&H from the opposite arm. Informed pt that he will be charged for another blood draw. Pt understood. Labs drawn from right forearm, covered with gauze and coban. Hgb 13, Hct 35.6. Pt understood that he will not be getting the phlebotomy since he does not meet parameters. Pt has appointment with Dr. Hernandez on Wednesday 920/23 and is going to discuss the matter with provider. PIV removed, covered with gauze and coban. Pt has future appointments. Discharged to self care.

## 2023-09-20 ENCOUNTER — HOSPITAL ENCOUNTER (OUTPATIENT)
Dept: HEMATOLOGY ONCOLOGY | Facility: MEDICAL CENTER | Age: 58
End: 2023-09-20
Attending: INTERNAL MEDICINE
Payer: COMMERCIAL

## 2023-09-20 VITALS
WEIGHT: 183.42 LBS | BODY MASS INDEX: 27.17 KG/M2 | HEART RATE: 102 BPM | HEIGHT: 69 IN | TEMPERATURE: 97.9 F | DIASTOLIC BLOOD PRESSURE: 86 MMHG | RESPIRATION RATE: 17 BRPM | SYSTOLIC BLOOD PRESSURE: 106 MMHG

## 2023-09-20 DIAGNOSIS — E83.110 HEREDITARY HEMOCHROMATOSIS (HCC): ICD-10-CM

## 2023-09-20 PROCEDURE — 99212 OFFICE O/P EST SF 10 MIN: CPT | Performed by: INTERNAL MEDICINE

## 2023-09-20 PROCEDURE — 99213 OFFICE O/P EST LOW 20 MIN: CPT | Performed by: INTERNAL MEDICINE

## 2023-09-20 ASSESSMENT — PAIN SCALES - GENERAL: PAINLEVEL: 5=MODERATE PAIN

## 2023-09-20 ASSESSMENT — FIBROSIS 4 INDEX: FIB4 SCORE: 1.29

## 2023-09-24 ENCOUNTER — OUTPATIENT INFUSION SERVICES (OUTPATIENT)
Dept: ONCOLOGY | Facility: MEDICAL CENTER | Age: 58
End: 2023-09-24
Attending: INTERNAL MEDICINE
Payer: COMMERCIAL

## 2023-09-24 VITALS
OXYGEN SATURATION: 94 % | HEART RATE: 96 BPM | DIASTOLIC BLOOD PRESSURE: 88 MMHG | TEMPERATURE: 97 F | HEIGHT: 69 IN | WEIGHT: 181.88 LBS | SYSTOLIC BLOOD PRESSURE: 118 MMHG | RESPIRATION RATE: 18 BRPM | BODY MASS INDEX: 26.94 KG/M2

## 2023-09-24 DIAGNOSIS — E83.110 HEREDITARY HEMOCHROMATOSIS (HCC): ICD-10-CM

## 2023-09-24 LAB
FERRITIN SERPL-MCNC: 2452 NG/ML (ref 22–322)
HCT VFR BLD AUTO: 42.8 % (ref 42–52)
HGB BLD-MCNC: 15.4 G/DL (ref 14–18)

## 2023-09-24 PROCEDURE — 82728 ASSAY OF FERRITIN: CPT

## 2023-09-24 PROCEDURE — 85014 HEMATOCRIT: CPT

## 2023-09-24 PROCEDURE — 99195 PHLEBOTOMY: CPT

## 2023-09-24 PROCEDURE — 85018 HEMOGLOBIN: CPT

## 2023-09-24 RX ORDER — SODIUM CHLORIDE 9 MG/ML
500 INJECTION, SOLUTION INTRAVENOUS ONCE
Status: CANCELLED | OUTPATIENT
Start: 2023-09-24 | End: 2023-09-24

## 2023-09-24 ASSESSMENT — FIBROSIS 4 INDEX: FIB4 SCORE: 1.29

## 2023-09-24 NOTE — PROGRESS NOTES
Mr. Aguila arrived to the infusion center for therapeutic phlebotomy. He has no new complaints today. PIV established to the Left FA with no difficulty and labs drawn. Hemoglobin was 15.4 and met parameter for treatment today. 500 cc of whole blood removed with no issues. No dizziness or signs of hypotension after 15 minutes after TP and VS remained stable. PIV flushed and removed with tip intact. Left infusion center with no apparent distress and confirmed next appointment.

## 2023-09-25 NOTE — ADDENDUM NOTE
Encounter addended by: Tamar Bustamante, Med Ass't on: 9/25/2023 11:54 AM   Actions taken: Charge Capture section accepted

## 2023-10-08 ENCOUNTER — OUTPATIENT INFUSION SERVICES (OUTPATIENT)
Dept: ONCOLOGY | Facility: MEDICAL CENTER | Age: 58
End: 2023-10-08
Attending: INTERNAL MEDICINE
Payer: COMMERCIAL

## 2023-10-08 VITALS — HEART RATE: 95 BPM | DIASTOLIC BLOOD PRESSURE: 85 MMHG | SYSTOLIC BLOOD PRESSURE: 128 MMHG

## 2023-10-08 DIAGNOSIS — E83.110 HEREDITARY HEMOCHROMATOSIS (HCC): ICD-10-CM

## 2023-10-08 LAB
FERRITIN SERPL-MCNC: 1365 NG/ML (ref 22–322)
HCT VFR BLD AUTO: 40.9 % (ref 42–52)
HGB BLD-MCNC: 14.6 G/DL (ref 14–18)

## 2023-10-08 PROCEDURE — 82728 ASSAY OF FERRITIN: CPT

## 2023-10-08 PROCEDURE — 85018 HEMOGLOBIN: CPT

## 2023-10-08 PROCEDURE — 85014 HEMATOCRIT: CPT

## 2023-10-08 PROCEDURE — 99195 PHLEBOTOMY: CPT

## 2023-10-08 RX ORDER — SODIUM CHLORIDE 9 MG/ML
500 INJECTION, SOLUTION INTRAVENOUS ONCE
Status: CANCELLED | OUTPATIENT
Start: 2023-10-08 | End: 2023-10-08

## 2023-10-08 NOTE — PROGRESS NOTES
Mr Aguila is here today for therapeutic phlebotomy.    He has no new concerns to report.    20 g  IV placed to his left AC. Labs drawn.   HGB 14.6, 500 ml of whole blood removed and was tolerated well. Mr Aguila denies any light headedness or dizziness.    Report was given to Radha Barros RN.

## 2023-10-09 NOTE — PROGRESS NOTES
Milton arrived ambulatory to the infusion center for therapeutic phlebotomy. He has no new complaints today. Hemoglobin was 14.6 and met parameters for treatment today. 500 cc of whole blood removed with no issues. No dizziness or signs of hypotension 15 minutes after TP and vitals remained stable. PIV flushed and removed with tip intact. He was then discharged home in stable condition.

## 2023-10-09 NOTE — PROGRESS NOTES
Milton arrived ambulatory to the infusion center for therapeutic phlebotomy. No dizziness or signs of hypotension 15 minutes after TP and vitals remained stable. PIV flushed and removed with tip intact. He was then discharged home in stable condition.

## 2023-10-22 ENCOUNTER — OUTPATIENT INFUSION SERVICES (OUTPATIENT)
Dept: ONCOLOGY | Facility: MEDICAL CENTER | Age: 58
End: 2023-10-22
Attending: INTERNAL MEDICINE
Payer: COMMERCIAL

## 2023-10-22 VITALS
OXYGEN SATURATION: 96 % | HEART RATE: 95 BPM | TEMPERATURE: 97.9 F | DIASTOLIC BLOOD PRESSURE: 87 MMHG | SYSTOLIC BLOOD PRESSURE: 125 MMHG | HEIGHT: 69 IN | BODY MASS INDEX: 28.08 KG/M2 | RESPIRATION RATE: 18 BRPM | WEIGHT: 189.6 LBS

## 2023-10-22 DIAGNOSIS — E83.110 HEREDITARY HEMOCHROMATOSIS (HCC): ICD-10-CM

## 2023-10-22 LAB
FERRITIN SERPL-MCNC: 2404 NG/ML (ref 22–322)
HCT VFR BLD AUTO: 40.5 % (ref 42–52)
HGB BLD-MCNC: 14.8 G/DL (ref 14–18)

## 2023-10-22 PROCEDURE — 99195 PHLEBOTOMY: CPT

## 2023-10-22 PROCEDURE — 85018 HEMOGLOBIN: CPT

## 2023-10-22 PROCEDURE — 85014 HEMATOCRIT: CPT

## 2023-10-22 PROCEDURE — 82728 ASSAY OF FERRITIN: CPT

## 2023-10-22 RX ORDER — SODIUM CHLORIDE 9 MG/ML
500 INJECTION, SOLUTION INTRAVENOUS ONCE
Status: CANCELLED | OUTPATIENT
Start: 2023-10-22 | End: 2023-10-22

## 2023-10-22 RX ORDER — SODIUM CHLORIDE 9 MG/ML
500 INJECTION, SOLUTION INTRAVENOUS ONCE
Status: DISCONTINUED | OUTPATIENT
Start: 2023-10-22 | End: 2023-10-22 | Stop reason: HOSPADM

## 2023-10-22 ASSESSMENT — FIBROSIS 4 INDEX: FIB4 SCORE: 1.31

## 2023-10-22 NOTE — PROGRESS NOTES
Pt presented to Infusion for Q2 week TP. POC discussed and pt verbalized understanding. PIV placed without difficulty and labs drawn. Hgb 14.8. 500 ml of whole blood removed slowly per protocol. Pt monitored for ten mins and orthostatic VS taken. Pt tolerated well. Denies s/s of syncope, chest pain, dizziness and lightheadedness. Pt confirms he feels well enough to drive home. PIV flushed, removed with tip intact and site covered with sterile gauze/coban. Pt confirmed next appt and discharged ambulatory to self care in [facility].

## 2023-11-05 ENCOUNTER — OUTPATIENT INFUSION SERVICES (OUTPATIENT)
Dept: ONCOLOGY | Facility: MEDICAL CENTER | Age: 58
End: 2023-11-05
Attending: INTERNAL MEDICINE
Payer: COMMERCIAL

## 2023-11-05 VITALS
BODY MASS INDEX: 28.24 KG/M2 | WEIGHT: 190.7 LBS | RESPIRATION RATE: 18 BRPM | SYSTOLIC BLOOD PRESSURE: 115 MMHG | TEMPERATURE: 99 F | OXYGEN SATURATION: 95 % | HEART RATE: 86 BPM | HEIGHT: 69 IN | DIASTOLIC BLOOD PRESSURE: 76 MMHG

## 2023-11-05 DIAGNOSIS — E83.110 HEREDITARY HEMOCHROMATOSIS (HCC): ICD-10-CM

## 2023-11-05 LAB
FERRITIN SERPL-MCNC: 2409 NG/ML (ref 22–322)
HCT VFR BLD AUTO: 37.6 % (ref 42–52)
HGB BLD-MCNC: 13.8 G/DL (ref 14–18)

## 2023-11-05 PROCEDURE — 82728 ASSAY OF FERRITIN: CPT

## 2023-11-05 PROCEDURE — 85018 HEMOGLOBIN: CPT

## 2023-11-05 PROCEDURE — 99195 PHLEBOTOMY: CPT

## 2023-11-05 PROCEDURE — 85014 HEMATOCRIT: CPT

## 2023-11-05 RX ORDER — SODIUM CHLORIDE 9 MG/ML
500 INJECTION, SOLUTION INTRAVENOUS ONCE
Status: CANCELLED | OUTPATIENT
Start: 2023-11-05 | End: 2023-11-05

## 2023-11-05 ASSESSMENT — FIBROSIS 4 INDEX: FIB4 SCORE: 1.31

## 2023-11-06 NOTE — PROGRESS NOTES
Tyler to infusion for Q2week therapeutic phlebotomy. Reports feeling well today with no major complaints. PIV started to L AC and labs drawn. Labs reviewed by RN, Hgb: 13.8, within parameters for TP today. 500ml blood removed over 10 minutes and patient observed 10 minutes post phlebotomy, tolerated well with no adverse effects, orthostatic VSS. PIV d/rhoda with tip intact. Patient left in stable condition, knows when to return for next appt.   
0 = independent

## 2023-11-14 NOTE — PROGRESS NOTES
Date of service 12/05/23 - rescheduled from 11/21/23    Subjective    Chief Complaint:  Follow up hereditary hemochromatosis    HPI:  58 male C282Y homozygous hereditary hemochromatosis on every other week therapeutic phlebotomies. Ken has RA on Enbrel. 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     ROS:    Constitutional: No weight loss  Skin: No rash or jaundice  HENT: No change in eyesight or hearing  Cardiovascular:No chest pain or arrythmia  Respiratory:No cough or SOB  GI:No nausea, vomiting, diarrhea, constipation  :No dysuria or frequency  Musculoskeletal:No bone or joint pain  Neuro:No sx's of neuropathy  Psych: No complaints    PMH:      No Known Allergies    Past Medical History:   Diagnosis Date    Arthritis, rheumatic, acute or subacute         Past Surgical History:   Procedure Laterality Date    LUMBAR LAMINECTOMY DISKECTOMY  2/7/2013    Performed by Mackenzie Cameron M.D. at [facility]    APPENDECTOMY  2000    INGUINAL HERNIA REPAIR      Hernia Repair, Inguinal    OTHER ORTHOPEDIC SURGERY      ivy hip replacements        Medications:    Current Outpatient Medications on File Prior to Visit   Medication Sig Dispense Refill    Etanercept (ENBREL SURECLICK) 50 MG/ML Solution Auto-injector INJECT 1 PEN UNDER THE SKIN EVERY 7 DAYS. 12 Each 1    ALPRAZolam (XANAX) 1 MG Tab       MOUNJARO 15 MG/0.5ML Solution Pen-injector INJECT 15 MG UNDER THE SKIN ONE DAY A WEEK      TESTOSTERONE by Does not apply route.       No current facility-administered medications on file prior to visit.       Social History     Tobacco Use    Smoking status: Never    Smokeless tobacco: Never   Substance Use Topics    Alcohol use: Yes        No family history on file.     Objective    Vitals:    There were no vitals taken for this visit.    Physical Exam:    Appears well-developed and well-nourished. No distress.    Head -  Normocephalic .   Eyes - Pupils are equal. Conjunctivae normal. No scleral  icterus.   Ears - normal hearing  Neurological -   Alert and oriented.  Skin - Skin is warm and dry. No rash noted. Not diaphoretic. No erythema. No pallor. No jaundice   Psychiatric -  Normal mood and affect.    Labs:       Latest Reference Range & Units 10/22/23 15:20 11/05/23 16:08 11/19/23 16:10 12/03/23 16:24   Ferritin 22.0 - 322.0 ng/mL 2404.0 (H) 2409.0 (H) 2707.0 (H) 2640.0 (H)      Latest Reference Range & Units 10/22/23 15:20 11/05/23 16:08 11/19/23 16:10 12/03/23 16:24   Hemoglobin 14.0 - 18.0 g/dL 14.8 13.8 (L) 15.2 14.9   Hematocrit 42.0 - 52.0 % 40.5 (L) 37.6 (L) 43.6 40.6 (L)       Assessment    Imp:    Visit Diagnosis:    1. Hereditary hemochromatosis (HCC)        2. Rheumatoid arthritis involving multiple sites with positive rheumatoid factor (HCC)          Plan:  Wants to increase phlebotomies to weekly   See me in 3 months    Haris Hernandez M.D.

## 2023-11-19 ENCOUNTER — OUTPATIENT INFUSION SERVICES (OUTPATIENT)
Dept: ONCOLOGY | Facility: MEDICAL CENTER | Age: 58
End: 2023-11-19
Attending: INTERNAL MEDICINE
Payer: COMMERCIAL

## 2023-11-19 VITALS
RESPIRATION RATE: 18 BRPM | HEART RATE: 101 BPM | DIASTOLIC BLOOD PRESSURE: 58 MMHG | SYSTOLIC BLOOD PRESSURE: 94 MMHG | BODY MASS INDEX: 28.73 KG/M2 | TEMPERATURE: 97.6 F | HEIGHT: 69 IN | WEIGHT: 194 LBS | OXYGEN SATURATION: 96 %

## 2023-11-19 DIAGNOSIS — E83.110 HEREDITARY HEMOCHROMATOSIS (HCC): ICD-10-CM

## 2023-11-19 LAB
FERRITIN SERPL-MCNC: 2707 NG/ML (ref 22–322)
HCT VFR BLD AUTO: 43.6 % (ref 42–52)
HGB BLD-MCNC: 15.2 G/DL (ref 14–18)

## 2023-11-19 PROCEDURE — 82728 ASSAY OF FERRITIN: CPT

## 2023-11-19 PROCEDURE — 85018 HEMOGLOBIN: CPT

## 2023-11-19 PROCEDURE — 85014 HEMATOCRIT: CPT

## 2023-11-19 PROCEDURE — 700105 HCHG RX REV CODE 258: Performed by: INTERNAL MEDICINE

## 2023-11-19 PROCEDURE — 99195 PHLEBOTOMY: CPT

## 2023-11-19 RX ORDER — SODIUM CHLORIDE 9 MG/ML
500 INJECTION, SOLUTION INTRAVENOUS ONCE
Status: CANCELLED | OUTPATIENT
Start: 2023-11-19 | End: 2023-11-19

## 2023-11-19 RX ORDER — SODIUM CHLORIDE 9 MG/ML
500 INJECTION, SOLUTION INTRAVENOUS ONCE
Status: COMPLETED | OUTPATIENT
Start: 2023-11-19 | End: 2023-11-19

## 2023-11-19 RX ADMIN — SODIUM CHLORIDE 500 ML: 9 INJECTION, SOLUTION INTRAVENOUS at 17:43

## 2023-11-19 ASSESSMENT — FIBROSIS 4 INDEX: FIB4 SCORE: 1.31

## 2023-11-20 RX ORDER — SODIUM CHLORIDE 9 MG/ML
500 INJECTION, SOLUTION INTRAVENOUS ONCE
Status: CANCELLED
Start: 2023-11-20 | End: 2023-11-20

## 2023-11-20 NOTE — PROGRESS NOTES
"Milton arrived to the Infusion Center for labs and possible TP ambulating with a steady gait. Pt denies changes to health, medication or allergies. Pt denies pain. Pt reports tolerating past phlebotomy's well, eating a good meal this afternoon. POC reviewed. IV started in L AC x 1 attempt, brisk blood return noted/labs drawn off line, Milton tolerated well. Lab results reviewed, Hgb  15.2, within parameters for TP. 500 ml whole blood phlebotomized over 10 minutes, Milton tolerated well. 10 minutes post TP VS checked, BP standing outside of parameters, Pt c/o dizziness. Pt visiting with a friend, resting for additional time, declines hydration at this time, all needs met. VS rechecked, Pt c/o dizziness, hypotension noted/ hydration infused per MD order, Pt tolerated well. Orthostatic VS remain low when standing. Pt denies dizziness/lightheadedness/ SOB or Cp. States \"feeling good, not perfect,\" reports that his girl friend is driving. Pt advised to seek medical attention if he becomes symptomatic. IV removed, tip intact/sterile gauze and coban applied. Confirmed next appointment and Milton was discharged home with his girlfriend in no acute distress.   "

## 2023-12-03 ENCOUNTER — OUTPATIENT INFUSION SERVICES (OUTPATIENT)
Dept: ONCOLOGY | Facility: MEDICAL CENTER | Age: 58
End: 2023-12-03
Attending: INTERNAL MEDICINE
Payer: COMMERCIAL

## 2023-12-03 VITALS
SYSTOLIC BLOOD PRESSURE: 120 MMHG | OXYGEN SATURATION: 98 % | BODY MASS INDEX: 28.08 KG/M2 | WEIGHT: 189.6 LBS | RESPIRATION RATE: 18 BRPM | HEART RATE: 105 BPM | HEIGHT: 69 IN | TEMPERATURE: 97 F | DIASTOLIC BLOOD PRESSURE: 79 MMHG

## 2023-12-03 DIAGNOSIS — E83.110 HEREDITARY HEMOCHROMATOSIS (HCC): ICD-10-CM

## 2023-12-03 LAB
FERRITIN SERPL-MCNC: 2640 NG/ML (ref 22–322)
HCT VFR BLD AUTO: 40.6 % (ref 42–52)
HGB BLD-MCNC: 14.9 G/DL (ref 14–18)

## 2023-12-03 PROCEDURE — 82728 ASSAY OF FERRITIN: CPT

## 2023-12-03 PROCEDURE — 85018 HEMOGLOBIN: CPT

## 2023-12-03 PROCEDURE — 700105 HCHG RX REV CODE 258: Performed by: INTERNAL MEDICINE

## 2023-12-03 PROCEDURE — 99195 PHLEBOTOMY: CPT

## 2023-12-03 PROCEDURE — 85014 HEMATOCRIT: CPT

## 2023-12-03 RX ORDER — SODIUM CHLORIDE 9 MG/ML
500 INJECTION, SOLUTION INTRAVENOUS ONCE
Status: CANCELLED
Start: 2023-12-03 | End: 2023-12-03

## 2023-12-03 RX ORDER — SODIUM CHLORIDE 9 MG/ML
500 INJECTION, SOLUTION INTRAVENOUS ONCE
Status: COMPLETED | OUTPATIENT
Start: 2023-12-03 | End: 2023-12-03

## 2023-12-03 RX ORDER — SODIUM CHLORIDE 9 MG/ML
500 INJECTION, SOLUTION INTRAVENOUS ONCE
Status: CANCELLED | OUTPATIENT
Start: 2023-12-03 | End: 2023-12-03

## 2023-12-03 RX ADMIN — SODIUM CHLORIDE 500 ML: 9 INJECTION, SOLUTION INTRAVENOUS at 16:15

## 2023-12-03 ASSESSMENT — FIBROSIS 4 INDEX: FIB4 SCORE: 1.31

## 2023-12-04 NOTE — PROGRESS NOTES
Tyler to infusion for Q2week therapeutic phlebotomy. Reports feeling well today with no major complaints. PIV started in L AC and labs drawn. Pre-hydration administered while waiting for lab results. Labs reviewed by RN, Hgb: 14.9, within parameters for TP today. 500ml blood removed over 18 minutes and patient observed 15 minutes post phlebotomy, tolerated well with no adverse effects, orthostatic VSS. PIV d/rhoda with tip intact. Patient left in stable condition, knows when to return for next appt.

## 2023-12-05 ENCOUNTER — HOSPITAL ENCOUNTER (OUTPATIENT)
Dept: HEMATOLOGY ONCOLOGY | Facility: MEDICAL CENTER | Age: 58
End: 2023-12-05
Attending: INTERNAL MEDICINE
Payer: COMMERCIAL

## 2023-12-05 VITALS
BODY MASS INDEX: 28.73 KG/M2 | SYSTOLIC BLOOD PRESSURE: 134 MMHG | HEART RATE: 94 BPM | WEIGHT: 194 LBS | TEMPERATURE: 98.8 F | RESPIRATION RATE: 16 BRPM | OXYGEN SATURATION: 97 % | HEIGHT: 69 IN | DIASTOLIC BLOOD PRESSURE: 84 MMHG

## 2023-12-05 DIAGNOSIS — E83.110 HEREDITARY HEMOCHROMATOSIS (HCC): ICD-10-CM

## 2023-12-05 DIAGNOSIS — M05.79 RHEUMATOID ARTHRITIS INVOLVING MULTIPLE SITES WITH POSITIVE RHEUMATOID FACTOR (HCC): ICD-10-CM

## 2023-12-05 PROCEDURE — 99213 OFFICE O/P EST LOW 20 MIN: CPT | Performed by: INTERNAL MEDICINE

## 2023-12-05 PROCEDURE — 99212 OFFICE O/P EST SF 10 MIN: CPT | Performed by: INTERNAL MEDICINE

## 2023-12-05 PROCEDURE — 99212 OFFICE O/P EST SF 10 MIN: CPT

## 2023-12-05 ASSESSMENT — PAIN SCALES - GENERAL: PAINLEVEL: NO PAIN

## 2023-12-05 ASSESSMENT — FIBROSIS 4 INDEX: FIB4 SCORE: 1.31

## 2023-12-05 NOTE — ADDENDUM NOTE
Encounter addended by: Haris Hernandez M.D. on: 12/5/2023 2:32 PM   Actions taken: Clinical Note Signed

## 2023-12-17 ENCOUNTER — OUTPATIENT INFUSION SERVICES (OUTPATIENT)
Dept: ONCOLOGY | Facility: MEDICAL CENTER | Age: 58
End: 2023-12-17
Attending: INTERNAL MEDICINE
Payer: COMMERCIAL

## 2023-12-17 VITALS
WEIGHT: 190.26 LBS | TEMPERATURE: 97.9 F | DIASTOLIC BLOOD PRESSURE: 77 MMHG | SYSTOLIC BLOOD PRESSURE: 120 MMHG | HEART RATE: 97 BPM | RESPIRATION RATE: 18 BRPM | HEIGHT: 69 IN | OXYGEN SATURATION: 96 % | BODY MASS INDEX: 28.18 KG/M2

## 2023-12-17 DIAGNOSIS — E83.110 HEREDITARY HEMOCHROMATOSIS (HCC): ICD-10-CM

## 2023-12-17 LAB
FERRITIN SERPL-MCNC: 2234 NG/ML (ref 22–322)
HCT VFR BLD AUTO: 39.2 % (ref 42–52)
HGB BLD-MCNC: 14.3 G/DL (ref 14–18)

## 2023-12-17 PROCEDURE — 82728 ASSAY OF FERRITIN: CPT

## 2023-12-17 PROCEDURE — 85018 HEMOGLOBIN: CPT

## 2023-12-17 PROCEDURE — 700105 HCHG RX REV CODE 258: Performed by: INTERNAL MEDICINE

## 2023-12-17 PROCEDURE — 85014 HEMATOCRIT: CPT

## 2023-12-17 PROCEDURE — 99195 PHLEBOTOMY: CPT

## 2023-12-17 RX ORDER — SODIUM CHLORIDE 9 MG/ML
500 INJECTION, SOLUTION INTRAVENOUS ONCE
Status: COMPLETED | OUTPATIENT
Start: 2023-12-17 | End: 2023-12-17

## 2023-12-17 RX ORDER — SODIUM CHLORIDE 9 MG/ML
500 INJECTION, SOLUTION INTRAVENOUS ONCE
Status: CANCELLED | OUTPATIENT
Start: 2023-12-17 | End: 2023-12-17

## 2023-12-17 RX ORDER — SODIUM CHLORIDE 9 MG/ML
500 INJECTION, SOLUTION INTRAVENOUS ONCE
Status: CANCELLED
Start: 2023-12-17 | End: 2023-12-17

## 2023-12-17 RX ADMIN — SODIUM CHLORIDE 500 ML: 9 INJECTION, SOLUTION INTRAVENOUS at 16:15

## 2023-12-17 ASSESSMENT — FIBROSIS 4 INDEX: FIB4 SCORE: 1.31

## 2023-12-18 NOTE — PROGRESS NOTES
Pt presented to Infusion for Q2 week TP. POC discussed and pt verbalized understanding. PIV placed without difficulty and labs drawn. NS bolus administered per provider orders. Hgb 14.3. 500 ml of whole blood removed slowly per protocol. Pt monitored for ten mins and orthostatic VS taken and pt remained seated an additional ten mins until orthostatic VS returned within 20 mmHg of baseline. Pt tolerated well. Denies s/s of syncope, chest pain, dizziness and lightheadedness. Pt confirms he feels well enough to drive home. PIV flushed, removed with tip intact and site covered with sterile gauze/coban. Pt confirmed next appt and discharged ambulatory to self care in [facility].

## 2023-12-23 ENCOUNTER — OUTPATIENT INFUSION SERVICES (OUTPATIENT)
Dept: ONCOLOGY | Facility: MEDICAL CENTER | Age: 58
End: 2023-12-23
Attending: INTERNAL MEDICINE
Payer: COMMERCIAL

## 2023-12-23 VITALS
OXYGEN SATURATION: 98 % | SYSTOLIC BLOOD PRESSURE: 151 MMHG | HEART RATE: 97 BPM | BODY MASS INDEX: 29.09 KG/M2 | RESPIRATION RATE: 16 BRPM | HEIGHT: 69 IN | TEMPERATURE: 97.8 F | DIASTOLIC BLOOD PRESSURE: 83 MMHG | WEIGHT: 196.43 LBS

## 2023-12-23 DIAGNOSIS — E83.110 HEREDITARY HEMOCHROMATOSIS (HCC): ICD-10-CM

## 2023-12-23 LAB
FERRITIN SERPL-MCNC: 1863 NG/ML (ref 22–322)
HCT VFR BLD AUTO: 37.8 % (ref 42–52)
HGB BLD-MCNC: 13.6 G/DL (ref 14–18)

## 2023-12-23 PROCEDURE — 700105 HCHG RX REV CODE 258: Performed by: INTERNAL MEDICINE

## 2023-12-23 PROCEDURE — 82728 ASSAY OF FERRITIN: CPT

## 2023-12-23 PROCEDURE — 85014 HEMATOCRIT: CPT

## 2023-12-23 PROCEDURE — 85018 HEMOGLOBIN: CPT

## 2023-12-23 PROCEDURE — 99195 PHLEBOTOMY: CPT

## 2023-12-23 RX ORDER — SODIUM CHLORIDE 9 MG/ML
500 INJECTION, SOLUTION INTRAVENOUS ONCE
Status: CANCELLED
Start: 2023-12-23 | End: 2023-12-23

## 2023-12-23 RX ORDER — SODIUM CHLORIDE 9 MG/ML
500 INJECTION, SOLUTION INTRAVENOUS ONCE
Status: CANCELLED | OUTPATIENT
Start: 2023-12-23 | End: 2023-12-23

## 2023-12-23 RX ORDER — SODIUM CHLORIDE 9 MG/ML
500 INJECTION, SOLUTION INTRAVENOUS ONCE
Status: COMPLETED | OUTPATIENT
Start: 2023-12-23 | End: 2023-12-23

## 2023-12-23 RX ADMIN — SODIUM CHLORIDE 500 ML: 9 INJECTION, SOLUTION INTRAVENOUS at 17:14

## 2023-12-23 ASSESSMENT — FIBROSIS 4 INDEX: FIB4 SCORE: 1.31

## 2023-12-24 NOTE — PROGRESS NOTES
Tyler came into infusion services today for therapeutic phlebotomy. No complaints. PIV started in the AC, +blood return, labs drawn, flushed well. Hgb 13.6, Hct 37.8. Pt meets parameters for phlebotomy. 500ml blood drawn from pt. Tolerated well. Pt received 500ml of NS bolus post hydration per pt request. Orthostatic vitals stable, see flowsheets. PIV removed, covered with gauze and coban. Pt has future appointments. Discharged to self care.

## 2023-12-27 ENCOUNTER — APPOINTMENT (OUTPATIENT)
Dept: ONCOLOGY | Facility: MEDICAL CENTER | Age: 58
End: 2023-12-27
Payer: COMMERCIAL

## 2023-12-31 ENCOUNTER — OUTPATIENT INFUSION SERVICES (OUTPATIENT)
Dept: ONCOLOGY | Facility: MEDICAL CENTER | Age: 58
End: 2023-12-31
Attending: INTERNAL MEDICINE
Payer: COMMERCIAL

## 2023-12-31 VITALS
BODY MASS INDEX: 28.24 KG/M2 | TEMPERATURE: 98.3 F | HEART RATE: 96 BPM | DIASTOLIC BLOOD PRESSURE: 87 MMHG | RESPIRATION RATE: 16 BRPM | WEIGHT: 190.7 LBS | HEIGHT: 69 IN | OXYGEN SATURATION: 97 % | SYSTOLIC BLOOD PRESSURE: 125 MMHG

## 2023-12-31 DIAGNOSIS — E83.110 HEREDITARY HEMOCHROMATOSIS (HCC): ICD-10-CM

## 2023-12-31 LAB
FERRITIN SERPL-MCNC: 2244 NG/ML (ref 22–322)
HCT VFR BLD AUTO: 39.6 % (ref 42–52)
HGB BLD-MCNC: 14.4 G/DL (ref 14–18)

## 2023-12-31 PROCEDURE — 700105 HCHG RX REV CODE 258: Performed by: INTERNAL MEDICINE

## 2023-12-31 PROCEDURE — 85018 HEMOGLOBIN: CPT

## 2023-12-31 PROCEDURE — 85014 HEMATOCRIT: CPT

## 2023-12-31 PROCEDURE — 99195 PHLEBOTOMY: CPT

## 2023-12-31 PROCEDURE — 82728 ASSAY OF FERRITIN: CPT

## 2023-12-31 RX ORDER — SODIUM CHLORIDE 9 MG/ML
500 INJECTION, SOLUTION INTRAVENOUS ONCE
Status: COMPLETED | OUTPATIENT
Start: 2023-12-31 | End: 2023-12-31

## 2023-12-31 RX ORDER — SODIUM CHLORIDE 9 MG/ML
500 INJECTION, SOLUTION INTRAVENOUS ONCE
Status: CANCELLED
Start: 2023-12-31 | End: 2023-12-31

## 2023-12-31 RX ORDER — SODIUM CHLORIDE 9 MG/ML
500 INJECTION, SOLUTION INTRAVENOUS ONCE
Status: CANCELLED | OUTPATIENT
Start: 2023-12-31 | End: 2023-12-31

## 2023-12-31 RX ADMIN — SODIUM CHLORIDE 500 ML: 9 INJECTION, SOLUTION INTRAVENOUS at 16:50

## 2023-12-31 ASSESSMENT — FIBROSIS 4 INDEX: FIB4 SCORE: 1.31

## 2024-01-01 NOTE — PROGRESS NOTES
Patient arrived to unit for a Therapeutic Phlebotomy. Patient denies any signs or symptoms. VSS. Labs drawn peripherally.     Labs reviewed. Patient's Hgb came back at 14.4. Per order, patient is to have 500 mL of whole blood removed for Hgb above 12.0.    Patient requested NS to be ran during observation period.     Therapeutic phlebotomy performed in 10 minutes. VSS sitting. VSS standing after 3 minutes. Patient tolerated treatment without any adverse effects.    Advised patient to check MyChart for next appointment. He is agreeable. Patient discharged home in stable condition.

## 2024-01-08 ENCOUNTER — OUTPATIENT INFUSION SERVICES (OUTPATIENT)
Dept: ONCOLOGY | Facility: MEDICAL CENTER | Age: 59
End: 2024-01-08
Attending: INTERNAL MEDICINE
Payer: COMMERCIAL

## 2024-01-08 VITALS
TEMPERATURE: 98 F | HEIGHT: 69 IN | WEIGHT: 194.45 LBS | BODY MASS INDEX: 28.8 KG/M2 | OXYGEN SATURATION: 95 % | HEART RATE: 82 BPM | RESPIRATION RATE: 18 BRPM | SYSTOLIC BLOOD PRESSURE: 142 MMHG | DIASTOLIC BLOOD PRESSURE: 94 MMHG

## 2024-01-08 DIAGNOSIS — E83.110 HEREDITARY HEMOCHROMATOSIS (HCC): ICD-10-CM

## 2024-01-08 LAB
FERRITIN SERPL-MCNC: 1894 NG/ML (ref 22–322)
HCT VFR BLD AUTO: 40.2 % (ref 42–52)
HGB BLD-MCNC: 14.3 G/DL (ref 14–18)

## 2024-01-08 PROCEDURE — 700105 HCHG RX REV CODE 258: Performed by: INTERNAL MEDICINE

## 2024-01-08 PROCEDURE — 82728 ASSAY OF FERRITIN: CPT

## 2024-01-08 PROCEDURE — 96360 HYDRATION IV INFUSION INIT: CPT

## 2024-01-08 PROCEDURE — 99195 PHLEBOTOMY: CPT

## 2024-01-08 PROCEDURE — 85018 HEMOGLOBIN: CPT

## 2024-01-08 PROCEDURE — 85014 HEMATOCRIT: CPT

## 2024-01-08 RX ORDER — SODIUM CHLORIDE 9 MG/ML
500 INJECTION, SOLUTION INTRAVENOUS ONCE
Status: CANCELLED | OUTPATIENT
Start: 2024-01-08 | End: 2024-01-08

## 2024-01-08 RX ORDER — SODIUM CHLORIDE 9 MG/ML
500 INJECTION, SOLUTION INTRAVENOUS ONCE
Status: CANCELLED
Start: 2024-01-08 | End: 2024-01-08

## 2024-01-08 RX ORDER — SODIUM CHLORIDE 9 MG/ML
500 INJECTION, SOLUTION INTRAVENOUS ONCE
Status: COMPLETED | OUTPATIENT
Start: 2024-01-08 | End: 2024-01-08

## 2024-01-08 RX ADMIN — SODIUM CHLORIDE 500 ML: 9 INJECTION, SOLUTION INTRAVENOUS at 12:27

## 2024-01-08 ASSESSMENT — FIBROSIS 4 INDEX: FIB4 SCORE: 1.31

## 2024-01-08 NOTE — PROGRESS NOTES
Pt arrived ambulatory for TP, denies c/o, states he is tolerating treatment well.  PIV started in RAC, labs drawn.  Hgb 14.3, meets parameters for treatment.   500cc blood removed over 10 minutes, pt tolerated well.  NS 500cc bolus then infused over 30 minutes per pt request. Post hydration VS WNL, Dc'd home without incident and will f/u as scheduled.

## 2024-01-14 ENCOUNTER — OUTPATIENT INFUSION SERVICES (OUTPATIENT)
Dept: ONCOLOGY | Facility: MEDICAL CENTER | Age: 59
End: 2024-01-14
Attending: INTERNAL MEDICINE
Payer: COMMERCIAL

## 2024-01-14 VITALS
SYSTOLIC BLOOD PRESSURE: 113 MMHG | OXYGEN SATURATION: 98 % | HEART RATE: 101 BPM | BODY MASS INDEX: 27.89 KG/M2 | HEIGHT: 69 IN | TEMPERATURE: 98.4 F | DIASTOLIC BLOOD PRESSURE: 73 MMHG | RESPIRATION RATE: 18 BRPM | WEIGHT: 188.27 LBS

## 2024-01-14 DIAGNOSIS — E83.110 HEREDITARY HEMOCHROMATOSIS (HCC): ICD-10-CM

## 2024-01-14 LAB
FERRITIN SERPL-MCNC: 1743 NG/ML (ref 22–322)
HCT VFR BLD AUTO: 41.8 % (ref 42–52)
HGB BLD-MCNC: 14.9 G/DL (ref 14–18)

## 2024-01-14 PROCEDURE — 700105 HCHG RX REV CODE 258: Performed by: INTERNAL MEDICINE

## 2024-01-14 PROCEDURE — 85018 HEMOGLOBIN: CPT

## 2024-01-14 PROCEDURE — 82728 ASSAY OF FERRITIN: CPT

## 2024-01-14 PROCEDURE — 85014 HEMATOCRIT: CPT

## 2024-01-14 PROCEDURE — 99195 PHLEBOTOMY: CPT

## 2024-01-14 PROCEDURE — 96360 HYDRATION IV INFUSION INIT: CPT

## 2024-01-14 RX ORDER — SODIUM CHLORIDE 9 MG/ML
500 INJECTION, SOLUTION INTRAVENOUS ONCE
Status: CANCELLED
Start: 2024-01-14 | End: 2024-01-14

## 2024-01-14 RX ORDER — SODIUM CHLORIDE 9 MG/ML
500 INJECTION, SOLUTION INTRAVENOUS ONCE
Status: CANCELLED | OUTPATIENT
Start: 2024-01-14 | End: 2024-01-14

## 2024-01-14 RX ORDER — SODIUM CHLORIDE 9 MG/ML
500 INJECTION, SOLUTION INTRAVENOUS ONCE
Status: COMPLETED | OUTPATIENT
Start: 2024-01-14 | End: 2024-01-14

## 2024-01-14 RX ADMIN — SODIUM CHLORIDE 500 ML: 9 INJECTION, SOLUTION INTRAVENOUS at 16:47

## 2024-01-14 ASSESSMENT — FIBROSIS 4 INDEX: FIB4 SCORE: 1.31

## 2024-01-15 NOTE — PROGRESS NOTES
Tyler came into infusion services today for therapeutic phlebotomy. No complaints. PIV started in the right AC, +blood return, labs drawn, flushed well. Hgb 14.9, Hct 41.8. Pt meets parameters for phlebotomy. 500ml blood drawn from pt. Tolerated well. Pt received 500ml of NS bolus post hydration per pt request. Orthostatic vitals stable, see flowsheets. PIV removed, covered with gauze and coban. Pt has future appointments. Discharged to self care.

## 2024-01-21 ENCOUNTER — OUTPATIENT INFUSION SERVICES (OUTPATIENT)
Dept: ONCOLOGY | Facility: MEDICAL CENTER | Age: 59
End: 2024-01-21
Attending: INTERNAL MEDICINE
Payer: COMMERCIAL

## 2024-01-21 VITALS
BODY MASS INDEX: 28.41 KG/M2 | HEIGHT: 69 IN | WEIGHT: 191.8 LBS | HEART RATE: 87 BPM | OXYGEN SATURATION: 96 % | DIASTOLIC BLOOD PRESSURE: 101 MMHG | RESPIRATION RATE: 18 BRPM | SYSTOLIC BLOOD PRESSURE: 147 MMHG | TEMPERATURE: 97.2 F

## 2024-01-21 DIAGNOSIS — E83.110 HEREDITARY HEMOCHROMATOSIS (HCC): ICD-10-CM

## 2024-01-21 LAB
FERRITIN SERPL-MCNC: 1514 NG/ML (ref 22–322)
HCT VFR BLD AUTO: 37.8 % (ref 42–52)
HGB BLD-MCNC: 13.9 G/DL (ref 14–18)

## 2024-01-21 PROCEDURE — 82728 ASSAY OF FERRITIN: CPT

## 2024-01-21 PROCEDURE — 99195 PHLEBOTOMY: CPT

## 2024-01-21 PROCEDURE — 700105 HCHG RX REV CODE 258: Performed by: INTERNAL MEDICINE

## 2024-01-21 PROCEDURE — 85018 HEMOGLOBIN: CPT

## 2024-01-21 PROCEDURE — 85014 HEMATOCRIT: CPT

## 2024-01-21 RX ORDER — SODIUM CHLORIDE 9 MG/ML
500 INJECTION, SOLUTION INTRAVENOUS ONCE
Status: CANCELLED | OUTPATIENT
Start: 2024-01-21 | End: 2024-01-21

## 2024-01-21 RX ORDER — SODIUM CHLORIDE 9 MG/ML
500 INJECTION, SOLUTION INTRAVENOUS ONCE
Status: COMPLETED | OUTPATIENT
Start: 2024-01-21 | End: 2024-01-21

## 2024-01-21 RX ORDER — SODIUM CHLORIDE 9 MG/ML
500 INJECTION, SOLUTION INTRAVENOUS ONCE
Status: CANCELLED
Start: 2024-01-21 | End: 2024-01-21

## 2024-01-21 RX ADMIN — SODIUM CHLORIDE 500 ML: 9 INJECTION, SOLUTION INTRAVENOUS at 16:33

## 2024-01-21 ASSESSMENT — FIBROSIS 4 INDEX: FIB4 SCORE: 1.31

## 2024-01-22 NOTE — PROGRESS NOTES
"Patient to [facility] for TP. PIV inserted into left AC, flushed with + blood return, labs drawn. HGB 13.9, patient meets parameters for TP. 500ml of whole blood removed. 500ml of NS infused over 30 minutes. Orthostatic VS taken. BP (!) 147/101   Pulse 87   Temp 36.2 °C (97.2 °F) (Temporal)   Resp 18   Ht 1.76 m (5' 9.29\")   Wt 87 kg (191 lb 12.8 oz)   SpO2 96%   BMI 28.09 kg/m²   Patient denies dizziness nor chest pain. PIV flushed with + blood return and removed. Patient has future appts. Patient to home.   "

## 2024-01-28 ENCOUNTER — OUTPATIENT INFUSION SERVICES (OUTPATIENT)
Dept: ONCOLOGY | Facility: MEDICAL CENTER | Age: 59
End: 2024-01-28
Attending: INTERNAL MEDICINE
Payer: COMMERCIAL

## 2024-01-28 VITALS
BODY MASS INDEX: 28.38 KG/M2 | DIASTOLIC BLOOD PRESSURE: 85 MMHG | HEIGHT: 69 IN | WEIGHT: 191.58 LBS | TEMPERATURE: 98.6 F | SYSTOLIC BLOOD PRESSURE: 127 MMHG | RESPIRATION RATE: 18 BRPM | HEART RATE: 110 BPM | OXYGEN SATURATION: 98 %

## 2024-01-28 DIAGNOSIS — E83.110 HEREDITARY HEMOCHROMATOSIS (HCC): ICD-10-CM

## 2024-01-28 LAB
FERRITIN SERPL-MCNC: 1659 NG/ML (ref 22–322)
HCT VFR BLD AUTO: 37.7 % (ref 42–52)
HGB BLD-MCNC: 13.8 G/DL (ref 14–18)

## 2024-01-28 PROCEDURE — 85018 HEMOGLOBIN: CPT

## 2024-01-28 PROCEDURE — 82728 ASSAY OF FERRITIN: CPT

## 2024-01-28 PROCEDURE — 85014 HEMATOCRIT: CPT

## 2024-01-28 PROCEDURE — 700105 HCHG RX REV CODE 258: Performed by: INTERNAL MEDICINE

## 2024-01-28 PROCEDURE — 99195 PHLEBOTOMY: CPT

## 2024-01-28 RX ORDER — SODIUM CHLORIDE 9 MG/ML
500 INJECTION, SOLUTION INTRAVENOUS ONCE
Status: CANCELLED | OUTPATIENT
Start: 2024-01-28 | End: 2024-01-28

## 2024-01-28 RX ORDER — SODIUM CHLORIDE 9 MG/ML
500 INJECTION, SOLUTION INTRAVENOUS ONCE
Status: CANCELLED
Start: 2024-01-28 | End: 2024-01-28

## 2024-01-28 RX ORDER — SODIUM CHLORIDE 9 MG/ML
500 INJECTION, SOLUTION INTRAVENOUS ONCE
Status: COMPLETED | OUTPATIENT
Start: 2024-01-28 | End: 2024-01-28

## 2024-01-28 RX ADMIN — SODIUM CHLORIDE 500 ML: 9 INJECTION, SOLUTION INTRAVENOUS at 16:50

## 2024-01-28 ASSESSMENT — FIBROSIS 4 INDEX: FIB4 SCORE: 1.31

## 2024-01-29 NOTE — PROGRESS NOTES
Pt arrived ambulatory to [facility] for weekly H/H, Ferritin possible TP for Hemochromatosis. POC discussed with pt and he agrees with plan.    PIV established, brisk blood return noted. Labs drawn as ordered. Results reviewed, Hgb 13.8 today. Pt meets parameters for TP. 500ml whole blood phlebotomized. 500ml NS bolus given as ordered. Pt tolerated TP without s/s adverse reaction. See flow sheet for vital signs. PIV dc'd catheter tip intact, gauze and coban dressing applied.     Pt discharged to self care, [facility]. Pt's next appt confirmed 2/4/2024.

## 2024-02-04 ENCOUNTER — OUTPATIENT INFUSION SERVICES (OUTPATIENT)
Dept: ONCOLOGY | Facility: MEDICAL CENTER | Age: 59
End: 2024-02-04
Attending: INTERNAL MEDICINE
Payer: COMMERCIAL

## 2024-02-04 VITALS
DIASTOLIC BLOOD PRESSURE: 81 MMHG | HEIGHT: 69 IN | WEIGHT: 199.74 LBS | OXYGEN SATURATION: 95 % | RESPIRATION RATE: 18 BRPM | TEMPERATURE: 97.8 F | BODY MASS INDEX: 29.58 KG/M2 | HEART RATE: 90 BPM | SYSTOLIC BLOOD PRESSURE: 122 MMHG

## 2024-02-04 DIAGNOSIS — E23.7 DISORDER OF PITUITARY GLAND, UNSPECIFIED (HCC): ICD-10-CM

## 2024-02-04 DIAGNOSIS — R73.01 IMPAIRED FASTING GLUCOSE: ICD-10-CM

## 2024-02-04 DIAGNOSIS — E29.9 TESTICULAR DYSFUNCTION, UNSPECIFIED: ICD-10-CM

## 2024-02-04 DIAGNOSIS — R79.89 OTHER SPECIFIED ABNORMAL FINDINGS OF BLOOD CHEMISTRY: ICD-10-CM

## 2024-02-04 DIAGNOSIS — R97.20 ELEVATED PROSTATE SPECIFIC ANTIGEN (PSA): ICD-10-CM

## 2024-02-04 DIAGNOSIS — E83.110 HEREDITARY HEMOCHROMATOSIS (HCC): ICD-10-CM

## 2024-02-04 LAB
ALBUMIN SERPL BCP-MCNC: 3.9 G/DL (ref 3.2–4.9)
ALBUMIN/GLOB SERPL: 1.7 G/DL
ALP SERPL-CCNC: 60 U/L (ref 30–99)
ALT SERPL-CCNC: 30 U/L (ref 2–50)
ANION GAP SERPL CALC-SCNC: 14 MMOL/L (ref 7–16)
AST SERPL-CCNC: 33 U/L (ref 12–45)
BASOPHILS # BLD AUTO: 0.8 % (ref 0–1.8)
BASOPHILS # BLD: 0.04 K/UL (ref 0–0.12)
BILIRUB SERPL-MCNC: 0.5 MG/DL (ref 0.1–1.5)
BUN SERPL-MCNC: 12 MG/DL (ref 8–22)
CALCIUM ALBUM COR SERPL-MCNC: 8.8 MG/DL (ref 8.5–10.5)
CALCIUM SERPL-MCNC: 8.7 MG/DL (ref 8.5–10.5)
CHLORIDE SERPL-SCNC: 104 MMOL/L (ref 96–112)
CO2 SERPL-SCNC: 22 MMOL/L (ref 20–33)
CREAT SERPL-MCNC: 0.84 MG/DL (ref 0.5–1.4)
EOSINOPHIL # BLD AUTO: 0.17 K/UL (ref 0–0.51)
EOSINOPHIL NFR BLD: 3.4 % (ref 0–6.9)
ERYTHROCYTE [DISTWIDTH] IN BLOOD BY AUTOMATED COUNT: 49.5 FL (ref 35.9–50)
ESTRADIOL SERPL-MCNC: 27.7 PG/ML
FERRITIN SERPL-MCNC: 1486 NG/ML (ref 22–322)
GFR SERPLBLD CREATININE-BSD FMLA CKD-EPI: 102 ML/MIN/1.73 M 2
GLOBULIN SER CALC-MCNC: 2.3 G/DL (ref 1.9–3.5)
GLUCOSE SERPL-MCNC: 68 MG/DL (ref 65–99)
HCT VFR BLD AUTO: 40 % (ref 42–52)
HGB BLD-MCNC: 14.3 G/DL (ref 14–18)
IMM GRANULOCYTES # BLD AUTO: 0.03 K/UL (ref 0–0.11)
IMM GRANULOCYTES NFR BLD AUTO: 0.6 % (ref 0–0.9)
LYMPHOCYTES # BLD AUTO: 1.86 K/UL (ref 1–4.8)
LYMPHOCYTES NFR BLD: 37 % (ref 22–41)
MCH RBC QN AUTO: 35.2 PG (ref 27–33)
MCHC RBC AUTO-ENTMCNC: 35.8 G/DL (ref 32.3–36.5)
MCV RBC AUTO: 98.5 FL (ref 81.4–97.8)
MONOCYTES # BLD AUTO: 0.74 K/UL (ref 0–0.85)
MONOCYTES NFR BLD AUTO: 14.7 % (ref 0–13.4)
NEUTROPHILS # BLD AUTO: 2.19 K/UL (ref 1.82–7.42)
NEUTROPHILS NFR BLD: 43.5 % (ref 44–72)
NRBC # BLD AUTO: 0 K/UL
NRBC BLD-RTO: 0 /100 WBC (ref 0–0.2)
PLATELET # BLD AUTO: 234 K/UL (ref 164–446)
PMV BLD AUTO: 8.7 FL (ref 9–12.9)
POTASSIUM SERPL-SCNC: 4.1 MMOL/L (ref 3.6–5.5)
PROT SERPL-MCNC: 6.2 G/DL (ref 6–8.2)
PSA SERPL-MCNC: 0.96 NG/ML (ref 0–4)
RBC # BLD AUTO: 4.06 M/UL (ref 4.7–6.1)
SODIUM SERPL-SCNC: 140 MMOL/L (ref 135–145)
WBC # BLD AUTO: 5 K/UL (ref 4.8–10.8)

## 2024-02-04 PROCEDURE — 84402 ASSAY OF FREE TESTOSTERONE: CPT

## 2024-02-04 PROCEDURE — 84305 ASSAY OF SOMATOMEDIN: CPT

## 2024-02-04 PROCEDURE — 99195 PHLEBOTOMY: CPT

## 2024-02-04 PROCEDURE — 80053 COMPREHEN METABOLIC PANEL: CPT

## 2024-02-04 PROCEDURE — 700105 HCHG RX REV CODE 258: Performed by: INTERNAL MEDICINE

## 2024-02-04 PROCEDURE — 84153 ASSAY OF PSA TOTAL: CPT

## 2024-02-04 PROCEDURE — 85025 COMPLETE CBC W/AUTO DIFF WBC: CPT

## 2024-02-04 PROCEDURE — 84403 ASSAY OF TOTAL TESTOSTERONE: CPT

## 2024-02-04 PROCEDURE — 82728 ASSAY OF FERRITIN: CPT

## 2024-02-04 PROCEDURE — 84270 ASSAY OF SEX HORMONE GLOBUL: CPT

## 2024-02-04 PROCEDURE — 82670 ASSAY OF TOTAL ESTRADIOL: CPT

## 2024-02-04 RX ORDER — SODIUM CHLORIDE 9 MG/ML
500 INJECTION, SOLUTION INTRAVENOUS ONCE
Status: COMPLETED | OUTPATIENT
Start: 2024-02-04 | End: 2024-02-04

## 2024-02-04 RX ORDER — SODIUM CHLORIDE 9 MG/ML
500 INJECTION, SOLUTION INTRAVENOUS ONCE
Status: CANCELLED | OUTPATIENT
Start: 2024-02-04 | End: 2024-02-04

## 2024-02-04 RX ORDER — SODIUM CHLORIDE 9 MG/ML
500 INJECTION, SOLUTION INTRAVENOUS ONCE
Status: CANCELLED
Start: 2024-02-04 | End: 2024-02-04

## 2024-02-04 RX ADMIN — SODIUM CHLORIDE 500 ML: 9 INJECTION, SOLUTION INTRAVENOUS at 16:50

## 2024-02-04 ASSESSMENT — FIBROSIS 4 INDEX: FIB4 SCORE: 1.31

## 2024-02-05 NOTE — PROGRESS NOTES
Tyler to infusion for weekly therapeutic phlebotomy. Reports feeling well today with no major complaints.    Patient requests that labs from Dr. Maier be drawn at appt today. Lab orders located in media. PIV started in R AC and labs drawn.     Labs reviewed by RN, Hgb: 14.3, within parameters for TP today.     500ml blood removed over 15 minutes and patient observed 15 minutes post phlebotomy while hydration administered, tolerated well with no adverse effects, orthostatic VSS.     PIV d/rhoda with tip intact. Patient left in stable condition, knows when to return for next appt.

## 2024-02-06 LAB
IGF-I SERPL-MCNC: 126 NG/ML (ref 58–204)
IGF-I Z-SCORE SERPL: 0.2
SHBG SERPL-SCNC: 46 NMOL/L (ref 19–76)
TESTOST FREE MFR SERPL: 1.6 % (ref 1.6–2.9)
TESTOST FREE SERPL-MCNC: 78 PG/ML (ref 47–244)
TESTOST SERPL-MCNC: 499 NG/DL (ref 300–890)

## 2024-02-11 ENCOUNTER — OUTPATIENT INFUSION SERVICES (OUTPATIENT)
Dept: ONCOLOGY | Facility: MEDICAL CENTER | Age: 59
End: 2024-02-11
Attending: INTERNAL MEDICINE
Payer: COMMERCIAL

## 2024-02-11 VITALS
SYSTOLIC BLOOD PRESSURE: 118 MMHG | HEART RATE: 90 BPM | WEIGHT: 197.09 LBS | BODY MASS INDEX: 29.19 KG/M2 | OXYGEN SATURATION: 96 % | TEMPERATURE: 98.2 F | HEIGHT: 69 IN | RESPIRATION RATE: 18 BRPM | DIASTOLIC BLOOD PRESSURE: 85 MMHG

## 2024-02-11 DIAGNOSIS — E83.110 HEREDITARY HEMOCHROMATOSIS (HCC): ICD-10-CM

## 2024-02-11 LAB
FERRITIN SERPL-MCNC: 1587 NG/ML (ref 22–322)
HCT VFR BLD AUTO: 40.7 % (ref 42–52)
HGB BLD-MCNC: 14.6 G/DL (ref 14–18)

## 2024-02-11 PROCEDURE — 82728 ASSAY OF FERRITIN: CPT

## 2024-02-11 PROCEDURE — 700105 HCHG RX REV CODE 258: Performed by: INTERNAL MEDICINE

## 2024-02-11 PROCEDURE — 85014 HEMATOCRIT: CPT

## 2024-02-11 PROCEDURE — 85018 HEMOGLOBIN: CPT

## 2024-02-11 PROCEDURE — 99195 PHLEBOTOMY: CPT

## 2024-02-11 RX ORDER — SODIUM CHLORIDE 9 MG/ML
500 INJECTION, SOLUTION INTRAVENOUS ONCE
Status: COMPLETED | OUTPATIENT
Start: 2024-02-11 | End: 2024-02-11

## 2024-02-11 RX ORDER — SODIUM CHLORIDE 9 MG/ML
500 INJECTION, SOLUTION INTRAVENOUS ONCE
Status: CANCELLED
Start: 2024-02-11 | End: 2024-02-11

## 2024-02-11 RX ORDER — SODIUM CHLORIDE 9 MG/ML
500 INJECTION, SOLUTION INTRAVENOUS ONCE
Status: CANCELLED | OUTPATIENT
Start: 2024-02-11 | End: 2024-02-11

## 2024-02-11 RX ADMIN — SODIUM CHLORIDE 500 ML: 9 INJECTION, SOLUTION INTRAVENOUS at 14:15

## 2024-02-11 ASSESSMENT — FIBROSIS 4 INDEX: FIB4 SCORE: 1.467615570751022021

## 2024-02-11 NOTE — PROGRESS NOTES
Tyler arrives to [facility] for weekly therapeutic phlebotomy for hereditary hemochromatosis. Patient denies acute health concerns. 20g PIV placed to LAC, which flushes easily and has brisk blood return. H/H and ferritin drawn as ordered. Hgb 14.6; Hct 40.7%. 500 cc whole blood phlebotomized without issues. 250 cc NS bolus infused over 15 mins, per patient request. Patient denied CP, dyspnea, dizziness/lightheadedness. After bolus, post-TP VSS. PIV flushed and removed with tip intact. Patient has his next appt. Discharged home to self care in no apparent distress.

## 2024-02-12 ENCOUNTER — OFFICE VISIT (OUTPATIENT)
Dept: RHEUMATOLOGY | Facility: MEDICAL CENTER | Age: 59
End: 2024-02-12
Attending: INTERNAL MEDICINE
Payer: COMMERCIAL

## 2024-02-12 VITALS
DIASTOLIC BLOOD PRESSURE: 80 MMHG | BODY MASS INDEX: 29.29 KG/M2 | OXYGEN SATURATION: 96 % | HEART RATE: 80 BPM | TEMPERATURE: 98.4 F | RESPIRATION RATE: 16 BRPM | SYSTOLIC BLOOD PRESSURE: 130 MMHG | WEIGHT: 200 LBS

## 2024-02-12 DIAGNOSIS — E83.119 HEMOCHROMATOSIS, UNSPECIFIED HEMOCHROMATOSIS TYPE: ICD-10-CM

## 2024-02-12 DIAGNOSIS — Z79.52 LONG TERM CURRENT USE OF SYSTEMIC STEROIDS: ICD-10-CM

## 2024-02-12 DIAGNOSIS — Z79.620 ENCOUNTER FOR MONITORING OF ETANERCEPT THERAPY: ICD-10-CM

## 2024-02-12 DIAGNOSIS — Z79.1 ENCOUNTER FOR LONG-TERM (CURRENT) USE OF NSAIDS: ICD-10-CM

## 2024-02-12 DIAGNOSIS — Z51.81 ENCOUNTER FOR MONITORING OF ETANERCEPT THERAPY: ICD-10-CM

## 2024-02-12 DIAGNOSIS — M05.79 RHEUMATOID ARTHRITIS INVOLVING MULTIPLE SITES WITH POSITIVE RHEUMATOID FACTOR (HCC): ICD-10-CM

## 2024-02-12 PROCEDURE — 99214 OFFICE O/P EST MOD 30 MIN: CPT | Mod: 25 | Performed by: INTERNAL MEDICINE

## 2024-02-12 PROCEDURE — 3075F SYST BP GE 130 - 139MM HG: CPT | Performed by: INTERNAL MEDICINE

## 2024-02-12 PROCEDURE — 20600 DRAIN/INJ JOINT/BURSA W/O US: CPT

## 2024-02-12 PROCEDURE — 20600 DRAIN/INJ JOINT/BURSA W/O US: CPT | Mod: 50 | Performed by: INTERNAL MEDICINE

## 2024-02-12 PROCEDURE — 99211 OFF/OP EST MAY X REQ PHY/QHP: CPT

## 2024-02-12 PROCEDURE — 3079F DIAST BP 80-89 MM HG: CPT | Performed by: INTERNAL MEDICINE

## 2024-02-12 PROCEDURE — 700111 HCHG RX REV CODE 636 W/ 250 OVERRIDE (IP): Mod: JZ | Performed by: INTERNAL MEDICINE

## 2024-02-12 RX ORDER — METHYLPREDNISOLONE ACETATE 40 MG/ML
20 INJECTION, SUSPENSION INTRA-ARTICULAR; INTRALESIONAL; INTRAMUSCULAR; SOFT TISSUE ONCE
Status: COMPLETED | OUTPATIENT
Start: 2024-02-12 | End: 2024-02-12

## 2024-02-12 RX ORDER — NABUMETONE 500 MG/1
TABLET, FILM COATED ORAL
Qty: 360 TABLET | Refills: 0 | Status: SHIPPED | OUTPATIENT
Start: 2024-02-12 | End: 2024-03-07

## 2024-02-12 RX ADMIN — METHYLPREDNISOLONE ACETATE 20 MG: 40 INJECTION, SUSPENSION INTRA-ARTICULAR; INTRALESIONAL; INTRAMUSCULAR; SOFT TISSUE at 12:21

## 2024-02-12 ASSESSMENT — PATIENT HEALTH QUESTIONNAIRE - PHQ9: CLINICAL INTERPRETATION OF PHQ2 SCORE: 0

## 2024-02-12 ASSESSMENT — JOINT PAIN
TOTAL NUMBER OF SWOLLEN JOINTS: 0
TOTAL NUMBER OF TENDER JOINTS: 2

## 2024-02-12 ASSESSMENT — FIBROSIS 4 INDEX: FIB4 SCORE: 1.467615570751022021

## 2024-02-12 NOTE — PROGRESS NOTES
Chief Complaint- joint pain     Subjective:   Milton Aguila is a 58 y.o. male here today for follow up of rheumatological issues    This is a follow-up visit, third visit with us for this patient who we see in this clinic for serologically positive rheumatoid arthritis.  Patient is currently on Enbrel 50 mg subcu every week and Advil as needed with great benefit.      Patient also is getting treated for hereditary hemochromatosis currently followed by Dr. Hernandez in hematology and getting phlebotomy every 1 to 2 weeks.  Patient is having some achiness in his hands and thinks it may be the arthritis associated with the hereditary hemochromatosis.    Patient comes in today requesting bilateral third MCP joint corticosteroid injections.     Additional comorbidities include  occasional back issues and sciatica for which he takes a muscle relaxer when necessary, also sees [facility] for tender point injections.      Soc Hx  Pos MJ use       RUQ Abdominal Ultrasound 2/3/2023  IMPRESSION:  1.  Liver cysts in the left lobe and right lobe.  2.  3.4 x 2.7 x 3.1 cm right upper pole renal cortical cyst. Doubtful clinical significance.  3.  Hepatomegaly  4.  Hepatic median shear wave velocity is 1.71 m/sec. Indeterminate for liver fibrosis.    Addendum 3/7/2024  S/p nabumetome-rash     s/p celebrex-ineffective   S/p Rinvoq-lost efficacy with spasming in palmar aspect right hand and right wrist pain  S/p Humira  S/p Plaquenil  S/p MTX-contraindicated because of daily alcohol use  S/p leflunamide-contraindicated because of daily alcohol use  S/p sulfasalazine-elevated LFTs (with ETOH)  S/p xeljanz-patient did not want to try for fear of elevated cholesterol levels.     Bilateral HERVE     HEREDITARY HEMOCHROMOTOSIS  Order: [id number]        Component 8 d ago   C282Y Mutation Homozygous    H63D Mutation Negative    S65C Mutation Negative    Hemochrom Interp See Note    Comment: Indication for testing: Carrier  screening or diagnostic testing   for hereditary hemochromatosis.     Hemochromatosis Interpretive Results:   Homozygous C282Y:   C282Y Homozygous - The patient is homozygous for the HFE C282Y   mutation and is at high risk for hereditary hemochromatosis.   Homozygosity for this mutation accounts for 80-90 percent of the   hemochromatosis patients of [place]  descent. The   frequency of this mutation in other populations is lower.   H63D: Negative - This patient is negative for the HFE H63D   mutation.             Quantiferon Gold neg 11/2022  HCV neg 11/2022  HBsAg/HBcAb neg 11/2022  Uric acid 6.7 6/2016 [facility]; Uric acid 7.3 12/2019  RF 14.2 (0-13.9) 10/2007 [facility]; RF 64 3/2018; RF 36 3/2021  CCP 18 ( 0-5) 10/2007 [facility];  3/2018;  3/2021  MRI pelvis 7/2007-indicates advanced inflammatory arthropathy compatible's rheumatoid arthritis involving both hips with hypertrophic synovitis-[facility]  MRI LS spine 9/2009-indicates disc desiccation L3-4, L4-5, and L5-S1, left-sided neural foraminal narrowing at L5-S1, mild central stenosis at L4, and L3-4-[facility]     MRI Right hand/wrist 11/2009-negative, no erosions, no synovitis, no pathology-[facility]      Right Hand x-ray 8/2022-Narrative  X-rays of the right hand 4 view AP lateral oblique and carpal tunnel view   reveal some degenerative changes at the MCP joint of the middle finger.     The wrist joint radiocarpal joint appears to be reasonably   well-maintained.  No acute fracture dislocations noted.     Corticosteroid Therapy Informed Consent reviewed and signed 9/14/2023-copy given to patient      Current Outpatient Medications   Medication Sig Dispense Refill    nabumetone (RELAFEN) 500 MG Tab 1-2 tabs po bid prn joint pain 360 Tablet 0    Etanercept (ENBREL SURECLICK) 50 MG/ML Solution Auto-injector INJECT 1 PEN UNDER THE SKIN EVERY 7 DAYS. 12 Each 1    ALPRAZolam  (XANAX) 1 MG Tab       MOUNJARO 15 MG/0.5ML Solution Pen-injector INJECT 15 MG UNDER THE SKIN ONE DAY A WEEK      TESTOSTERONE by Does not apply route.       No current facility-administered medications for this visit.     He  has a past medical history of Arthritis, rheumatic, acute or subacute.    ROS   Other than what is mentioned in HPI or physical exam, there is no history of headaches, double vision or blurred vision.  No trouble swallowing difficulties .  No chest complaints including chest pain, cough or sputum production. No GI complaints including nausea, vomiting, change in bowel habits, or past peptic ulcer disease. No history of blood in the stools. No urinary complaints including dysuria or frequency. No history of alopecia, photosensitivity     Objective:     /80   Pulse 80   Temp 36.9 °C (98.4 °F) (Temporal)   Resp 16   Wt 90.7 kg (200 lb)   SpO2 96%  Body mass index is 29.29 kg/m².   Physical Exam:    Constitutional: Alert and oriented X3, patient is talkative with good eye contact.Skin: Warm, dry, good turgor, no rashes in visible areas.Eye: Equal, round and reactive, conjunctiva clear, lids normal EOM intactENMT: Lips without lesions,  pinna without deformityNeck: Trachea midline, no masses, no thyromegaly.Lymph:  No cervical lymphadenopathy, no axillary lymphadenopathy, no supraclavicular lymphadenopathyRespiratory: Unlabored respiratory effort, lungs clear to auscultation, no wheezes, no ronchi.Cardiovascular: Normal S1, S2, Regular rate and rhythm, no murmurs rubs or gallops  .Abdomen: Soft, non-distended.Psych: Alert and oriented x3, normal affect and mood.Neuro: Cranial nerves 2-12 are grossly intact Ext:no joint laxity noted in bilateral arms, no joint laxity noted in bilateral legs      Lab Results   Component Value Date/Time    HEPBCORIGM Non-Reactive 02/10/2023 01:21 PM    HEPBSAG Non-Reactive 11/11/2022 08:21 AM     Lab Results   Component Value Date/Time    HEPCAB  Non-Reactive 11/11/2022 08:21 AM     Lab Results   Component Value Date/Time    SODIUM 140 02/04/2024 04:21 PM    POTASSIUM 4.1 02/04/2024 04:21 PM    CHLORIDE 104 02/04/2024 04:21 PM    CO2 22 02/04/2024 04:21 PM    GLUCOSE 68 02/04/2024 04:21 PM    BUN 12 02/04/2024 04:21 PM    CREATININE 0.84 02/04/2024 04:21 PM    CREATININE 0.8 02/09/2008 08:50 AM      Lab Results   Component Value Date/Time    WBC 5.0 02/04/2024 04:15 PM    RBC 4.06 (L) 02/04/2024 04:15 PM    HEMOGLOBIN 14.6 02/11/2024 01:48 PM    HEMATOCRIT 40.7 (L) 02/11/2024 01:48 PM    MCV 98.5 (H) 02/04/2024 04:15 PM    MCH 35.2 (H) 02/04/2024 04:15 PM    MCHC 35.8 02/04/2024 04:15 PM    MPV 8.7 (L) 02/04/2024 04:15 PM    NEUTSPOLYS 43.50 (L) 02/04/2024 04:15 PM    LYMPHOCYTES 37.00 02/04/2024 04:15 PM    MONOCYTES 14.70 (H) 02/04/2024 04:15 PM    EOSINOPHILS 3.40 02/04/2024 04:15 PM    BASOPHILS 0.80 02/04/2024 04:15 PM      Lab Results   Component Value Date/Time    CALCIUM 8.7 02/04/2024 04:21 PM    ASTSGOT 33 02/04/2024 04:21 PM    ALTSGPT 30 02/04/2024 04:21 PM    ALKPHOSPHAT 60 02/04/2024 04:21 PM    TBILIRUBIN 0.5 02/04/2024 04:21 PM    ALBUMIN 3.9 02/04/2024 04:21 PM    TOTPROTEIN 6.2 02/04/2024 04:21 PM     Lab Results   Component Value Date/Time    URICACID 5.9 08/16/2021 10:53 AM    RHEUMFACTN 36 (H) 03/11/2021 07:35 AM    CCPANTIBODY 144 (H) 03/11/2021 07:35 AM     Lab Results   Component Value Date/Time    SEDRATEWES 6 06/13/2023 11:26 AM     Lab Results   Component Value Date/Time    HBA1C 5.2 04/16/2022 09:24 AM     Lab Results   Component Value Date/Time    ANTIMITOCHO 4.9 06/28/2019 11:03 AM     Assessment and Plan:     1. Rheumatoid arthritis involving multiple sites with positive rheumatoid factor (HCC)  Clinically stable on Enbrel 50 mg subcu every week, patient is interested in trying an alternative anti-inflammatory, will do a trial of nabumetone 500 to 1000 mg p.o. twice daily as needed  - HEP B CORE AB IGM  - HEP B SURFACE ANTIGEN;  Future  - HEP C VIRUS ANTIBODY; Future  - Quantiferon Gold Plus TB (4 tube); Future  - nabumetone (RELAFEN) 500 MG Tab; 1-2 tabs po bid prn joint pain  Dispense: 360 Tablet; Refill: 0    2. Encounter for monitoring of etanercept therapy  Currently on Enbrel 50 mg subcu every week, screening labs up-to-date, neck screening labs will be due about November 2024, labs ordered for patient  Patient needs monitoring labs about every 6 months, next labs due about August 2024.  We reviewed risks of biological medications with patient including hematological pathology, cancer risks, neurological and infection issues especially in the Covid-19 pandemic environment, patient states understanding.  - HEP B CORE AB IGM  - HEP B SURFACE ANTIGEN; Future  - HEP C VIRUS ANTIBODY; Future  - Quantiferon Gold Plus TB (4 tube); Future  - nabumetone (RELAFEN) 500 MG Tab; 1-2 tabs po bid prn joint pain  Dispense: 360 Tablet; Refill: 0    3. Encounter for long-term (current) use of NSAIDs  Currently on Advil as needed, patient is interested in trying alternative anti-inflammatory medication, we opted to do a trial of nabumetone 500 to 1000 mg p.o. twice daily as needed to be taken with food.  Patient will need monitoring labs about every 6 months, next labs will be due about August 2024    4. Long term current use of systemic steroids  Patient uses intermittently  Corticosteroid Therapy Informed Consent reviewed and signed 9/14/2023-copy given to patient   - HEP B CORE AB IGM  - HEP B SURFACE ANTIGEN; Future  - HEP C VIRUS ANTIBODY; Future  - Quantiferon Gold Plus TB (4 tube); Future  - nabumetone (RELAFEN) 500 MG Tab; 1-2 tabs po bid prn joint pain  Dispense: 360 Tablet; Refill: 0    5. Hemochromatosis, unspecified hemochromatosis type  Patient currently getting phlebotomy about every week, of note liver functions down to normal, ferritin still elevated, followed by Dr. Atkins in hematology/oncology.    Procedure: Bilateral third finger MCP  joint corticosteroid injections    The procedure was explained to the patient in detail including potential damage to area being injected including risk of avascular necrosis, all questions were answered, verbal consent to do procedure was obtained. Risks and benefits were reviewed with patient and patient states understanding including risks of steroid injections including bleeding, infections, subcutaneous lipolysis and/or hypopigmentation at site of injection, and risk of avascular necrosis. Verbal consent was again obtained.  Patient denies any allergies to lidocaine, Betadine/iodine or corticosteroids.  The patient was positioned appropriately. Anatomical landmarks were identified.    Dorsal aspect of bilateral third MCP joint was prepped with betadine x 3, local anesthetic with ethyl chloride and 1% Xylocaine lot # [id number], Exp March 2027 and using sterile technique,  injected 10 mg of Depomedrol Lot # [id number], Exp November 2024    EBL 0  The patient tolerated the procedure well, was observed in the office and there were no complications     Patient was asked to rest bilateral third fingers for 3 days, patient states understanding and states will comply.      Followup: Return in about 7 months (around 9/12/2024). or sooner myla Aguila  was seen 30 minutes face-to-face of which more than 50% of the time was spent counseling the patient (excluding time for procedures)  regarding  rheumatological condition and care. Therapy was discussed in detail.      Please note that this dictation was created using voice recognition software. I have made every reasonable attempt to correct obvious errors, but I expect that there are errors of grammar and possibly content that I did not discover before finalizing the note.                  Double Island Pedicle Flap Text: The defect edges were debeveled with a #15 scalpel blade.  Given the location of the defect, shape of the defect and the proximity to free margins a double island pedicle advancement flap was deemed most appropriate.  Using a sterile surgical marker, an appropriate advancement flap was drawn incorporating the defect, outlining the appropriate donor tissue and placing the expected incisions within the relaxed skin tension lines where possible.    The area thus outlined was incised deep to adipose tissue with a #15 scalpel blade.  The skin margins were undermined to an appropriate distance in all directions around the primary defect and laterally outward around the island pedicle utilizing iris scissors.  There was minimal undermining beneath the pedicle flap.

## 2024-02-18 ENCOUNTER — OUTPATIENT INFUSION SERVICES (OUTPATIENT)
Dept: ONCOLOGY | Facility: MEDICAL CENTER | Age: 59
End: 2024-02-18
Attending: INTERNAL MEDICINE
Payer: COMMERCIAL

## 2024-02-18 VITALS
BODY MASS INDEX: 28.77 KG/M2 | HEART RATE: 84 BPM | SYSTOLIC BLOOD PRESSURE: 119 MMHG | TEMPERATURE: 98.9 F | DIASTOLIC BLOOD PRESSURE: 82 MMHG | OXYGEN SATURATION: 96 % | RESPIRATION RATE: 18 BRPM | WEIGHT: 194.22 LBS | HEIGHT: 69 IN

## 2024-02-18 DIAGNOSIS — E83.110 HEREDITARY HEMOCHROMATOSIS (HCC): ICD-10-CM

## 2024-02-18 LAB
FERRITIN SERPL-MCNC: 1797 NG/ML (ref 22–322)
HBV CORE IGM SER QL: NORMAL
HBV SURFACE AG SER QL: NORMAL
HCT VFR BLD AUTO: 41.4 % (ref 42–52)
HCV AB SER QL: NORMAL
HGB BLD-MCNC: 15.5 G/DL (ref 14–18)

## 2024-02-18 PROCEDURE — 86705 HEP B CORE ANTIBODY IGM: CPT

## 2024-02-18 PROCEDURE — 85014 HEMATOCRIT: CPT

## 2024-02-18 PROCEDURE — 86480 TB TEST CELL IMMUN MEASURE: CPT

## 2024-02-18 PROCEDURE — 85018 HEMOGLOBIN: CPT

## 2024-02-18 PROCEDURE — 99195 PHLEBOTOMY: CPT

## 2024-02-18 PROCEDURE — 87340 HEPATITIS B SURFACE AG IA: CPT

## 2024-02-18 PROCEDURE — 82728 ASSAY OF FERRITIN: CPT

## 2024-02-18 PROCEDURE — 700105 HCHG RX REV CODE 258: Performed by: INTERNAL MEDICINE

## 2024-02-18 PROCEDURE — 86803 HEPATITIS C AB TEST: CPT

## 2024-02-18 RX ORDER — SODIUM CHLORIDE 9 MG/ML
500 INJECTION, SOLUTION INTRAVENOUS ONCE
Status: CANCELLED | OUTPATIENT
Start: 2024-02-18 | End: 2024-02-18

## 2024-02-18 RX ORDER — SODIUM CHLORIDE 9 MG/ML
500 INJECTION, SOLUTION INTRAVENOUS ONCE
Status: COMPLETED | OUTPATIENT
Start: 2024-02-18 | End: 2024-02-18

## 2024-02-18 RX ORDER — SODIUM CHLORIDE 9 MG/ML
500 INJECTION, SOLUTION INTRAVENOUS ONCE
Status: CANCELLED
Start: 2024-02-18 | End: 2024-02-18

## 2024-02-18 RX ADMIN — SODIUM CHLORIDE 500 ML: 9 INJECTION, SOLUTION INTRAVENOUS at 17:00

## 2024-02-18 ASSESSMENT — FIBROSIS 4 INDEX: FIB4 SCORE: 1.467615570751022021

## 2024-02-19 LAB
GAMMA INTERFERON BACKGROUND BLD IA-ACNC: 0.02 IU/ML
M TB IFN-G BLD-IMP: NEGATIVE
M TB IFN-G CD4+ BCKGRND COR BLD-ACNC: 0 IU/ML
MITOGEN IGNF BCKGRD COR BLD-ACNC: >10 IU/ML
QFT TB2 - NIL TBQ2: 0 IU/ML

## 2024-02-19 NOTE — PROGRESS NOTES
[facility]botomy. PIV started to RAC, brisk blood return observed and flushed easily. Labs drawn as ordered as well as additional labs ordered by patient's provider. Hgb = 15.5 and Hct = 41.4%. Pt meets parameters for treatment. 500 cc whole blood phlebotomized per order, pt tolerated well. 400 mls of NS bolus given over 20 minutes per patient's request. Orthostatic blood pressures taken and pt WNL. PIV flushed and removed, gauze and Coban dressing placed. Pt has future appointments. Pt discharged to home in good condition.   No Residual Tumor Seen Histology Text: There were no malignant cells seen in the sections examined.

## 2024-02-25 ENCOUNTER — OUTPATIENT INFUSION SERVICES (OUTPATIENT)
Dept: ONCOLOGY | Facility: MEDICAL CENTER | Age: 59
End: 2024-02-25
Attending: INTERNAL MEDICINE
Payer: COMMERCIAL

## 2024-02-25 VITALS
WEIGHT: 194.89 LBS | HEIGHT: 69 IN | OXYGEN SATURATION: 96 % | RESPIRATION RATE: 18 BRPM | SYSTOLIC BLOOD PRESSURE: 114 MMHG | HEART RATE: 80 BPM | DIASTOLIC BLOOD PRESSURE: 73 MMHG | TEMPERATURE: 98.3 F | BODY MASS INDEX: 28.87 KG/M2

## 2024-02-25 DIAGNOSIS — E83.110 HEREDITARY HEMOCHROMATOSIS (HCC): ICD-10-CM

## 2024-02-25 LAB
FERRITIN SERPL-MCNC: 1538 NG/ML (ref 22–322)
HCT VFR BLD AUTO: 40.1 % (ref 42–52)
HGB BLD-MCNC: 14.2 G/DL (ref 14–18)

## 2024-02-25 PROCEDURE — 700105 HCHG RX REV CODE 258: Performed by: INTERNAL MEDICINE

## 2024-02-25 PROCEDURE — 99195 PHLEBOTOMY: CPT

## 2024-02-25 PROCEDURE — 85014 HEMATOCRIT: CPT

## 2024-02-25 PROCEDURE — 96360 HYDRATION IV INFUSION INIT: CPT

## 2024-02-25 PROCEDURE — 85018 HEMOGLOBIN: CPT

## 2024-02-25 PROCEDURE — 82728 ASSAY OF FERRITIN: CPT

## 2024-02-25 RX ORDER — SODIUM CHLORIDE 9 MG/ML
500 INJECTION, SOLUTION INTRAVENOUS ONCE
Status: COMPLETED | OUTPATIENT
Start: 2024-02-25 | End: 2024-02-25

## 2024-02-25 RX ORDER — SODIUM CHLORIDE 9 MG/ML
500 INJECTION, SOLUTION INTRAVENOUS ONCE
Status: CANCELLED | OUTPATIENT
Start: 2024-02-25 | End: 2024-02-25

## 2024-02-25 RX ORDER — SODIUM CHLORIDE 9 MG/ML
500 INJECTION, SOLUTION INTRAVENOUS ONCE
Status: CANCELLED
Start: 2024-02-25 | End: 2024-02-25

## 2024-02-25 RX ADMIN — SODIUM CHLORIDE 500 ML: 9 INJECTION, SOLUTION INTRAVENOUS at 17:00

## 2024-02-25 ASSESSMENT — FIBROSIS 4 INDEX: FIB4 SCORE: 1.467615570751022021

## 2024-02-26 NOTE — PROGRESS NOTES
Pt arrived ambulatory for TP, denies c/o.  PIV started in RAC  with good blood return, labs drawn as ordered.  H&H revieved, meets parameters for TP today.  500cc blood removed without difficulty over 10 minutes, 500cc NS bolus given after per pt request due to history of dizziness after blood removal. Pt tolerated well, no issues noted.  Pt Dc'd home without incident and will return next week as scheduled.

## 2024-02-28 NOTE — PROGRESS NOTES
This evaluation was conducted via Zoom using secure and encrypted videoconferencing technology. The patient was in their home in the [facility].    The patient's identity was confirmed and verbal consent was obtained for this virtual visit.    Date of service 3/12/24 - rescheduled from 03/1124    Subjective    Chief Complaint:  Follow up hemoochromatosis    HPI:  58 male with homozygous C282Y hereditary hemochromatosis. He has been on weekly therapeutic phlebotomies (at his request). Ferritin was > 4000. Now down to 1538. On testosterone which helps keep H/H up. [employer].     ROS:    Constitutional: No weight loss  Skin: No rash or jaundice  HENT: No change in eyesight or hearing  Cardiovascular:No chest pain or arrythmia  Respiratory:No cough or SOB  GI:No nausea, vomiting, diarrhea, constipation  :No dysuria or frequency  Musculoskeletal:No bone or joint pain  Neuro:No sx's of neuropathy  Psych: No complaints    PMH:      No Known Allergies    Past Medical History:   Diagnosis Date    Arthritis, rheumatic, acute or subacute         Past Surgical History:   Procedure Laterality Date    LUMBAR LAMINECTOMY DISKECTOMY  2/7/2013    Performed by Mackenzie Cameron M.D. at SURGERY [facility]    APPENDECTOMY  2000    INGUINAL HERNIA REPAIR      Hernia Repair, Inguinal    OTHER ORTHOPEDIC SURGERY      ivy hip replacements        Medications:    Current Outpatient Medications on File Prior to Visit   Medication Sig Dispense Refill    nabumetone (RELAFEN) 500 MG Tab 1-2 tabs po bid prn joint pain 360 Tablet 0    Etanercept (ENBREL SURECLICK) 50 MG/ML Solution Auto-injector INJECT 1 PEN UNDER THE SKIN EVERY 7 DAYS. 12 Each 1    ALPRAZolam (XANAX) 1 MG Tab       MOUNJARO 15 MG/0.5ML Solution Pen-injector INJECT 15 MG UNDER THE SKIN ONE DAY A WEEK      TESTOSTERONE by Does not apply route.       No current facility-administered medications on file prior to visit.       Social History     Tobacco Use     Smoking status: Never    Smokeless tobacco: Never   Substance Use Topics    Alcohol use: Yes        No family history on file.     Objective    Vitals:    There were no vitals taken for this visit.    Physical Exam:    Appears well-developed and well-nourished. No distress.    Head -  Normocephalic .   Eyes - Pupils are equal. Conjunctivae normal. No scleral icterus.   Ears - normal hearing  Neurological -   Alert and oriented.  Skin - . No rash noted. Not diaphoretic. No erythema. No pallor. No jaundice   Psychiatric -  Normal mood and affect.    Labs:     Latest Reference Range & Units 01/21/24 15:50 01/28/24 16:20 02/04/24 16:15 02/11/24 13:48 02/18/24 16:21 02/25/24 16:20   Hemoglobin 14.0 - 18.0 g/dL 13.9 (L) 13.8 (L) 14.3 14.6 15.5 14.2   Hematocrit 42.0 - 52.0 % 37.8 (L) 37.7 (L) 40.0 (L) 40.7 (L) 41.4 (L) 40.1 (L)      Latest Reference Range & Units 03/10/24 16:04   Hemoglobin 14.0 - 18.0 g/dL 14.8   Hematocrit 42.0 - 52.0 % 40.3 (L)      Latest Reference Range & Units 01/28/24 16:20 02/04/24 16:15 02/11/24 13:48 02/18/24 16:21 02/25/24 16:20   Ferritin 22.0 - 322.0 ng/mL 1659.0 (H) 1486.0 (H) 1587.0 (H) 1797.0 (H) 1538.0 (H)      Latest Reference Range & Units 03/10/24 16:04   Ferritin 22.0 - 322.0 ng/mL 1530.0 (H)      Latest Reference Range & Units 02/04/24 16:21   Sodium 135 - 145 mmol/L 140   Potassium 3.6 - 5.5 mmol/L 4.1   Chloride 96 - 112 mmol/L 104   Co2 20 - 33 mmol/L 22   Anion Gap 7.0 - 16.0  14.0   Glucose 65 - 99 mg/dL 68   Bun 8 - 22 mg/dL 12   Creatinine 0.50 - 1.40 mg/dL 0.84   GFR (CKD-EPI) >60 mL/min/1.73 m 2 102   Calcium 8.5 - 10.5 mg/dL 8.7   Correct Calcium 8.5 - 10.5 mg/dL 8.8   AST(SGOT) 12 - 45 U/L 33   ALT(SGPT) 2 - 50 U/L 30   Alkaline Phosphatase 30 - 99 U/L 60   Total Bilirubin 0.1 - 1.5 mg/dL 0.5   Albumin 3.2 - 4.9 g/dL 3.9   Total Protein 6.0 - 8.2 g/dL 6.2   Globulin 1.9 - 3.5 g/dL 2.3   A-G Ratio g/dL 1.7       Assessment  Responding  Imp:    Visit Diagnosis:    1.  Hereditary hemochromatosis (HCC)        2. Rheumatoid arthritis involving multiple sites with positive rheumatoid factor (HCC)          Plan:  Continue same  Recheck in 3 months    Haris Hernandez M.D.

## 2024-03-03 ENCOUNTER — OUTPATIENT INFUSION SERVICES (OUTPATIENT)
Dept: ONCOLOGY | Facility: MEDICAL CENTER | Age: 59
End: 2024-03-03
Attending: INTERNAL MEDICINE
Payer: COMMERCIAL

## 2024-03-03 VITALS
BODY MASS INDEX: 28.8 KG/M2 | SYSTOLIC BLOOD PRESSURE: 105 MMHG | RESPIRATION RATE: 18 BRPM | OXYGEN SATURATION: 95 % | HEIGHT: 69 IN | WEIGHT: 194.45 LBS | HEART RATE: 113 BPM | TEMPERATURE: 98.8 F | DIASTOLIC BLOOD PRESSURE: 66 MMHG

## 2024-03-03 DIAGNOSIS — E83.110 HEREDITARY HEMOCHROMATOSIS (HCC): ICD-10-CM

## 2024-03-03 LAB
FERRITIN SERPL-MCNC: 1582 NG/ML (ref 22–322)
HCT VFR BLD AUTO: 42.9 % (ref 42–52)
HGB BLD-MCNC: 15.8 G/DL (ref 14–18)

## 2024-03-03 PROCEDURE — 85014 HEMATOCRIT: CPT

## 2024-03-03 PROCEDURE — 99195 PHLEBOTOMY: CPT

## 2024-03-03 PROCEDURE — 700105 HCHG RX REV CODE 258: Performed by: INTERNAL MEDICINE

## 2024-03-03 PROCEDURE — 85018 HEMOGLOBIN: CPT

## 2024-03-03 PROCEDURE — 82728 ASSAY OF FERRITIN: CPT

## 2024-03-03 RX ORDER — SODIUM CHLORIDE 9 MG/ML
500 INJECTION, SOLUTION INTRAVENOUS ONCE
Status: CANCELLED
Start: 2024-03-03 | End: 2024-03-03

## 2024-03-03 RX ORDER — SODIUM CHLORIDE 9 MG/ML
500 INJECTION, SOLUTION INTRAVENOUS ONCE
Status: COMPLETED | OUTPATIENT
Start: 2024-03-03 | End: 2024-03-03

## 2024-03-03 RX ORDER — SODIUM CHLORIDE 9 MG/ML
500 INJECTION, SOLUTION INTRAVENOUS ONCE
Status: CANCELLED | OUTPATIENT
Start: 2024-03-03 | End: 2024-03-03

## 2024-03-03 RX ADMIN — SODIUM CHLORIDE 500 ML: 9 INJECTION, SOLUTION INTRAVENOUS at 16:54

## 2024-03-03 ASSESSMENT — FIBROSIS 4 INDEX: FIB4 SCORE: 1.467615570751022021

## 2024-03-04 NOTE — PROGRESS NOTES
Tyler presents to [facility] for therapeutic phlebotomy.  PIV placed to left AC, labs collected and reviewed.  Hgb 15.8, parameters met for TP. 500 cc whole blood removed, followed by NS bolus 500 cc administered per MD order.   Tyler tolerated well.  Orthostatic vital signs wnl, see flowesheet.  Tyler denies s/s chest pain, shortness of breath, dizziness or light-headedness after treatment.  PIV flushed and removed, gauze and coban to site.  Tyler discharged in NAD, next appointment confirmed.

## 2024-03-08 ENCOUNTER — HOSPITAL ENCOUNTER (EMERGENCY)
Facility: MEDICAL CENTER | Age: 59
End: 2024-03-08
Attending: EMERGENCY MEDICINE
Payer: COMMERCIAL

## 2024-03-08 VITALS
WEIGHT: 196.21 LBS | BODY MASS INDEX: 28.09 KG/M2 | SYSTOLIC BLOOD PRESSURE: 126 MMHG | TEMPERATURE: 97.8 F | DIASTOLIC BLOOD PRESSURE: 80 MMHG | HEART RATE: 98 BPM | RESPIRATION RATE: 18 BRPM | OXYGEN SATURATION: 95 % | HEIGHT: 70 IN

## 2024-03-08 DIAGNOSIS — T78.40XA ALLERGIC REACTION, INITIAL ENCOUNTER: ICD-10-CM

## 2024-03-08 DIAGNOSIS — L50.9 URTICARIA: ICD-10-CM

## 2024-03-08 PROCEDURE — 700102 HCHG RX REV CODE 250 W/ 637 OVERRIDE(OP): Performed by: EMERGENCY MEDICINE

## 2024-03-08 PROCEDURE — A9270 NON-COVERED ITEM OR SERVICE: HCPCS | Performed by: EMERGENCY MEDICINE

## 2024-03-08 PROCEDURE — 700111 HCHG RX REV CODE 636 W/ 250 OVERRIDE (IP): Performed by: EMERGENCY MEDICINE

## 2024-03-08 PROCEDURE — 99283 EMERGENCY DEPT VISIT LOW MDM: CPT

## 2024-03-08 RX ORDER — PREDNISONE 20 MG/1
60 TABLET ORAL DAILY
Qty: 15 TABLET | Refills: 0 | Status: SHIPPED | OUTPATIENT
Start: 2024-03-09 | End: 2024-03-14

## 2024-03-08 RX ORDER — NABUMETONE 500 MG/1
500 TABLET, FILM COATED ORAL DAILY
COMMUNITY

## 2024-03-08 RX ORDER — PREDNISONE 20 MG/1
20 TABLET ORAL DAILY
Status: DISCONTINUED | OUTPATIENT
Start: 2024-03-08 | End: 2024-03-08 | Stop reason: HOSPADM

## 2024-03-08 RX ORDER — DIPHENHYDRAMINE HCL 25 MG
50 TABLET ORAL ONCE
Status: COMPLETED | OUTPATIENT
Start: 2024-03-08 | End: 2024-03-08

## 2024-03-08 RX ADMIN — DIPHENHYDRAMINE HYDROCHLORIDE 50 MG: 25 TABLET ORAL at 09:47

## 2024-03-08 RX ADMIN — PREDNISONE 20 MG: 20 TABLET ORAL at 09:47

## 2024-03-08 ASSESSMENT — FIBROSIS 4 INDEX: FIB4 SCORE: 1.467615570751022021

## 2024-03-08 NOTE — ED NOTES
Pt ambulated back to [facility] from triage. Pt able to transfer self to bed, in a gown, call light in reach. ERP at bedside.

## 2024-03-08 NOTE — ED NOTES
Discharge orders received, monitor discontinued, instructions and education given, follow-up discussed, prescription sent to pharmacy, pt verbalized understanding. Pt ambulated to [facility].

## 2024-03-08 NOTE — ED PROVIDER NOTES
ER Provider Note    Scribed for Julio Devine M.D. by Prema Benjamin. 3/8/2024   9:19 AM    Primary Care Provider: Paolo Barnhart M.D.    CHIEF COMPLAINT  Chief Complaint   Patient presents with    Hives     Pt c/o full body hives x 3 days , pt was recently started on a new medication. Pt is maintaining his airway and speaking in full sentences      EXTERNAL RECORDS REVIEWED  Outpatient Notes patient was seen outpatient for chronic pain of right ankle.     HPI/ROS  LIMITATION TO HISTORY   Select: : None  OUTSIDE HISTORIAN(S):  None    Milton Aguila is a 58 y.o. male, with a history of rheumatoid arthritis and hemochromatosis, who presents to the ED for evaluation of hives onset 3 - 4 days ago. He notes that the symptoms started with throat itching and he woke up the following morning with hives along his chest and lower extremities. He has taken Benadryl and prednisone for his symptoms with moderate and temporary alleviation of his symptoms. He reports taking valacyclovir for concerns of shingles, which he has since discontinued. He additionally notes some acid reflux symptoms. He was encouraged to report to the ED by a nurse hotline given the persistence of his symptoms. Patient states that he was recently started on Relafen for his rheumatoid arthritis and notes that his symptoms started around the time he started this medication. No additional pain or symptoms noted at this time.    PAST MEDICAL HISTORY  Past Medical History:   Diagnosis Date    Arthritis, rheumatic, acute or subacute        SURGICAL HISTORY  Past Surgical History:   Procedure Laterality Date    LUMBAR LAMINECTOMY DISKECTOMY  2/7/2013    Performed by Mackenzie Cameron M.D. at SURGERY [facility]    APPENDECTOMY  2000    INGUINAL HERNIA REPAIR      Hernia Repair, Inguinal    OTHER ORTHOPEDIC SURGERY      ivy hip replacements       FAMILY HISTORY  None noted.     SOCIAL HISTORY   reports that he has never smoked. He has never used  "smokeless tobacco. He reports current alcohol use. He reports that he does not use drugs.    CURRENT MEDICATIONS  Discharge Medication List as of 3/8/2024 10:15 AM        CONTINUE these medications which have NOT CHANGED    Details   nabumetone (RELAFEN) 500 MG Tab Take 500 mg by mouth every day., Historical Med      Etanercept (ENBREL SURECLICK) 50 MG/ML Solution Auto-injector INJECT 1 PEN UNDER THE SKIN EVERY 7 DAYS., Disp-12 Each, R-1, Normal      ALPRAZolam (XANAX) 1 MG Tab Historical Med      MOUNJARO 15 MG/0.5ML Solution Pen-injector INJECT 15 MG UNDER THE SKIN ONE DAY A WEEK, KIM, Historical Med      TESTOSTERONE by Does not apply route., Historical Med             ALLERGIES  No Known Allergies     PHYSICAL EXAM  BP 96/73   Pulse (!) 103   Temp 36.4 °C (97.5 °F)   Resp 16   Ht 1.778 m (5' 10\")   Wt 89 kg (196 lb 3.4 oz)   SpO2 97%   BMI 28.15 kg/m²      Nursing note and vitals reviewed.  Constitutional: No distress.   HENT: Head is atraumatic. Oropharynx is moist.  No tongue or throat swelling.  No mucous membrane involvement.  Eyes: Conjunctivae are normal. Pupils are equal, round, and reactive to light.   Cardiovascular: Normal peripheral perfusion  Respiratory: No respiratory distress.   Musculoskeletal: Normal range of motion.   Neurological: Alert. No focal deficits noted.    Skin: Diffuse erythema along the torso with bilateral lower extremity hives. No mucous membrane involvement.   Psych: Appropriate for clinical situation    INITIAL ASSESSMENT AND PLAN    9:19 AM - Patient was evaluated at bedside. Patient will be treated with Benadryl capsule 50 mg and prednisone 20 mg.     COURSE AND MEDICAL DECISION MAKING    9:42 AM - Upon reassessment, patient is resting comfortably with stable vital signs. No new complaints at this time. He is feeling improved following treatment with Benadryl and prednisone.     Patient presents today for diffuse hives and itching along his chest and bilateral legs " following initiation of Relafen for his rheumatoid arthritis. The hives and itching improved following prednisone and Benadryl treatment. Physical exam is consistent with hives. Given this, and reported improvement with antihistamine and steroid treatment, patient's symptoms are likely secondary to a medication reaction. His vitals are stable. Discussed plan for discharge.     DISPOSITION AND DISCUSSIONS    I have discussed management of the patient with the following physicians and TIFFANIE's:  None    Discussion of management with other HP or appropriate source(s): None     Escalation of care considered, and ultimately not performed: blood analysis.    Barriers to care at this time, including but not limited to:  None .     Decision tools and prescription drugs considered including, but not limited to: Antivirals not indicated at this time .    The patient will return for new or worsening symptoms and is stable at the time of discharge.    The patient is referred to a primary physician for blood pressure management, diabetic screening, and for all other preventative health concerns.    DISPOSITION:  Patient will be discharged home in stable condition.    FOLLOW UP:  Paolo Barnhart M.D.  [id number] [address] [phone]    Schedule an appointment as soon as possible for a visit       [facility], Emergency Dept  [address] [address] [phone]    If symptoms worsen      OUTPATIENT MEDICATIONS:  Discharge Medication List as of 3/8/2024 10:15 AM        START taking these medications    Details   predniSONE (DELTASONE) 20 MG Tab Take 3 Tablets by mouth every day for 5 days., Disp-15 Tablet, R-0, Normal             FINAL DIAGNOSIS  1. Urticaria    2. Allergic reaction, initial encounter         I, Prema Benjamin (Scribe), am scribing for, and in the presence of, Julio Devine M.D..    Electronically signed by: Prema Benjamin (Scribe),  3/8/2024    IJulio M.D. personally performed the services described in this documentation, as scribed by Prema Benjamin in my presence, and it is both accurate and complete.      The note accurately reflects work and decisions made by me.  Julio Devine M.D.  3/8/2024  2:22 PM

## 2024-03-10 ENCOUNTER — OUTPATIENT INFUSION SERVICES (OUTPATIENT)
Dept: ONCOLOGY | Facility: MEDICAL CENTER | Age: 59
End: 2024-03-10
Attending: INTERNAL MEDICINE
Payer: COMMERCIAL

## 2024-03-10 VITALS
SYSTOLIC BLOOD PRESSURE: 137 MMHG | TEMPERATURE: 97.6 F | HEART RATE: 108 BPM | DIASTOLIC BLOOD PRESSURE: 85 MMHG | RESPIRATION RATE: 18 BRPM | OXYGEN SATURATION: 96 % | BODY MASS INDEX: 28.77 KG/M2 | WEIGHT: 194.22 LBS | HEIGHT: 69 IN

## 2024-03-10 DIAGNOSIS — E83.110 HEREDITARY HEMOCHROMATOSIS (HCC): ICD-10-CM

## 2024-03-10 LAB
FERRITIN SERPL-MCNC: 1530 NG/ML (ref 22–322)
HCT VFR BLD AUTO: 40.3 % (ref 42–52)
HGB BLD-MCNC: 14.8 G/DL (ref 14–18)

## 2024-03-10 PROCEDURE — 700105 HCHG RX REV CODE 258: Performed by: INTERNAL MEDICINE

## 2024-03-10 PROCEDURE — 85014 HEMATOCRIT: CPT

## 2024-03-10 PROCEDURE — 85018 HEMOGLOBIN: CPT

## 2024-03-10 PROCEDURE — 82728 ASSAY OF FERRITIN: CPT

## 2024-03-10 PROCEDURE — 99195 PHLEBOTOMY: CPT

## 2024-03-10 RX ORDER — SODIUM CHLORIDE 9 MG/ML
500 INJECTION, SOLUTION INTRAVENOUS ONCE
Status: COMPLETED | OUTPATIENT
Start: 2024-03-10 | End: 2024-03-10

## 2024-03-10 RX ORDER — SODIUM CHLORIDE 9 MG/ML
500 INJECTION, SOLUTION INTRAVENOUS ONCE
Status: CANCELLED | OUTPATIENT
Start: 2024-03-10 | End: 2024-03-10

## 2024-03-10 RX ORDER — SODIUM CHLORIDE 9 MG/ML
500 INJECTION, SOLUTION INTRAVENOUS ONCE
Status: CANCELLED
Start: 2024-03-10 | End: 2024-03-10

## 2024-03-10 RX ADMIN — SODIUM CHLORIDE 500 ML: 9 INJECTION, SOLUTION INTRAVENOUS at 16:42

## 2024-03-10 ASSESSMENT — FIBROSIS 4 INDEX: FIB4 SCORE: 1.467615570751022021

## 2024-03-10 NOTE — PROGRESS NOTES
Pt presented to [facility] for therapeutic phlebotomy. Established PIV in LAC; brisk blood return observed and pt tolerated well. Labs drawn per orders and were within treatable parameters. 500 mLs of whole blood removed and pt tolerated well. 500 mls NS infused after with no s/s of adverse reactions. Orthostatic vitals stable. PIV flushed with brisk blood return observed and removed; gauze/coband dressing applied. Next appointment confirmedand education provided. Pt discharged to self care with all personal belongings and in NAD.

## 2024-03-12 ENCOUNTER — HOSPITAL ENCOUNTER (OUTPATIENT)
Dept: HEMATOLOGY ONCOLOGY | Facility: MEDICAL CENTER | Age: 59
End: 2024-03-12
Attending: INTERNAL MEDICINE
Payer: COMMERCIAL

## 2024-03-12 VITALS — BODY MASS INDEX: 28.73 KG/M2 | WEIGHT: 194 LBS | HEIGHT: 69 IN

## 2024-03-12 DIAGNOSIS — E83.110 HEREDITARY HEMOCHROMATOSIS (HCC): ICD-10-CM

## 2024-03-12 DIAGNOSIS — M05.79 RHEUMATOID ARTHRITIS INVOLVING MULTIPLE SITES WITH POSITIVE RHEUMATOID FACTOR (HCC): ICD-10-CM

## 2024-03-12 PROCEDURE — 99213 OFFICE O/P EST LOW 20 MIN: CPT | Mod: 95 | Performed by: INTERNAL MEDICINE

## 2024-03-12 ASSESSMENT — FIBROSIS 4 INDEX: FIB4 SCORE: 1.467615570751022021

## 2024-03-17 ENCOUNTER — OUTPATIENT INFUSION SERVICES (OUTPATIENT)
Dept: ONCOLOGY | Facility: MEDICAL CENTER | Age: 59
End: 2024-03-17
Attending: INTERNAL MEDICINE
Payer: COMMERCIAL

## 2024-03-17 VITALS
DIASTOLIC BLOOD PRESSURE: 62 MMHG | TEMPERATURE: 98.3 F | WEIGHT: 197.31 LBS | HEART RATE: 114 BPM | SYSTOLIC BLOOD PRESSURE: 97 MMHG | HEIGHT: 69 IN | OXYGEN SATURATION: 94 % | RESPIRATION RATE: 18 BRPM | BODY MASS INDEX: 29.22 KG/M2

## 2024-03-17 DIAGNOSIS — E83.110 HEREDITARY HEMOCHROMATOSIS (HCC): ICD-10-CM

## 2024-03-17 LAB
FERRITIN SERPL-MCNC: 1754 NG/ML (ref 22–322)
HCT VFR BLD AUTO: 43.9 % (ref 42–52)
HGB BLD-MCNC: 15.8 G/DL (ref 14–18)

## 2024-03-17 PROCEDURE — 85014 HEMATOCRIT: CPT

## 2024-03-17 PROCEDURE — 85018 HEMOGLOBIN: CPT

## 2024-03-17 PROCEDURE — 700105 HCHG RX REV CODE 258: Performed by: INTERNAL MEDICINE

## 2024-03-17 PROCEDURE — 99195 PHLEBOTOMY: CPT

## 2024-03-17 PROCEDURE — 82728 ASSAY OF FERRITIN: CPT

## 2024-03-17 RX ORDER — SODIUM CHLORIDE 9 MG/ML
500 INJECTION, SOLUTION INTRAVENOUS ONCE
Status: CANCELLED | OUTPATIENT
Start: 2024-03-17 | End: 2024-03-17

## 2024-03-17 RX ORDER — SODIUM CHLORIDE 9 MG/ML
500 INJECTION, SOLUTION INTRAVENOUS ONCE
Status: CANCELLED
Start: 2024-03-17 | End: 2024-03-17

## 2024-03-17 RX ORDER — SODIUM CHLORIDE 9 MG/ML
500 INJECTION, SOLUTION INTRAVENOUS ONCE
Status: COMPLETED | OUTPATIENT
Start: 2024-03-17 | End: 2024-03-17

## 2024-03-17 RX ADMIN — SODIUM CHLORIDE 500 ML: 9 INJECTION, SOLUTION INTRAVENOUS at 16:52

## 2024-03-17 ASSESSMENT — FIBROSIS 4 INDEX: FIB4 SCORE: 1.467615570751022021

## 2024-03-18 NOTE — PROGRESS NOTES
Tyler arrived to the Infusion Center for labs and possible TP ambulatory. Pt denies changes to health, medication or allergies. POC reviewed.    IV started in R AC x 1 attempt, brisk blood return noted, labs drawn off line, Tyler tolerated well.    Lab results reviewed, Hgb 15.8, within parameters for TP. 500 ml whole blood phlebotomized per MD order, Tyler tolerated well. Post hydration infused over 30 minutes, and was well tolerated.     After 30 minutes, orthostatic VSS. Pt denies lightheadedness, dizziness, CP or SOB. IV removed, tip intact/sterile gauze and coban applied.    Confirmed next appointment and Tyler was discharged home in no acute distress.

## 2024-03-24 ENCOUNTER — OUTPATIENT INFUSION SERVICES (OUTPATIENT)
Dept: ONCOLOGY | Facility: MEDICAL CENTER | Age: 59
End: 2024-03-24
Attending: INTERNAL MEDICINE
Payer: COMMERCIAL

## 2024-03-24 VITALS
SYSTOLIC BLOOD PRESSURE: 120 MMHG | BODY MASS INDEX: 28.83 KG/M2 | WEIGHT: 194.67 LBS | RESPIRATION RATE: 18 BRPM | TEMPERATURE: 98 F | HEIGHT: 69 IN | OXYGEN SATURATION: 97 % | DIASTOLIC BLOOD PRESSURE: 89 MMHG | HEART RATE: 92 BPM

## 2024-03-24 DIAGNOSIS — E83.110 HEREDITARY HEMOCHROMATOSIS (HCC): ICD-10-CM

## 2024-03-24 LAB
FERRITIN SERPL-MCNC: 1450 NG/ML (ref 22–322)
HCT VFR BLD AUTO: 38.6 % (ref 42–52)
HGB BLD-MCNC: 14 G/DL (ref 14–18)

## 2024-03-24 PROCEDURE — 85014 HEMATOCRIT: CPT

## 2024-03-24 PROCEDURE — 85018 HEMOGLOBIN: CPT

## 2024-03-24 PROCEDURE — 99195 PHLEBOTOMY: CPT

## 2024-03-24 PROCEDURE — 96360 HYDRATION IV INFUSION INIT: CPT

## 2024-03-24 PROCEDURE — 82728 ASSAY OF FERRITIN: CPT

## 2024-03-24 PROCEDURE — 700105 HCHG RX REV CODE 258: Performed by: INTERNAL MEDICINE

## 2024-03-24 RX ORDER — SODIUM CHLORIDE 9 MG/ML
500 INJECTION, SOLUTION INTRAVENOUS ONCE
Status: COMPLETED | OUTPATIENT
Start: 2024-03-24 | End: 2024-03-24

## 2024-03-24 RX ORDER — SODIUM CHLORIDE 9 MG/ML
500 INJECTION, SOLUTION INTRAVENOUS ONCE
Status: CANCELLED | OUTPATIENT
Start: 2024-03-24 | End: 2024-03-24

## 2024-03-24 RX ORDER — SODIUM CHLORIDE 9 MG/ML
500 INJECTION, SOLUTION INTRAVENOUS ONCE
Status: CANCELLED
Start: 2024-03-24 | End: 2024-03-24

## 2024-03-24 RX ADMIN — SODIUM CHLORIDE 500 ML: 9 INJECTION, SOLUTION INTRAVENOUS at 16:45

## 2024-03-24 ASSESSMENT — FIBROSIS 4 INDEX: FIB4 SCORE: 1.467615570751022021

## 2024-03-24 NOTE — PROGRESS NOTES
Pt presented to Infusion for Therapeutic Phlebotomy. POC discussed and pt verbalized understanding. PIV placed without difficulty; brisk blood return noted, line flushes with ease and labs drawn. Pt tolerated well. Hgb 14.0 and Hct 38.6. Pt meets parameters for phlebotomy. 500 ml whole blood removed per [facility] policy and pt tolerated well. 500 mL bolus given post-TP. Orthostatic VS taken and pt within defined parameters to discharge home. Pt denies s/s of syncope, dizziness, lightheadedness, chest pain, and reports feels well enough to discharge home. Educated pt on when to seek provider evaluation for symptoms and pt verbalized understanding. PIV flushed, removed with tip intact and site covered with sterile gauze/coban. Pt confirmed next appt and discharged to self care. Pt in NAD at time of discharge.

## 2024-03-31 ENCOUNTER — OUTPATIENT INFUSION SERVICES (OUTPATIENT)
Dept: ONCOLOGY | Facility: MEDICAL CENTER | Age: 59
End: 2024-03-31
Attending: INTERNAL MEDICINE
Payer: COMMERCIAL

## 2024-03-31 VITALS
RESPIRATION RATE: 18 BRPM | BODY MASS INDEX: 27.49 KG/M2 | TEMPERATURE: 97.8 F | OXYGEN SATURATION: 98 % | SYSTOLIC BLOOD PRESSURE: 127 MMHG | HEART RATE: 98 BPM | HEIGHT: 70 IN | WEIGHT: 192.02 LBS | DIASTOLIC BLOOD PRESSURE: 91 MMHG

## 2024-03-31 DIAGNOSIS — E83.110 HEREDITARY HEMOCHROMATOSIS (HCC): ICD-10-CM

## 2024-03-31 LAB
FERRITIN SERPL-MCNC: 1156 NG/ML (ref 22–322)
HCT VFR BLD AUTO: 39.5 % (ref 42–52)
HGB BLD-MCNC: 14.5 G/DL (ref 14–18)

## 2024-03-31 PROCEDURE — 96360 HYDRATION IV INFUSION INIT: CPT

## 2024-03-31 PROCEDURE — 700105 HCHG RX REV CODE 258: Performed by: INTERNAL MEDICINE

## 2024-03-31 PROCEDURE — 99195 PHLEBOTOMY: CPT

## 2024-03-31 RX ORDER — SODIUM CHLORIDE 9 MG/ML
500 INJECTION, SOLUTION INTRAVENOUS ONCE
Status: COMPLETED | OUTPATIENT
Start: 2024-03-31 | End: 2024-03-31

## 2024-03-31 RX ORDER — SODIUM CHLORIDE 9 MG/ML
500 INJECTION, SOLUTION INTRAVENOUS ONCE
Start: 2024-03-31 | End: 2024-03-31

## 2024-03-31 RX ORDER — SODIUM CHLORIDE 9 MG/ML
500 INJECTION, SOLUTION INTRAVENOUS ONCE
OUTPATIENT
Start: 2024-03-31 | End: 2024-03-31

## 2024-03-31 RX ADMIN — SODIUM CHLORIDE 500 ML: 9 INJECTION, SOLUTION INTRAVENOUS at 16:50

## 2024-03-31 ASSESSMENT — FIBROSIS 4 INDEX: FIB4 SCORE: 1.467615570751022021

## 2024-04-01 ENCOUNTER — OFFICE VISIT (OUTPATIENT)
Dept: RHEUMATOLOGY | Facility: MEDICAL CENTER | Age: 59
End: 2024-04-01
Attending: INTERNAL MEDICINE
Payer: COMMERCIAL

## 2024-04-01 VITALS
SYSTOLIC BLOOD PRESSURE: 122 MMHG | HEART RATE: 98 BPM | TEMPERATURE: 99.2 F | BODY MASS INDEX: 28.06 KG/M2 | DIASTOLIC BLOOD PRESSURE: 70 MMHG | WEIGHT: 196 LBS | RESPIRATION RATE: 12 BRPM | OXYGEN SATURATION: 97 %

## 2024-04-01 DIAGNOSIS — Z51.81 ENCOUNTER FOR MONITORING OF ETANERCEPT THERAPY: ICD-10-CM

## 2024-04-01 DIAGNOSIS — Z79.52 LONG TERM CURRENT USE OF SYSTEMIC STEROIDS: ICD-10-CM

## 2024-04-01 DIAGNOSIS — E83.119 HEMOCHROMATOSIS, UNSPECIFIED HEMOCHROMATOSIS TYPE: ICD-10-CM

## 2024-04-01 DIAGNOSIS — Z79.620 ENCOUNTER FOR MONITORING OF ETANERCEPT THERAPY: ICD-10-CM

## 2024-04-01 DIAGNOSIS — M05.79 RHEUMATOID ARTHRITIS INVOLVING MULTIPLE SITES WITH POSITIVE RHEUMATOID FACTOR (HCC): ICD-10-CM

## 2024-04-01 DIAGNOSIS — Z79.1 ENCOUNTER FOR LONG-TERM (CURRENT) USE OF NSAIDS: ICD-10-CM

## 2024-04-01 PROCEDURE — 3074F SYST BP LT 130 MM HG: CPT | Performed by: INTERNAL MEDICINE

## 2024-04-01 PROCEDURE — 99214 OFFICE O/P EST MOD 30 MIN: CPT | Performed by: INTERNAL MEDICINE

## 2024-04-01 PROCEDURE — 3078F DIAST BP <80 MM HG: CPT | Performed by: INTERNAL MEDICINE

## 2024-04-01 PROCEDURE — 99211 OFF/OP EST MAY X REQ PHY/QHP: CPT

## 2024-04-01 RX ORDER — SULINDAC 200 MG/1
TABLET ORAL
Qty: 180 TABLET | Refills: 0 | Status: SHIPPED | OUTPATIENT
Start: 2024-04-01

## 2024-04-01 ASSESSMENT — FIBROSIS 4 INDEX: FIB4 SCORE: 1.467615570751022021

## 2024-04-01 ASSESSMENT — JOINT PAIN
TOTAL NUMBER OF TENDER JOINTS: 2
TOTAL NUMBER OF SWOLLEN JOINTS: 0

## 2024-04-01 NOTE — PROGRESS NOTES
"Patient to [facility] for TP. PIV inserted into left AC, flushed with + blood return, labs drawn. HGB 14.5 patient meets parameter for TP. 500ml of whole blood removed. 500ml of NS infused as a bolus. Orthostatic VS taken and WNL. BP (!) 127/91   Pulse 98   Temp 36.6 °C (97.8 °F) (Temporal)   Resp 18   Ht 1.78 m (5' 10.08\")   Wt 87.1 kg (192 lb 0.3 oz)   SpO2 98%   BMI 27.49 kg/m²   PIV removed. Patient to home.   "

## 2024-04-01 NOTE — PROGRESS NOTES
Chief Complaint- joint pain     Subjective:   Milton Aguila is a 58 y.o. male here today for follow up of rheumatological issues    This is a follow-up visit for this patient who we see in this clinic for serologically positive rheumatoid arthritis.  Patient is currently on Enbrel 50 mg subcu every week and Advil.  Patient comes in today stating he still having some problems with his MCP joints, wonders about alternative treatments, we did talk about baricitinib, patient also asked about none clinically available options.  Patient also asks about a new blood test called prism RA advertised to pinpoint the appropriate treatments for patients with rheumatoid arthritis.     Patient also is getting treated for hereditary hemochromatosis currently followed by Dr. Hernandez in hematology and getting phlebotomy every 1 to 2 weeks.  Patient is having some achiness in his hands and thinks it may be the arthritis associated with the hereditary hemochromatosis.      Additional comorbidities include  occasional back issues and sciatica for which he takes a muscle relaxer when necessary, also sees [facility] for tender point injections.      Soc Hx  Pos MJ use       RUQ Abdominal Ultrasound 2/3/2023  IMPRESSION:  1.  Liver cysts in the left lobe and right lobe.  2.  3.4 x 2.7 x 3.1 cm right upper pole renal cortical cyst. Doubtful clinical significance.  3.  Hepatomegaly  4.  Hepatic median shear wave velocity is 1.71 m/sec. Indeterminate for liver fibrosis.     S/p nabumetome-rash  s/p celebrex-ineffective   S/p Rinvoq-lost efficacy with spasming in palmar aspect right hand and right wrist pain  S/p Humira  S/p Plaquenil  S/p MTX-contraindicated because of underlying liver disease secondary to hemochromatosis  S/p leflunamide-contraindicated because of underlying liver disease secondary to hemochromatosis   s/p sulfasalazine-elevated LFTs   S/p xeljanz-patient did not want to try for fear of elevated  cholesterol levels.     Bilateral HERVE     HEREDITARY HEMOCHROMOTOSIS  Order: [id number]        Component 8 d ago   C282Y Mutation Homozygous    H63D Mutation Negative    S65C Mutation Negative    Hemochrom Interp See Note    Comment: Indication for testing: Carrier screening or diagnostic testing   for hereditary hemochromatosis.     Hemochromatosis Interpretive Results:   Homozygous C282Y:   C282Y Homozygous - The patient is homozygous for the HFE C282Y   mutation and is at high risk for hereditary hemochromatosis.   Homozygosity for this mutation accounts for 80-90 percent of the   hemochromatosis patients of [place]  des[place]. The   frequency of this mutation in other populations is lower.   H63D: Negative - This patient is negative for the HFE H63D   mutation.             Quantiferon Gold neg 11/2022  HCV neg 11/2022  HBsAg/HBcAb neg 11/2022  Uric acid 6.7 6/2016 [facility]; Uric acid 7.3 12/2019  RF 14.2 (0-13.9) 10/2007 [facility]; RF 64 3/2018; RF 36 3/2021  CCP 18 ( 0-5) 10/2007 [facility];  3/2018;  3/2021  MRI pelvis 7/2007-indicates advanced inflammatory arthropathy compatible's rheumatoid arthritis involving both hips with hypertrophic synovitis-[facility]  MRI LS spine 9/2009-indicates disc desiccation L3-4, L4-5, and L5-S1, left-sided neural foraminal narrowing at L5-S1, mild central stenosis at L4, and L3-4-[facility]     MRI Right hand/wrist 11/2009-negative, no erosions, no synovitis, no pathology-[facility]      Right Hand x-ray 8/2022-Narrative  X-rays of the right hand 4 view AP lateral oblique and carpal tunnel view   reveal some degenerative changes at the MCP joint of the middle finger.     The wrist joint radiocarpal joint appears to be reasonably   well-maintained.  No acute fracture dislocations noted.     Corticosteroid Therapy Informed Consent reviewed and signed 9/14/2023-copy given to patient      Current  Outpatient Medications   Medication Sig Dispense Refill    sulindac (CLINORIL) 200 MG Tab 1 tab po bid 180 Tablet 0    Etanercept (ENBREL SURECLICK) 50 MG/ML Solution Auto-injector INJECT 1 PEN UNDER THE SKIN EVERY 7 DAYS. 12 Each 1    ALPRAZolam (XANAX) 1 MG Tab       MOUNJARO 15 MG/0.5ML Solution Pen-injector INJECT 15 MG UNDER THE SKIN ONE DAY A WEEK      TESTOSTERONE by Does not apply route.      nabumetone (RELAFEN) 500 MG Tab Take 500 mg by mouth every day.       No current facility-administered medications for this visit.     He  has a past medical history of Arthritis, rheumatic, acute or subacute.    ROS   Other than what is mentioned in HPI or physical exam, there is no history of headaches, double vision or blurred vision.  No trouble swallowing difficulties .  No chest complaints including chest pain, cough or sputum production. No GI complaints including nausea, vomiting, change in bowel habits, or past peptic ulcer disease. No history of blood in the stools. No urinary complaints including dysuria or frequency. No history of alopecia, photosensitivity     Objective:     /70   Pulse 98   Temp 37.3 °C (99.2 °F) (Temporal)   Resp 12   Wt 88.9 kg (196 lb)   SpO2 97%  Body mass index is 28.06 kg/m².   Physical Exam:    Constitutional: Alert and oriented X3, patient is talkative with good eye contact.Skin: Warm, dry, good turgor, no rashes in visible areas.Eye: Equal, round and reactive, conjunctiva clear, lids normal EOM intactENMT: Lips without lesions,  pinna without deformityNeck: Trachea midline, no masses, no thyromegaly.Lymph:  No cervical lymphadenopathy, no axillary lymphadenopathy, no supraclavicular lymphadenopathyRespiratory: Unlabored respiratory effort, lungs clear to auscultation, no wheezes, no ronchi.Cardiovascular: Normal S1, S2, .Abdomen: Soft, non-distended.Psych: Alert and oriented x3, normal affect and mood.Neuro: Cranial nerves 2-12 are grossly intact Ext:no joint laxity  noted in bilateral arms, no joint laxity noted in bilateral legs      Lab Results   Component Value Date/Time    HEPBCORIGM Non-Reactive 02/18/2024 04:21 PM    HEPBSAG Non-Reactive 02/18/2024 04:21 PM     Lab Results   Component Value Date/Time    HEPCAB Non-Reactive 02/18/2024 04:21 PM     Lab Results   Component Value Date/Time    SODIUM 140 02/04/2024 04:21 PM    POTASSIUM 4.1 02/04/2024 04:21 PM    CHLORIDE 104 02/04/2024 04:21 PM    CO2 22 02/04/2024 04:21 PM    GLUCOSE 68 02/04/2024 04:21 PM    BUN 12 02/04/2024 04:21 PM    CREATININE 0.84 02/04/2024 04:21 PM    CREATININE 0.8 02/09/2008 08:50 AM      Lab Results   Component Value Date/Time    WBC 5.0 02/04/2024 04:15 PM    RBC 4.06 (L) 02/04/2024 04:15 PM    HEMOGLOBIN 14.5 03/31/2024 04:20 PM    HEMATOCRIT 39.5 (L) 03/31/2024 04:20 PM    MCV 98.5 (H) 02/04/2024 04:15 PM    MCH 35.2 (H) 02/04/2024 04:15 PM    MCHC 35.8 02/04/2024 04:15 PM    MPV 8.7 (L) 02/04/2024 04:15 PM    NEUTSPOLYS 43.50 (L) 02/04/2024 04:15 PM    LYMPHOCYTES 37.00 02/04/2024 04:15 PM    MONOCYTES 14.70 (H) 02/04/2024 04:15 PM    EOSINOPHILS 3.40 02/04/2024 04:15 PM    BASOPHILS 0.80 02/04/2024 04:15 PM      Lab Results   Component Value Date/Time    CALCIUM 8.7 02/04/2024 04:21 PM    ASTSGOT 33 02/04/2024 04:21 PM    ALTSGPT 30 02/04/2024 04:21 PM    ALKPHOSPHAT 60 02/04/2024 04:21 PM    TBILIRUBIN 0.5 02/04/2024 04:21 PM    ALBUMIN 3.9 02/04/2024 04:21 PM    TOTPROTEIN 6.2 02/04/2024 04:21 PM     Lab Results   Component Value Date/Time    URICACID 5.9 08/16/2021 10:53 AM    RHEUMFACTN 36 (H) 03/11/2021 07:35 AM    CCPANTIBODY 144 (H) 03/11/2021 07:35 AM     Lab Results   Component Value Date/Time    SEDRATEWES 6 06/13/2023 11:26 AM     Lab Results   Component Value Date/Time    HBA1C 5.2 04/16/2022 09:24 AM     Lab Results   Component Value Date/Time    ANTIMITOCHO 4.9 06/28/2019 11:03 AM     Assessment and Plan:     1. Rheumatoid arthritis involving multiple sites with positive  rheumatoid factor (HCC)  - sulindac (CLINORIL) 200 MG Tab; 1 tab po bid  Dispense: 180 Tablet; Refill: 0  Currently on Enbrel 50 mg subcu every week, we did discuss treatment alternatives, patient wants to continue the Enbrel at this time, patient is interested in doing the prism RA test first to see if there might be some insight as to a better treatment option.    2. Encounter for monitoring of etanercept therapy  Currently on Enbrel 50 mg subcu every week, screening labs are up-to-date, next labs due about November 2024, will order at next visit  Patient needs monitoring labs every 6 months, next labs due now, labs ordered for patient  We reviewed risks of biological medications with patient including hematological pathology, cancer risks, neurological and infection issues, patient states understanding.  - sulindac (CLINORIL) 200 MG Tab; 1 tab po bid  Dispense: 180 Tablet; Refill: 0    3. Encounter for long-term (current) use of NSAIDs  Patient uses Advil as needed, we opted do a trial of sulindac 200 mg p.o. twice daily as needed with food to see if we might help some of the polyarthralgias, patient will need monitoring labs about every 6 months, patient is due for CMP now, CMP ordered for patient  - sulindac (CLINORIL) 200 MG Tab; 1 tab po bid  Dispense: 180 Tablet; Refill: 0    4. Long term current use of systemic steroids  Patient uses intermittently as needed  Corticosteroid Therapy Informed Consent reviewed and signed 9/14/2023-copy given to patient   - sulindac (CLINORIL) 200 MG Tab; 1 tab po bid  Dispense: 180 Tablet; Refill: 0    5. Hemochromatosis, unspecified hemochromatosis type  Patient gets regular phlebotomy through hematology/oncology  - sulindac (CLINORIL) 200 MG Tab; 1 tab po bid  Dispense: 180 Tablet; Refill: 0    Followup: Return in about 6 months (around 10/1/2024). or sooner prn      Please note that this dictation was created using voice recognition software. I have made every reasonable  attempt to correct obvious errors, but I expect that there are errors of grammar and possibly content that I did not discover before finalizing the note.

## 2024-04-07 ENCOUNTER — OUTPATIENT INFUSION SERVICES (OUTPATIENT)
Dept: ONCOLOGY | Facility: MEDICAL CENTER | Age: 59
End: 2024-04-07
Attending: INTERNAL MEDICINE
Payer: COMMERCIAL

## 2024-04-07 VITALS
DIASTOLIC BLOOD PRESSURE: 88 MMHG | TEMPERATURE: 98.3 F | WEIGHT: 193.56 LBS | SYSTOLIC BLOOD PRESSURE: 121 MMHG | BODY MASS INDEX: 27.71 KG/M2 | HEART RATE: 89 BPM | RESPIRATION RATE: 18 BRPM | HEIGHT: 70 IN | OXYGEN SATURATION: 97 %

## 2024-04-07 DIAGNOSIS — E83.110 HEREDITARY HEMOCHROMATOSIS (HCC): ICD-10-CM

## 2024-04-07 LAB
FERRITIN SERPL-MCNC: 1256 NG/ML (ref 22–322)
HCT VFR BLD AUTO: 40.7 % (ref 42–52)
HGB BLD-MCNC: 14.9 G/DL (ref 14–18)

## 2024-04-07 PROCEDURE — 96360 HYDRATION IV INFUSION INIT: CPT

## 2024-04-07 PROCEDURE — 85014 HEMATOCRIT: CPT

## 2024-04-07 PROCEDURE — 82728 ASSAY OF FERRITIN: CPT

## 2024-04-07 PROCEDURE — 85018 HEMOGLOBIN: CPT

## 2024-04-07 PROCEDURE — 700105 HCHG RX REV CODE 258: Performed by: INTERNAL MEDICINE

## 2024-04-07 PROCEDURE — 99195 PHLEBOTOMY: CPT

## 2024-04-07 RX ORDER — SODIUM CHLORIDE 9 MG/ML
500 INJECTION, SOLUTION INTRAVENOUS ONCE
Status: CANCELLED | OUTPATIENT
Start: 2024-04-07 | End: 2024-04-07

## 2024-04-07 RX ORDER — SODIUM CHLORIDE 9 MG/ML
500 INJECTION, SOLUTION INTRAVENOUS ONCE
Status: COMPLETED | OUTPATIENT
Start: 2024-04-07 | End: 2024-04-07

## 2024-04-07 RX ORDER — SODIUM CHLORIDE 9 MG/ML
500 INJECTION, SOLUTION INTRAVENOUS ONCE
Status: CANCELLED
Start: 2024-04-07 | End: 2024-04-07

## 2024-04-07 RX ADMIN — SODIUM CHLORIDE 500 ML: 9 INJECTION, SOLUTION INTRAVENOUS at 16:52

## 2024-04-07 ASSESSMENT — FIBROSIS 4 INDEX: FIB4 SCORE: 1.467615570751022021

## 2024-04-07 NOTE — PROGRESS NOTES
Pt presented to Infusion for Therapeutic Phlebotomy. POC discussed and pt verbalized understanding. PIV placed without difficulty; brisk blood return noted, line flushes with ease and labs drawn. Pt tolerated well. Hgb 14.9 and Hct 40.7. Pt meets parameters for phlebotomy. 500 ml whole blood removed per [facility] policy and pt tolerated well. Pt remained seated for 30 mins, 500 ml NS bolus administered per orders during this time. Orthostatic VS taken and pt within defined parameters to discharge home. Pt denies s/s of syncope, dizziness, lightheadedness, chest pain, and reports feels well enough to discharge home. Educated pt on when to seek provider evaluation for symptoms and pt verbalized understanding. PIV flushed, removed with tip intact and site covered with sterile gauze/coban. Pt confirmed next appt and discharged to self care. Pt in NAD at time of discharge.

## 2024-04-14 ENCOUNTER — APPOINTMENT (OUTPATIENT)
Dept: ONCOLOGY | Facility: MEDICAL CENTER | Age: 59
End: 2024-04-14
Attending: INTERNAL MEDICINE
Payer: COMMERCIAL

## 2024-04-21 ENCOUNTER — TELEPHONE (OUTPATIENT)
Dept: ONCOLOGY | Facility: MEDICAL CENTER | Age: 59
End: 2024-04-21

## 2024-04-22 ENCOUNTER — OUTPATIENT INFUSION SERVICES (OUTPATIENT)
Dept: ONCOLOGY | Facility: MEDICAL CENTER | Age: 59
End: 2024-04-22
Attending: INTERNAL MEDICINE
Payer: COMMERCIAL

## 2024-04-22 VITALS
OXYGEN SATURATION: 96 % | HEIGHT: 70 IN | RESPIRATION RATE: 18 BRPM | WEIGHT: 192.24 LBS | TEMPERATURE: 98.3 F | DIASTOLIC BLOOD PRESSURE: 84 MMHG | SYSTOLIC BLOOD PRESSURE: 120 MMHG | BODY MASS INDEX: 27.52 KG/M2 | HEART RATE: 81 BPM

## 2024-04-22 DIAGNOSIS — E83.110 HEREDITARY HEMOCHROMATOSIS (HCC): ICD-10-CM

## 2024-04-22 LAB
FERRITIN SERPL-MCNC: 1565 NG/ML (ref 22–322)
HCT VFR BLD AUTO: 42.2 % (ref 42–52)
HGB BLD-MCNC: 15.2 G/DL (ref 14–18)

## 2024-04-22 PROCEDURE — 96360 HYDRATION IV INFUSION INIT: CPT

## 2024-04-22 PROCEDURE — 82728 ASSAY OF FERRITIN: CPT

## 2024-04-22 PROCEDURE — 85018 HEMOGLOBIN: CPT

## 2024-04-22 PROCEDURE — 85014 HEMATOCRIT: CPT

## 2024-04-22 PROCEDURE — 700105 HCHG RX REV CODE 258: Performed by: INTERNAL MEDICINE

## 2024-04-22 PROCEDURE — 99195 PHLEBOTOMY: CPT

## 2024-04-22 RX ORDER — SODIUM CHLORIDE 9 MG/ML
500 INJECTION, SOLUTION INTRAVENOUS ONCE
Start: 2024-04-22 | End: 2024-04-22

## 2024-04-22 RX ORDER — SODIUM CHLORIDE 9 MG/ML
500 INJECTION, SOLUTION INTRAVENOUS ONCE
Status: COMPLETED | OUTPATIENT
Start: 2024-04-22 | End: 2024-04-22

## 2024-04-22 RX ORDER — SODIUM CHLORIDE 9 MG/ML
500 INJECTION, SOLUTION INTRAVENOUS ONCE
OUTPATIENT
Start: 2024-04-22 | End: 2024-04-22

## 2024-04-22 RX ADMIN — SODIUM CHLORIDE 500 ML: 9 INJECTION, SOLUTION INTRAVENOUS at 16:09

## 2024-04-22 ASSESSMENT — FIBROSIS 4 INDEX: FIB4 SCORE: 1.467615570751022021

## 2024-04-22 NOTE — PROGRESS NOTES
Pt arrived ambulatory to IS for TP. POC discussed. PIV started to RAC, blood return noted and labs drawn as ordered. Results reviewed, pt appropriate for treatment today. 500 cc whole blood removed as ordered. Bolus infused, pt tolerated well. Orthostatic VS are WNL. PIV flushed, removed, and site covered with gauze and coban. Next appointment confirmed (Dr. David mccallum with this appt being one day early). Pt discharged from IS in NAD under self care.

## 2024-04-28 ENCOUNTER — APPOINTMENT (OUTPATIENT)
Dept: ONCOLOGY | Facility: MEDICAL CENTER | Age: 59
End: 2024-04-28
Attending: INTERNAL MEDICINE
Payer: COMMERCIAL

## 2024-04-29 ENCOUNTER — TELEPHONE (OUTPATIENT)
Dept: RHEUMATOLOGY | Facility: MEDICAL CENTER | Age: 59
End: 2024-04-29
Payer: COMMERCIAL

## 2024-04-29 ENCOUNTER — APPOINTMENT (OUTPATIENT)
Dept: RHEUMATOLOGY | Facility: MEDICAL CENTER | Age: 59
End: 2024-04-29
Attending: INTERNAL MEDICINE
Payer: COMMERCIAL

## 2024-04-29 DIAGNOSIS — Z79.620 ENCOUNTER FOR MONITORING OF ETANERCEPT THERAPY: ICD-10-CM

## 2024-04-29 DIAGNOSIS — M05.79 RHEUMATOID ARTHRITIS INVOLVING MULTIPLE SITES WITH POSITIVE RHEUMATOID FACTOR (HCC): ICD-10-CM

## 2024-04-29 DIAGNOSIS — Z51.81 ENCOUNTER FOR MONITORING OF ETANERCEPT THERAPY: ICD-10-CM

## 2024-04-29 DIAGNOSIS — Z79.52 LONG TERM CURRENT USE OF SYSTEMIC STEROIDS: ICD-10-CM

## 2024-04-29 RX ORDER — MEDROXYPROGESTERONE ACETATE 150 MG/ML
INJECTION, SUSPENSION INTRAMUSCULAR
Qty: 12 EACH | Refills: 0 | Status: SHIPPED | OUTPATIENT
Start: 2024-04-29

## 2024-04-29 NOTE — TELEPHONE ENCOUNTER
Received Refill PA request via [facility]  for ENBREL SURECLICK 50 MG/ML Solution Auto-injector. (Quantity:12, Day Supply:90)     Insurance: [employer] [place]  Member ID:  [id number]  BIN: [id number]  PCN: [id number]  Group: [id number]     Ran Test claim via [facility]  PRODUCT/SERVICE NOT APPROPRIATE FOR THIS LOCATION PRIOR AUTHORIZATION EXPIRES ON 07/31/24

## 2024-04-29 NOTE — TELEPHONE ENCOUNTER
Received request via: Pharmacy    Was the patient seen in the last year in this department? Yes    Does the patient have an active prescription (recently filled or refills available) for medication(s) requested? No    [facility]    Does the patient have [facility] and need 100 day supply (blood pressure, diabetes and cholesterol meds only)? Medication is not for cholesterol, blood pressure or diabetes

## 2024-05-05 ENCOUNTER — OUTPATIENT INFUSION SERVICES (OUTPATIENT)
Dept: ONCOLOGY | Facility: MEDICAL CENTER | Age: 59
End: 2024-05-05
Attending: INTERNAL MEDICINE
Payer: COMMERCIAL

## 2024-05-05 VITALS
BODY MASS INDEX: 27.58 KG/M2 | RESPIRATION RATE: 18 BRPM | DIASTOLIC BLOOD PRESSURE: 102 MMHG | OXYGEN SATURATION: 98 % | WEIGHT: 192.68 LBS | HEART RATE: 93 BPM | SYSTOLIC BLOOD PRESSURE: 138 MMHG | HEIGHT: 70 IN | TEMPERATURE: 99 F

## 2024-05-05 DIAGNOSIS — E83.110 HEREDITARY HEMOCHROMATOSIS (HCC): ICD-10-CM

## 2024-05-05 LAB
FERRITIN SERPL-MCNC: 1258 NG/ML (ref 22–322)
HCT VFR BLD AUTO: 43.6 % (ref 42–52)
HGB BLD-MCNC: 15.6 G/DL (ref 14–18)

## 2024-05-05 RX ORDER — SODIUM CHLORIDE 9 MG/ML
500 INJECTION, SOLUTION INTRAVENOUS ONCE
Status: CANCELLED | OUTPATIENT
Start: 2024-05-05 | End: 2024-05-05

## 2024-05-05 RX ORDER — SODIUM CHLORIDE 9 MG/ML
500 INJECTION, SOLUTION INTRAVENOUS ONCE
Status: COMPLETED | OUTPATIENT
Start: 2024-05-05 | End: 2024-05-05

## 2024-05-05 RX ORDER — SODIUM CHLORIDE 9 MG/ML
500 INJECTION, SOLUTION INTRAVENOUS ONCE
Status: CANCELLED
Start: 2024-05-05 | End: 2024-05-05

## 2024-05-05 RX ADMIN — SODIUM CHLORIDE 500 ML: 9 INJECTION, SOLUTION INTRAVENOUS at 11:55

## 2024-05-05 ASSESSMENT — FIBROSIS 4 INDEX: FIB4 SCORE: 1.467615570751022021

## 2024-05-05 NOTE — PROGRESS NOTES
Tyler presents to IS for therapeutic phlebotomy.  PIV placed to right FA, labs collected and reviewed.  Hgb 15.6, parameters met for TP. 500 cc whole blood removed, followed by NS bolus 500 cc administered per MD order.   Tyler tolerated well.  Orthostatic vital signs wnl, see flowesheet.  Tyler denies s/s chest pain, shortness of breath, dizziness or light-headedness after treatment.  PIV flushed and removed, gauze and coban to site.  Tyler discharged in NAD, next appointment confirmed.

## 2024-05-12 ENCOUNTER — OUTPATIENT INFUSION SERVICES (OUTPATIENT)
Dept: ONCOLOGY | Facility: MEDICAL CENTER | Age: 59
End: 2024-05-12
Attending: INTERNAL MEDICINE
Payer: COMMERCIAL

## 2024-05-12 VITALS
RESPIRATION RATE: 18 BRPM | OXYGEN SATURATION: 96 % | BODY MASS INDEX: 28.28 KG/M2 | WEIGHT: 197.53 LBS | TEMPERATURE: 97.7 F | HEART RATE: 80 BPM | HEIGHT: 70 IN | SYSTOLIC BLOOD PRESSURE: 129 MMHG | DIASTOLIC BLOOD PRESSURE: 81 MMHG

## 2024-05-12 DIAGNOSIS — E83.110 HEREDITARY HEMOCHROMATOSIS (HCC): ICD-10-CM

## 2024-05-12 LAB
FERRITIN SERPL-MCNC: 1113 NG/ML (ref 22–322)
HCT VFR BLD AUTO: 41.6 % (ref 42–52)
HGB BLD-MCNC: 15.4 G/DL (ref 14–18)

## 2024-05-12 RX ORDER — SODIUM CHLORIDE 9 MG/ML
500 INJECTION, SOLUTION INTRAVENOUS ONCE
Status: COMPLETED | OUTPATIENT
Start: 2024-05-12 | End: 2024-05-12

## 2024-05-12 RX ORDER — SODIUM CHLORIDE 9 MG/ML
500 INJECTION, SOLUTION INTRAVENOUS ONCE
Status: CANCELLED | OUTPATIENT
Start: 2024-05-12 | End: 2024-05-12

## 2024-05-12 RX ORDER — SODIUM CHLORIDE 9 MG/ML
500 INJECTION, SOLUTION INTRAVENOUS ONCE
Status: CANCELLED
Start: 2024-05-12 | End: 2024-05-12

## 2024-05-12 RX ADMIN — SODIUM CHLORIDE 500 ML: 9 INJECTION, SOLUTION INTRAVENOUS at 16:43

## 2024-05-12 ASSESSMENT — FIBROSIS 4 INDEX: FIB4 SCORE: 1.467615570751022021

## 2024-05-12 NOTE — PROGRESS NOTES
Pt arrived ambulatory to [facility] for weekly H/H, Ferritin labs possible therapeutic phlebotomy for hemochromatosis. POC discussed with pt and he agrees with plan.     PIV established, brisk blood return noted. Labs drawn as ordered. Results reviewed, Hgb 15.4 today. Pt meets parameters for TP. 500ml whole blood phlebotomized. 500ml NS bolus infused after TP as ordered. Pt tolerated treatment without s/s adverse reaction. See flow sheet for orthostatic vital signs. PIV dc'd catheter tip intact, gauze and coban dressing applied.     Pt discharged to [facility] care, [facility]. Pt's next appointment confirmed 5/19/2024.

## 2024-05-19 ENCOUNTER — OUTPATIENT INFUSION SERVICES (OUTPATIENT)
Dept: ONCOLOGY | Facility: MEDICAL CENTER | Age: 59
End: 2024-05-19
Attending: INTERNAL MEDICINE
Payer: COMMERCIAL

## 2024-05-19 VITALS
WEIGHT: 197.09 LBS | SYSTOLIC BLOOD PRESSURE: 128 MMHG | OXYGEN SATURATION: 95 % | RESPIRATION RATE: 18 BRPM | DIASTOLIC BLOOD PRESSURE: 94 MMHG | HEART RATE: 110 BPM | HEIGHT: 70 IN | TEMPERATURE: 97.5 F | BODY MASS INDEX: 28.22 KG/M2

## 2024-05-19 DIAGNOSIS — E83.110 HEREDITARY HEMOCHROMATOSIS (HCC): ICD-10-CM

## 2024-05-19 LAB
FERRITIN SERPL-MCNC: 1148 NG/ML (ref 22–322)
HCT VFR BLD AUTO: 41.9 % (ref 42–52)
HGB BLD-MCNC: 15.3 G/DL (ref 14–18)

## 2024-05-19 RX ORDER — SODIUM CHLORIDE 9 MG/ML
500 INJECTION, SOLUTION INTRAVENOUS ONCE
Status: CANCELLED | OUTPATIENT
Start: 2024-05-19 | End: 2024-05-19

## 2024-05-19 RX ORDER — SODIUM CHLORIDE 9 MG/ML
500 INJECTION, SOLUTION INTRAVENOUS ONCE
Status: COMPLETED | OUTPATIENT
Start: 2024-05-19 | End: 2024-05-19

## 2024-05-19 RX ORDER — SODIUM CHLORIDE 9 MG/ML
500 INJECTION, SOLUTION INTRAVENOUS ONCE
Status: CANCELLED
Start: 2024-05-19 | End: 2024-05-19

## 2024-05-19 RX ADMIN — SODIUM CHLORIDE 500 ML: 9 INJECTION, SOLUTION INTRAVENOUS at 15:21

## 2024-05-19 ASSESSMENT — FIBROSIS 4 INDEX: FIB4 SCORE: 1.467615570751022021

## 2024-05-19 NOTE — PROGRESS NOTES
Pt presented to [facility] for therapeutic phlebotomy. Established PIV in RAC; brisk blood return observed and pt tolerated well. Labs drawn per orders and were within treatable parameters. 500 mLs of whole blood removed and pt tolerated well. 500 mls NS infused after with no s/s of adverse reactions. Orthostatic vitals stable. PIV flushed with brisk blood return observed and removed; gauze/coband dressing applied. Next appointment confirmedand education provided. Pt discharged to self care with all personal belongings and in NAD.

## 2024-05-26 ENCOUNTER — OUTPATIENT INFUSION SERVICES (OUTPATIENT)
Dept: ONCOLOGY | Facility: MEDICAL CENTER | Age: 59
End: 2024-05-26
Attending: INTERNAL MEDICINE
Payer: COMMERCIAL

## 2024-05-26 VITALS
TEMPERATURE: 98.2 F | WEIGHT: 197.53 LBS | RESPIRATION RATE: 18 BRPM | HEIGHT: 70 IN | SYSTOLIC BLOOD PRESSURE: 116 MMHG | HEART RATE: 108 BPM | OXYGEN SATURATION: 97 % | BODY MASS INDEX: 28.28 KG/M2 | DIASTOLIC BLOOD PRESSURE: 86 MMHG

## 2024-05-26 DIAGNOSIS — E83.110 HEREDITARY HEMOCHROMATOSIS (HCC): ICD-10-CM

## 2024-05-26 LAB
FERRITIN SERPL-MCNC: 1018 NG/ML (ref 22–322)
HCT VFR BLD AUTO: 41.3 % (ref 42–52)
HGB BLD-MCNC: 14.6 G/DL (ref 14–18)

## 2024-05-26 RX ORDER — SODIUM CHLORIDE 9 MG/ML
500 INJECTION, SOLUTION INTRAVENOUS ONCE
Start: 2024-05-26 | End: 2024-05-26

## 2024-05-26 RX ORDER — SODIUM CHLORIDE 9 MG/ML
500 INJECTION, SOLUTION INTRAVENOUS ONCE
OUTPATIENT
Start: 2024-05-26 | End: 2024-05-26

## 2024-05-26 RX ORDER — SODIUM CHLORIDE 9 MG/ML
500 INJECTION, SOLUTION INTRAVENOUS ONCE
Status: COMPLETED | OUTPATIENT
Start: 2024-05-26 | End: 2024-05-26

## 2024-05-26 RX ADMIN — SODIUM CHLORIDE 500 ML: 9 INJECTION, SOLUTION INTRAVENOUS at 08:24

## 2024-05-26 ASSESSMENT — FIBROSIS 4 INDEX: FIB4 SCORE: 1.467615570751022021

## 2024-05-26 NOTE — PROGRESS NOTES
Tyler arrived to the Infusion Center for labs and possible TP ambulatory. Pt denies changes to health, medication or allergies. POC reviewed.    IV started in R AC x 1 attempt, brisk blood return noted, labs drawn off line, Tyler tolerated well.    Lab results reviewed, Hgb 14.6, within parameters for TP. 500 ml whole blood phlebotomized per MD order, Tyler tolerated well. Post hydration infused over 30 minutes, and was well tolerated.     Orthostatic VSS. Pt denies lightheadedness, dizziness, CP or SOB. IV removed, tip intact/sterile gauze and coban applied.    Confirmed next appointment and Tyler was discharged home in no acute distress.

## 2024-06-02 ENCOUNTER — APPOINTMENT (OUTPATIENT)
Dept: ONCOLOGY | Facility: MEDICAL CENTER | Age: 59
End: 2024-06-02
Attending: INTERNAL MEDICINE
Payer: COMMERCIAL

## 2024-06-02 VITALS
HEART RATE: 84 BPM | WEIGHT: 198.19 LBS | OXYGEN SATURATION: 98 % | BODY MASS INDEX: 28.37 KG/M2 | HEIGHT: 70 IN | DIASTOLIC BLOOD PRESSURE: 85 MMHG | RESPIRATION RATE: 18 BRPM | SYSTOLIC BLOOD PRESSURE: 139 MMHG | TEMPERATURE: 97.9 F

## 2024-06-02 DIAGNOSIS — E83.110 HEREDITARY HEMOCHROMATOSIS (HCC): ICD-10-CM

## 2024-06-02 LAB
FERRITIN SERPL-MCNC: 870 NG/ML (ref 22–322)
HCT VFR BLD AUTO: 41.6 % (ref 42–52)
HGB BLD-MCNC: 14.2 G/DL (ref 14–18)

## 2024-06-02 PROCEDURE — 96360 HYDRATION IV INFUSION INIT: CPT

## 2024-06-02 PROCEDURE — 700105 HCHG RX REV CODE 258: Performed by: INTERNAL MEDICINE

## 2024-06-02 PROCEDURE — 82728 ASSAY OF FERRITIN: CPT

## 2024-06-02 PROCEDURE — 85014 HEMATOCRIT: CPT

## 2024-06-02 PROCEDURE — 99195 PHLEBOTOMY: CPT

## 2024-06-02 PROCEDURE — 85018 HEMOGLOBIN: CPT

## 2024-06-02 RX ORDER — SODIUM CHLORIDE 9 MG/ML
500 INJECTION, SOLUTION INTRAVENOUS ONCE
Status: CANCELLED
Start: 2024-06-02 | End: 2024-06-02

## 2024-06-02 RX ORDER — SODIUM CHLORIDE 9 MG/ML
500 INJECTION, SOLUTION INTRAVENOUS ONCE
Status: COMPLETED | OUTPATIENT
Start: 2024-06-02 | End: 2024-06-02

## 2024-06-02 RX ORDER — SODIUM CHLORIDE 9 MG/ML
500 INJECTION, SOLUTION INTRAVENOUS ONCE
Status: CANCELLED | OUTPATIENT
Start: 2024-06-02 | End: 2024-06-02

## 2024-06-02 RX ADMIN — SODIUM CHLORIDE 500 ML: 9 INJECTION, SOLUTION INTRAVENOUS at 10:11

## 2024-06-02 ASSESSMENT — FIBROSIS 4 INDEX: FIB4 SCORE: 1.467615570751022021

## 2024-06-02 NOTE — PROGRESS NOTES
Pt arrived ambulatory for weekly TP, denies c/o, plan of care reviewed.  PIV started in LAC with good blood return, labs drawn.  Results reviewed, meets parameters for TP. 500cc blood removed over 10 minutes without difficulty, NS bolus then given per pt request.  VS checked post hydration, VS stable and Dc'd home without incident and will return next week as scheduled.

## 2024-06-02 NOTE — PROGRESS NOTES
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 Does take testosterone to maintain his H/H. He has been on weekly therapeutic phlebotomies. Most recent was on 6/9/24. Ferritin was > 4000 when we started.     ROS:    Constitutional: No weight loss  Skin: No rash or jaundice  HENT: No change in eyesight or hearing  Cardiovascular:No chest pain or arrythmia  Respiratory:No cough or SOB  GI:No nausea, vomiting, diarrhea, constipation  :No dysuria or frequency  Musculoskeletal:No bone or joint pain  Neuro:No sx's of neuropathy  Psych: No complaints    PMH:      No Known Allergies    Past Medical History:   Diagnosis Date    Arthritis, rheumatic, acute or subacute         Past Surgical History:   Procedure Laterality Date    LUMBAR LAMINECTOMY DISKECTOMY  2/7/2013    Performed by Mackenzie Cameron M.D. at SURGERY [facility]    APPENDECTOMY  2000    INGUINAL HERNIA REPAIR      Hernia Repair, Inguinal    OTHER ORTHOPEDIC SURGERY      ivy hip replacements        Medications:    Current Outpatient Medications on File Prior to Encounter   Medication Sig Dispense Refill    Etanercept (ENBREL SURECLICK) 50 MG/ML Solution Auto-injector INJECT 1 PEN UNDER THE SKIN EVERY 7 DAYS. 12 Each 0    sulindac (CLINORIL) 200 MG Tab 1 tab po bid 180 Tablet 0    ALPRAZolam (XANAX) 1 MG Tab       MOUNJARO 15 MG/0.5ML Solution Pen-injector INJECT 15 MG UNDER THE SKIN ONE DAY A WEEK      TESTOSTERONE by Does not apply route.      nabumetone (RELAFEN) 500 MG Tab Take 500 mg by mouth every day.       No current facility-administered medications on file prior to encounter.       Social History     Tobacco Use    Smoking status: Never    Smokeless tobacco: Never   Substance Use Topics    Alcohol use: Yes        History reviewed. No pertinent family history.     Objective    Vitals:    /71 (BP Location: Left arm, Patient Position: Sitting, BP Cuff  "Size: Adult)   Pulse 89   Temp 37.4 °C (99.4 °F) (Temporal)   Ht 1.77 m (5' 9.69\")   Wt 90.9 kg (200 lb 4.6 oz)   SpO2 98%   BMI 29.00 kg/m²     Physical Exam:    Appears well-developed and well-nourished. No distress.    Head -  Normocephalic .   Eyes - Pupils are equal. Conjunctivae normal. No scleral icterus.   Ears - normal hearing  Neurological -   Alert and oriented.  Skin - No rash noted. Not diaphoretic. No erythema. No pallor. No jaundice   Psychiatric -  Normal mood and affect.    Labs:     Latest Reference Range & Units 05/12/24 16:17 05/19/24 14:50 05/26/24 07:40 06/02/24 09:25 06/09/24 09:23   Hemoglobin 14.0 - 18.0 g/dL 15.4 15.3 14.6 14.2 14.5   Hematocrit 42.0 - 52.0 % 41.6 (L) 41.9 (L) 41.3 (L) 41.6 (L) 40.5 (L)      Latest Reference Range & Units 04/22/24 15:30 05/05/24 11:13 05/12/24 16:17 05/19/24 14:50 05/26/24 07:40 06/02/24 09:25 06/09/24 09:23   Ferritin 22.0 - 322.0 ng/mL 1565.0 (H) 1258.0 (H) 1113.0 (H) 1148.0 (H) 1018.0 (H) 870.0 (H) 862.0 (H)     Assessment    Imp:    Visit Diagnosis:    1. Hereditary hemochromatosis (HCC)        2. Rheumatoid arthritis involving multiple sites with positive rheumatoid factor (HCC)            Plan:  Continue weekly for now  Recheck 3 months with Elsa Hernandez M.D.        "

## 2024-06-09 ENCOUNTER — OUTPATIENT INFUSION SERVICES (OUTPATIENT)
Dept: ONCOLOGY | Facility: MEDICAL CENTER | Age: 59
End: 2024-06-09
Attending: INTERNAL MEDICINE
Payer: COMMERCIAL

## 2024-06-09 VITALS
HEIGHT: 70 IN | TEMPERATURE: 96.6 F | SYSTOLIC BLOOD PRESSURE: 125 MMHG | OXYGEN SATURATION: 99 % | WEIGHT: 195.99 LBS | RESPIRATION RATE: 18 BRPM | HEART RATE: 85 BPM | DIASTOLIC BLOOD PRESSURE: 85 MMHG | BODY MASS INDEX: 28.06 KG/M2

## 2024-06-09 DIAGNOSIS — E83.110 HEREDITARY HEMOCHROMATOSIS (HCC): ICD-10-CM

## 2024-06-09 LAB
FERRITIN SERPL-MCNC: 862 NG/ML (ref 22–322)
HCT VFR BLD AUTO: 40.5 % (ref 42–52)
HGB BLD-MCNC: 14.5 G/DL (ref 14–18)

## 2024-06-09 PROCEDURE — 99195 PHLEBOTOMY: CPT

## 2024-06-09 PROCEDURE — 82728 ASSAY OF FERRITIN: CPT

## 2024-06-09 PROCEDURE — 85018 HEMOGLOBIN: CPT

## 2024-06-09 PROCEDURE — 85014 HEMATOCRIT: CPT

## 2024-06-09 PROCEDURE — 700105 HCHG RX REV CODE 258: Performed by: INTERNAL MEDICINE

## 2024-06-09 RX ORDER — SODIUM CHLORIDE 9 MG/ML
500 INJECTION, SOLUTION INTRAVENOUS ONCE
Status: CANCELLED | OUTPATIENT
Start: 2024-06-09 | End: 2024-06-09

## 2024-06-09 RX ORDER — SODIUM CHLORIDE 9 MG/ML
500 INJECTION, SOLUTION INTRAVENOUS ONCE
Status: COMPLETED | OUTPATIENT
Start: 2024-06-09 | End: 2024-06-09

## 2024-06-09 RX ORDER — SODIUM CHLORIDE 9 MG/ML
500 INJECTION, SOLUTION INTRAVENOUS ONCE
Status: CANCELLED
Start: 2024-06-09 | End: 2024-06-09

## 2024-06-09 RX ADMIN — SODIUM CHLORIDE 500 ML: 9 INJECTION, SOLUTION INTRAVENOUS at 09:55

## 2024-06-09 ASSESSMENT — FIBROSIS 4 INDEX: FIB4 SCORE: 1.467615570751022021

## 2024-06-09 NOTE — PROGRESS NOTES
Milton arrives ambulatory to [facility] for weekly TP. PIV established in right AC with brisk blood return. Labs drawn and reviewed. 500 ml whole blood removed. NS bolus administered over the 15 minute waiting period, per pt request. Orthostatic vital signs checked and stable. PIV removed with tip intact and site covered with gauze and coban. Pt left on foot in NAD.

## 2024-06-12 ENCOUNTER — HOSPITAL ENCOUNTER (OUTPATIENT)
Dept: HEMATOLOGY ONCOLOGY | Facility: MEDICAL CENTER | Age: 59
End: 2024-06-12
Attending: INTERNAL MEDICINE
Payer: COMMERCIAL

## 2024-06-12 VITALS
DIASTOLIC BLOOD PRESSURE: 71 MMHG | HEIGHT: 70 IN | SYSTOLIC BLOOD PRESSURE: 122 MMHG | HEART RATE: 89 BPM | WEIGHT: 200.29 LBS | BODY MASS INDEX: 28.67 KG/M2 | OXYGEN SATURATION: 98 % | TEMPERATURE: 99.4 F

## 2024-06-12 DIAGNOSIS — E83.110 HEREDITARY HEMOCHROMATOSIS (HCC): ICD-10-CM

## 2024-06-12 DIAGNOSIS — M05.79 RHEUMATOID ARTHRITIS INVOLVING MULTIPLE SITES WITH POSITIVE RHEUMATOID FACTOR (HCC): ICD-10-CM

## 2024-06-12 PROCEDURE — 99213 OFFICE O/P EST LOW 20 MIN: CPT | Performed by: INTERNAL MEDICINE

## 2024-06-12 PROCEDURE — 99212 OFFICE O/P EST SF 10 MIN: CPT | Performed by: INTERNAL MEDICINE

## 2024-06-12 ASSESSMENT — PAIN SCALES - GENERAL: PAINLEVEL: NO PAIN

## 2024-06-12 ASSESSMENT — FIBROSIS 4 INDEX: FIB4 SCORE: 1.467615570751022021

## 2024-06-16 ENCOUNTER — OUTPATIENT INFUSION SERVICES (OUTPATIENT)
Dept: ONCOLOGY | Facility: MEDICAL CENTER | Age: 59
End: 2024-06-16
Attending: INTERNAL MEDICINE
Payer: COMMERCIAL

## 2024-06-16 VITALS
HEIGHT: 70 IN | RESPIRATION RATE: 18 BRPM | BODY MASS INDEX: 27.87 KG/M2 | DIASTOLIC BLOOD PRESSURE: 88 MMHG | SYSTOLIC BLOOD PRESSURE: 127 MMHG | WEIGHT: 194.67 LBS | OXYGEN SATURATION: 94 % | TEMPERATURE: 98.4 F | HEART RATE: 96 BPM

## 2024-06-16 DIAGNOSIS — E83.110 HEREDITARY HEMOCHROMATOSIS (HCC): ICD-10-CM

## 2024-06-16 LAB
FERRITIN SERPL-MCNC: 702 NG/ML (ref 22–322)
HCT VFR BLD AUTO: 41.7 % (ref 42–52)
HGB BLD-MCNC: 15 G/DL (ref 14–18)

## 2024-06-16 PROCEDURE — 85014 HEMATOCRIT: CPT

## 2024-06-16 PROCEDURE — 99195 PHLEBOTOMY: CPT

## 2024-06-16 PROCEDURE — 700105 HCHG RX REV CODE 258: Performed by: INTERNAL MEDICINE

## 2024-06-16 PROCEDURE — 85018 HEMOGLOBIN: CPT

## 2024-06-16 PROCEDURE — 82728 ASSAY OF FERRITIN: CPT

## 2024-06-16 RX ORDER — SODIUM CHLORIDE 9 MG/ML
500 INJECTION, SOLUTION INTRAVENOUS ONCE
Status: CANCELLED | OUTPATIENT
Start: 2024-06-16 | End: 2024-06-16

## 2024-06-16 RX ORDER — SODIUM CHLORIDE 9 MG/ML
500 INJECTION, SOLUTION INTRAVENOUS ONCE
Status: COMPLETED | OUTPATIENT
Start: 2024-06-16 | End: 2024-06-16

## 2024-06-16 RX ORDER — SODIUM CHLORIDE 9 MG/ML
500 INJECTION, SOLUTION INTRAVENOUS ONCE
Status: CANCELLED
Start: 2024-06-16 | End: 2024-06-16

## 2024-06-16 RX ADMIN — SODIUM CHLORIDE 500 ML: 9 INJECTION, SOLUTION INTRAVENOUS at 12:29

## 2024-06-16 ASSESSMENT — FIBROSIS 4 INDEX: FIB4 SCORE: 1.467615570751022021

## 2024-06-16 NOTE — PROGRESS NOTES
Tyler arrived to the [facility] for labs and possible TP ambulatory. Pt denies changes to health, medication or allergies. POC reviewed.    IV started in L AC x 1 attempt, brisk blood return noted, labs drawn off line, Tyler tolerated well.    Lab results reviewed, Hgb 15, within parameters for TP. 500 ml whole blood phlebotomized per MD order, Tyler tolerated well. Post hydration infused over 30 minutes, and was well tolerated.     Orthostatic VSS. Pt denies lightheadedness, dizziness, CP or SOB. IV removed, tip intact/sterile gauze and coban applied.    Confirmed next appointment and Tyler was discharged home in no acute distress.

## 2024-06-23 ENCOUNTER — OUTPATIENT INFUSION SERVICES (OUTPATIENT)
Dept: ONCOLOGY | Facility: MEDICAL CENTER | Age: 59
End: 2024-06-23
Attending: INTERNAL MEDICINE
Payer: COMMERCIAL

## 2024-06-23 VITALS
OXYGEN SATURATION: 96 % | BODY MASS INDEX: 27.77 KG/M2 | HEIGHT: 70 IN | DIASTOLIC BLOOD PRESSURE: 73 MMHG | SYSTOLIC BLOOD PRESSURE: 108 MMHG | HEART RATE: 88 BPM | TEMPERATURE: 98.7 F | RESPIRATION RATE: 18 BRPM | WEIGHT: 194 LBS

## 2024-06-23 DIAGNOSIS — E83.110 HEREDITARY HEMOCHROMATOSIS (HCC): ICD-10-CM

## 2024-06-23 LAB
FERRITIN SERPL-MCNC: 805 NG/ML (ref 22–322)
HCT VFR BLD AUTO: 40.4 % (ref 42–52)
HGB BLD-MCNC: 14.4 G/DL (ref 14–18)

## 2024-06-23 PROCEDURE — 99195 PHLEBOTOMY: CPT

## 2024-06-23 PROCEDURE — 700105 HCHG RX REV CODE 258: Performed by: INTERNAL MEDICINE

## 2024-06-23 PROCEDURE — 82728 ASSAY OF FERRITIN: CPT

## 2024-06-23 PROCEDURE — 85018 HEMOGLOBIN: CPT

## 2024-06-23 PROCEDURE — 85014 HEMATOCRIT: CPT

## 2024-06-23 RX ORDER — SODIUM CHLORIDE 9 MG/ML
500 INJECTION, SOLUTION INTRAVENOUS ONCE
Status: CANCELLED | OUTPATIENT
Start: 2024-06-23 | End: 2024-06-23

## 2024-06-23 RX ORDER — SODIUM CHLORIDE 9 MG/ML
500 INJECTION, SOLUTION INTRAVENOUS ONCE
Status: COMPLETED | OUTPATIENT
Start: 2024-06-23 | End: 2024-06-23

## 2024-06-23 RX ORDER — SODIUM CHLORIDE 9 MG/ML
500 INJECTION, SOLUTION INTRAVENOUS ONCE
Status: CANCELLED
Start: 2024-06-23 | End: 2024-06-23

## 2024-06-23 RX ADMIN — SODIUM CHLORIDE 500 ML: 9 INJECTION, SOLUTION INTRAVENOUS at 17:15

## 2024-06-23 ASSESSMENT — FIBROSIS 4 INDEX: FIB4 SCORE: 1.467615570751022021

## 2024-06-24 NOTE — PROGRESS NOTES
Tyler to infusion for weekly therapeutic phlebotomy. Reports feeling well today with no major complaints.    PIV started in L AC and labs drawn.     Labs reviewed by RN, Hgb: 14.4, within parameters for TP today.     500ml blood removed over 15 minutes and patient observed 30 minutes post phlebotomy while hydration administered. Patient had mild hypotension and tachycardia with initial orthostatic VS, rechecked after 10 additional minutes and orthostatic VS were stable, patient denies any dizziness.      PIV d/rhoda with tip intact. Patient left in stable condition, knows when to return for next appt.

## 2024-06-30 ENCOUNTER — OUTPATIENT INFUSION SERVICES (OUTPATIENT)
Dept: ONCOLOGY | Facility: MEDICAL CENTER | Age: 59
End: 2024-06-30
Attending: INTERNAL MEDICINE
Payer: COMMERCIAL

## 2024-06-30 VITALS
TEMPERATURE: 97.5 F | HEART RATE: 86 BPM | HEIGHT: 70 IN | OXYGEN SATURATION: 96 % | DIASTOLIC BLOOD PRESSURE: 85 MMHG | WEIGHT: 192.02 LBS | BODY MASS INDEX: 27.49 KG/M2 | RESPIRATION RATE: 18 BRPM | SYSTOLIC BLOOD PRESSURE: 132 MMHG

## 2024-06-30 DIAGNOSIS — E83.110 HEREDITARY HEMOCHROMATOSIS (HCC): ICD-10-CM

## 2024-06-30 LAB
FERRITIN SERPL-MCNC: 630 NG/ML (ref 22–322)
HCT VFR BLD AUTO: 41.7 % (ref 42–52)
HGB BLD-MCNC: 14.9 G/DL (ref 14–18)

## 2024-06-30 PROCEDURE — 96360 HYDRATION IV INFUSION INIT: CPT

## 2024-06-30 PROCEDURE — 99195 PHLEBOTOMY: CPT

## 2024-06-30 PROCEDURE — 700105 HCHG RX REV CODE 258: Performed by: INTERNAL MEDICINE

## 2024-06-30 RX ORDER — SODIUM CHLORIDE 9 MG/ML
500 INJECTION, SOLUTION INTRAVENOUS ONCE
OUTPATIENT
Start: 2024-06-30 | End: 2024-06-30

## 2024-06-30 RX ORDER — SODIUM CHLORIDE 9 MG/ML
500 INJECTION, SOLUTION INTRAVENOUS ONCE
Status: COMPLETED | OUTPATIENT
Start: 2024-06-30 | End: 2024-06-30

## 2024-06-30 RX ORDER — SODIUM CHLORIDE 9 MG/ML
500 INJECTION, SOLUTION INTRAVENOUS ONCE
Start: 2024-06-30 | End: 2024-06-30

## 2024-06-30 RX ADMIN — SODIUM CHLORIDE 500 ML: 9 INJECTION, SOLUTION INTRAVENOUS at 10:06

## 2024-06-30 ASSESSMENT — FIBROSIS 4 INDEX: FIB4 SCORE: 1.467615570751022021

## 2024-06-30 NOTE — PROGRESS NOTES
Tyler arrived to the [facility]fusion [facility] for labs and possible TP ambulatory. Pt denies changes to health, medication or allergies. POC reviewed.    IV started in L AC x 1 attempt, brisk blood return noted, labs drawn off line, Tyler tolerated well.    Lab results reviewed, Hgb 14.9  within parameters for TP. 500 ml whole blood phlebotomized per MD order, over 10 minutes, Tyler tolerated well. Post hydration infused over 30 minutes, and was well tolerated.     Upon taking VS, Pt c/o feeling lightheaded, Pt sitting for an additional 10 minutes. After 10 minutes VS remain elevated, Pt not feeling well, sitting for another 10 minutes.     Pt felling much better, orthostatic VSS. Pt denies lightheadedness, dizziness, CP or SOB. IV removed, tip intact/sterile gauze and coban applied.    Confirmed next appointment and Tyler was discharged home in no acute distress.

## 2024-07-01 ENCOUNTER — TELEPHONE (OUTPATIENT)
Dept: RHEUMATOLOGY | Facility: MEDICAL CENTER | Age: 59
End: 2024-07-01
Payer: COMMERCIAL

## 2024-07-07 ENCOUNTER — OUTPATIENT INFUSION SERVICES (OUTPATIENT)
Dept: ONCOLOGY | Facility: MEDICAL CENTER | Age: 59
End: 2024-07-07
Attending: INTERNAL MEDICINE
Payer: COMMERCIAL

## 2024-07-07 VITALS
WEIGHT: 195.11 LBS | SYSTOLIC BLOOD PRESSURE: 127 MMHG | DIASTOLIC BLOOD PRESSURE: 89 MMHG | OXYGEN SATURATION: 96 % | TEMPERATURE: 98.1 F | HEIGHT: 70 IN | BODY MASS INDEX: 27.93 KG/M2 | HEART RATE: 78 BPM | RESPIRATION RATE: 18 BRPM

## 2024-07-07 DIAGNOSIS — E83.110 HEREDITARY HEMOCHROMATOSIS (HCC): ICD-10-CM

## 2024-07-07 LAB
FERRITIN SERPL-MCNC: 621 NG/ML (ref 22–322)
HCT VFR BLD AUTO: 41.6 % (ref 42–52)
HGB BLD-MCNC: 14.9 G/DL (ref 14–18)

## 2024-07-07 PROCEDURE — 85018 HEMOGLOBIN: CPT

## 2024-07-07 PROCEDURE — 700105 HCHG RX REV CODE 258: Performed by: INTERNAL MEDICINE

## 2024-07-07 PROCEDURE — 82728 ASSAY OF FERRITIN: CPT

## 2024-07-07 PROCEDURE — 85014 HEMATOCRIT: CPT

## 2024-07-07 PROCEDURE — 99195 PHLEBOTOMY: CPT

## 2024-07-07 RX ORDER — SODIUM CHLORIDE 9 MG/ML
500 INJECTION, SOLUTION INTRAVENOUS ONCE
Status: CANCELLED
Start: 2024-07-07 | End: 2024-07-07

## 2024-07-07 RX ORDER — SODIUM CHLORIDE 9 MG/ML
500 INJECTION, SOLUTION INTRAVENOUS ONCE
Status: COMPLETED | OUTPATIENT
Start: 2024-07-07 | End: 2024-07-07

## 2024-07-07 RX ORDER — SODIUM CHLORIDE 9 MG/ML
500 INJECTION, SOLUTION INTRAVENOUS ONCE
Status: CANCELLED | OUTPATIENT
Start: 2024-07-07 | End: 2024-07-07

## 2024-07-07 RX ORDER — SODIUM CHLORIDE 9 MG/ML
500 INJECTION, SOLUTION INTRAVENOUS ONCE
Status: DISCONTINUED | OUTPATIENT
Start: 2024-07-07 | End: 2024-07-07 | Stop reason: HOSPADM

## 2024-07-07 RX ADMIN — SODIUM CHLORIDE 500 ML: 9 INJECTION, SOLUTION INTRAVENOUS at 10:00

## 2024-07-07 ASSESSMENT — FIBROSIS 4 INDEX: FIB4 SCORE: 1.467615570751022021

## 2024-07-14 ENCOUNTER — OUTPATIENT INFUSION SERVICES (OUTPATIENT)
Dept: ONCOLOGY | Facility: MEDICAL CENTER | Age: 59
End: 2024-07-14
Attending: INTERNAL MEDICINE
Payer: COMMERCIAL

## 2024-07-14 VITALS
WEIGHT: 193.78 LBS | HEIGHT: 70 IN | RESPIRATION RATE: 16 BRPM | OXYGEN SATURATION: 97 % | SYSTOLIC BLOOD PRESSURE: 121 MMHG | DIASTOLIC BLOOD PRESSURE: 88 MMHG | HEART RATE: 85 BPM | TEMPERATURE: 98 F | BODY MASS INDEX: 27.74 KG/M2

## 2024-07-14 DIAGNOSIS — E83.110 HEREDITARY HEMOCHROMATOSIS (HCC): ICD-10-CM

## 2024-07-14 LAB
FERRITIN SERPL-MCNC: 718 NG/ML (ref 22–322)
HCT VFR BLD AUTO: 43.3 % (ref 42–52)
HGB BLD-MCNC: 15.4 G/DL (ref 14–18)

## 2024-07-14 PROCEDURE — 85018 HEMOGLOBIN: CPT

## 2024-07-14 PROCEDURE — 99195 PHLEBOTOMY: CPT

## 2024-07-14 PROCEDURE — 85014 HEMATOCRIT: CPT

## 2024-07-14 PROCEDURE — 96360 HYDRATION IV INFUSION INIT: CPT

## 2024-07-14 PROCEDURE — 700105 HCHG RX REV CODE 258: Performed by: INTERNAL MEDICINE

## 2024-07-14 PROCEDURE — 82728 ASSAY OF FERRITIN: CPT

## 2024-07-14 RX ORDER — SODIUM CHLORIDE 9 MG/ML
500 INJECTION, SOLUTION INTRAVENOUS ONCE
Status: COMPLETED | OUTPATIENT
Start: 2024-07-14 | End: 2024-07-14

## 2024-07-14 RX ORDER — SODIUM CHLORIDE 9 MG/ML
500 INJECTION, SOLUTION INTRAVENOUS ONCE
Status: CANCELLED | OUTPATIENT
Start: 2024-07-14 | End: 2024-07-14

## 2024-07-14 RX ORDER — ATROPINE SULFATE 1 MG/ML
0.5 INJECTION, SOLUTION INTRAVENOUS ONCE
Status: CANCELLED
Start: 2024-07-14

## 2024-07-14 RX ORDER — SODIUM CHLORIDE 9 MG/ML
500 INJECTION, SOLUTION INTRAVENOUS ONCE
Status: CANCELLED
Start: 2024-07-14 | End: 2024-07-14

## 2024-07-14 RX ADMIN — SODIUM CHLORIDE 500 ML: 9 INJECTION, SOLUTION INTRAVENOUS at 12:19

## 2024-07-14 ASSESSMENT — FIBROSIS 4 INDEX: FIB4 SCORE: 1.467615570751022021

## 2024-07-21 ENCOUNTER — APPOINTMENT (OUTPATIENT)
Dept: ONCOLOGY | Facility: MEDICAL CENTER | Age: 59
End: 2024-07-21
Attending: INTERNAL MEDICINE
Payer: COMMERCIAL

## 2024-07-25 DIAGNOSIS — Z79.620 ENCOUNTER FOR MONITORING OF ETANERCEPT THERAPY: ICD-10-CM

## 2024-07-25 DIAGNOSIS — Z51.81 ENCOUNTER FOR MONITORING OF ETANERCEPT THERAPY: ICD-10-CM

## 2024-07-25 DIAGNOSIS — M05.79 RHEUMATOID ARTHRITIS INVOLVING MULTIPLE SITES WITH POSITIVE RHEUMATOID FACTOR (HCC): ICD-10-CM

## 2024-07-25 DIAGNOSIS — Z79.52 LONG TERM CURRENT USE OF SYSTEMIC STEROIDS: ICD-10-CM

## 2024-07-28 ENCOUNTER — OUTPATIENT INFUSION SERVICES (OUTPATIENT)
Dept: ONCOLOGY | Facility: MEDICAL CENTER | Age: 59
End: 2024-07-28
Attending: INTERNAL MEDICINE
Payer: COMMERCIAL

## 2024-07-28 VITALS
DIASTOLIC BLOOD PRESSURE: 81 MMHG | TEMPERATURE: 98.5 F | SYSTOLIC BLOOD PRESSURE: 110 MMHG | BODY MASS INDEX: 28.41 KG/M2 | OXYGEN SATURATION: 98 % | HEART RATE: 90 BPM | WEIGHT: 198.41 LBS | RESPIRATION RATE: 16 BRPM | HEIGHT: 70 IN

## 2024-07-28 DIAGNOSIS — E83.110 HEREDITARY HEMOCHROMATOSIS (HCC): ICD-10-CM

## 2024-07-28 LAB
FERRITIN SERPL-MCNC: 501 NG/ML (ref 22–322)
HCT VFR BLD AUTO: 39.1 % (ref 42–52)
HGB BLD-MCNC: 14.3 G/DL (ref 14–18)

## 2024-07-28 PROCEDURE — 700105 HCHG RX REV CODE 258: Performed by: NURSE PRACTITIONER

## 2024-07-28 PROCEDURE — 82728 ASSAY OF FERRITIN: CPT

## 2024-07-28 PROCEDURE — 85014 HEMATOCRIT: CPT

## 2024-07-28 PROCEDURE — 99195 PHLEBOTOMY: CPT

## 2024-07-28 PROCEDURE — 85018 HEMOGLOBIN: CPT

## 2024-07-28 RX ORDER — SODIUM CHLORIDE 9 MG/ML
500 INJECTION, SOLUTION INTRAVENOUS ONCE
OUTPATIENT
Start: 2024-07-28 | End: 2024-07-28

## 2024-07-28 RX ORDER — SODIUM CHLORIDE 9 MG/ML
500 INJECTION, SOLUTION INTRAVENOUS ONCE
Start: 2024-07-28 | End: 2024-07-28

## 2024-07-28 RX ORDER — SODIUM CHLORIDE 9 MG/ML
500 INJECTION, SOLUTION INTRAVENOUS ONCE
Status: COMPLETED | OUTPATIENT
Start: 2024-07-28 | End: 2024-07-28

## 2024-07-28 RX ORDER — ATROPINE SULFATE 1 MG/ML
0.5 INJECTION, SOLUTION INTRAVENOUS ONCE
Start: 2024-07-28 | End: 2024-07-28

## 2024-07-28 RX ADMIN — SODIUM CHLORIDE 500 ML: 9 INJECTION, SOLUTION INTRAVENOUS at 17:15

## 2024-07-28 ASSESSMENT — FIBROSIS 4 INDEX: FIB4 SCORE: 1.467615570751022021

## 2024-07-29 RX ORDER — MEDROXYPROGESTERONE ACETATE 150 MG/ML
INJECTION, SUSPENSION INTRAMUSCULAR
Qty: 12 EACH | Refills: 0 | Status: SHIPPED | OUTPATIENT
Start: 2024-07-29

## 2024-08-04 ENCOUNTER — OUTPATIENT INFUSION SERVICES (OUTPATIENT)
Dept: ONCOLOGY | Facility: MEDICAL CENTER | Age: 59
End: 2024-08-04
Attending: INTERNAL MEDICINE
Payer: COMMERCIAL

## 2024-08-04 VITALS
BODY MASS INDEX: 28.12 KG/M2 | DIASTOLIC BLOOD PRESSURE: 70 MMHG | OXYGEN SATURATION: 97 % | HEART RATE: 86 BPM | RESPIRATION RATE: 16 BRPM | TEMPERATURE: 97.1 F | HEIGHT: 70 IN | WEIGHT: 196.43 LBS | SYSTOLIC BLOOD PRESSURE: 106 MMHG

## 2024-08-04 DIAGNOSIS — E83.110 HEREDITARY HEMOCHROMATOSIS (HCC): ICD-10-CM

## 2024-08-04 LAB
FERRITIN SERPL-MCNC: 575 NG/ML (ref 22–322)
HCT VFR BLD AUTO: 40.5 % (ref 42–52)
HGB BLD-MCNC: 14.7 G/DL (ref 14–18)

## 2024-08-04 PROCEDURE — 85018 HEMOGLOBIN: CPT

## 2024-08-04 PROCEDURE — 85014 HEMATOCRIT: CPT

## 2024-08-04 PROCEDURE — 82728 ASSAY OF FERRITIN: CPT

## 2024-08-04 PROCEDURE — 99195 PHLEBOTOMY: CPT

## 2024-08-04 PROCEDURE — 700105 HCHG RX REV CODE 258: Performed by: NURSE PRACTITIONER

## 2024-08-04 RX ORDER — SODIUM CHLORIDE 9 MG/ML
500 INJECTION, SOLUTION INTRAVENOUS ONCE
Status: CANCELLED
Start: 2024-08-04 | End: 2024-08-04

## 2024-08-04 RX ORDER — SODIUM CHLORIDE 9 MG/ML
500 INJECTION, SOLUTION INTRAVENOUS ONCE
Status: CANCELLED | OUTPATIENT
Start: 2024-08-04 | End: 2024-08-04

## 2024-08-04 RX ORDER — ATROPINE SULFATE 1 MG/ML
0.5 INJECTION, SOLUTION INTRAVENOUS ONCE
Status: CANCELLED
Start: 2024-08-04 | End: 2024-08-04

## 2024-08-04 RX ORDER — SODIUM CHLORIDE 9 MG/ML
500 INJECTION, SOLUTION INTRAVENOUS ONCE
Status: COMPLETED | OUTPATIENT
Start: 2024-08-04 | End: 2024-08-04

## 2024-08-04 RX ORDER — SODIUM CHLORIDE 9 MG/ML
500 INJECTION, SOLUTION INTRAVENOUS ONCE
Status: DISCONTINUED | OUTPATIENT
Start: 2024-08-04 | End: 2024-08-04 | Stop reason: HOSPADM

## 2024-08-04 RX ADMIN — SODIUM CHLORIDE 500 ML: 9 INJECTION, SOLUTION INTRAVENOUS at 08:38

## 2024-08-04 ASSESSMENT — FIBROSIS 4 INDEX: FIB4 SCORE: 1.467615570751022021

## 2024-08-04 NOTE — PROGRESS NOTES
Pt arrived ambulatory to [facility] for weekly H/H, Ferritin possible TP. POC discussed with pt and he agrees with plan.     PIV established, brisk blood return noted, labs drawn as ordered. Results reviewed, Hgb 14.7 and .5% today. Pt meets parameters for TP. 500ml whole blood phlebotomized. NS bolus infused as ordered. See flow sheet for orthostatic vitals. Pt tolerated treatment without s/s adverse reaction. PIV dc'd catheter tip intact, gauze and coban dressing applied.     Pt discharged to [facility] care, [facility]. Pt's next appointment confirmed 8/11/2024.

## 2024-08-11 ENCOUNTER — OUTPATIENT INFUSION SERVICES (OUTPATIENT)
Dept: ONCOLOGY | Facility: MEDICAL CENTER | Age: 59
End: 2024-08-11
Attending: INTERNAL MEDICINE
Payer: COMMERCIAL

## 2024-08-11 VITALS
HEIGHT: 70 IN | DIASTOLIC BLOOD PRESSURE: 63 MMHG | TEMPERATURE: 98.2 F | SYSTOLIC BLOOD PRESSURE: 102 MMHG | HEART RATE: 90 BPM | OXYGEN SATURATION: 95 % | RESPIRATION RATE: 17 BRPM | BODY MASS INDEX: 28.37 KG/M2 | WEIGHT: 198.19 LBS

## 2024-08-11 DIAGNOSIS — E83.110 HEREDITARY HEMOCHROMATOSIS (HCC): ICD-10-CM

## 2024-08-11 LAB
FERRITIN SERPL-MCNC: 427 NG/ML (ref 22–322)
HCT VFR BLD AUTO: 41.1 % (ref 42–52)
HGB BLD-MCNC: 14.5 G/DL (ref 14–18)

## 2024-08-11 PROCEDURE — 700105 HCHG RX REV CODE 258: Performed by: NURSE PRACTITIONER

## 2024-08-11 PROCEDURE — 85014 HEMATOCRIT: CPT

## 2024-08-11 PROCEDURE — 82728 ASSAY OF FERRITIN: CPT

## 2024-08-11 PROCEDURE — 85018 HEMOGLOBIN: CPT

## 2024-08-11 PROCEDURE — 99195 PHLEBOTOMY: CPT

## 2024-08-11 RX ORDER — ATROPINE SULFATE 1 MG/ML
0.5 INJECTION, SOLUTION INTRAVENOUS ONCE
Status: CANCELLED
Start: 2024-08-11 | End: 2024-08-11

## 2024-08-11 RX ORDER — ATROPINE SULFATE 1 MG/ML
0.5 INJECTION, SOLUTION INTRAVENOUS ONCE
Status: DISCONTINUED | OUTPATIENT
Start: 2024-08-11 | End: 2024-08-11 | Stop reason: HOSPADM

## 2024-08-11 RX ORDER — SODIUM CHLORIDE 9 MG/ML
500 INJECTION, SOLUTION INTRAVENOUS ONCE
Status: CANCELLED
Start: 2024-08-11 | End: 2024-08-11

## 2024-08-11 RX ORDER — SODIUM CHLORIDE 9 MG/ML
500 INJECTION, SOLUTION INTRAVENOUS ONCE
Status: COMPLETED | OUTPATIENT
Start: 2024-08-11 | End: 2024-08-11

## 2024-08-11 RX ORDER — SODIUM CHLORIDE 9 MG/ML
500 INJECTION, SOLUTION INTRAVENOUS ONCE
Status: CANCELLED | OUTPATIENT
Start: 2024-08-11 | End: 2024-08-11

## 2024-08-11 RX ADMIN — SODIUM CHLORIDE 500 ML: 9 INJECTION, SOLUTION INTRAVENOUS at 08:46

## 2024-08-11 ASSESSMENT — FIBROSIS 4 INDEX: FIB4 SCORE: 1.467615570751022021

## 2024-08-11 NOTE — PROGRESS NOTES
Pt arrived ambulatory to [facility] for weekly H/H, Ferritin possible TP. POC discussed with pt and he agrees with plan.      PIV established, brisk blood return noted, labs drawn as ordered. Results reviewed, Hgb 14.5 and HCT 41.1 today. Pt meets parameters for TP. 500ml whole blood phlebotomized. NS bolus infused as ordered. See flow sheet for orthostatic vitals. Pt tolerated treatment without s/s adverse reaction. PIV dc'd catheter tip intact, gauze and coban dressing applied.      Pt discharged to [facility] care, [facility]. Pt's next appointment confirmed 8/18/2024.

## 2024-08-12 ENCOUNTER — OFFICE VISIT (OUTPATIENT)
Dept: URGENT CARE | Facility: PHYSICIAN GROUP | Age: 59
End: 2024-08-12
Payer: COMMERCIAL

## 2024-08-12 VITALS
HEIGHT: 70 IN | RESPIRATION RATE: 16 BRPM | DIASTOLIC BLOOD PRESSURE: 58 MMHG | OXYGEN SATURATION: 92 % | WEIGHT: 195.99 LBS | SYSTOLIC BLOOD PRESSURE: 102 MMHG | HEART RATE: 81 BPM | BODY MASS INDEX: 28.06 KG/M2 | TEMPERATURE: 98 F

## 2024-08-12 DIAGNOSIS — S61.412A LACERATION OF LEFT HAND WITHOUT FOREIGN BODY, INITIAL ENCOUNTER: ICD-10-CM

## 2024-08-12 PROCEDURE — 3078F DIAST BP <80 MM HG: CPT

## 2024-08-12 PROCEDURE — 90715 TDAP VACCINE 7 YRS/> IM: CPT

## 2024-08-12 PROCEDURE — 3074F SYST BP LT 130 MM HG: CPT

## 2024-08-12 PROCEDURE — 90471 IMMUNIZATION ADMIN: CPT

## 2024-08-12 PROCEDURE — 12002 RPR S/N/AX/GEN/TRNK2.6-7.5CM: CPT | Mod: LT

## 2024-08-12 ASSESSMENT — ENCOUNTER SYMPTOMS
ROS SKIN COMMENTS: LACERATION
BACK PAIN: 0
MYALGIAS: 0
FALLS: 0
NECK PAIN: 0

## 2024-08-12 ASSESSMENT — FIBROSIS 4 INDEX: FIB4 SCORE: 1.467615570751022021
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
## 2024-08-12 NOTE — PROCEDURES
Laceration Repair    Date/Time: 8/12/2024 1:24 PM    Performed by: BEN Lowry  Authorized by: BEN Lowry  Body area: upper extremity  Location details: left hand  Laceration length: 3 cm  Foreign bodies: no foreign bodies  Tendon involvement: none  Nerve involvement: none  Vascular damage: no  Anesthesia: local infiltration    Anesthesia:  Local Anesthetic: lidocaine 1% without epinephrine  Anesthetic total: 2 mL    Sedation:  Patient sedated: no    Preparation: Patient was prepped and draped in the usual sterile fashion.  Irrigation solution: saline  Irrigation method: syringe  Amount of cleaning: standard  Debridement: none  Degree of undermining: none  Skin closure: 4-0 nylon  Technique: simple  Approximation: close  Approximation difficulty: simple  Dressing: 4x4 sterile gauze, antibiotic ointment and gauze roll  Patient tolerance: patient tolerated the procedure well with no immediate complications

## 2024-08-18 ENCOUNTER — OUTPATIENT INFUSION SERVICES (OUTPATIENT)
Dept: ONCOLOGY | Facility: MEDICAL CENTER | Age: 59
End: 2024-08-18
Attending: INTERNAL MEDICINE
Payer: COMMERCIAL

## 2024-08-18 VITALS
RESPIRATION RATE: 18 BRPM | OXYGEN SATURATION: 96 % | DIASTOLIC BLOOD PRESSURE: 83 MMHG | TEMPERATURE: 97.8 F | HEART RATE: 95 BPM | SYSTOLIC BLOOD PRESSURE: 123 MMHG | WEIGHT: 202.16 LBS | BODY MASS INDEX: 28.94 KG/M2 | HEIGHT: 70 IN

## 2024-08-18 DIAGNOSIS — E83.110 HEREDITARY HEMOCHROMATOSIS (HCC): ICD-10-CM

## 2024-08-18 LAB
FERRITIN SERPL-MCNC: 379 NG/ML (ref 22–322)
HCT VFR BLD AUTO: 39.6 % (ref 42–52)
HGB BLD-MCNC: 14.1 G/DL (ref 14–18)

## 2024-08-18 PROCEDURE — 85014 HEMATOCRIT: CPT

## 2024-08-18 PROCEDURE — 99195 PHLEBOTOMY: CPT

## 2024-08-18 PROCEDURE — 82728 ASSAY OF FERRITIN: CPT

## 2024-08-18 PROCEDURE — 85018 HEMOGLOBIN: CPT

## 2024-08-18 PROCEDURE — 700105 HCHG RX REV CODE 258: Performed by: NURSE PRACTITIONER

## 2024-08-18 RX ORDER — SODIUM CHLORIDE 9 MG/ML
500 INJECTION, SOLUTION INTRAVENOUS ONCE
Status: CANCELLED | OUTPATIENT
Start: 2024-08-18 | End: 2024-08-18

## 2024-08-18 RX ORDER — SODIUM CHLORIDE 9 MG/ML
500 INJECTION, SOLUTION INTRAVENOUS ONCE
Status: CANCELLED
Start: 2024-08-18 | End: 2024-08-18

## 2024-08-18 RX ORDER — SODIUM CHLORIDE 9 MG/ML
500 INJECTION, SOLUTION INTRAVENOUS ONCE
Status: COMPLETED | OUTPATIENT
Start: 2024-08-18 | End: 2024-08-18

## 2024-08-18 RX ADMIN — SODIUM CHLORIDE 500 ML: 9 INJECTION, SOLUTION INTRAVENOUS at 08:51

## 2024-08-18 ASSESSMENT — FIBROSIS 4 INDEX: FIB4 SCORE: 1.467615570751022021

## 2024-08-18 NOTE — PROGRESS NOTES
Jan arrived ambulatory to infusion services for weekly therapeutic phlebotomy. Reports feeling well today, he reports he has a couple stitches to his left wrist from a few nights ago but healing and is going to be receiving antibiotics for.     Peripheral IV started in Right Forearm and labs drawn.      Labs reviewed by RN, Hgb: 14.1, within parameters for TP today.      500ml blood removed over 12 minutes and patient observed 30 minutes post phlebotomy while hydration administered. Orthostatic vital signs were stable, patient denies any dizziness.       PIV removed with tip intact. Patient left in stable condition, knows when to return for next appointment.

## 2024-08-19 ENCOUNTER — APPOINTMENT (OUTPATIENT)
Dept: RADIOLOGY | Facility: MEDICAL CENTER | Age: 59
DRG: 902 | End: 2024-08-19
Attending: STUDENT IN AN ORGANIZED HEALTH CARE EDUCATION/TRAINING PROGRAM
Payer: COMMERCIAL

## 2024-08-19 ENCOUNTER — HOSPITAL ENCOUNTER (INPATIENT)
Facility: MEDICAL CENTER | Age: 59
LOS: 2 days | DRG: 902 | End: 2024-08-22
Attending: STUDENT IN AN ORGANIZED HEALTH CARE EDUCATION/TRAINING PROGRAM | Admitting: STUDENT IN AN ORGANIZED HEALTH CARE EDUCATION/TRAINING PROGRAM
Payer: COMMERCIAL

## 2024-08-19 DIAGNOSIS — L02.519 CELLULITIS AND ABSCESS OF HAND: ICD-10-CM

## 2024-08-19 DIAGNOSIS — T14.8XXA WOUND INFECTION: ICD-10-CM

## 2024-08-19 DIAGNOSIS — L03.119 CELLULITIS AND ABSCESS OF HAND: ICD-10-CM

## 2024-08-19 DIAGNOSIS — L08.9 WOUND INFECTION: ICD-10-CM

## 2024-08-19 PROCEDURE — 700111 HCHG RX REV CODE 636 W/ 250 OVERRIDE (IP): Mod: JZ | Performed by: STUDENT IN AN ORGANIZED HEALTH CARE EDUCATION/TRAINING PROGRAM

## 2024-08-19 PROCEDURE — 700105 HCHG RX REV CODE 258: Performed by: STUDENT IN AN ORGANIZED HEALTH CARE EDUCATION/TRAINING PROGRAM

## 2024-08-19 PROCEDURE — 87641 MR-STAPH DNA AMP PROBE: CPT

## 2024-08-19 PROCEDURE — 80053 COMPREHEN METABOLIC PANEL: CPT

## 2024-08-19 PROCEDURE — 36415 COLL VENOUS BLD VENIPUNCTURE: CPT

## 2024-08-19 PROCEDURE — 76882 US LMTD JT/FCL EVL NVASC XTR: CPT | Mod: LT

## 2024-08-19 PROCEDURE — 85025 COMPLETE CBC W/AUTO DIFF WBC: CPT

## 2024-08-19 PROCEDURE — 96375 TX/PRO/DX INJ NEW DRUG ADDON: CPT

## 2024-08-19 PROCEDURE — 99285 EMERGENCY DEPT VISIT HI MDM: CPT

## 2024-08-19 PROCEDURE — 73130 X-RAY EXAM OF HAND: CPT | Mod: LT

## 2024-08-19 PROCEDURE — 96365 THER/PROPH/DIAG IV INF INIT: CPT

## 2024-08-19 RX ORDER — LINEZOLID 2 MG/ML
600 INJECTION, SOLUTION INTRAVENOUS ONCE
Status: COMPLETED | OUTPATIENT
Start: 2024-08-19 | End: 2024-08-20

## 2024-08-19 RX ORDER — HYDROMORPHONE HYDROCHLORIDE 1 MG/ML
0.5 INJECTION, SOLUTION INTRAMUSCULAR; INTRAVENOUS; SUBCUTANEOUS ONCE
Status: COMPLETED | OUTPATIENT
Start: 2024-08-19 | End: 2024-08-19

## 2024-08-19 RX ORDER — KETOROLAC TROMETHAMINE 15 MG/ML
15 INJECTION, SOLUTION INTRAMUSCULAR; INTRAVENOUS ONCE
Status: COMPLETED | OUTPATIENT
Start: 2024-08-19 | End: 2024-08-19

## 2024-08-19 RX ORDER — LINEZOLID 2 MG/ML
600 INJECTION, SOLUTION INTRAVENOUS EVERY 12 HOURS
Status: DISCONTINUED | OUTPATIENT
Start: 2024-08-19 | End: 2024-08-19

## 2024-08-19 RX ORDER — CEFAZOLIN 2 G/1
2 INJECTION, POWDER, FOR SOLUTION INTRAMUSCULAR; INTRAVENOUS ONCE
Status: DISCONTINUED | OUTPATIENT
Start: 2024-08-19 | End: 2024-08-19

## 2024-08-19 RX ORDER — SODIUM CHLORIDE, SODIUM LACTATE, POTASSIUM CHLORIDE, CALCIUM CHLORIDE 600; 310; 30; 20 MG/100ML; MG/100ML; MG/100ML; MG/100ML
1000 INJECTION, SOLUTION INTRAVENOUS ONCE
Status: COMPLETED | OUTPATIENT
Start: 2024-08-19 | End: 2024-08-20

## 2024-08-19 RX ADMIN — KETOROLAC TROMETHAMINE 15 MG: 15 INJECTION, SOLUTION INTRAMUSCULAR; INTRAVENOUS at 22:56

## 2024-08-19 RX ADMIN — HYDROMORPHONE HYDROCHLORIDE 0.5 MG: 1 INJECTION, SOLUTION INTRAMUSCULAR; INTRAVENOUS; SUBCUTANEOUS at 22:57

## 2024-08-19 RX ADMIN — SODIUM CHLORIDE, POTASSIUM CHLORIDE, SODIUM LACTATE AND CALCIUM CHLORIDE 1000 ML: 600; 310; 30; 20 INJECTION, SOLUTION INTRAVENOUS at 22:58

## 2024-08-19 RX ADMIN — LINEZOLID 600 MG: 600 INJECTION, SOLUTION INTRAVENOUS at 23:02

## 2024-08-19 ASSESSMENT — FIBROSIS 4 INDEX: FIB4 SCORE: 1.467615570751022021

## 2024-08-20 ENCOUNTER — ANESTHESIA EVENT (OUTPATIENT)
Dept: SURGERY | Facility: MEDICAL CENTER | Age: 59
DRG: 902 | End: 2024-08-20
Payer: COMMERCIAL

## 2024-08-20 ENCOUNTER — ANESTHESIA (OUTPATIENT)
Dept: SURGERY | Facility: MEDICAL CENTER | Age: 59
DRG: 902 | End: 2024-08-20
Payer: COMMERCIAL

## 2024-08-20 PROBLEM — L02.519 CELLULITIS AND ABSCESS OF HAND: Status: ACTIVE | Noted: 2024-08-20

## 2024-08-20 PROBLEM — L03.119 CELLULITIS AND ABSCESS OF HAND: Status: ACTIVE | Noted: 2024-08-20

## 2024-08-20 LAB
ALBUMIN SERPL BCP-MCNC: 3.7 G/DL (ref 3.2–4.9)
ALBUMIN/GLOB SERPL: 1.5 G/DL
ALP SERPL-CCNC: 81 U/L (ref 30–99)
ALT SERPL-CCNC: 13 U/L (ref 2–50)
ANION GAP SERPL CALC-SCNC: 13 MMOL/L (ref 7–16)
AST SERPL-CCNC: 12 U/L (ref 12–45)
BASOPHILS # BLD AUTO: 0.2 % (ref 0–1.8)
BASOPHILS # BLD: 0.02 K/UL (ref 0–0.12)
BILIRUB SERPL-MCNC: 0.6 MG/DL (ref 0.1–1.5)
BUN SERPL-MCNC: 16 MG/DL (ref 8–22)
CALCIUM ALBUM COR SERPL-MCNC: 8.5 MG/DL (ref 8.5–10.5)
CALCIUM SERPL-MCNC: 8.3 MG/DL (ref 8.5–10.5)
CHLORIDE SERPL-SCNC: 108 MMOL/L (ref 96–112)
CO2 SERPL-SCNC: 20 MMOL/L (ref 20–33)
CREAT SERPL-MCNC: 0.68 MG/DL (ref 0.5–1.4)
EOSINOPHIL # BLD AUTO: 0.08 K/UL (ref 0–0.51)
EOSINOPHIL NFR BLD: 0.7 % (ref 0–6.9)
ERYTHROCYTE [DISTWIDTH] IN BLOOD BY AUTOMATED COUNT: 51.6 FL (ref 35.9–50)
FUNGUS SPEC FUNGUS STN: NORMAL
GFR SERPLBLD CREATININE-BSD FMLA CKD-EPI: 108 ML/MIN/1.73 M 2
GLOBULIN SER CALC-MCNC: 2.4 G/DL (ref 1.9–3.5)
GLUCOSE SERPL-MCNC: 93 MG/DL (ref 65–99)
HCT VFR BLD AUTO: 35.6 % (ref 42–52)
HGB BLD-MCNC: 12.5 G/DL (ref 14–18)
IMM GRANULOCYTES # BLD AUTO: 0.18 K/UL (ref 0–0.11)
IMM GRANULOCYTES NFR BLD AUTO: 1.6 % (ref 0–0.9)
LYMPHOCYTES # BLD AUTO: 1.37 K/UL (ref 1–4.8)
LYMPHOCYTES NFR BLD: 12.2 % (ref 22–41)
MCH RBC QN AUTO: 35.3 PG (ref 27–33)
MCHC RBC AUTO-ENTMCNC: 35.1 G/DL (ref 32.3–36.5)
MCV RBC AUTO: 100.6 FL (ref 81.4–97.8)
MONOCYTES # BLD AUTO: 1.35 K/UL (ref 0–0.85)
MONOCYTES NFR BLD AUTO: 12 % (ref 0–13.4)
NEUTROPHILS # BLD AUTO: 8.23 K/UL (ref 1.82–7.42)
NEUTROPHILS NFR BLD: 73.3 % (ref 44–72)
NRBC # BLD AUTO: 0 K/UL
NRBC BLD-RTO: 0 /100 WBC (ref 0–0.2)
PLATELET # BLD AUTO: 171 K/UL (ref 164–446)
PMV BLD AUTO: 9.6 FL (ref 9–12.9)
POTASSIUM SERPL-SCNC: 3.8 MMOL/L (ref 3.6–5.5)
PROT SERPL-MCNC: 6.1 G/DL (ref 6–8.2)
RBC # BLD AUTO: 3.54 M/UL (ref 4.7–6.1)
SCCMEC + MECA PNL NOSE NAA+PROBE: NEGATIVE
SIGNIFICANT IND 70042: NORMAL
SITE SITE: NORMAL
SODIUM SERPL-SCNC: 141 MMOL/L (ref 135–145)
SOURCE SOURCE: NORMAL
WBC # BLD AUTO: 11.2 K/UL (ref 4.8–10.8)

## 2024-08-20 PROCEDURE — 87147 CULTURE TYPE IMMUNOLOGIC: CPT

## 2024-08-20 PROCEDURE — 99223 1ST HOSP IP/OBS HIGH 75: CPT | Performed by: STUDENT IN AN ORGANIZED HEALTH CARE EDUCATION/TRAINING PROGRAM

## 2024-08-20 PROCEDURE — A9270 NON-COVERED ITEM OR SERVICE: HCPCS | Performed by: STUDENT IN AN ORGANIZED HEALTH CARE EDUCATION/TRAINING PROGRAM

## 2024-08-20 PROCEDURE — 87102 FUNGUS ISOLATION CULTURE: CPT

## 2024-08-20 PROCEDURE — 87116 MYCOBACTERIA CULTURE: CPT

## 2024-08-20 PROCEDURE — 0JBH0ZZ EXCISION OF LEFT LOWER ARM SUBCUTANEOUS TISSUE AND FASCIA, OPEN APPROACH: ICD-10-PCS | Performed by: STUDENT IN AN ORGANIZED HEALTH CARE EDUCATION/TRAINING PROGRAM

## 2024-08-20 PROCEDURE — 700111 HCHG RX REV CODE 636 W/ 250 OVERRIDE (IP): Performed by: ANESTHESIOLOGY

## 2024-08-20 PROCEDURE — 160035 HCHG PACU - 1ST 60 MINS PHASE I: Performed by: STUDENT IN AN ORGANIZED HEALTH CARE EDUCATION/TRAINING PROGRAM

## 2024-08-20 PROCEDURE — 770006 HCHG ROOM/CARE - MED/SURG/GYN SEMI*

## 2024-08-20 PROCEDURE — 160048 HCHG OR STATISTICAL LEVEL 1-5: Performed by: STUDENT IN AN ORGANIZED HEALTH CARE EDUCATION/TRAINING PROGRAM

## 2024-08-20 PROCEDURE — 700101 HCHG RX REV CODE 250: Performed by: ANESTHESIOLOGY

## 2024-08-20 PROCEDURE — 87206 SMEAR FLUORESCENT/ACID STAI: CPT

## 2024-08-20 PROCEDURE — 87205 SMEAR GRAM STAIN: CPT

## 2024-08-20 PROCEDURE — 87075 CULTR BACTERIA EXCEPT BLOOD: CPT

## 2024-08-20 PROCEDURE — 700102 HCHG RX REV CODE 250 W/ 637 OVERRIDE(OP): Performed by: STUDENT IN AN ORGANIZED HEALTH CARE EDUCATION/TRAINING PROGRAM

## 2024-08-20 PROCEDURE — 160027 HCHG SURGERY MINUTES - 1ST 30 MINS LEVEL 2: Performed by: STUDENT IN AN ORGANIZED HEALTH CARE EDUCATION/TRAINING PROGRAM

## 2024-08-20 PROCEDURE — 87077 CULTURE AEROBIC IDENTIFY: CPT

## 2024-08-20 PROCEDURE — 700105 HCHG RX REV CODE 258: Performed by: STUDENT IN AN ORGANIZED HEALTH CARE EDUCATION/TRAINING PROGRAM

## 2024-08-20 PROCEDURE — 160002 HCHG RECOVERY MINUTES (STAT): Performed by: STUDENT IN AN ORGANIZED HEALTH CARE EDUCATION/TRAINING PROGRAM

## 2024-08-20 PROCEDURE — 160009 HCHG ANES TIME/MIN: Performed by: STUDENT IN AN ORGANIZED HEALTH CARE EDUCATION/TRAINING PROGRAM

## 2024-08-20 PROCEDURE — 99222 1ST HOSP IP/OBS MODERATE 55: CPT | Mod: 57 | Performed by: STUDENT IN AN ORGANIZED HEALTH CARE EDUCATION/TRAINING PROGRAM

## 2024-08-20 PROCEDURE — 87186 SC STD MICRODIL/AGAR DIL: CPT

## 2024-08-20 PROCEDURE — 0J9H0ZZ DRAINAGE OF LEFT LOWER ARM SUBCUTANEOUS TISSUE AND FASCIA, OPEN APPROACH: ICD-10-PCS | Performed by: STUDENT IN AN ORGANIZED HEALTH CARE EDUCATION/TRAINING PROGRAM

## 2024-08-20 PROCEDURE — 25028 I&D F/ARM&/WRST DP ABSC/HMTM: CPT | Mod: LT | Performed by: STUDENT IN AN ORGANIZED HEALTH CARE EDUCATION/TRAINING PROGRAM

## 2024-08-20 PROCEDURE — 87070 CULTURE OTHR SPECIMN AEROBIC: CPT

## 2024-08-20 PROCEDURE — 87015 SPECIMEN INFECT AGNT CONCNTJ: CPT | Mod: 91

## 2024-08-20 PROCEDURE — 700105 HCHG RX REV CODE 258: Performed by: ANESTHESIOLOGY

## 2024-08-20 RX ORDER — HYDROMORPHONE HYDROCHLORIDE 1 MG/ML
0.1 INJECTION, SOLUTION INTRAMUSCULAR; INTRAVENOUS; SUBCUTANEOUS
Status: DISCONTINUED | OUTPATIENT
Start: 2024-08-20 | End: 2024-08-20 | Stop reason: HOSPADM

## 2024-08-20 RX ORDER — SODIUM CHLORIDE, SODIUM LACTATE, POTASSIUM CHLORIDE, CALCIUM CHLORIDE 600; 310; 30; 20 MG/100ML; MG/100ML; MG/100ML; MG/100ML
INJECTION, SOLUTION INTRAVENOUS CONTINUOUS
Status: DISCONTINUED | OUTPATIENT
Start: 2024-08-20 | End: 2024-08-21

## 2024-08-20 RX ORDER — TESTOSTERONE CYPIONATE 200 MG/ML
200 INJECTION, SOLUTION INTRAMUSCULAR
COMMUNITY

## 2024-08-20 RX ORDER — SODIUM CHLORIDE, SODIUM LACTATE, POTASSIUM CHLORIDE, CALCIUM CHLORIDE 600; 310; 30; 20 MG/100ML; MG/100ML; MG/100ML; MG/100ML
INJECTION, SOLUTION INTRAVENOUS CONTINUOUS
Status: DISCONTINUED | OUTPATIENT
Start: 2024-08-20 | End: 2024-08-20 | Stop reason: HOSPADM

## 2024-08-20 RX ORDER — ONDANSETRON 2 MG/ML
INJECTION INTRAMUSCULAR; INTRAVENOUS PRN
Status: DISCONTINUED | OUTPATIENT
Start: 2024-08-20 | End: 2024-08-20 | Stop reason: SURG

## 2024-08-20 RX ORDER — HYDROMORPHONE HYDROCHLORIDE 1 MG/ML
0.2 INJECTION, SOLUTION INTRAMUSCULAR; INTRAVENOUS; SUBCUTANEOUS
Status: DISCONTINUED | OUTPATIENT
Start: 2024-08-20 | End: 2024-08-20 | Stop reason: HOSPADM

## 2024-08-20 RX ORDER — OXYCODONE HCL 5 MG/5 ML
10 SOLUTION, ORAL ORAL
Status: DISCONTINUED | OUTPATIENT
Start: 2024-08-20 | End: 2024-08-20 | Stop reason: HOSPADM

## 2024-08-20 RX ORDER — LINEZOLID 600 MG/1
600 TABLET, FILM COATED ORAL EVERY 12 HOURS
Status: DISCONTINUED | OUTPATIENT
Start: 2024-08-20 | End: 2024-08-21

## 2024-08-20 RX ORDER — ACETAMINOPHEN 325 MG/1
650 TABLET ORAL EVERY 6 HOURS PRN
Status: DISCONTINUED | OUTPATIENT
Start: 2024-08-20 | End: 2024-08-22 | Stop reason: HOSPADM

## 2024-08-20 RX ORDER — KETOROLAC TROMETHAMINE 15 MG/ML
INJECTION, SOLUTION INTRAMUSCULAR; INTRAVENOUS PRN
Status: DISCONTINUED | OUTPATIENT
Start: 2024-08-20 | End: 2024-08-20 | Stop reason: SURG

## 2024-08-20 RX ORDER — MEPERIDINE HYDROCHLORIDE 25 MG/ML
12.5 INJECTION INTRAMUSCULAR; INTRAVENOUS; SUBCUTANEOUS
Status: DISCONTINUED | OUTPATIENT
Start: 2024-08-20 | End: 2024-08-20 | Stop reason: HOSPADM

## 2024-08-20 RX ORDER — OXYCODONE HCL 5 MG/5 ML
5 SOLUTION, ORAL ORAL
Status: DISCONTINUED | OUTPATIENT
Start: 2024-08-20 | End: 2024-08-20 | Stop reason: HOSPADM

## 2024-08-20 RX ORDER — KETOROLAC TROMETHAMINE 30 MG/ML
INJECTION, SOLUTION INTRAMUSCULAR; INTRAVENOUS PRN
Status: DISCONTINUED | OUTPATIENT
Start: 2024-08-20 | End: 2024-08-20 | Stop reason: SURG

## 2024-08-20 RX ORDER — IBUPROFEN 600 MG/1
600 TABLET, FILM COATED ORAL EVERY 6 HOURS PRN
Status: DISCONTINUED | OUTPATIENT
Start: 2024-08-20 | End: 2024-08-20

## 2024-08-20 RX ORDER — MIDAZOLAM HYDROCHLORIDE 1 MG/ML
INJECTION INTRAMUSCULAR; INTRAVENOUS PRN
Status: DISCONTINUED | OUTPATIENT
Start: 2024-08-20 | End: 2024-08-20 | Stop reason: HOSPADM

## 2024-08-20 RX ORDER — DEXAMETHASONE SODIUM PHOSPHATE 4 MG/ML
INJECTION, SOLUTION INTRA-ARTICULAR; INTRALESIONAL; INTRAMUSCULAR; INTRAVENOUS; SOFT TISSUE PRN
Status: DISCONTINUED | OUTPATIENT
Start: 2024-08-20 | End: 2024-08-20 | Stop reason: SURG

## 2024-08-20 RX ORDER — LIDOCAINE HYDROCHLORIDE 20 MG/ML
INJECTION, SOLUTION EPIDURAL; INFILTRATION; INTRACAUDAL; PERINEURAL PRN
Status: DISCONTINUED | OUTPATIENT
Start: 2024-08-20 | End: 2024-08-20 | Stop reason: SURG

## 2024-08-20 RX ORDER — HYDROMORPHONE HYDROCHLORIDE 1 MG/ML
0.4 INJECTION, SOLUTION INTRAMUSCULAR; INTRAVENOUS; SUBCUTANEOUS
Status: DISCONTINUED | OUTPATIENT
Start: 2024-08-20 | End: 2024-08-20 | Stop reason: HOSPADM

## 2024-08-20 RX ORDER — LABETALOL HYDROCHLORIDE 5 MG/ML
5 INJECTION, SOLUTION INTRAVENOUS
Status: DISCONTINUED | OUTPATIENT
Start: 2024-08-20 | End: 2024-08-20 | Stop reason: HOSPADM

## 2024-08-20 RX ORDER — LABETALOL HYDROCHLORIDE 5 MG/ML
10 INJECTION, SOLUTION INTRAVENOUS EVERY 4 HOURS PRN
Status: DISCONTINUED | OUTPATIENT
Start: 2024-08-20 | End: 2024-08-22 | Stop reason: HOSPADM

## 2024-08-20 RX ORDER — CEFAZOLIN SODIUM 1 G/3ML
INJECTION, POWDER, FOR SOLUTION INTRAMUSCULAR; INTRAVENOUS PRN
Status: DISCONTINUED | OUTPATIENT
Start: 2024-08-20 | End: 2024-08-20 | Stop reason: SURG

## 2024-08-20 RX ORDER — DIPHENHYDRAMINE HYDROCHLORIDE 50 MG/ML
12.5 INJECTION INTRAMUSCULAR; INTRAVENOUS
Status: DISCONTINUED | OUTPATIENT
Start: 2024-08-20 | End: 2024-08-20 | Stop reason: HOSPADM

## 2024-08-20 RX ORDER — SODIUM CHLORIDE, SODIUM LACTATE, POTASSIUM CHLORIDE, CALCIUM CHLORIDE 600; 310; 30; 20 MG/100ML; MG/100ML; MG/100ML; MG/100ML
INJECTION, SOLUTION INTRAVENOUS
Status: DISCONTINUED | OUTPATIENT
Start: 2024-08-20 | End: 2024-08-20 | Stop reason: SURG

## 2024-08-20 RX ORDER — HYDRALAZINE HYDROCHLORIDE 20 MG/ML
5 INJECTION INTRAMUSCULAR; INTRAVENOUS
Status: DISCONTINUED | OUTPATIENT
Start: 2024-08-20 | End: 2024-08-20 | Stop reason: HOSPADM

## 2024-08-20 RX ORDER — ALBUTEROL SULFATE 5 MG/ML
2.5 SOLUTION RESPIRATORY (INHALATION)
Status: DISCONTINUED | OUTPATIENT
Start: 2024-08-20 | End: 2024-08-20 | Stop reason: HOSPADM

## 2024-08-20 RX ORDER — HALOPERIDOL 5 MG/ML
1 INJECTION INTRAMUSCULAR
Status: DISCONTINUED | OUTPATIENT
Start: 2024-08-20 | End: 2024-08-20 | Stop reason: HOSPADM

## 2024-08-20 RX ORDER — IBUPROFEN 200 MG
800 TABLET ORAL 2 TIMES DAILY
COMMUNITY

## 2024-08-20 RX ORDER — IBUPROFEN 600 MG/1
600 TABLET, FILM COATED ORAL EVERY 6 HOURS PRN
Status: DISCONTINUED | OUTPATIENT
Start: 2024-08-20 | End: 2024-08-22 | Stop reason: HOSPADM

## 2024-08-20 RX ORDER — TRAMADOL HYDROCHLORIDE 50 MG/1
50 TABLET ORAL EVERY 4 HOURS PRN
Status: DISCONTINUED | OUTPATIENT
Start: 2024-08-20 | End: 2024-08-22 | Stop reason: HOSPADM

## 2024-08-20 RX ADMIN — FENTANYL CITRATE 50 MCG: 50 INJECTION, SOLUTION INTRAMUSCULAR; INTRAVENOUS at 15:37

## 2024-08-20 RX ADMIN — SODIUM CHLORIDE, POTASSIUM CHLORIDE, SODIUM LACTATE AND CALCIUM CHLORIDE: 600; 310; 30; 20 INJECTION, SOLUTION INTRAVENOUS at 15:27

## 2024-08-20 RX ADMIN — IBUPROFEN 600 MG: 600 TABLET, FILM COATED ORAL at 20:31

## 2024-08-20 RX ADMIN — KETOROLAC TROMETHAMINE 30 MG: 15 INJECTION, SOLUTION INTRAMUSCULAR; INTRAVENOUS at 15:40

## 2024-08-20 RX ADMIN — ONDANSETRON 8 MG: 2 INJECTION INTRAMUSCULAR; INTRAVENOUS at 15:40

## 2024-08-20 RX ADMIN — LINEZOLID 600 MG: 600 TABLET, FILM COATED ORAL at 06:37

## 2024-08-20 RX ADMIN — FENTANYL CITRATE 100 MCG: 50 INJECTION, SOLUTION INTRAMUSCULAR; INTRAVENOUS at 15:27

## 2024-08-20 RX ADMIN — ONDANSETRON 8 MG: 2 INJECTION INTRAMUSCULAR; INTRAVENOUS at 15:43

## 2024-08-20 RX ADMIN — CEFAZOLIN 2 G: 1 INJECTION, POWDER, FOR SOLUTION INTRAMUSCULAR; INTRAVENOUS at 15:38

## 2024-08-20 RX ADMIN — IBUPROFEN 600 MG: 600 TABLET, FILM COATED ORAL at 10:46

## 2024-08-20 RX ADMIN — TRAMADOL HYDROCHLORIDE 50 MG: 50 TABLET ORAL at 10:46

## 2024-08-20 RX ADMIN — LINEZOLID 600 MG: 600 TABLET, FILM COATED ORAL at 17:16

## 2024-08-20 RX ADMIN — DEXAMETHASONE SODIUM PHOSPHATE 8 MG: 4 INJECTION INTRA-ARTICULAR; INTRALESIONAL; INTRAMUSCULAR; INTRAVENOUS; SOFT TISSUE at 15:30

## 2024-08-20 RX ADMIN — TRAMADOL HYDROCHLORIDE 50 MG: 50 TABLET ORAL at 03:27

## 2024-08-20 RX ADMIN — LIDOCAINE HYDROCHLORIDE 100 MG: 20 INJECTION, SOLUTION EPIDURAL; INFILTRATION; INTRACAUDAL at 15:30

## 2024-08-20 RX ADMIN — SODIUM CHLORIDE, POTASSIUM CHLORIDE, SODIUM LACTATE AND CALCIUM CHLORIDE: 600; 310; 30; 20 INJECTION, SOLUTION INTRAVENOUS at 02:10

## 2024-08-20 RX ADMIN — MIDAZOLAM HYDROCHLORIDE 2 MG: 2 INJECTION, SOLUTION INTRAMUSCULAR; INTRAVENOUS at 15:27

## 2024-08-20 RX ADMIN — KETOROLAC TROMETHAMINE 30 MG: 30 INJECTION, SOLUTION INTRAMUSCULAR; INTRAVENOUS at 15:43

## 2024-08-20 RX ADMIN — PROPOFOL 200 MG: 10 INJECTION, EMULSION INTRAVENOUS at 15:30

## 2024-08-20 ASSESSMENT — ENCOUNTER SYMPTOMS
FEVER: 0
ABDOMINAL PAIN: 0
SHORTNESS OF BREATH: 0
NAUSEA: 0
COUGH: 0
DIARRHEA: 0
VOMITING: 0
CHILLS: 0

## 2024-08-20 ASSESSMENT — PAIN DESCRIPTION - PAIN TYPE
TYPE: SURGICAL PAIN
TYPE: ACUTE PAIN
TYPE: SURGICAL PAIN
TYPE: ACUTE PAIN
TYPE: SURGICAL PAIN
TYPE: SURGICAL PAIN
TYPE: ACUTE PAIN
TYPE: SURGICAL PAIN

## 2024-08-20 NOTE — PROGRESS NOTES
4 Eyes Skin Assessment Completed by Vivian MILLER RN and Leticia COOPER RN.    Head WDL  Ears WDL  Nose WDL  Mouth WDL  Neck WDL  Breast/Chest WDL  Shoulder Blades WDL  Spine WDL  (R) Arm/Elbow/Hand WDL  (L) Arm/Elbow/Hand Redness, Abrasion, Swelling, and Weeping  Abdomen WDL  Groin WDL  Scrotum/Coccyx/Buttocks WDL  (R) Leg Scab  (L) Leg Scab  (R) Heel/Foot/Toe WDL  (L) Heel/Foot/Toe WDL          Devices In Places Blood Pressure Cuff and Pulse Ox      Interventions In Place Pillows    Possible Skin Injury Yes    Pictures Uploaded Into Epic Yes  Wound Consult Placed No  RN Wound Prevention Protocol Ordered No
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
## 2024-08-20 NOTE — OP REPORT
DATE OF OPERATION: 8/20/2024     PREOPERATIVE DIAGNOSIS: Left dorsal wrist abscess/wound dehiscence infection    POSTOPERATIVE DIAGNOSIS: Same    PROCEDURE PERFORMED: Left wrist incision drainage abscess    SURGEON: Yuniel Sheehan M.D.     ASSISTANT: None    ANESTHESIA: General    SPECIMEN: Sent for culture    ESTIMATED BLOOD LOSS: 5 mL    IMPLANTS: None      INDICATIONS: The patient is a 58 y.o. male who presented with above.  I discussed the risks and benefits of the procedure which include but are not limited to risks of infection, wound healing complication, neurovascular injury, pain, malunion, non-union, malrotation, and the medical risks of anesthesia including MI, stroke, and death.  Alternatives to surgery were also discussed, including non-operative management, which I did not recommend.  The patient was in agreement with the plan to proceed, and the informed consent was signed and documented.  I met with the patient pre-operatively and marked the operative extremity with their agreement.  We proceeded to the operating room.     DESCRIPTION OF PROCEDURE:  Patient was seen in the preoperative holding area on the day of surgery. The operative site was marked with my initials.  he was taken to the operating room and placed supine on the operative table.  Anesthesia was induced.  The operative extremity was prepped and draped in the normal sterile fashion.  Operative pause was conducted and the correct patient, site, side, procedure, and surgeon's initials on extremity were identified.  There is a horizontal laceration of the dorsal wrist with evidence of dehiscence and infection.  This was ellipsed out and extended proximally and distally.  Blunt dissection was then carried down to the subcutaneous abscess pocket.  There is immediate outflow of necrotic purulent fluid.  This was obtained obtained and sent for culture.  After thorough debridement of any ill appearing tissue the wound was thoroughly  irrigated sterile saline.  Wound was then loosely closed with 3-0 nylon suture.  Sterile dressings were applied.  He was woken taken to PACU in stable condition.    POSTOPERATIVE PLAN: Nonweightbearing left hand.  Dressings down postop day 3, okay to shower at that time.  Discharge medically appropriate.  Antibiotics per ID.  The patient will follow up in clinic in 2 weeks to check wounds and remove sutures/staples.      ____________________________________   Yuniel Sheehan M.D.   DD: 8/20/2024  3:46 PM

## 2024-08-20 NOTE — CONSULTS
8/20/2024          HPI: Milton Aguila is a 58 y.o. male who presents with left wrist pain and swelling after barbed wire injury. Failed oral abx.     Past Medical History:   Diagnosis Date    Arthritis, rheumatic, acute or subacute     Hemochromatosis        Past Surgical History:   Procedure Laterality Date    LUMBAR LAMINECTOMY DISKECTOMY  2/7/2013    Performed by Mackenzie Cameron M.D. at SURGERY [facility]    APPENDECTOMY  2000    INGUINAL HERNIA REPAIR      Hernia Repair, Inguinal    OTHER ORTHOPEDIC SURGERY      ivy hip replacements       Medications  No current facility-administered medications on file prior to encounter.     Current Outpatient Medications on File Prior to Encounter   Medication Sig Dispense Refill    testosterone cypionate (DEPO-TESTOSTERONE) 200 MG/ML injection Inject 200 mg into the shoulder, thigh, or buttocks every 7 days.      ibuprofen (MOTRIN) 200 MG Tab Take 800 mg by mouth 2 times a day.      Etanercept (ENBREL SURECLICK) 50 MG/ML Solution Auto-injector INJECT 1 PEN UNDER THE SKIN EVERY 7 DAYS. (Patient taking differently: Inject 50 mg under the skin every 7 days. INJECT 1 PEN UNDER THE SKIN EVERY 7 DAYS.) 12 Each 0       Allergies  Patient has no known allergies.    ROS  . All other systems were reviewed and found to be negative    History reviewed. No pertinent family history.    Social History     Socioeconomic History    Marital status:    Tobacco Use    Smoking status: Never    Smokeless tobacco: Never   Vaping Use    Vaping status: Never Used   Substance and Sexual Activity    Alcohol use: Yes    Drug use: No     Social Determinants of Health     Financial Resource Strain: Not on File (8/24/2019)    [facility] from [facility]    Financial Resource Strain     Financial Resource Strain: 0   Food Insecurity: Not on File (8/24/2019)    Received from [facility]    Food Insecurity     Food: 0   Transportation Needs: Not on File (8/24/2019)    [facility]    Transportation Needs     Transportation: 0   Physical Activity: Not on File (2019)    Received from [facility]    Physical Activity     Physical Activity: 0   Stress: Not on File (2019)    Received from [facility]    Stress     Stress: 0   Social Connections: Not on File (2019)    Received from [facility]    Social Connections     Social Connections and Isolation: 0   Housing Stability: Not on File (2019)    Received from [facility]    Housing Stability     Housin       Physical Exam  Vitals  /79   Pulse 76   Temp 36.3 °C (97.3 °F) (Temporal)   Resp 17   Wt 92.4 kg (203 lb 11.3 oz)   SpO2 95%   General: Well Developed, Well Nourished, Age appropriate appearance  HEENT: Normocephalic, atraumatic  Psych: Normal mood and affect  Neck: Supple, nontender, no masses  Lungs: Breathing unlabored, No audible wheezing  Heart: Regular heart rate and rhythm  Abdomen: Soft, NT, ND  Neuro: Sensation grossly intact to BUE and BLE, moving all four extremities  Skin: Intact, no open wounds  Vascular: , Capillary refill <2 seconds  MSK: LUE: dorsal wound w/ erythema      Radiographs:  US-EXTREMITY NON VASCULAR UNILATERAL LEFT   Final Result      Deep complex fluid collection with irregular borders measuring approximately 1 cm in maximum thickness is identified. Differential diagnosis includes abscess and hematoma. There is increased vascularity.      DX-HAND 3+ LEFT   Final Result      No evidence of fracture or dislocation.          Laboratory Values  Recent Labs     24  0712 24  2344   WBC  --  11.2*   RBC  --  3.54*   HEMOGLOBIN 14.1 12.5*   HEMATOCRIT 39.6* 35.6*   MCV  --  100.6*   MCH  --  35.3*   MCHC  --  35.1   RDW  --  51.6*   PLATELETCT  --  171   MPV  --  9.6     Recent Labs     24  2344   SODIUM 141   POTASSIUM 3.8   CHLORIDE 108   CO2 20   GLUCOSE 93   BUN 16             Impression:58M left wrist infection. Plan for I&D today    Plan:We discussed the  diagnosis and findings with the patient at length.  We reviewed possible non operative and operative interventions and the risks and benefits of each of these.  he had a chance to ask questions and all of these were answered to his satisfaction. The patient chose to proceed with  surgical intervention. Risks and benefits of surgery were discussed which include but are not limited to bleeding, infection, neurovascular damage, malunion, nonunion, instability, limb length discrepancy, DVT, PE, MI, Stroke and death. They understand these risks and wish to proceed.      Yuniel Sheehan MD  Orthopedic Trauma Surgery

## 2024-08-20 NOTE — ED TRIAGE NOTES
Patient to ED with complaints of left wrist infection. He had a lac sutures about 1 weeks ago and started developing redness yesterday. Started on Augmentin - has taken 3 doses. He now has increases swelling, redness and purulent drainage form wound. Sutures are in placement.     Pt educated on ED process and asked to wait in lobby. Patient educated on importance of alerting staff to new or worsening symptoms or concerns.

## 2024-08-20 NOTE — PROGRESS NOTES
Evaluated patient at the bedside.     #cellulitis and abscess of the land. US showed concern for abscess. I and D today with orthopedics. Continue antibiotics. Follow up on cultures. Further management will be based on patient response to the treatment.

## 2024-08-20 NOTE — ANESTHESIA POSTPROCEDURE EVALUATION
Patient: Milton Aguila    Procedure Summary       Date: 08/20/24 Room / Location: [place] [address] / SURGERY [facility]    Anesthesia Start: 1527 Anesthesia Stop: 1550    Procedure: IRRIGATION AND DEBRIDEMENT, WRIST (Left: Hand) Diagnosis: (left wrist abscess)    Surgeons: Yuniel Sheehan M.D. Responsible Provider: Abebe Rushing M.D.    Anesthesia Type: general ASA Status: 2            Final Anesthesia Type: general  Last vitals  BP   Blood Pressure: (!) 141/87    Temp   36.7 °C (98 °F)    Pulse   80   Resp   19    SpO2   94 %      Anesthesia Post Evaluation    Patient location during evaluation: PACU  Patient participation: complete - patient participated  Level of consciousness: awake and alert    Airway patency: patent  Anesthetic complications: no  Cardiovascular status: hemodynamically stable  Respiratory status: acceptable  Hydration status: euvolemic    PONV: none          No notable events documented.     Nurse Pain Score: 0 (NPRS)

## 2024-08-20 NOTE — ANESTHESIA PREPROCEDURE EVALUATION
Case: [id number] Anesthesia Start Date/Time: 08/20/24 1527    Procedure: IRRIGATION AND DEBRIDEMENT, WRIST (Left)    Pre-op diagnosis: infected wound    Location: [facility] OR 12 / SURGERY [facility]    Surgeons: Yuniel Sheehan M.D.            Relevant Problems   Other   (positive) Cellulitis and abscess of hand   (positive) Hereditary hemochromatosis (HCC)   (positive) Rheumatoid arthritis (HCC)       Physical Exam    Airway   Mallampati: II  TM distance: >3 FB  Neck ROM: full       Cardiovascular - normal exam  Rhythm: regular  Rate: normal  (-) murmur     Dental - normal exam           Pulmonary - normal exam  Breath sounds clear to auscultation     Abdominal    Neurological - normal exam                   Anesthesia Plan    ASA 2       Plan - general       Airway plan will be LMA          Induction: intravenous    Postoperative Plan: Postoperative administration of opioids is intended.    Pertinent diagnostic labs and testing reviewed    Informed Consent:    Anesthetic plan and risks discussed with patient.

## 2024-08-20 NOTE — CARE PLAN
The patient is Stable - Low risk of patient condition declining or worsening    Shift Goals  Clinical Goals: pain control, NPO  Patient Goals: pain control, comfort  Family Goals: not present    Problem: Knowledge Deficit - Standard  Goal: Patient and family/care givers will demonstrate understanding of plan of care, disease process/condition, diagnostic tests and medications  Outcome: Progressing  Progress made toward(s) clinical / shift goals:  Pt verbalized understanding of education and plan of care.      Problem: Pain - Standard  Goal: Alleviation of pain or a reduction in pain to the patient’s comfort goal  Outcome: Progressing  Progress made toward(s) clinical / shift goals: Pt received pain meds per MAR. Pain was well controlled throughout shift.

## 2024-08-20 NOTE — H&P
Hospital Medicine History & Physical Note    Date of Service  8/20/2024    Primary Care Physician  Paolo Barnhart M.D.    Consultants  orthopedics    Code Status  Full Code    Chief Complaint  Chief Complaint   Patient presents with    Wound Check    Wound Infection       History of Presenting Illness  Milton Aguila is a 58 y.o. male who presented 8/19/2024 with left hand wound.  PMH of rheumatoid arthritis, hemochromatosis gets weekly phlebotomies.  1 week ago patient cut his left wrist after being caught on barbed wire which was sutured.  He developed redness and swelling, he is a  and started taking Augmentin.  Symptoms are getting worse so he started taking cephalexin.  Comes in tonight due to suture dehiscence and continued purulent discharge.  Denies fever/chills.    In the ED afebrile, hemodynamically stable.  Labs showed WBC count of 11.2.  Ultrasound shows a 1cm fluid collection concerning for abscess.     I discussed the plan of care with patient, bedside RN, and edp .    Review of Systems  Review of Systems   Constitutional:  Negative for chills and fever.   Respiratory:  Negative for cough and shortness of breath.    Cardiovascular:  Negative for chest pain.   Gastrointestinal:  Negative for abdominal pain, diarrhea, nausea and vomiting.   Genitourinary:  Negative for dysuria and urgency.     Past Medical History   has a past medical history of Arthritis, rheumatic, acute or subacute and Hemochromatosis.    Surgical History   has a past surgical history that includes inguinal hernia repair; appendectomy (2000); other orthopedic surgery; and lumbar laminectomy diskectomy (2/7/2013).     Family History  Family history reviewed with patient. There is no family history that is pertinent to the chief complaint.     Social History   reports that he has never smoked. He has never used smokeless tobacco. He reports current alcohol use. He reports that he does not use drugs.    Allergies  No  Known Allergies    Medications  Prior to Admission Medications   Prescriptions Last Dose Informant Patient Reported? Taking?   ALPRAZolam (XANAX) 1 MG Tab   Yes No   Patient not taking: Reported on 2024   Etanercept (ENBREL SURECLICK) 50 MG/ML Solution Auto-injector   No No   Sig: INJECT 1 PEN UNDER THE SKIN EVERY 7 DAYS.   MOUNJARO 15 MG/0.5ML Solution Pen-injector   Yes No   Sig: INJECT 15 MG UNDER THE SKIN ONE DAY A WEEK   Patient not taking: Reported on 2024   TESTOSTERONE   Yes No   Sig: by Does not apply route.   Patient not taking: Reported on 2024   sulindac (CLINORIL) 200 MG Tab   No No   Si tab po bid   Patient not taking: Reported on 2024      Facility-Administered Medications: None       Physical Exam  Temp:  [36.8 °C (98.2 °F)-36.9 °C (98.5 °F)] 36.8 °C (98.2 °F)  Pulse:  [73-91] 73  Resp:  [16-18] 18  BP: (112-138)/(67-89) 135/89  SpO2:  [93 %-95 %] 95 %  Blood Pressure: 124/74   Temperature: 36.9 °C (98.5 °F)   Pulse: 91   Respiration: 18   Pulse Oximetry: 95 %       Physical Exam  Constitutional:       Appearance: Normal appearance.   HENT:      Head: Normocephalic and atraumatic.      Mouth/Throat:      Mouth: Mucous membranes are moist.      Pharynx: Oropharynx is clear. No oropharyngeal exudate or posterior oropharyngeal erythema.   Eyes:      General: No scleral icterus.  Cardiovascular:      Rate and Rhythm: Normal rate and regular rhythm.      Pulses: Normal pulses.      Heart sounds: Normal heart sounds. No murmur heard.  Pulmonary:      Effort: Pulmonary effort is normal. No respiratory distress.      Breath sounds: Normal breath sounds. No wheezing.   Abdominal:      Palpations: Abdomen is soft.      Tenderness: There is no abdominal tenderness.   Musculoskeletal:         General: Normal range of motion.      Cervical back: Normal range of motion.   Skin:     General: Skin is warm and dry.      Findings: Erythema present.      Comments: JENNIFER   Neurological:       "General: No focal deficit present.      Mental Status: He is alert and oriented to person, place, and time. Mental status is at baseline.   Psychiatric:         Mood and Affect: Mood normal.         Laboratory:  Recent Labs     08/18/24  0712 08/19/24  2344   WBC  --  11.2*   RBC  --  3.54*   HEMOGLOBIN 14.1 12.5*   HEMATOCRIT 39.6* 35.6*   MCV  --  100.6*   MCH  --  35.3*   MCHC  --  35.1   RDW  --  51.6*   PLATELETCT  --  171   MPV  --  9.6     Recent Labs     08/19/24  2344   SODIUM 141   POTASSIUM 3.8   CHLORIDE 108   CO2 20   GLUCOSE 93   BUN 16   CREATININE 0.68   CALCIUM 8.3*     Recent Labs     08/19/24  2344   ALTSGPT 13   ASTSGOT 12   ALKPHOSPHAT 81   TBILIRUBIN 0.6   GLUCOSE 93         No results for input(s): \"NTPROBNP\" in the last 72 hours.      No results for input(s): \"TROPONINT\" in the last 72 hours.    Imaging:  US-EXTREMITY NON VASCULAR UNILATERAL LEFT   Final Result      Deep complex fluid collection with irregular borders measuring approximately 1 cm in maximum thickness is identified. Differential diagnosis includes abscess and hematoma. There is increased vascularity.      DX-HAND 3+ LEFT   Final Result      No evidence of fracture or dislocation.          X-Ray:  I have personally reviewed the images and compared with prior images. and My impression is: no bony abnormality     Assessment/Plan:  Justification for Admission Status  I anticipate this patient will require at least two midnights for appropriate medical management, necessitating inpatient admission because cellulitis and abscess requiring surgical intervention    * Cellulitis and abscess of hand- (present on admission)  Assessment & Plan  Patient sustained a laceration after being caught on barbed wire about a week ago, wound was sutured and he has been on Augmentin and cephalexin  Dehiscence of sutures today and continued purulent discharge  Ultrasound in the ED shows a 1 cm fluid collection concerning for abscess  Will start on " linezolid  EDP discussed with Ortho who will evaluate the patient, keep n.p.o.    Hereditary hemochromatosis (HCC)- (present on admission)  Assessment & Plan  Patient gets weekly phlebotomies. Hemoglobin 12.5 on presentation    Rheumatoid arthritis (HCC)- (present on admission)  Assessment & Plan  On etanercept        VTE prophylaxis: pharmacologic prophylaxis contraindicated due to expected procedure

## 2024-08-20 NOTE — ANESTHESIA TIME REPORT
Anesthesia Start and Stop Event Times       Date Time Event    8/20/2024 1514 Ready for Procedure     1527 Anesthesia Start     1550 Anesthesia Stop          Responsible Staff  08/20/24      Name Role Begin End    Abebe Rushing M.D. Anesth 1527 1550          Overtime Reason:  no overtime (within assigned shift)    Comments:

## 2024-08-20 NOTE — ANESTHESIA PROCEDURE NOTES
Airway    Date/Time: 8/20/2024 3:31 PM    Performed by: Abebe Rushing M.D.  Authorized by: Abebe Rushing M.D.    Location:  OR  Urgency:  Elective  Indications for Airway Management:  Anesthesia      Spontaneous Ventilation: absent    Sedation Level:  Deep  Preoxygenated: Yes    Final Airway Type:  Supraglottic airway  Final Supraglottic Airway:  Standard LMA    SGA Size:  5  Number of Attempts at Approach:  1

## 2024-08-20 NOTE — PROGRESS NOTES
Received report from previous shift RN at 0715.  Assessment complete.  A&O x 4. Patient calls appropriately.  Patient ambulates by self.   Patient has 7/10 pain. Pain managed with prescribed medications.  Denies N&V. Patient is currently NPO and sips with meds.  + void, last BM 8/19/24.  Patient denies SOB. Spo2>95% RA.  SCD's off.  Patient is awaiting I&D procedure and has been updated as changes occur.  Reviewed plan of care with patient. Call light and personal belongings with in reach. Hourly rounding in place. All needs met at this time.

## 2024-08-20 NOTE — ASSESSMENT & PLAN NOTE
Patient sustained a laceration after being caught on barbed wire about a week ago, wound was sutured and he has been on Augmentin and cephalexin  Dehiscence of sutures today and continued purulent discharge  Ultrasound in the ED shows a 1 cm fluid collection concerning for abscess  Now status post I and D  Culture growing MSSA. Will switch to IV ancef  Follow culture sensitivity

## 2024-08-20 NOTE — CARE PLAN
The patient is Stable - Low risk of patient condition declining or worsening    Shift Goals  Clinical Goals: Sx, rest  Patient Goals: Rest  Family Goals: not present    Progress made toward(s) clinical / shift goals:  Pt resting. Sx pushed to 1730.     Patient is not progressing towards the following goals:

## 2024-08-21 PROBLEM — D72.829 LEUKOCYTOSIS: Status: ACTIVE | Noted: 2024-08-21

## 2024-08-21 LAB
ALBUMIN SERPL BCP-MCNC: 3.7 G/DL (ref 3.2–4.9)
ALBUMIN/GLOB SERPL: 1.5 G/DL
ALP SERPL-CCNC: 43 U/L (ref 30–99)
ALT SERPL-CCNC: 15 U/L (ref 2–50)
ANION GAP SERPL CALC-SCNC: 11 MMOL/L (ref 7–16)
AST SERPL-CCNC: 13 U/L (ref 12–45)
BASOPHILS # BLD AUTO: 0.1 % (ref 0–1.8)
BASOPHILS # BLD: 0.02 K/UL (ref 0–0.12)
BILIRUB SERPL-MCNC: 0.8 MG/DL (ref 0.1–1.5)
BUN SERPL-MCNC: 16 MG/DL (ref 8–22)
CALCIUM ALBUM COR SERPL-MCNC: 9.1 MG/DL (ref 8.5–10.5)
CALCIUM SERPL-MCNC: 8.9 MG/DL (ref 8.5–10.5)
CHLORIDE SERPL-SCNC: 100 MMOL/L (ref 96–112)
CO2 SERPL-SCNC: 25 MMOL/L (ref 20–33)
CREAT SERPL-MCNC: 0.91 MG/DL (ref 0.5–1.4)
EOSINOPHIL # BLD AUTO: 0 K/UL (ref 0–0.51)
EOSINOPHIL NFR BLD: 0 % (ref 0–6.9)
ERYTHROCYTE [DISTWIDTH] IN BLOOD BY AUTOMATED COUNT: 49.7 FL (ref 35.9–50)
GFR SERPLBLD CREATININE-BSD FMLA CKD-EPI: 98 ML/MIN/1.73 M 2
GLOBULIN SER CALC-MCNC: 2.4 G/DL (ref 1.9–3.5)
GLUCOSE SERPL-MCNC: 149 MG/DL (ref 65–99)
GRAM STN SPEC: NORMAL
HCT VFR BLD AUTO: 39.3 % (ref 42–52)
HGB BLD-MCNC: 13.9 G/DL (ref 14–18)
IMM GRANULOCYTES # BLD AUTO: 0.09 K/UL (ref 0–0.11)
IMM GRANULOCYTES NFR BLD AUTO: 0.6 % (ref 0–0.9)
LYMPHOCYTES # BLD AUTO: 0.7 K/UL (ref 1–4.8)
LYMPHOCYTES NFR BLD: 4.8 % (ref 22–41)
MCH RBC QN AUTO: 34.8 PG (ref 27–33)
MCHC RBC AUTO-ENTMCNC: 35.4 G/DL (ref 32.3–36.5)
MCV RBC AUTO: 98.5 FL (ref 81.4–97.8)
MONOCYTES # BLD AUTO: 0.76 K/UL (ref 0–0.85)
MONOCYTES NFR BLD AUTO: 5.2 % (ref 0–13.4)
MYCOBACTERIUM SPEC CULT: NORMAL
NEUTROPHILS # BLD AUTO: 13.08 K/UL (ref 1.82–7.42)
NEUTROPHILS NFR BLD: 89.3 % (ref 44–72)
NRBC # BLD AUTO: 0 K/UL
NRBC BLD-RTO: 0 /100 WBC (ref 0–0.2)
PLATELET # BLD AUTO: 189 K/UL (ref 164–446)
PMV BLD AUTO: 9.6 FL (ref 9–12.9)
POTASSIUM SERPL-SCNC: 4.3 MMOL/L (ref 3.6–5.5)
PROCALCITONIN SERPL-MCNC: 0.07 NG/ML
PROT SERPL-MCNC: 6.1 G/DL (ref 6–8.2)
RBC # BLD AUTO: 3.99 M/UL (ref 4.7–6.1)
RHODAMINE-AURAMINE STN SPEC: NORMAL
RHODAMINE-AURAMINE STN SPEC: NORMAL
SIGNIFICANT IND 70042: NORMAL
SITE SITE: NORMAL
SODIUM SERPL-SCNC: 136 MMOL/L (ref 135–145)
SOURCE SOURCE: NORMAL
WBC # BLD AUTO: 14.7 K/UL (ref 4.8–10.8)

## 2024-08-21 PROCEDURE — 84145 PROCALCITONIN (PCT): CPT

## 2024-08-21 PROCEDURE — 700102 HCHG RX REV CODE 250 W/ 637 OVERRIDE(OP): Performed by: STUDENT IN AN ORGANIZED HEALTH CARE EDUCATION/TRAINING PROGRAM

## 2024-08-21 PROCEDURE — 99232 SBSQ HOSP IP/OBS MODERATE 35: CPT | Performed by: STUDENT IN AN ORGANIZED HEALTH CARE EDUCATION/TRAINING PROGRAM

## 2024-08-21 PROCEDURE — 770006 HCHG ROOM/CARE - MED/SURG/GYN SEMI*

## 2024-08-21 PROCEDURE — 80053 COMPREHEN METABOLIC PANEL: CPT

## 2024-08-21 PROCEDURE — 700111 HCHG RX REV CODE 636 W/ 250 OVERRIDE (IP): Performed by: STUDENT IN AN ORGANIZED HEALTH CARE EDUCATION/TRAINING PROGRAM

## 2024-08-21 PROCEDURE — A9270 NON-COVERED ITEM OR SERVICE: HCPCS | Performed by: STUDENT IN AN ORGANIZED HEALTH CARE EDUCATION/TRAINING PROGRAM

## 2024-08-21 PROCEDURE — 85025 COMPLETE CBC W/AUTO DIFF WBC: CPT

## 2024-08-21 PROCEDURE — 700105 HCHG RX REV CODE 258: Performed by: STUDENT IN AN ORGANIZED HEALTH CARE EDUCATION/TRAINING PROGRAM

## 2024-08-21 RX ORDER — ENOXAPARIN SODIUM 100 MG/ML
40 INJECTION SUBCUTANEOUS DAILY
Status: DISCONTINUED | OUTPATIENT
Start: 2024-08-21 | End: 2024-08-21

## 2024-08-21 RX ADMIN — TRAMADOL HYDROCHLORIDE 50 MG: 50 TABLET ORAL at 21:18

## 2024-08-21 RX ADMIN — IBUPROFEN 600 MG: 600 TABLET, FILM COATED ORAL at 04:03

## 2024-08-21 RX ADMIN — CEFAZOLIN 2 G: 2 INJECTION, POWDER, FOR SOLUTION INTRAMUSCULAR; INTRAVENOUS at 08:58

## 2024-08-21 RX ADMIN — CEFAZOLIN 2 G: 2 INJECTION, POWDER, FOR SOLUTION INTRAMUSCULAR; INTRAVENOUS at 17:15

## 2024-08-21 RX ADMIN — TRAMADOL HYDROCHLORIDE 50 MG: 50 TABLET ORAL at 14:10

## 2024-08-21 RX ADMIN — LINEZOLID 600 MG: 600 TABLET, FILM COATED ORAL at 04:03

## 2024-08-21 RX ADMIN — IBUPROFEN 600 MG: 600 TABLET, FILM COATED ORAL at 14:10

## 2024-08-21 RX ADMIN — IBUPROFEN 600 MG: 600 TABLET, FILM COATED ORAL at 21:27

## 2024-08-21 ASSESSMENT — ENCOUNTER SYMPTOMS
DEPRESSION: 0
CHILLS: 0
FEVER: 0
MYALGIAS: 0
DIZZINESS: 0
SORE THROAT: 0
NECK PAIN: 0
HEARTBURN: 0
NAUSEA: 0
VOMITING: 0

## 2024-08-21 ASSESSMENT — COGNITIVE AND FUNCTIONAL STATUS - GENERAL
SUGGESTED CMS G CODE MODIFIER DAILY ACTIVITY: CH
MOBILITY SCORE: 24
SUGGESTED CMS G CODE MODIFIER MOBILITY: CH
DAILY ACTIVITIY SCORE: 24

## 2024-08-21 ASSESSMENT — PAIN DESCRIPTION - PAIN TYPE
TYPE: ACUTE PAIN

## 2024-08-21 NOTE — CARE PLAN
The patient is Stable - Low risk of patient condition declining or worsening    Shift Goals  Clinical Goals: comfort  Patient Goals: updates on poc  Family Goals: na    Progress made toward(s) clinical / shift goals:    Problem: Knowledge Deficit - Standard  Goal: Patient and family/care givers will demonstrate understanding of plan of care, disease process/condition, diagnostic tests and medications  Outcome: Progressing  Note: Patient updated on POC. No concerns at this time. Personal belongings and call light within reach.        Problem: Pain - Standard  Goal: Alleviation of pain or a reduction in pain to the patient’s comfort goal  Outcome: Progressing  Note: Patient educated on 0-10 pain scale, available pain medications, and non-pharmacological methods of pain management. Verbalizes understanding.       Problem: Wound/ / Incision Healing  Goal: Patient's wound/surgical incision will decrease in size and heals properly  Outcome: Progressing  Note: Dressing to hand CDI.        Patient is not progressing towards the following goals:

## 2024-08-21 NOTE — PROGRESS NOTES
Patient A&Ox4. Patient stated pain. Meds administered per MAR as well as pain med.  Patient upself.  Water and call light within reach.   [phone]

## 2024-08-21 NOTE — PROGRESS NOTES
[facility] Medicine Daily Progress Note    Date of Service  8/21/2024    Chief Complaint  Milton Aguila is a 58 y.o. male admitted 8/19/2024 with left hand cellulitis     Hospital Course  No notes on file    Interval Problem Update  Milton Aguila is a 58 y.o. male who presented 8/19/2024 with left hand wound.  PMH of rheumatoid arthritis, hemochromatosis gets weekly phlebotomies.      1 week ago patient cut his left wrist after being caught on barbed wire which was sutured.  He developed redness and swelling, he is a  and started taking Augmentin.  Symptoms are getting worse so he started taking cephalexin.  Comes in tonight due to suture dehiscence and continued purulent discharge.  Denies fever/chills.     In the ED afebrile, hemodynamically stable.  Labs showed WBC count of 11.2.  Ultrasound shows a 1cm fluid collection concerning for abscess.     Patient is now status post I and D. Culture is growing MSSA. Sensitivity pending. Switched abx to IV ancef.        I have discussed this patient's plan of care and discharge plan at IDT rounds today with Case Management, Nursing, Nursing leadership, and other members of the IDT team.    Consultants/Specialty  Orthopedics     Code Status  Full Code    Disposition  Not Medically Cleared  I have placed the appropriate orders for post-discharge needs.    Review of Systems  Review of Systems   Constitutional:  Negative for chills and fever.   HENT:  Negative for congestion and sore throat.    Gastrointestinal:  Negative for heartburn, nausea and vomiting.   Genitourinary:  Negative for dysuria and urgency.   Musculoskeletal:  Negative for myalgias and neck pain.   Neurological:  Negative for dizziness.   Psychiatric/Behavioral:  Negative for depression.         Physical Exam  Temp:  [36.3 °C (97.4 °F)-37.1 °C (98.7 °F)] 36.3 °C (97.4 °F)  Pulse:  [59-92] 61  Resp:  [9-19] 16  BP: (107-154)/(66-87) 121/75  SpO2:  [91 %-98 %] 96 %    Physical Exam  HENT:       Head: Normocephalic.      Nose: Nose normal.      Mouth/Throat:      Mouth: Mucous membranes are moist.   Eyes:      Extraocular Movements: Extraocular movements intact.      Pupils: Pupils are equal, round, and reactive to light.   Cardiovascular:      Rate and Rhythm: Normal rate and regular rhythm.   Pulmonary:      Effort: Pulmonary effort is normal.      Breath sounds: Normal breath sounds.   Abdominal:      General: Abdomen is flat.      Palpations: Abdomen is soft.   Musculoskeletal:         General: Swelling present. Normal range of motion.      Comments: Left arm swelling with dressing in place   No purulent discharge  Incision site clear dry and intact  Some swelling present   Neurological:      General: No focal deficit present.      Mental Status: He is alert and oriented to person, place, and time.         Fluids    Intake/Output Summary (Last 24 hours) at 8/21/2024 1542  Last data filed at 8/20/2024 1549  Gross per 24 hour   Intake 500 ml   Output 5 ml   Net 495 ml       Laboratory  Recent Labs     08/19/24  2344 08/21/24  0525   WBC 11.2* 14.7*   RBC 3.54* 3.99*   HEMOGLOBIN 12.5* 13.9*   HEMATOCRIT 35.6* 39.3*   .6* 98.5*   MCH 35.3* 34.8*   MCHC 35.1 35.4   RDW 51.6* 49.7   PLATELETCT 171 189   MPV 9.6 9.6     Recent Labs     08/19/24  2344 08/21/24  0525   SODIUM 141 136   POTASSIUM 3.8 4.3   CHLORIDE 108 100   CO2 20 25   GLUCOSE 93 149*   BUN 16 16   CREATININE 0.68 0.91   CALCIUM 8.3* 8.9                   Imaging  US-EXTREMITY NON VASCULAR UNILATERAL LEFT   Final Result      Deep complex fluid collection with irregular borders measuring approximately 1 cm in maximum thickness is identified. Differential diagnosis includes abscess and hematoma. There is increased vascularity.      DX-HAND 3+ LEFT   Final Result      No evidence of fracture or dislocation.            Assessment/Plan  * Cellulitis and abscess of hand- (present on admission)  Assessment & Plan  Patient sustained a laceration  after being caught on barbed wire about a week ago, wound was sutured and he has been on Augmentin and cephalexin  Dehiscence of sutures today and continued purulent discharge  Ultrasound in the ED shows a 1 cm fluid collection concerning for abscess  Now status post I and D  Culture growing MSSA. Will switch to IV ancef  Follow culture sensitivity     Leukocytosis  Assessment & Plan  Due to cellulitis   Continue IV abx    Hereditary hemochromatosis (HCC)- (present on admission)  Assessment & Plan  Patient gets weekly phlebotomies. Hemoglobin 12.5 on presentation    Rheumatoid arthritis (HCC)- (present on admission)  Assessment & Plan  On etanercept         VTE prophylaxis: Patient is ambulatory    I have performed a physical exam and reviewed and updated ROS and Plan today (8/21/2024). In review of yesterday's note (8/20/2024), there are no changes except as documented above.

## 2024-08-21 NOTE — PROGRESS NOTES
Orthopaedic Progress Note    Interval changes:  Patient doing well post op  Cultures with NGTD  L wrist dressings CDI  Cleared for DC to home by ortho pending medicine clearance    ROS - Patient denies any new issues.  Pain well controlled.    /75   Pulse 61   Temp 36.3 °C (97.4 °F) (Temporal)   Resp 16   Wt 92.4 kg (203 lb 11.3 oz)   SpO2 96%     Patient seen and examined  No acute distress  Breathing non labored  RRR  L wrist dressings CDI, DNVI, moves all fingers, cap refill <2 sec.     Recent Labs     08/19/24  2344 08/21/24  0525   WBC 11.2* 14.7*   RBC 3.54* 3.99*   HEMOGLOBIN 12.5* 13.9*   HEMATOCRIT 35.6* 39.3*   .6* 98.5*   MCH 35.3* 34.8*   MCHC 35.1 35.4   RDW 51.6* 49.7   PLATELETCT 171 189   MPV 9.6 9.6       Active Hospital Problems    Diagnosis     Cellulitis and abscess of hand [L03.119, L02.519]     Hereditary hemochromatosis (HCC) [E83.110]     Rheumatoid arthritis (HCC) [M06.9]        Assessment/Plan:  Patient doing well post op  Cultures with NGTD  L wrist dressings CDI  Cleared for DC to home by ortho pending medicine clearance  POD#1 S/P Left wrist incision drainage abscess  Wt bearing status - NWB LUE  Wound care/Drains - Dressings to be changed every other day by nursing. Or PRN for saturation starting POD#2  Future Procedures - none planned    Sutures/Staples out- 14-21 days post operatively. Removal will completed by ortho mid levels only.  PT/OT-initiated  Antibiotics: ancef 2g IV Q8  DVT Prophylaxis- TEDS/SCDs/Foot pumps  Hanson-not needed per ortho  Case Coordination for Discharge Planning - Disposition per therapy recs.

## 2024-08-22 ENCOUNTER — PHARMACY VISIT (OUTPATIENT)
Dept: PHARMACY | Facility: MEDICAL CENTER | Age: 59
End: 2024-08-22
Payer: MEDICARE

## 2024-08-22 VITALS
TEMPERATURE: 97.5 F | SYSTOLIC BLOOD PRESSURE: 138 MMHG | RESPIRATION RATE: 16 BRPM | HEART RATE: 63 BPM | DIASTOLIC BLOOD PRESSURE: 78 MMHG | OXYGEN SATURATION: 94 % | BODY MASS INDEX: 29.49 KG/M2 | WEIGHT: 203.71 LBS

## 2024-08-22 LAB
ALBUMIN SERPL BCP-MCNC: 3.6 G/DL (ref 3.2–4.9)
ALBUMIN/GLOB SERPL: 1.3 G/DL
ALP SERPL-CCNC: 49 U/L (ref 30–99)
ALT SERPL-CCNC: 12 U/L (ref 2–50)
ANION GAP SERPL CALC-SCNC: 15 MMOL/L (ref 7–16)
AST SERPL-CCNC: 16 U/L (ref 12–45)
BACTERIA TISS AEROBE CULT: ABNORMAL
BACTERIA TISS AEROBE CULT: ABNORMAL
BILIRUB SERPL-MCNC: 0.3 MG/DL (ref 0.1–1.5)
BUN SERPL-MCNC: 10 MG/DL (ref 8–22)
CALCIUM ALBUM COR SERPL-MCNC: 8.9 MG/DL (ref 8.5–10.5)
CALCIUM SERPL-MCNC: 8.6 MG/DL (ref 8.5–10.5)
CHLORIDE SERPL-SCNC: 103 MMOL/L (ref 96–112)
CO2 SERPL-SCNC: 21 MMOL/L (ref 20–33)
CREAT SERPL-MCNC: 0.79 MG/DL (ref 0.5–1.4)
ERYTHROCYTE [DISTWIDTH] IN BLOOD BY AUTOMATED COUNT: 50.8 FL (ref 35.9–50)
GFR SERPLBLD CREATININE-BSD FMLA CKD-EPI: 103 ML/MIN/1.73 M 2
GLOBULIN SER CALC-MCNC: 2.8 G/DL (ref 1.9–3.5)
GLUCOSE SERPL-MCNC: 90 MG/DL (ref 65–99)
GRAM STN SPEC: ABNORMAL
HCT VFR BLD AUTO: 38 % (ref 42–52)
HGB BLD-MCNC: 13.3 G/DL (ref 14–18)
MCH RBC QN AUTO: 34.8 PG (ref 27–33)
MCHC RBC AUTO-ENTMCNC: 35 G/DL (ref 32.3–36.5)
MCV RBC AUTO: 99.5 FL (ref 81.4–97.8)
PLATELET # BLD AUTO: 197 K/UL (ref 164–446)
PMV BLD AUTO: 9.4 FL (ref 9–12.9)
POTASSIUM SERPL-SCNC: 3.7 MMOL/L (ref 3.6–5.5)
PROCALCITONIN SERPL-MCNC: 0.07 NG/ML
PROT SERPL-MCNC: 6.4 G/DL (ref 6–8.2)
RBC # BLD AUTO: 3.82 M/UL (ref 4.7–6.1)
SIGNIFICANT IND 70042: ABNORMAL
SITE SITE: ABNORMAL
SODIUM SERPL-SCNC: 139 MMOL/L (ref 135–145)
SOURCE SOURCE: ABNORMAL
WBC # BLD AUTO: 10.6 K/UL (ref 4.8–10.8)

## 2024-08-22 PROCEDURE — 80053 COMPREHEN METABOLIC PANEL: CPT

## 2024-08-22 PROCEDURE — 99239 HOSP IP/OBS DSCHRG MGMT >30: CPT | Performed by: STUDENT IN AN ORGANIZED HEALTH CARE EDUCATION/TRAINING PROGRAM

## 2024-08-22 PROCEDURE — RXMED WILLOW AMBULATORY MEDICATION CHARGE: Performed by: STUDENT IN AN ORGANIZED HEALTH CARE EDUCATION/TRAINING PROGRAM

## 2024-08-22 PROCEDURE — 700111 HCHG RX REV CODE 636 W/ 250 OVERRIDE (IP): Performed by: STUDENT IN AN ORGANIZED HEALTH CARE EDUCATION/TRAINING PROGRAM

## 2024-08-22 PROCEDURE — 700105 HCHG RX REV CODE 258: Performed by: STUDENT IN AN ORGANIZED HEALTH CARE EDUCATION/TRAINING PROGRAM

## 2024-08-22 PROCEDURE — 85027 COMPLETE CBC AUTOMATED: CPT

## 2024-08-22 PROCEDURE — A9270 NON-COVERED ITEM OR SERVICE: HCPCS | Performed by: STUDENT IN AN ORGANIZED HEALTH CARE EDUCATION/TRAINING PROGRAM

## 2024-08-22 PROCEDURE — 84145 PROCALCITONIN (PCT): CPT

## 2024-08-22 PROCEDURE — 700102 HCHG RX REV CODE 250 W/ 637 OVERRIDE(OP): Performed by: STUDENT IN AN ORGANIZED HEALTH CARE EDUCATION/TRAINING PROGRAM

## 2024-08-22 RX ORDER — TRAMADOL HYDROCHLORIDE 50 MG/1
50 TABLET ORAL EVERY 8 HOURS PRN
Qty: 9 TABLET | Refills: 0 | Status: SHIPPED | OUTPATIENT
Start: 2024-08-22 | End: 2024-08-25

## 2024-08-22 RX ADMIN — CEFAZOLIN 2 G: 2 INJECTION, POWDER, FOR SOLUTION INTRAMUSCULAR; INTRAVENOUS at 08:16

## 2024-08-22 RX ADMIN — CEFAZOLIN 2 G: 2 INJECTION, POWDER, FOR SOLUTION INTRAMUSCULAR; INTRAVENOUS at 00:17

## 2024-08-22 RX ADMIN — IBUPROFEN 600 MG: 600 TABLET, FILM COATED ORAL at 06:41

## 2024-08-22 RX ADMIN — TRAMADOL HYDROCHLORIDE 50 MG: 50 TABLET ORAL at 06:42

## 2024-08-22 ASSESSMENT — PAIN DESCRIPTION - PAIN TYPE: TYPE: ACUTE PAIN

## 2024-08-22 NOTE — DISCHARGE SUMMARY
Discharge Summary    CHIEF COMPLAINT ON ADMISSION  Chief Complaint   Patient presents with    Wound Check    Wound Infection       Reason for Admission  Wound check     Admission Date  8/19/2024    CODE STATUS  Full Code    HPI & HOSPITAL COURSE  For full details please refer to HPI    Milton Aguila is a 58 y.o. male who presented 8/19/2024 with left hand wound.  PMH of rheumatoid arthritis, hemochromatosis gets weekly phlebotomies.       1 week ago patient cut his left wrist after being caught on barbed wire which was sutured.  He developed redness and swelling, he is a  and started taking Augmentin.  Symptoms are getting worse so he started taking cephalexin.  Comes in tonight due to suture dehiscence and continued purulent discharge.  Denies fever/chills.     In the ED afebrile, hemodynamically stable.  Labs showed WBC count of 11.2.  Ultrasound shows a 1cm fluid collection concerning for abscess.      Patient is now status post I and D. Culture is growing MSSA. Sensitivity pending. Switched abx to IV ancef.     Leukocytosis is resolved.  Wound culture is growing MSSA which is sensitive to Augmentin.  Thus patient was discharged on p.o. Augmentin.  Advised to continue taking medication as prescribed.  Follow-up with PCP outpatient.  Return to ER precaution.     Therefore, he is discharged in good and stable condition to home with close outpatient follow-up.    The patient met 2-midnight criteria for an inpatient stay at the time of discharge.    Discharge Date  8/22    FOLLOW UP ITEMS POST DISCHARGE  PCP    DISCHARGE DIAGNOSES  Principal Problem:    Cellulitis and abscess of hand (POA: Yes)  Active Problems:    Rheumatoid arthritis (HCC) (POA: Yes)    Hereditary hemochromatosis (HCC) (POA: Yes)    Leukocytosis (POA: Unknown)  Resolved Problems:    * No resolved hospital problems. *      FOLLOW UP  Future Appointments   Date Time Provider Department Center   8/25/2024  [facility]   8/27/2024  1:30 PM BEN Jackson [facility]   9/12/2024  1:30 PM BEN Constantino [facility] None   9/16/2024 11:00 AM Aydee Barkley M.D. [facility] None     No follow-up provider specified.    MEDICATIONS ON DISCHARGE     Medication List        START taking these medications        Instructions   amoxicillin-clavulanate 875-125 MG Tabs  Commonly known as: Augmentin   Take 1 Tablet by mouth 2 times a day for 7 days.  Dose: 1 Tablet     traMADol 50 MG Tabs  Commonly known as: Ultram   Take 1 Tablet by mouth every 8 hours as needed for Moderate Pain or Mild Pain for up to 3 days.  Dose: 50 mg            CHANGE how you take these medications        Instructions   Enbrel SureClick 50 MG/ML Soaj  What changed:   how much to take  how to take this  when to take this  Generic drug: Etanercept   INJECT 1 PEN UNDER THE SKIN EVERY 7 DAYS.            CONTINUE taking these medications        Instructions   ibuprofen 200 MG Tabs  Commonly known as: Motrin   Take 800 mg by mouth 2 times a day.  Dose: 800 mg     testosterone cypionate 200 MG/ML injection  Commonly known as: Depo-Testosterone   Inject 200 mg into the shoulder, thigh, or buttocks every 7 days.  Dose: 200 mg              Allergies  No Known Allergies    DIET  Orders Placed This Encounter   Procedures    Diet Order Diet: Regular     Standing Status:   Standing     Number of Occurrences:   1     Order Specific Question:   Diet:     Answer:   Regular [1]       ACTIVITY  As tolerated.  Weight bearing as tolerated    CONSULTATIONS  Orthopedics    PROCEDURES  I and D    LABORATORY  Lab Results   Component Value Date    SODIUM 139 08/22/2024    POTASSIUM 3.7 08/22/2024    CHLORIDE 103 08/22/2024    CO2 21 08/22/2024    GLUCOSE 90 08/22/2024    BUN 10 08/22/2024    CREATININE 0.79 08/22/2024    CREATININE 0.8 02/09/2008        Lab Results   Component Value Date    WBC 10.6 08/22/2024    HEMOGLOBIN 13.3 (L) 08/22/2024    HEMATOCRIT  38.0 (L) 08/22/2024    PLATELETCT 197 08/22/2024        Total time of the discharge process exceeds 35 minutes.

## 2024-08-22 NOTE — CARE PLAN
The patient is Stable - Low risk of patient condition declining or worsening    Shift Goals  Clinical Goals: safety, pain control  Patient Goals: rest, comfort  Family Goals: no family present    Progress made toward(s) clinical / shift goals:    Problem: Knowledge Deficit - Standard  Goal: Patient and family/care givers will demonstrate understanding of plan of care, disease process/condition, diagnostic tests and medications  Outcome: Progressing    Educated the patient regarding his plan of care.      Problem: Pain - Standard  Goal: Alleviation of pain or a reduction in pain to the patient’s comfort goal  Outcome: Progressing    Administer pain medications per MAR.       Patient is not progressing towards the following goals:

## 2024-08-22 NOTE — PROGRESS NOTES
Patient to be discharged today - patient aware and agreeable to plan. D/c instructions reviewed with patient - ?'s/concerns answered.1 piv removed and meds to beds handed to patient.

## 2024-08-22 NOTE — CARE PLAN
The patient is Stable - Low risk of patient condition declining or worsening    Shift Goals  Clinical Goals: comfort  Patient Goals: discharge  Family Goals: na    Progress made toward(s) clinical / shift goals:    Problem: Knowledge Deficit - Standard  Goal: Patient and family/care givers will demonstrate understanding of plan of care, disease process/condition, diagnostic tests and medications  Outcome: Progressing  Note: Patient updated on POC. No concerns at this time. Patient provided with dressing change supplies and instructions to change dressing. Personal belongings and call light within reach.        Problem: Pain - Standard  Goal: Alleviation of pain or a reduction in pain to the patient’s comfort goal  Outcome: Progressing  Note: Patient educated on 0-10 pain scale, available pain medications, and non-pharmacological methods of pain management. Verbalizes understanding. Cold pack in use for swelling.       Problem: Wound/ / Incision Healing  Goal: Patient's wound/surgical incision will decrease in size and heals properly  Outcome: Progressing  Note: Dressing to L hand changed. Expected swelling. No drainage.        Patient is not progressing towards the following goals:

## 2024-08-24 LAB
BACTERIA SPEC ANAEROBE CULT: NORMAL
SIGNIFICANT IND 70042: NORMAL
SITE SITE: NORMAL
SOURCE SOURCE: NORMAL

## 2024-08-25 ENCOUNTER — OUTPATIENT INFUSION SERVICES (OUTPATIENT)
Dept: ONCOLOGY | Facility: MEDICAL CENTER | Age: 59
End: 2024-08-25
Attending: INTERNAL MEDICINE
Payer: COMMERCIAL

## 2024-08-25 VITALS
HEIGHT: 70 IN | DIASTOLIC BLOOD PRESSURE: 67 MMHG | HEART RATE: 93 BPM | SYSTOLIC BLOOD PRESSURE: 91 MMHG | WEIGHT: 193.78 LBS | OXYGEN SATURATION: 93 % | BODY MASS INDEX: 27.74 KG/M2 | RESPIRATION RATE: 16 BRPM | TEMPERATURE: 97.4 F

## 2024-08-25 DIAGNOSIS — E83.110 HEREDITARY HEMOCHROMATOSIS (HCC): ICD-10-CM

## 2024-08-25 LAB
FERRITIN SERPL-MCNC: 572 NG/ML (ref 22–322)
HCT VFR BLD AUTO: 43.8 % (ref 42–52)
HGB BLD-MCNC: 15.4 G/DL (ref 14–18)

## 2024-08-25 PROCEDURE — 99195 PHLEBOTOMY: CPT

## 2024-08-25 PROCEDURE — 700105 HCHG RX REV CODE 258: Performed by: NURSE PRACTITIONER

## 2024-08-25 PROCEDURE — 85018 HEMOGLOBIN: CPT

## 2024-08-25 PROCEDURE — 85014 HEMATOCRIT: CPT

## 2024-08-25 PROCEDURE — 82728 ASSAY OF FERRITIN: CPT

## 2024-08-25 RX ORDER — SODIUM CHLORIDE 9 MG/ML
500 INJECTION, SOLUTION INTRAVENOUS ONCE
OUTPATIENT
Start: 2024-08-25 | End: 2024-08-25

## 2024-08-25 RX ORDER — SODIUM CHLORIDE 9 MG/ML
500 INJECTION, SOLUTION INTRAVENOUS ONCE
Start: 2024-08-25 | End: 2024-08-25

## 2024-08-25 RX ORDER — SODIUM CHLORIDE 9 MG/ML
500 INJECTION, SOLUTION INTRAVENOUS ONCE
Status: COMPLETED | OUTPATIENT
Start: 2024-08-25 | End: 2024-08-25

## 2024-08-25 RX ADMIN — SODIUM CHLORIDE 500 ML: 9 INJECTION, SOLUTION INTRAVENOUS at 10:10

## 2024-08-25 ASSESSMENT — FIBROSIS 4 INDEX: FIB4 SCORE: 1.34

## 2024-08-25 NOTE — PROGRESS NOTES
Pt presented to Infusion for Therapeutic Phlebotomy. POC discussed and pt verbalized understanding.He has a healing injury to his left wrist and is currently undergoing oral antibiotic therapy after being hospitalized; reports no pain and is doing better. PIV placed without difficulty in the left AC; brisk blood return noted, line flushes with ease and labs drawn. Pt tolerated well. Hgb 15.4 and Hct 43.8. Pt meets parameters for phlebotomy. 500 ml whole blood removed per [facility] policy and pt tolerated well. 500 mL bolus administered over 30 min after phlebotomy. Orthostatic VS taken; pt hypotensive after 500 mL bolus. BP redone after 15 minutes and he remained hypotensive. After another 15 minutes, pt's vitals were near his baseline. Pt denies s/s of syncope, dizziness, lightheadedness, chest pain, and reports feels well enough to discharge home. Educated pt on when to seek provider evaluation for symptoms and pt verbalized understanding. PIV flushed, removed with tip intact and site covered with sterile gauze/coban.  emailed for next appointment and discharged to self care. Pt in NAD at time of discharge.

## 2024-08-26 ENCOUNTER — HOSPITAL ENCOUNTER (OUTPATIENT)
Dept: LAB | Facility: MEDICAL CENTER | Age: 59
End: 2024-08-26
Attending: INTERNAL MEDICINE
Payer: COMMERCIAL

## 2024-08-26 LAB
ANION GAP SERPL CALC-SCNC: 10 MMOL/L (ref 7–16)
BASOPHILS # BLD AUTO: 0.8 % (ref 0–1.8)
BASOPHILS # BLD: 0.05 K/UL (ref 0–0.12)
BUN SERPL-MCNC: 13 MG/DL (ref 8–22)
CALCIUM SERPL-MCNC: 9.3 MG/DL (ref 8.5–10.5)
CHLORIDE SERPL-SCNC: 103 MMOL/L (ref 96–112)
CO2 SERPL-SCNC: 22 MMOL/L (ref 20–33)
CREAT SERPL-MCNC: 0.73 MG/DL (ref 0.5–1.4)
EOSINOPHIL # BLD AUTO: 0.11 K/UL (ref 0–0.51)
EOSINOPHIL NFR BLD: 1.8 % (ref 0–6.9)
ERYTHROCYTE [DISTWIDTH] IN BLOOD BY AUTOMATED COUNT: 50.5 FL (ref 35.9–50)
GFR SERPLBLD CREATININE-BSD FMLA CKD-EPI: 106 ML/MIN/1.73 M 2
GLUCOSE SERPL-MCNC: 84 MG/DL (ref 65–99)
HCT VFR BLD AUTO: 42.4 % (ref 42–52)
HGB BLD-MCNC: 14.6 G/DL (ref 14–18)
IMM GRANULOCYTES # BLD AUTO: 0.04 K/UL (ref 0–0.11)
IMM GRANULOCYTES NFR BLD AUTO: 0.7 % (ref 0–0.9)
LYMPHOCYTES # BLD AUTO: 1.01 K/UL (ref 1–4.8)
LYMPHOCYTES NFR BLD: 16.9 % (ref 22–41)
MCH RBC QN AUTO: 34.6 PG (ref 27–33)
MCHC RBC AUTO-ENTMCNC: 34.4 G/DL (ref 32.3–36.5)
MCV RBC AUTO: 100.5 FL (ref 81.4–97.8)
MONOCYTES # BLD AUTO: 0.66 K/UL (ref 0–0.85)
MONOCYTES NFR BLD AUTO: 11.1 % (ref 0–13.4)
NEUTROPHILS # BLD AUTO: 4.1 K/UL (ref 1.82–7.42)
NEUTROPHILS NFR BLD: 68.7 % (ref 44–72)
NRBC # BLD AUTO: 0 K/UL
NRBC BLD-RTO: 0 /100 WBC (ref 0–0.2)
PLATELET # BLD AUTO: 249 K/UL (ref 164–446)
PMV BLD AUTO: 9.1 FL (ref 9–12.9)
POTASSIUM SERPL-SCNC: 4.6 MMOL/L (ref 3.6–5.5)
RBC # BLD AUTO: 4.22 M/UL (ref 4.7–6.1)
SODIUM SERPL-SCNC: 135 MMOL/L (ref 135–145)
WBC # BLD AUTO: 6 K/UL (ref 4.8–10.8)

## 2024-08-26 PROCEDURE — 85025 COMPLETE CBC W/AUTO DIFF WBC: CPT

## 2024-08-26 PROCEDURE — 36415 COLL VENOUS BLD VENIPUNCTURE: CPT

## 2024-08-26 PROCEDURE — 80048 BASIC METABOLIC PNL TOTAL CA: CPT

## 2024-08-27 LAB
FUNGUS SPEC CULT: NORMAL
FUNGUS SPEC FUNGUS STN: NORMAL
SIGNIFICANT IND 70042: NORMAL
SITE SITE: NORMAL
SOURCE SOURCE: NORMAL

## 2024-08-29 NOTE — DOCUMENTATION QUERY
[facility]                                                                       Query Response Note      PATIENT:               NELDA RIVER  ACCT #:                  [id number]  MRN:                     [id number]  :                      1965  ADMIT DATE:       2024 9:37 PM  DISCH DATE:        2024 11:33 AM  RESPONDING  PROVIDER #:        [id number]           QUERY TEXT:    Debridement is documented in the Medical Record. Please specify the type:    The patient's Clinical Indicators include:  Findings:  --Pt admitted for cellulitis and abscess of left hand sustained s/p being caught on barbed wire with      dehiscence of sutures  --Per OP note , ''There is a horizontal laceration of the dorsal wrist with evidence of dehiscence and      infection. This was elliipsed out and extended proximally and distally. Blunt dissection was then carried down      to the subcutaneous abscess pocket. There is immediate outflow of necrotic purulent fluid. This was      obtained and sent for culture. After thorough debridement of any ill appearing tissue the wound was      thoroughly irrigated with sterile saline. Wound was then loosely closed with 3-0 nylon suture'' documented  --Fungal smear from left wrist tissue on :  negative  --GS from left wrist tissue on :  few wbc's, no organisms seen  --Tissue cx with GS on :  MSSA++    Treatment:  --Orthopedic consult  --S/p left wrist incision/drainage of abscess with debridement  --NWB to left hand postop  --IV Ancef    Risk Factors:  --Cellulitis/abscess of left hand with suture dehiscence    Thank you,  Shaniqua VILLAREALN, RN  Clinical   Connect via [employer]  Options provided:   -- Non-excisional debridement   -- Excisional debridement   -- Other explanation, Please specify      Query created by: Shaniqua Rutledge on 2024 1:05  PM    RESPONSE TEXT:    Excisional debridement          Electronically signed by:  OSWALDO MORFIN MD 8/29/2024 7:07 AM

## 2024-09-01 ENCOUNTER — APPOINTMENT (OUTPATIENT)
Dept: ONCOLOGY | Facility: MEDICAL CENTER | Age: 59
End: 2024-09-01
Attending: INTERNAL MEDICINE
Payer: COMMERCIAL

## 2024-09-01 VITALS
HEIGHT: 70 IN | DIASTOLIC BLOOD PRESSURE: 89 MMHG | RESPIRATION RATE: 18 BRPM | SYSTOLIC BLOOD PRESSURE: 121 MMHG | BODY MASS INDEX: 28.09 KG/M2 | OXYGEN SATURATION: 99 % | WEIGHT: 196.21 LBS | HEART RATE: 75 BPM | TEMPERATURE: 97.2 F

## 2024-09-01 DIAGNOSIS — E83.110 HEREDITARY HEMOCHROMATOSIS (HCC): ICD-10-CM

## 2024-09-01 LAB
FERRITIN SERPL-MCNC: 447 NG/ML (ref 22–322)
HCT VFR BLD AUTO: 39.8 % (ref 42–52)
HGB BLD-MCNC: 14.5 G/DL (ref 14–18)

## 2024-09-01 PROCEDURE — 99195 PHLEBOTOMY: CPT

## 2024-09-01 PROCEDURE — 85018 HEMOGLOBIN: CPT

## 2024-09-01 PROCEDURE — 82728 ASSAY OF FERRITIN: CPT

## 2024-09-01 PROCEDURE — 85014 HEMATOCRIT: CPT

## 2024-09-01 PROCEDURE — 700105 HCHG RX REV CODE 258: Performed by: NURSE PRACTITIONER

## 2024-09-01 PROCEDURE — 96360 HYDRATION IV INFUSION INIT: CPT

## 2024-09-01 RX ORDER — SODIUM CHLORIDE 9 MG/ML
500 INJECTION, SOLUTION INTRAVENOUS ONCE
Status: CANCELLED | OUTPATIENT
Start: 2024-09-01 | End: 2024-09-01

## 2024-09-01 RX ORDER — SODIUM CHLORIDE 9 MG/ML
500 INJECTION, SOLUTION INTRAVENOUS ONCE
Status: CANCELLED
Start: 2024-09-01 | End: 2024-09-01

## 2024-09-01 RX ORDER — SODIUM CHLORIDE 9 MG/ML
500 INJECTION, SOLUTION INTRAVENOUS ONCE
Status: COMPLETED | OUTPATIENT
Start: 2024-09-01 | End: 2024-09-01

## 2024-09-01 RX ADMIN — SODIUM CHLORIDE 500 ML: 9 INJECTION, SOLUTION INTRAVENOUS at 08:13

## 2024-09-01 ASSESSMENT — FIBROSIS 4 INDEX: FIB4 SCORE: 1.06

## 2024-09-01 NOTE — PROGRESS NOTES
Patient presents for lab draw and therapeutic phlebotomy. Reviewed plan of care patient verbalizes understanding. PIV established with blood drawn as ordered. Hemoglobin 14.5 today. 500 mL units whole blood removed per MD orders. 500 mL NS bolus administered per MD orders. Patient remains stable with stable signs stable as well. PIV removed tip intact compression dressing to site. Patient scheduled for his next appointment and released in no acute distress.

## 2024-09-08 ENCOUNTER — OUTPATIENT INFUSION SERVICES (OUTPATIENT)
Dept: ONCOLOGY | Facility: MEDICAL CENTER | Age: 59
End: 2024-09-08
Attending: INTERNAL MEDICINE
Payer: COMMERCIAL

## 2024-09-08 ENCOUNTER — TELEPHONE (OUTPATIENT)
Dept: ONCOLOGY | Facility: MEDICAL CENTER | Age: 59
End: 2024-09-08

## 2024-09-08 VITALS
HEART RATE: 86 BPM | SYSTOLIC BLOOD PRESSURE: 137 MMHG | WEIGHT: 197.97 LBS | TEMPERATURE: 97.1 F | HEIGHT: 70 IN | BODY MASS INDEX: 28.34 KG/M2 | RESPIRATION RATE: 18 BRPM | OXYGEN SATURATION: 96 % | DIASTOLIC BLOOD PRESSURE: 94 MMHG

## 2024-09-08 DIAGNOSIS — E83.110 HEREDITARY HEMOCHROMATOSIS (HCC): ICD-10-CM

## 2024-09-08 LAB
FERRITIN SERPL-MCNC: 361 NG/ML (ref 22–322)
HCT VFR BLD AUTO: 35.9 % (ref 42–52)
HGB BLD-MCNC: 13.4 G/DL (ref 14–18)

## 2024-09-08 PROCEDURE — 85018 HEMOGLOBIN: CPT

## 2024-09-08 PROCEDURE — 99195 PHLEBOTOMY: CPT

## 2024-09-08 PROCEDURE — 700105 HCHG RX REV CODE 258: Performed by: NURSE PRACTITIONER

## 2024-09-08 PROCEDURE — 85014 HEMATOCRIT: CPT

## 2024-09-08 PROCEDURE — 82728 ASSAY OF FERRITIN: CPT

## 2024-09-08 RX ORDER — SODIUM CHLORIDE 9 MG/ML
500 INJECTION, SOLUTION INTRAVENOUS ONCE
Status: CANCELLED
Start: 2024-09-08 | End: 2024-09-08

## 2024-09-08 RX ORDER — SODIUM CHLORIDE 9 MG/ML
500 INJECTION, SOLUTION INTRAVENOUS ONCE
Status: COMPLETED | OUTPATIENT
Start: 2024-09-08 | End: 2024-09-08

## 2024-09-08 RX ORDER — SODIUM CHLORIDE 9 MG/ML
500 INJECTION, SOLUTION INTRAVENOUS ONCE
Status: CANCELLED | OUTPATIENT
Start: 2024-09-08 | End: 2024-09-08

## 2024-09-08 RX ORDER — CEPHALEXIN 500 MG/1
3000 CAPSULE ORAL
COMMUNITY
Start: 2024-08-28 | End: 2024-09-22

## 2024-09-08 RX ADMIN — SODIUM CHLORIDE 500 ML: 9 INJECTION, SOLUTION INTRAVENOUS at 15:59

## 2024-09-08 ASSESSMENT — FIBROSIS 4 INDEX: FIB4 SCORE: 1.06

## 2024-09-08 NOTE — PROGRESS NOTES
Tyler arrived to the Infusion Center for labs and possible TP ambulatory. Pt reports hand healing, has appointments this week with providers. Sutures intact, incision CDI/ healing. POC reviewed.     IV started in R AC x 1 attempt, brisk blood return noted, labs drawn off line, Tyler tolerated well.    Lab results reviewed, Hgb 13.4  within parameters for TP. 500 ml whole blood phlebotomized per MD order, over 10 minutes, Tyler tolerated well. Post hydration infused over 30 minutes, and was well tolerated.      Pt denies lightheadedness, dizziness, CP or SOB. IV removed, tip intact/sterile gauze and coban applied.    Confirmed next appointment and Tyler was discharged home in no acute distress.

## 2024-09-12 ENCOUNTER — HOSPITAL ENCOUNTER (OUTPATIENT)
Dept: HEMATOLOGY ONCOLOGY | Facility: MEDICAL CENTER | Age: 59
End: 2024-09-12
Attending: NURSE PRACTITIONER
Payer: COMMERCIAL

## 2024-09-12 VITALS
HEART RATE: 81 BPM | TEMPERATURE: 99.3 F | BODY MASS INDEX: 27.77 KG/M2 | DIASTOLIC BLOOD PRESSURE: 78 MMHG | RESPIRATION RATE: 21 BRPM | OXYGEN SATURATION: 96 % | HEIGHT: 70 IN | SYSTOLIC BLOOD PRESSURE: 130 MMHG | WEIGHT: 194 LBS

## 2024-09-12 DIAGNOSIS — E83.110 HEREDITARY HEMOCHROMATOSIS (HCC): ICD-10-CM

## 2024-09-12 DIAGNOSIS — Z09 ENCOUNTER FOR HEMATOLOGY FOLLOW-UP: ICD-10-CM

## 2024-09-12 PROCEDURE — 99212 OFFICE O/P EST SF 10 MIN: CPT | Performed by: NURSE PRACTITIONER

## 2024-09-12 PROCEDURE — 99213 OFFICE O/P EST LOW 20 MIN: CPT | Performed by: NURSE PRACTITIONER

## 2024-09-12 ASSESSMENT — FIBROSIS 4 INDEX: FIB4 SCORE: 1.06

## 2024-09-12 ASSESSMENT — ENCOUNTER SYMPTOMS
BLOOD IN STOOL: 0
TINGLING: 0
WEIGHT LOSS: 0
FEVER: 0
DIAPHORESIS: 1
NAUSEA: 0
CHILLS: 0
DIARRHEA: 0
MYALGIAS: 0
CONSTIPATION: 0
COUGH: 0
DIZZINESS: 0
SHORTNESS OF BREATH: 0
PALPITATIONS: 0
HEADACHES: 0
INSOMNIA: 0
VOMITING: 0
WHEEZING: 0

## 2024-09-12 ASSESSMENT — PAIN SCALES - GENERAL: PAINLEVEL: 5=MODERATE PAIN

## 2024-09-12 NOTE — PROGRESS NOTES
"Subjective     Jan Aguila is a 58 y.o. male who presents with Follow-Up (3 month follow up / hereditary hemochromatosis)            HPI    HH - weekly TP for ferring  Unacc      Stopped testosterone 3-4 weeks ago while hospitalized   Tatyana wire fence then debridement       Review of Systems   Constitutional:  Positive for diaphoresis (a little with infection - on Keflex). Negative for chills, fever, malaise/fatigue (tired today) and weight loss (stable).   Respiratory:  Negative for cough, shortness of breath and wheezing.    Cardiovascular:  Negative for chest pain, palpitations and leg swelling.   Gastrointestinal:  Negative for blood in stool, constipation (Last BM today), diarrhea, melena, nausea and vomiting.   Genitourinary:  Negative for dysuria and hematuria.   Musculoskeletal:  Positive for joint pain (RA (Enbrel); bilatreral hands (iron deposits); bilateral hip replacement). Negative for myalgias.        S/p surgery x 2 L hand   Neurological:  Negative for dizziness, tingling and headaches.   Psychiatric/Behavioral:  The patient does not have insomnia.               Objective     /78 (BP Location: Left arm, Patient Position: Sitting, BP Cuff Size: Adult)   Pulse 81   Temp 37.4 °C (99.3 °F) (Temporal)   Resp (!) 21   Ht 1.77 m (5' 9.69\")   Wt 88 kg (194 lb)   SpO2 96%   BMI 28.09 kg/m²      Physical Exam             Assessment & Plan     No diagnosis found.           Rto 3 mo               " "testosterone cypionate (DEPO-TESTOSTERONE) 200 MG/ML injection Inject 200 mg into the shoulder, thigh, or buttocks every 7 days.      ibuprofen (MOTRIN) 200 MG Tab Take 800 mg by mouth 2 times a day.      Etanercept (ENBREL SURECLICK) 50 MG/ML Solution Auto-injector INJECT 1 PEN UNDER THE SKIN EVERY 7 DAYS. (Patient not taking: Reported on 9/16/2024) 12 Each 0     No current facility-administered medications on file prior to encounter.              Objective     /78 (BP Location: Left arm, Patient Position: Sitting, BP Cuff Size: Adult)   Pulse 81   Temp 37.4 °C (99.3 °F) (Temporal)   Resp (!) 21   Ht 1.77 m (5' 9.69\")   Wt 88 kg (194 lb)   SpO2 96%   BMI 28.09 kg/m²      Physical Exam  Vitals reviewed.   Constitutional:       General: He is not in acute distress.     Appearance: He is well-developed. He is not diaphoretic.   HENT:      Head: Normocephalic and atraumatic.   Eyes:      General: No scleral icterus.        Right eye: No discharge.         Left eye: No discharge.   Cardiovascular:      Rate and Rhythm: Normal rate and regular rhythm.      Heart sounds: Normal heart sounds. No murmur heard.     No friction rub. No gallop.   Pulmonary:      Effort: Pulmonary effort is normal. No respiratory distress.      Breath sounds: Normal breath sounds. No wheezing.   Abdominal:      General: There is no distension.      Palpations: Abdomen is soft.      Tenderness: There is no abdominal tenderness.   Musculoskeletal:         General: Normal range of motion.      Cervical back: Normal range of motion.   Skin:     General: Skin is warm and dry.      Coloration: Skin is not pale.      Findings: No erythema or rash.   Neurological:      Mental Status: He is alert and oriented to person, place, and time.   Psychiatric:         Behavior: Behavior normal.                          Assessment & Plan     1. Hereditary hemochromatosis (HCC)        2. Encounter for hematology follow-up             1. HH: Diagnosed " 2/13/23; continues with TP    Patient continues with weekly therapeutic phlebotomy with mild associated anemia noted, fairly asymptomatic.  Patient will continue with weekly TP and return in 3 months for reevaluation, sooner as needed.        The patient verbalized agreement and understanding of current plan. All questions and concerns were addressed at time of visit.    Please note that this dictation was created using voice recognition software. I have made every reasonable attempt to correct obvious errors, but I expect that there are errors of grammar and possibly content that I did not discover before finalizing the note.

## 2024-09-15 ENCOUNTER — OUTPATIENT INFUSION SERVICES (OUTPATIENT)
Dept: ONCOLOGY | Facility: MEDICAL CENTER | Age: 59
End: 2024-09-15
Attending: INTERNAL MEDICINE
Payer: COMMERCIAL

## 2024-09-15 VITALS
TEMPERATURE: 97.9 F | WEIGHT: 195.77 LBS | HEART RATE: 87 BPM | HEIGHT: 70 IN | DIASTOLIC BLOOD PRESSURE: 90 MMHG | RESPIRATION RATE: 18 BRPM | SYSTOLIC BLOOD PRESSURE: 127 MMHG | BODY MASS INDEX: 28.03 KG/M2 | OXYGEN SATURATION: 96 %

## 2024-09-15 DIAGNOSIS — E83.110 HEREDITARY HEMOCHROMATOSIS (HCC): ICD-10-CM

## 2024-09-15 LAB
FERRITIN SERPL-MCNC: 293 NG/ML (ref 22–322)
HCT VFR BLD AUTO: 37.8 % (ref 42–52)
HGB BLD-MCNC: 13.7 G/DL (ref 14–18)

## 2024-09-15 PROCEDURE — 96360 HYDRATION IV INFUSION INIT: CPT

## 2024-09-15 PROCEDURE — 700105 HCHG RX REV CODE 258: Performed by: NURSE PRACTITIONER

## 2024-09-15 PROCEDURE — 82728 ASSAY OF FERRITIN: CPT

## 2024-09-15 PROCEDURE — 306780 HCHG STAT FOR TRANSFUSION PER CASE

## 2024-09-15 PROCEDURE — 85014 HEMATOCRIT: CPT

## 2024-09-15 PROCEDURE — 85018 HEMOGLOBIN: CPT

## 2024-09-15 RX ORDER — SODIUM CHLORIDE 9 MG/ML
500 INJECTION, SOLUTION INTRAVENOUS ONCE
Status: CANCELLED | OUTPATIENT
Start: 2024-09-15 | End: 2024-09-15

## 2024-09-15 RX ORDER — SODIUM CHLORIDE 9 MG/ML
500 INJECTION, SOLUTION INTRAVENOUS ONCE
Status: COMPLETED | OUTPATIENT
Start: 2024-09-15 | End: 2024-09-15

## 2024-09-15 RX ORDER — SODIUM CHLORIDE 9 MG/ML
500 INJECTION, SOLUTION INTRAVENOUS ONCE
Status: CANCELLED
Start: 2024-09-15 | End: 2024-09-15

## 2024-09-15 RX ADMIN — SODIUM CHLORIDE 500 ML: 9 INJECTION, SOLUTION INTRAVENOUS at 17:38

## 2024-09-15 ASSESSMENT — FIBROSIS 4 INDEX: FIB4 SCORE: 1.06

## 2024-09-16 ENCOUNTER — TELEMEDICINE (OUTPATIENT)
Dept: RHEUMATOLOGY | Facility: MEDICAL CENTER | Age: 59
End: 2024-09-16
Attending: INTERNAL MEDICINE
Payer: COMMERCIAL

## 2024-09-16 VITALS
OXYGEN SATURATION: 96 % | WEIGHT: 193 LBS | DIASTOLIC BLOOD PRESSURE: 78 MMHG | BODY MASS INDEX: 27.94 KG/M2 | SYSTOLIC BLOOD PRESSURE: 128 MMHG | HEART RATE: 75 BPM

## 2024-09-16 DIAGNOSIS — Z79.52 LONG TERM CURRENT USE OF SYSTEMIC STEROIDS: ICD-10-CM

## 2024-09-16 DIAGNOSIS — Z79.620 ENCOUNTER FOR MONITORING OF ETANERCEPT THERAPY: ICD-10-CM

## 2024-09-16 DIAGNOSIS — Z51.81 ENCOUNTER FOR MONITORING OF ETANERCEPT THERAPY: ICD-10-CM

## 2024-09-16 DIAGNOSIS — Z79.1 ENCOUNTER FOR LONG-TERM (CURRENT) USE OF NSAIDS: ICD-10-CM

## 2024-09-16 DIAGNOSIS — M05.79 RHEUMATOID ARTHRITIS INVOLVING MULTIPLE SITES WITH POSITIVE RHEUMATOID FACTOR (HCC): ICD-10-CM

## 2024-09-16 DIAGNOSIS — E83.119 HEMOCHROMATOSIS, UNSPECIFIED HEMOCHROMATOSIS TYPE: ICD-10-CM

## 2024-09-16 PROCEDURE — 99214 OFFICE O/P EST MOD 30 MIN: CPT | Mod: 95 | Performed by: INTERNAL MEDICINE

## 2024-09-16 ASSESSMENT — FIBROSIS 4 INDEX: FIB4 SCORE: 1.06

## 2024-09-16 NOTE — PROGRESS NOTES
Chief Complaint- joint pain     Subjective:   Milton Aguila is a 58 y.o. male here today for follow up of rheumatological issues    Addendum 9/23/2024  This telemedicine encounter was conducted via Zoom   Verbal consent was obtained. Patient's identity was verified.  Interview was done in patients home in the [facility].      This is a follow-up visit for this patient who we see in this clinic for serologically positive rheumatoid arthritis.  Patient was on Enbrel 50 mg subcu every week with Advil but has been off Enbrel now for about a month because of an infection in the dorsal aspect of his left hand after tussling with nuha wire.  Patient continues to be on antibiotics and continues to be off of Enbrel.  Of note recent sedimentation rate equals 22 August 2024.    Patient also is getting treated for hereditary hemochromatosis currently followed by Dr. Hernandez in hematology and getting phlebotomy every 1 to 2 weeks.  Patient is having some achiness in his hands and thinks it may be the arthritis associated with the hereditary hemochromatosis.      Additional comorbidities include  occasional back issues and sciatica for which he takes a muscle relaxer when necessary, also sees [facility] for tender point injections.      Soc Hx  Pos MJ use       RUQ Abdominal Ultrasound 2/3/2023  IMPRESSION:  1.  Liver cysts in the left lobe and right lobe.  2.  3.4 x 2.7 x 3.1 cm right upper pole renal cortical cyst. Doubtful clinical significance.  3.  Hepatomegaly  4.  Hepatic median shear wave velocity is 1.71 m/sec. Indeterminate for liver fibrosis.     S/p nabumetome-rash  s/p celebrex-ineffective   S/p Rinvoq-lost efficacy with spasming in palmar aspect right hand and right wrist pain  S/p Humira  S/p Plaquenil  S/p MTX-contraindicated because of underlying liver disease secondary to hemochromatosis  S/p leflunamide-contraindicated because of underlying liver disease secondary to hemochromatosis    s/p sulfasalazine-elevated LFTs   S/p xeljanz-patient did not want to try for fear of elevated cholesterol levels.     Bilateral HERVE     HEREDITARY HEMOCHROMOTOSIS  Order: [id number]        Component 8 d ago   C282Y Mutation Homozygous    H63D Mutation Negative    S65C Mutation Negative    Hemochrom Interp See Note    Comment: Indication for testing: Carrier screening or diagnostic testing   for hereditary hemochromatosis.     Hemochromatosis Interpretive Results:   Homozygous C282Y:   C282Y Homozygous - The patient is homozygous for the HFE C282Y   mutation and is at high risk for hereditary hemochromatosis.   Homozygosity for this mutation accounts for 80-90 percent of the   hemochromatosis patients of [place]  descent. The   frequency of this mutation in other populations is lower.   H63D: Negative - This patient is negative for the HFE H63D   mutation.          HBsAg/HBcAb neg 2/2024  HCV neg 2/2024  Quantiferon Gold neg 2/2024  Uric acid 6.7 6/2016 [facility]; Uric acid 7.3 12/2019  RF 14.2 (0-13.9) 10/2007 [facility]; RF 64 3/2018; RF 36 3/2021  CCP 18 ( 0-5) 10/2007 [facility];  3/2018;  3/2021  MRI pelvis 7/2007-indicates advanced inflammatory arthropathy compatible's rheumatoid arthritis involving both hips with hypertrophic synovitis-[facility]  MRI LS spine 9/2009-indicates disc desiccation L3-4, L4-5, and L5-S1, left-sided neural foraminal narrowing at L5-S1, mild central stenosis at L4, and L3-4-[facility]     MRI Right hand/wrist 11/2009-negative, no erosions, no synovitis, no pathology-[facility]      Right Hand x-ray 8/2022-Narrative  X-rays of the right hand 4 view AP lateral oblique and carpal tunnel view   reveal some degenerative changes at the MCP joint of the middle finger.     The wrist joint radiocarpal joint appears to be reasonably   well-maintained.  No acute fracture dislocations noted.     Corticosteroid  Therapy Informed Consent reviewed and signed 9/14/2023-copy given to patient           Current Outpatient Medications   Medication Sig Dispense Refill    cephALEXin (KEFLEX) 500 MG Cap 3,000 mg.      testosterone cypionate (DEPO-TESTOSTERONE) 200 MG/ML injection Inject 200 mg into the shoulder, thigh, or buttocks every 7 days.      ibuprofen (MOTRIN) 200 MG Tab Take 800 mg by mouth 2 times a day.      Etanercept (ENBREL SURECLICK) 50 MG/ML Solution Auto-injector INJECT 1 PEN UNDER THE SKIN EVERY 7 DAYS. (Patient not taking: Reported on 9/16/2024) 12 Each 0     No current facility-administered medications for this visit.     He  has a past medical history of Arthritis, rheumatic, acute or subacute and Hemochromatosis.    ROS   Other than what is mentioned in HPI or physical exam, there is no history of headaches, double vision or blurred vision.  No trouble swallowing difficulties .  No chest complaints including chest pain, cough or sputum production. No GI complaints including nausea, vomiting, change in bowel habits, or past peptic ulcer disease. No history of blood in the stools. No urinary complaints including dysuria or frequency. No history of alopecia, photosensitivity     Objective:     /78   Pulse 75   Wt 87.5 kg (193 lb)   SpO2 96%  Body mass index is 27.94 kg/m².   Physical Exam:    Constitutional: Alert and oriented X3, patient is talkative with good eye contact.Skin: Warm, dry,  no rashes in visible areas.Eye: Equal, round and reactive, conjunctiva clear, lids normal EOM intactENMT: Lips without lesions, good dentition, no oropharyngeal ulcers, moist buccal mucosa, pinna without deformityNeck: Trachea midline,Respiratory: Unlabored respiratory effort, patient able to talk in full sentences without shortness of breath Abdomen:    Psych: Alert and oriented x3, normal affect and mood.Ext:no joint hyperextensibility noted in bilateral arms, no joint hyperextensibility noted in bilateral  legs  Slightly swollen erythematous dorsal aspect of left hand, as noted above patient currently on antibiotics for such  Lab Results   Component Value Date/Time    HEPBCORIGM Non-Reactive 02/18/2024 04:21 PM    HEPBSAG Non-Reactive 02/18/2024 04:21 PM     Lab Results   Component Value Date/Time    HEPCAB Non-Reactive 02/18/2024 04:21 PM     Lab Results   Component Value Date/Time    SODIUM 135 08/26/2024 01:21 PM    POTASSIUM 4.6 08/26/2024 01:21 PM    CHLORIDE 103 08/26/2024 01:21 PM    CO2 22 08/26/2024 01:21 PM    GLUCOSE 84 08/26/2024 01:21 PM    BUN 13 08/26/2024 01:21 PM    CREATININE 0.73 08/26/2024 01:21 PM    CREATININE 0.8 02/09/2008 08:50 AM      Lab Results   Component Value Date/Time    WBC 6.0 08/26/2024 01:21 PM    RBC 4.22 (L) 08/26/2024 01:21 PM    HEMOGLOBIN 13.7 (L) 09/15/2024 05:10 PM    HEMATOCRIT 37.8 (L) 09/15/2024 05:10 PM    .5 (H) 08/26/2024 01:21 PM    MCH 34.6 (H) 08/26/2024 01:21 PM    MCHC 34.4 08/26/2024 01:21 PM    MPV 9.1 08/26/2024 01:21 PM    NEUTSPOLYS 68.70 08/26/2024 01:21 PM    LYMPHOCYTES 16.90 (L) 08/26/2024 01:21 PM    MONOCYTES 11.10 08/26/2024 01:21 PM    EOSINOPHILS 1.80 08/26/2024 01:21 PM    BASOPHILS 0.80 08/26/2024 01:21 PM      Lab Results   Component Value Date/Time    CALCIUM 9.3 08/26/2024 01:21 PM    ASTSGOT 16 08/22/2024 12:36 AM    ALTSGPT 12 08/22/2024 12:36 AM    ALKPHOSPHAT 49 08/22/2024 12:36 AM    TBILIRUBIN 0.3 08/22/2024 12:36 AM    ALBUMIN 3.6 08/22/2024 12:36 AM    TOTPROTEIN 6.4 08/22/2024 12:36 AM     Lab Results   Component Value Date/Time    URICACID 5.9 08/16/2021 10:53 AM    RHEUMFACTN 36 (H) 03/11/2021 07:35 AM    CCPANTIBODY 144 (H) 03/11/2021 07:35 AM     Lab Results   Component Value Date/Time    SEDRATEWES 6 06/13/2023 11:26 AM     Lab Results   Component Value Date/Time    HBA1C 5.2 04/16/2022 09:24 AM     Lab Results   Component Value Date/Time    ANTIMITOCHO 4.9 06/28/2019 11:03 AM       Assessment and Plan:     1. Rheumatoid  arthritis involving multiple sites with positive rheumatoid factor (HCC)  Currently off Enbrel until infection and left hand can be resolved.    2. Encounter for monitoring of etanercept therapy  Currently off Enbrel until infection in left hand can be resolved, screening labs are up-to-date, next screening labs due February 2026, patient needs monitoring labs every 6 months, next labs due February 2025.  We reviewed risks of biological medications with patient including hematological pathology, cancer risks, neurological and infection issues, patient states understanding.    3. Encounter for long-term (current) use of NSAIDs  Uses NSAIDs as needed, patient needs monitoring labs about every 6 months, next labs due by February 2025    4. Long term current use of systemic steroids  Patient uses intermittently as needed, understands risks of corticosteroids  Corticosteroid Therapy Informed Consent reviewed and signed 9/14/2023-copy given to patient     5. Hemochromatosis, unspecified hemochromatosis type  Currently undergoing treatment through hematology    Followup: Return in about 6 months (around 3/16/2025). or sooner prn      Please note that this dictation was created using voice recognition software. I have made every reasonable attempt to correct obvious errors, but I expect that there are errors of grammar and possibly content that I did not discover before finalizing the note.

## 2024-09-16 NOTE — PROGRESS NOTES
Pt arrived ambulatory for TP, denies c/o, states hand wound is healing well, sutures out and open to air, swelling much better.  PIV started in RAC with good blood return, labs drawn. Lab results reviewed, Hgb 13.7, meets parameters.  500cc blood removed over 10 minutes followed by NS 500cc bolused over 30 minutes.  Pt tolerated well, VS stable, Dc'd home without incident and will f/u as scheduled.

## 2024-09-22 ENCOUNTER — OUTPATIENT INFUSION SERVICES (OUTPATIENT)
Dept: ONCOLOGY | Facility: MEDICAL CENTER | Age: 59
End: 2024-09-22
Attending: INTERNAL MEDICINE
Payer: COMMERCIAL

## 2024-09-22 VITALS
SYSTOLIC BLOOD PRESSURE: 96 MMHG | DIASTOLIC BLOOD PRESSURE: 67 MMHG | RESPIRATION RATE: 18 BRPM | WEIGHT: 196.65 LBS | OXYGEN SATURATION: 95 % | BODY MASS INDEX: 28.15 KG/M2 | HEART RATE: 93 BPM | HEIGHT: 70 IN | TEMPERATURE: 97.5 F

## 2024-09-22 DIAGNOSIS — E83.110 HEREDITARY HEMOCHROMATOSIS (HCC): ICD-10-CM

## 2024-09-22 LAB
FERRITIN SERPL-MCNC: 302 NG/ML (ref 22–322)
HCT VFR BLD AUTO: 40.8 % (ref 42–52)
HGB BLD-MCNC: 14.6 G/DL (ref 14–18)

## 2024-09-22 PROCEDURE — 99195 PHLEBOTOMY: CPT

## 2024-09-22 PROCEDURE — 82728 ASSAY OF FERRITIN: CPT

## 2024-09-22 PROCEDURE — 85018 HEMOGLOBIN: CPT

## 2024-09-22 PROCEDURE — 700105 HCHG RX REV CODE 258: Performed by: NURSE PRACTITIONER

## 2024-09-22 PROCEDURE — 85014 HEMATOCRIT: CPT

## 2024-09-22 RX ORDER — SODIUM CHLORIDE 9 MG/ML
500 INJECTION, SOLUTION INTRAVENOUS ONCE
Status: CANCELLED | OUTPATIENT
Start: 2024-09-22 | End: 2024-09-22

## 2024-09-22 RX ORDER — SODIUM CHLORIDE 9 MG/ML
500 INJECTION, SOLUTION INTRAVENOUS ONCE
Status: CANCELLED
Start: 2024-09-22 | End: 2024-09-22

## 2024-09-22 RX ORDER — SODIUM CHLORIDE 9 MG/ML
500 INJECTION, SOLUTION INTRAVENOUS ONCE
Status: DISCONTINUED | OUTPATIENT
Start: 2024-09-22 | End: 2024-09-22 | Stop reason: HOSPADM

## 2024-09-22 RX ORDER — SODIUM CHLORIDE 9 MG/ML
500 INJECTION, SOLUTION INTRAVENOUS ONCE
Status: COMPLETED | OUTPATIENT
Start: 2024-09-22 | End: 2024-09-22

## 2024-09-22 RX ADMIN — SODIUM CHLORIDE 500 ML: 9 INJECTION, SOLUTION INTRAVENOUS at 16:44

## 2024-09-22 ASSESSMENT — FIBROSIS 4 INDEX: FIB4 SCORE: 1.06

## 2024-09-23 NOTE — PROGRESS NOTES
Pt arrived ambulatory to [facility] for weekly H/H, ferritin possible Therapeutic phlebotomy. POC discussed with pt and he agrees with plan.    PIV established, brisk blood return noted. Labs drawn as ordered. Results reviewed, Hgb 14.6 today. Pt meets parameters for TP. 500ml whole blood phlebotomized. 500ml NS bolus infused as ordered. See flow sheet for orthostatic vital signs. Pt tolerated TP without s/s adverse reaction. PIV dc'd catheter tip intact, gauze and coban dressing applied.     Pt discharged to [facility] care, [facility]. Pt's next appointment confirmed 9/29/2024.

## 2024-09-29 ENCOUNTER — OUTPATIENT INFUSION SERVICES (OUTPATIENT)
Dept: ONCOLOGY | Facility: MEDICAL CENTER | Age: 59
End: 2024-09-29
Attending: INTERNAL MEDICINE
Payer: COMMERCIAL

## 2024-09-29 VITALS
OXYGEN SATURATION: 96 % | TEMPERATURE: 98.6 F | SYSTOLIC BLOOD PRESSURE: 134 MMHG | HEART RATE: 95 BPM | BODY MASS INDEX: 28.59 KG/M2 | RESPIRATION RATE: 18 BRPM | DIASTOLIC BLOOD PRESSURE: 89 MMHG | WEIGHT: 199.74 LBS | HEIGHT: 70 IN

## 2024-09-29 DIAGNOSIS — E83.110 HEREDITARY HEMOCHROMATOSIS (HCC): ICD-10-CM

## 2024-09-29 LAB
FERRITIN SERPL-MCNC: 220 NG/ML (ref 22–322)
HCT VFR BLD AUTO: 34.5 % (ref 42–52)
HGB BLD-MCNC: 12.7 G/DL (ref 14–18)

## 2024-09-29 PROCEDURE — 82728 ASSAY OF FERRITIN: CPT

## 2024-09-29 PROCEDURE — 700105 HCHG RX REV CODE 258: Performed by: NURSE PRACTITIONER

## 2024-09-29 PROCEDURE — 99195 PHLEBOTOMY: CPT

## 2024-09-29 PROCEDURE — 85014 HEMATOCRIT: CPT

## 2024-09-29 PROCEDURE — 85018 HEMOGLOBIN: CPT

## 2024-09-29 RX ORDER — SODIUM CHLORIDE 9 MG/ML
500 INJECTION, SOLUTION INTRAVENOUS ONCE
Status: COMPLETED | OUTPATIENT
Start: 2024-09-29 | End: 2024-09-29

## 2024-09-29 RX ORDER — SODIUM CHLORIDE 9 MG/ML
500 INJECTION, SOLUTION INTRAVENOUS ONCE
OUTPATIENT
Start: 2024-09-29 | End: 2024-09-29

## 2024-09-29 RX ORDER — SODIUM CHLORIDE 9 MG/ML
500 INJECTION, SOLUTION INTRAVENOUS ONCE
Start: 2024-09-29 | End: 2024-09-29

## 2024-09-29 RX ADMIN — SODIUM CHLORIDE 500 ML: 9 INJECTION, SOLUTION INTRAVENOUS at 14:28

## 2024-09-29 ASSESSMENT — FIBROSIS 4 INDEX: FIB4 SCORE: 1.06

## 2024-09-29 NOTE — PROGRESS NOTES
Patient came into INF independently. Orders and vitals reviewed, assessment done. PIV started with blood return noted. Labs collected from PIV and reviewed. Per order parameters patient requires therapeutic phlebotomy. 500cc of whole blood drawn off PIV.  500 NS bolus given as ordered. Post vital taken sitting and standing. PIV removed with tip intact. Patient left in stable condition with next appointment confirmed.

## 2024-10-02 LAB
MYCOBACTERIUM SPEC CULT: NORMAL
RHODAMINE-AURAMINE STN SPEC: NORMAL
SIGNIFICANT IND 70042: NORMAL
SITE SITE: NORMAL
SOURCE SOURCE: NORMAL

## 2024-10-06 ENCOUNTER — APPOINTMENT (OUTPATIENT)
Dept: ONCOLOGY | Facility: MEDICAL CENTER | Age: 59
End: 2024-10-06
Attending: INTERNAL MEDICINE
Payer: COMMERCIAL

## 2024-10-13 ENCOUNTER — OUTPATIENT INFUSION SERVICES (OUTPATIENT)
Dept: ONCOLOGY | Facility: MEDICAL CENTER | Age: 59
End: 2024-10-13
Attending: INTERNAL MEDICINE
Payer: COMMERCIAL

## 2024-10-13 VITALS
RESPIRATION RATE: 18 BRPM | OXYGEN SATURATION: 96 % | HEART RATE: 95 BPM | SYSTOLIC BLOOD PRESSURE: 136 MMHG | DIASTOLIC BLOOD PRESSURE: 99 MMHG | HEIGHT: 70 IN | TEMPERATURE: 97.2 F | WEIGHT: 200.84 LBS | BODY MASS INDEX: 28.75 KG/M2

## 2024-10-13 DIAGNOSIS — E83.110 HEREDITARY HEMOCHROMATOSIS (HCC): ICD-10-CM

## 2024-10-13 LAB
FERRITIN SERPL-MCNC: 168 NG/ML (ref 22–322)
HCT VFR BLD AUTO: 39.4 % (ref 42–52)
HGB BLD-MCNC: 14.3 G/DL (ref 14–18)

## 2024-10-13 PROCEDURE — 96360 HYDRATION IV INFUSION INIT: CPT

## 2024-10-13 PROCEDURE — 700105 HCHG RX REV CODE 258: Performed by: NURSE PRACTITIONER

## 2024-10-13 PROCEDURE — 99195 PHLEBOTOMY: CPT

## 2024-10-13 PROCEDURE — 36000 PLACE NEEDLE IN VEIN: CPT

## 2024-10-13 PROCEDURE — 85014 HEMATOCRIT: CPT

## 2024-10-13 PROCEDURE — 85018 HEMOGLOBIN: CPT

## 2024-10-13 PROCEDURE — 82728 ASSAY OF FERRITIN: CPT

## 2024-10-13 RX ORDER — SODIUM CHLORIDE 9 MG/ML
500 INJECTION, SOLUTION INTRAVENOUS ONCE
Status: COMPLETED | OUTPATIENT
Start: 2024-10-13 | End: 2024-10-13

## 2024-10-13 RX ORDER — SODIUM CHLORIDE 9 MG/ML
500 INJECTION, SOLUTION INTRAVENOUS ONCE
Status: CANCELLED | OUTPATIENT
Start: 2024-10-13 | End: 2024-10-13

## 2024-10-13 RX ORDER — SODIUM CHLORIDE 9 MG/ML
500 INJECTION, SOLUTION INTRAVENOUS ONCE
Status: CANCELLED
Start: 2024-10-13 | End: 2024-10-13

## 2024-10-13 RX ADMIN — SODIUM CHLORIDE 500 ML: 9 INJECTION, SOLUTION INTRAVENOUS at 18:05

## 2024-10-13 ASSESSMENT — FIBROSIS 4 INDEX: FIB4 SCORE: 1.08

## 2024-10-14 DIAGNOSIS — Z51.81 ENCOUNTER FOR MONITORING OF ETANERCEPT THERAPY: ICD-10-CM

## 2024-10-14 DIAGNOSIS — Z79.52 LONG TERM CURRENT USE OF SYSTEMIC STEROIDS: ICD-10-CM

## 2024-10-14 DIAGNOSIS — M05.79 RHEUMATOID ARTHRITIS INVOLVING MULTIPLE SITES WITH POSITIVE RHEUMATOID FACTOR (HCC): ICD-10-CM

## 2024-10-14 DIAGNOSIS — Z79.620 ENCOUNTER FOR MONITORING OF ETANERCEPT THERAPY: ICD-10-CM

## 2024-10-14 RX ORDER — MEDROXYPROGESTERONE ACETATE 150 MG/ML
INJECTION, SUSPENSION INTRAMUSCULAR
Qty: 12 ML | Refills: 0 | Status: SHIPPED | OUTPATIENT
Start: 2024-10-14

## 2024-10-20 ENCOUNTER — TELEPHONE (OUTPATIENT)
Dept: ONCOLOGY | Facility: MEDICAL CENTER | Age: 59
End: 2024-10-20
Payer: COMMERCIAL

## 2024-10-27 ENCOUNTER — OUTPATIENT INFUSION SERVICES (OUTPATIENT)
Dept: ONCOLOGY | Facility: MEDICAL CENTER | Age: 59
End: 2024-10-27
Attending: INTERNAL MEDICINE
Payer: COMMERCIAL

## 2024-10-27 VITALS
SYSTOLIC BLOOD PRESSURE: 132 MMHG | BODY MASS INDEX: 28.75 KG/M2 | RESPIRATION RATE: 18 BRPM | DIASTOLIC BLOOD PRESSURE: 92 MMHG | TEMPERATURE: 97.8 F | WEIGHT: 200.84 LBS | OXYGEN SATURATION: 96 % | HEART RATE: 96 BPM | HEIGHT: 70 IN

## 2024-10-27 DIAGNOSIS — E83.110 HEREDITARY HEMOCHROMATOSIS (HCC): ICD-10-CM

## 2024-10-27 LAB
FERRITIN SERPL-MCNC: 245 NG/ML (ref 22–322)
HCT VFR BLD AUTO: 42.7 % (ref 42–52)
HGB BLD-MCNC: 14.8 G/DL (ref 14–18)

## 2024-10-27 PROCEDURE — 700105 HCHG RX REV CODE 258: Performed by: NURSE PRACTITIONER

## 2024-10-27 PROCEDURE — 99195 PHLEBOTOMY: CPT

## 2024-10-27 PROCEDURE — 85018 HEMOGLOBIN: CPT

## 2024-10-27 PROCEDURE — 82728 ASSAY OF FERRITIN: CPT

## 2024-10-27 PROCEDURE — 85014 HEMATOCRIT: CPT

## 2024-10-27 RX ORDER — SODIUM CHLORIDE 9 MG/ML
500 INJECTION, SOLUTION INTRAVENOUS ONCE
OUTPATIENT
Start: 2024-10-27 | End: 2024-10-27

## 2024-10-27 RX ORDER — SODIUM CHLORIDE 9 MG/ML
500 INJECTION, SOLUTION INTRAVENOUS ONCE
Status: COMPLETED | OUTPATIENT
Start: 2024-10-27 | End: 2024-10-27

## 2024-10-27 RX ORDER — SODIUM CHLORIDE 9 MG/ML
500 INJECTION, SOLUTION INTRAVENOUS ONCE
Start: 2024-10-27 | End: 2024-10-27

## 2024-10-27 RX ORDER — SODIUM CHLORIDE 9 MG/ML
500 INJECTION, SOLUTION INTRAVENOUS ONCE
Status: CANCELLED | OUTPATIENT
Start: 2024-10-27 | End: 2024-10-27

## 2024-10-27 RX ORDER — SODIUM CHLORIDE 9 MG/ML
500 INJECTION, SOLUTION INTRAVENOUS ONCE
Status: CANCELLED
Start: 2024-10-27 | End: 2024-10-27

## 2024-10-27 RX ADMIN — SODIUM CHLORIDE 500 ML: 9 INJECTION, SOLUTION INTRAVENOUS at 17:56

## 2024-10-27 ASSESSMENT — FIBROSIS 4 INDEX: FIB4 SCORE: 1.09

## 2024-11-04 ENCOUNTER — OUTPATIENT INFUSION SERVICES (OUTPATIENT)
Dept: ONCOLOGY | Facility: MEDICAL CENTER | Age: 59
End: 2024-11-04
Attending: NURSE PRACTITIONER
Payer: COMMERCIAL

## 2024-11-04 VITALS
HEART RATE: 85 BPM | TEMPERATURE: 97.4 F | HEIGHT: 70 IN | OXYGEN SATURATION: 94 % | WEIGHT: 200.62 LBS | SYSTOLIC BLOOD PRESSURE: 91 MMHG | RESPIRATION RATE: 16 BRPM | BODY MASS INDEX: 28.72 KG/M2 | DIASTOLIC BLOOD PRESSURE: 57 MMHG

## 2024-11-04 DIAGNOSIS — E83.110 HEREDITARY HEMOCHROMATOSIS (HCC): ICD-10-CM

## 2024-11-04 LAB
FERRITIN SERPL-MCNC: 236 NG/ML (ref 22–322)
HCT VFR BLD AUTO: 38.4 % (ref 42–52)
HGB BLD-MCNC: 14.1 G/DL (ref 14–18)

## 2024-11-04 PROCEDURE — 82728 ASSAY OF FERRITIN: CPT

## 2024-11-04 PROCEDURE — 36415 COLL VENOUS BLD VENIPUNCTURE: CPT

## 2024-11-04 PROCEDURE — 700105 HCHG RX REV CODE 258: Performed by: NURSE PRACTITIONER

## 2024-11-04 PROCEDURE — 85014 HEMATOCRIT: CPT

## 2024-11-04 PROCEDURE — 85018 HEMOGLOBIN: CPT

## 2024-11-04 RX ORDER — SODIUM CHLORIDE 9 MG/ML
500 INJECTION, SOLUTION INTRAVENOUS ONCE
Status: CANCELLED
Start: 2024-11-04 | End: 2024-11-04

## 2024-11-04 RX ORDER — SODIUM CHLORIDE 9 MG/ML
500 INJECTION, SOLUTION INTRAVENOUS ONCE
Status: COMPLETED | OUTPATIENT
Start: 2024-11-04 | End: 2024-11-04

## 2024-11-04 RX ORDER — SODIUM CHLORIDE 9 MG/ML
500 INJECTION, SOLUTION INTRAVENOUS ONCE
Status: CANCELLED | OUTPATIENT
Start: 2024-11-04 | End: 2024-11-04

## 2024-11-04 RX ADMIN — SODIUM CHLORIDE 500 ML: 9 INJECTION, SOLUTION INTRAVENOUS at 15:19

## 2024-11-04 ASSESSMENT — FIBROSIS 4 INDEX: FIB4 SCORE: 1.09

## 2024-11-04 ASSESSMENT — PAIN DESCRIPTION - PAIN TYPE: TYPE: ACUTE PAIN

## 2024-11-04 NOTE — PROGRESS NOTES
Jan arrived to the Infusion center for labs and possible TP. Pt reports being bucked off horse yesterday, fell onto R buttock. Pt denies hitting his head, and has not been checked out by ED. Pt is hypotensive, reports not drinking much water yesterday/today. Water provided. Pt encouraged to get evaluated. POC reviewed.     IV started in R AC, brisk blood return noted, labs drawn off line, Pt tolerated well. Pre-hydration administered per MD order, Pt tolerated well. Pt remains hypotensive post hydration. Pt drinking more water, resting pending lab results. Pt aware that if BP doesn't increase, TP will not be preformed today. Pt agrees with POC.     BP remains low, Pt reports he feels fine. TP held and Pt rescheduled. IV removed, tip intact/gauze and coban applied.     Pt encouraged to drink more water, seek medical attention for fall and Jan was discharged home in no acute distress.

## 2024-11-10 ENCOUNTER — APPOINTMENT (OUTPATIENT)
Dept: ONCOLOGY | Facility: MEDICAL CENTER | Age: 59
End: 2024-11-10
Attending: NURSE PRACTITIONER

## 2024-11-10 ENCOUNTER — OUTPATIENT INFUSION SERVICES (OUTPATIENT)
Dept: ONCOLOGY | Facility: MEDICAL CENTER | Age: 59
End: 2024-11-10
Attending: NURSE PRACTITIONER
Payer: COMMERCIAL

## 2024-11-10 VITALS
SYSTOLIC BLOOD PRESSURE: 117 MMHG | OXYGEN SATURATION: 97 % | DIASTOLIC BLOOD PRESSURE: 78 MMHG | WEIGHT: 203.26 LBS | HEART RATE: 92 BPM | HEIGHT: 70 IN | RESPIRATION RATE: 17 BRPM | TEMPERATURE: 97.9 F | BODY MASS INDEX: 29.1 KG/M2

## 2024-11-10 DIAGNOSIS — E83.110 HEREDITARY HEMOCHROMATOSIS (HCC): ICD-10-CM

## 2024-11-10 PROCEDURE — 99195 PHLEBOTOMY: CPT

## 2024-11-10 PROCEDURE — 700105 HCHG RX REV CODE 258: Performed by: NURSE PRACTITIONER

## 2024-11-10 PROCEDURE — 96360 HYDRATION IV INFUSION INIT: CPT

## 2024-11-10 RX ORDER — SODIUM CHLORIDE 9 MG/ML
500 INJECTION, SOLUTION INTRAVENOUS ONCE
Status: COMPLETED | OUTPATIENT
Start: 2024-11-10 | End: 2024-11-10

## 2024-11-10 RX ORDER — SODIUM CHLORIDE 9 MG/ML
500 INJECTION, SOLUTION INTRAVENOUS ONCE
Status: CANCELLED | OUTPATIENT
Start: 2024-11-10 | End: 2024-11-10

## 2024-11-10 RX ORDER — SODIUM CHLORIDE 9 MG/ML
500 INJECTION, SOLUTION INTRAVENOUS ONCE
Status: CANCELLED
Start: 2024-11-10 | End: 2024-11-10

## 2024-11-10 RX ADMIN — SODIUM CHLORIDE 500 ML: 9 INJECTION, SOLUTION INTRAVENOUS at 17:14

## 2024-11-10 ASSESSMENT — FIBROSIS 4 INDEX: FIB4 SCORE: 1.09

## 2024-11-11 NOTE — PROGRESS NOTES
"Patient to [facility] for TP. Patient to [facility] for TP. PIV inserted into right AC, flushed with + blood return. 500ml of whole blood removed. 500ml of NS infused. VS taken per protocol. /78   Pulse 92   Temp 36.6 °C (97.9 °F) (Temporal)   Resp 17   Ht 1.77 m (5' 9.69\")   Wt 92.2 kg (203 lb 4.2 oz)   SpO2 97%   BMI 29.43 kg/m²   PIV removed. Patient to home.  "

## 2024-11-14 ENCOUNTER — HOSPITAL ENCOUNTER (OUTPATIENT)
Facility: MEDICAL CENTER | Age: 59
End: 2024-11-14
Attending: ORTHOPAEDIC SURGERY
Payer: COMMERCIAL

## 2024-11-14 LAB
A PREV+VAG DNA SNV QL NAA+NON-PROBE: NOT DETECTED
APPEARANCE FLD: NORMAL
B FRAGILIS DNA SNV QL NAA+NON-PROBE: NOT DETECTED
BLACTX-M ISLT/SPM QL: NORMAL
BLAIMP ISLT/SPM QL: NORMAL
BLAKPC ISLT/SPM QL: NORMAL
BLAOXA-48-LIKE ISLT/SPM QL: NORMAL
BLAVIM ISLT/SPM QL: NORMAL
BODY FLD TYPE: NORMAL
C ALBICANS DNA SNV QL NAA+NON-PROBE: NOT DETECTED
C AVID+GRANUL DNA SNV QL NAA+NON-PROBE: NOT DETECTED
C PERFRINGENS SNV QL NAA+NON-PROBE: NOT DETECTED
CANDIDA DNA SNV QL NAA+NON-PROBE: NOT DETECTED
CITROBAC SP DNA SNV QL NAA+NON-PROBE: NOT DETECTED
COLOR FLD: NORMAL
CRYSTALS FLD MICRO: NORMAL
CSF COMMENTS 1658: NORMAL
E CLOAC COMP DNA SNV QL NAA+NON-PROBE: NOT DETECTED
E COLI DNA SNV QL NAA+NON-PROBE: NOT DETECTED
E FAECALIS DNA SNV QL NAA+NON-PROBE: NOT DETECTED
E FAECIUM DNA SNV QL NAA+NON-PROBE: NOT DETECTED
F MAGNA DNA SNV QL NAA+NON-PROBE: NOT DETECTED
GP B STREP DNA SNV QL NAA+NON-PROBE: NOT DETECTED
GRAM STN SPEC: NORMAL
HAEM INFLU DNA SNV QL NAA+NON-PROBE: NOT DETECTED
K KINGAE DNA SNV QL NAA+NON-PROBE: NOT DETECTED
K. AEROGENES DNA SNV QL NAA+NON-PROBE: NOT DETECTED
K. PNEUMON GROUP DNA SNV QL NAA+NON-PRB: NOT DETECTED
M. MORGANII DNA SNV QL NAA+NON-PROBE: NOT DETECTED
MECA+MECC+MREJ ISLT/SPM QL: NORMAL
MONOS+MACROS NFR FLD MANUAL: 5 %
N GONORRHOEA DNA SNV QL NAA+NON-PROBE: NOT DETECTED
NDM (CARBAPENEMASE), PCR L739821A: NORMAL
NEUTROPHILS NFR FLD: 95 %
NUC CELL # FLD: NORMAL CELLS/UL
P AERUGINOSA DNA SNV QL NAA+NON-PROBE: NOT DETECTED
P ANAEROBIUS DNA SNV QL NAA+NON-PROBE: NOT DETECTED
P MICRA DNA SNV QL NAA+NON-PROBE: NOT DETECTED
PEPTONIPHILUS SP DNA SNV QL NAA+NON-PRB: NOT DETECTED
PROTEUS SP DNA SNV QL NAA+NON-PROBE: NOT DETECTED
RBC # FLD: NORMAL CELLS/UL
S AUREUS DNA SNV QL NAA+NON-PROBE: NOT DETECTED
S LUGDUNENSIS DNA SNV QL NAA+NON-PROBE: NOT DETECTED
S MARCESCENS DNA SNV QL NAA+NON-PROBE: NOT DETECTED
S PNEUM DNA SNV QL NAA+NON-PROBE: NOT DETECTED
S PYO DNA SNV QL NAA+NON-PROBE: NOT DETECTED
SALMONELLA DNA SNV QL NAA+NON-PROBE: NOT DETECTED
SIGNIFICANT IND 70042: NORMAL
SITE SITE: NORMAL
SOURCE SOURCE: NORMAL
STREPTOCOCCUS DNA SNV QL NAA+NON-PROBE: NOT DETECTED
VANA+VANB ISLT/SPM QL: NORMAL

## 2024-11-14 PROCEDURE — 87070 CULTURE OTHR SPECIMN AEROBIC: CPT

## 2024-11-14 PROCEDURE — 87205 SMEAR GRAM STAIN: CPT

## 2024-11-14 PROCEDURE — 87999 UNLISTED MICROBIOLOGY PX: CPT

## 2024-11-14 PROCEDURE — 89060 EXAM SYNOVIAL FLUID CRYSTALS: CPT

## 2024-11-14 PROCEDURE — 89051 BODY FLUID CELL COUNT: CPT

## 2024-11-17 ENCOUNTER — OUTPATIENT INFUSION SERVICES (OUTPATIENT)
Dept: ONCOLOGY | Facility: MEDICAL CENTER | Age: 59
End: 2024-11-17
Attending: NURSE PRACTITIONER
Payer: COMMERCIAL

## 2024-11-17 VITALS
DIASTOLIC BLOOD PRESSURE: 72 MMHG | HEART RATE: 103 BPM | HEIGHT: 70 IN | TEMPERATURE: 99.6 F | RESPIRATION RATE: 18 BRPM | OXYGEN SATURATION: 100 % | WEIGHT: 197.75 LBS | SYSTOLIC BLOOD PRESSURE: 102 MMHG | BODY MASS INDEX: 28.31 KG/M2

## 2024-11-17 DIAGNOSIS — E83.110 HEREDITARY HEMOCHROMATOSIS (HCC): ICD-10-CM

## 2024-11-17 DIAGNOSIS — Z96.649 PAIN IN HIP REGION AFTER TOTAL HIP REPLACEMENT, INITIAL ENCOUNTER (HCC): ICD-10-CM

## 2024-11-17 DIAGNOSIS — T84.84XA PAIN IN HIP REGION AFTER TOTAL HIP REPLACEMENT, INITIAL ENCOUNTER (HCC): ICD-10-CM

## 2024-11-17 LAB
BACTERIA FLD AEROBE CULT: NORMAL
CRP SERPL HS-MCNC: 7.46 MG/DL (ref 0–0.75)
ERYTHROCYTE [SEDIMENTATION RATE] IN BLOOD BY WESTERGREN METHOD: 133 MM/HOUR (ref 0–20)
FERRITIN SERPL-MCNC: 408 NG/ML (ref 22–322)
GRAM STN SPEC: NORMAL
HCT VFR BLD AUTO: 36.6 % (ref 42–52)
HGB BLD-MCNC: 13.2 G/DL (ref 14–18)
SIGNIFICANT IND 70042: NORMAL
SITE SITE: NORMAL
SOURCE SOURCE: NORMAL

## 2024-11-17 PROCEDURE — 85014 HEMATOCRIT: CPT

## 2024-11-17 PROCEDURE — 83018 HEAVY METAL QUAN EACH NES: CPT

## 2024-11-17 PROCEDURE — 99195 PHLEBOTOMY: CPT

## 2024-11-17 PROCEDURE — 86140 C-REACTIVE PROTEIN: CPT

## 2024-11-17 PROCEDURE — 82728 ASSAY OF FERRITIN: CPT

## 2024-11-17 PROCEDURE — 700105 HCHG RX REV CODE 258: Performed by: NURSE PRACTITIONER

## 2024-11-17 PROCEDURE — 85652 RBC SED RATE AUTOMATED: CPT

## 2024-11-17 PROCEDURE — 82495 ASSAY OF CHROMIUM: CPT

## 2024-11-17 PROCEDURE — 85018 HEMOGLOBIN: CPT

## 2024-11-17 RX ORDER — SODIUM CHLORIDE 9 MG/ML
500 INJECTION, SOLUTION INTRAVENOUS ONCE
Start: 2024-11-17 | End: 2024-11-17

## 2024-11-17 RX ORDER — SODIUM CHLORIDE 9 MG/ML
500 INJECTION, SOLUTION INTRAVENOUS ONCE
OUTPATIENT
Start: 2024-11-17 | End: 2024-11-17

## 2024-11-17 RX ORDER — SODIUM CHLORIDE 9 MG/ML
500 INJECTION, SOLUTION INTRAVENOUS ONCE
Status: COMPLETED | OUTPATIENT
Start: 2024-11-17 | End: 2024-11-17

## 2024-11-17 RX ADMIN — SODIUM CHLORIDE 500 ML: 9 INJECTION, SOLUTION INTRAVENOUS at 18:18

## 2024-11-17 ASSESSMENT — PAIN DESCRIPTION - PAIN TYPE: TYPE: ACUTE PAIN

## 2024-11-17 ASSESSMENT — FIBROSIS 4 INDEX: FIB4 SCORE: 1.09

## 2024-11-18 NOTE — PROGRESS NOTES
Jan into [facility] for weekly TP.  PIV to left AC established, labs drawn.   Hgb 13.2, meets parameters for TP today.  500 mLs blood removed and 500 mLs NS bolus given per order.   Vitals monitored as ordered, sitting and standing, ok to discharge. Pt denies dizziness.  PIV removed tip intact. Pt left unit in NAD, verbalized next appointment.

## 2024-11-19 PROBLEM — T84.51XA INFECTION AND INFLAMMATORY REACTION DUE TO INTERNAL RIGHT HIP PROSTHESIS, INITIAL ENCOUNTER (HCC): Status: ACTIVE | Noted: 2024-11-19

## 2024-11-19 LAB
COBALT SERPL-MCNC: 2.9 UG/L
CR SERPL-MCNC: 1.3 UG/L

## 2024-12-01 ENCOUNTER — APPOINTMENT (OUTPATIENT)
Dept: ONCOLOGY | Facility: MEDICAL CENTER | Age: 59
End: 2024-12-01
Attending: NURSE PRACTITIONER

## 2024-12-02 ENCOUNTER — HOSPITAL ENCOUNTER (OUTPATIENT)
Facility: MEDICAL CENTER | Age: 59
End: 2024-12-02
Attending: PHYSICIAN ASSISTANT
Payer: COMMERCIAL

## 2024-12-02 LAB
ALBUMIN SERPL BCP-MCNC: 3.5 G/DL (ref 3.2–4.9)
ALBUMIN/GLOB SERPL: 1.1 G/DL
ALP SERPL-CCNC: 64 U/L (ref 30–99)
ALT SERPL-CCNC: 10 U/L (ref 2–50)
ANION GAP SERPL CALC-SCNC: 13 MMOL/L (ref 7–16)
AST SERPL-CCNC: 20 U/L (ref 12–45)
BASOPHILS # BLD AUTO: 0.6 % (ref 0–1.8)
BASOPHILS # BLD: 0.03 K/UL (ref 0–0.12)
BILIRUB SERPL-MCNC: 0.3 MG/DL (ref 0.1–1.5)
BUN SERPL-MCNC: 10 MG/DL (ref 8–22)
CALCIUM ALBUM COR SERPL-MCNC: 9 MG/DL (ref 8.5–10.5)
CALCIUM SERPL-MCNC: 8.6 MG/DL (ref 8.5–10.5)
CHLORIDE SERPL-SCNC: 105 MMOL/L (ref 96–112)
CK SERPL-CCNC: 53 U/L (ref 0–154)
CO2 SERPL-SCNC: 21 MMOL/L (ref 20–33)
CREAT SERPL-MCNC: 0.91 MG/DL (ref 0.5–1.4)
CRP SERPL HS-MCNC: 0.67 MG/DL (ref 0–0.75)
EOSINOPHIL # BLD AUTO: 0.29 K/UL (ref 0–0.51)
EOSINOPHIL NFR BLD: 6.2 % (ref 0–6.9)
ERYTHROCYTE [DISTWIDTH] IN BLOOD BY AUTOMATED COUNT: 47.9 FL (ref 35.9–50)
ERYTHROCYTE [SEDIMENTATION RATE] IN BLOOD BY WESTERGREN METHOD: 102 MM/HOUR (ref 0–20)
GFR SERPLBLD CREATININE-BSD FMLA CKD-EPI: 98 ML/MIN/1.73 M 2
GLOBULIN SER CALC-MCNC: 3.1 G/DL (ref 1.9–3.5)
GLUCOSE SERPL-MCNC: 86 MG/DL (ref 65–99)
HCT VFR BLD AUTO: 25.5 % (ref 42–52)
HGB BLD-MCNC: 8.6 G/DL (ref 14–18)
IMM GRANULOCYTES # BLD AUTO: 0.06 K/UL (ref 0–0.11)
IMM GRANULOCYTES NFR BLD AUTO: 1.3 % (ref 0–0.9)
LYMPHOCYTES # BLD AUTO: 1.06 K/UL (ref 1–4.8)
LYMPHOCYTES NFR BLD: 22.6 % (ref 22–41)
MCH RBC QN AUTO: 32.3 PG (ref 27–33)
MCHC RBC AUTO-ENTMCNC: 33.7 G/DL (ref 32.3–36.5)
MCV RBC AUTO: 95.9 FL (ref 81.4–97.8)
MONOCYTES # BLD AUTO: 0.51 K/UL (ref 0–0.85)
MONOCYTES NFR BLD AUTO: 10.9 % (ref 0–13.4)
NEUTROPHILS # BLD AUTO: 2.75 K/UL (ref 1.82–7.42)
NEUTROPHILS NFR BLD: 58.4 % (ref 44–72)
NRBC # BLD AUTO: 0 K/UL
NRBC BLD-RTO: 0 /100 WBC (ref 0–0.2)
PLATELET # BLD AUTO: 374 K/UL (ref 164–446)
PMV BLD AUTO: 8.9 FL (ref 9–12.9)
POTASSIUM SERPL-SCNC: 4.2 MMOL/L (ref 3.6–5.5)
PROT SERPL-MCNC: 6.6 G/DL (ref 6–8.2)
RBC # BLD AUTO: 2.66 M/UL (ref 4.7–6.1)
SODIUM SERPL-SCNC: 139 MMOL/L (ref 135–145)
WBC # BLD AUTO: 4.7 K/UL (ref 4.8–10.8)

## 2024-12-02 PROCEDURE — 85025 COMPLETE CBC W/AUTO DIFF WBC: CPT

## 2024-12-02 PROCEDURE — 86140 C-REACTIVE PROTEIN: CPT

## 2024-12-02 PROCEDURE — 85652 RBC SED RATE AUTOMATED: CPT

## 2024-12-02 PROCEDURE — 80053 COMPREHEN METABOLIC PANEL: CPT

## 2024-12-02 PROCEDURE — 82550 ASSAY OF CK (CPK): CPT

## 2024-12-03 ENCOUNTER — TELEPHONE (OUTPATIENT)
Dept: HEMATOLOGY ONCOLOGY | Facility: MEDICAL CENTER | Age: 59
End: 2024-12-03
Payer: COMMERCIAL

## 2024-12-03 DIAGNOSIS — E83.110 HEREDITARY HEMOCHROMATOSIS (HCC): ICD-10-CM

## 2024-12-04 NOTE — TELEPHONE ENCOUNTER
Pt called and left message r/t him haivng surgery and being hospitalized and actually had to receive blood transfusion.  Pt is holding off on getting weekly TPs per his surgeon for now.  Pt wanting to get Ferritin level checked at [facility] when he gets his weekly PICC line dressing change.  Spoke with MARGARITO Hunter and she gave the order to an H&H and Ferritin.  Spoke with [facility] and faxed the lab orders over.  Notified the Pt.

## 2024-12-08 ENCOUNTER — APPOINTMENT (OUTPATIENT)
Dept: ONCOLOGY | Facility: MEDICAL CENTER | Age: 59
End: 2024-12-08
Attending: NURSE PRACTITIONER

## 2024-12-09 ENCOUNTER — HOSPITAL ENCOUNTER (OUTPATIENT)
Facility: MEDICAL CENTER | Age: 59
End: 2024-12-09
Attending: NURSE PRACTITIONER
Payer: COMMERCIAL

## 2024-12-09 ENCOUNTER — HOSPITAL ENCOUNTER (OUTPATIENT)
Facility: MEDICAL CENTER | Age: 59
End: 2024-12-09
Attending: PHYSICIAN ASSISTANT
Payer: COMMERCIAL

## 2024-12-09 DIAGNOSIS — E83.110 HEREDITARY HEMOCHROMATOSIS (HCC): ICD-10-CM

## 2024-12-09 LAB
ALBUMIN SERPL BCP-MCNC: 4 G/DL (ref 3.2–4.9)
ALBUMIN/GLOB SERPL: 1.1 G/DL
ALP SERPL-CCNC: 74 U/L (ref 30–99)
ALT SERPL-CCNC: 12 U/L (ref 2–50)
ANION GAP SERPL CALC-SCNC: 12 MMOL/L (ref 7–16)
AST SERPL-CCNC: 21 U/L (ref 12–45)
BASOPHILS # BLD AUTO: 0.9 % (ref 0–1.8)
BASOPHILS # BLD: 0.04 K/UL (ref 0–0.12)
BILIRUB SERPL-MCNC: 0.5 MG/DL (ref 0.1–1.5)
BUN SERPL-MCNC: 12 MG/DL (ref 8–22)
CALCIUM ALBUM COR SERPL-MCNC: 9.3 MG/DL (ref 8.5–10.5)
CALCIUM SERPL-MCNC: 9.3 MG/DL (ref 8.5–10.5)
CHLORIDE SERPL-SCNC: 103 MMOL/L (ref 96–112)
CK SERPL-CCNC: 95 U/L (ref 0–154)
CO2 SERPL-SCNC: 23 MMOL/L (ref 20–33)
CREAT SERPL-MCNC: 0.98 MG/DL (ref 0.5–1.4)
CRP SERPL HS-MCNC: <0.3 MG/DL (ref 0–0.75)
EOSINOPHIL # BLD AUTO: 0.26 K/UL (ref 0–0.51)
EOSINOPHIL NFR BLD: 5.8 % (ref 0–6.9)
ERYTHROCYTE [DISTWIDTH] IN BLOOD BY AUTOMATED COUNT: 47.5 FL (ref 35.9–50)
ERYTHROCYTE [SEDIMENTATION RATE] IN BLOOD BY WESTERGREN METHOD: 73 MM/HOUR (ref 0–20)
FERRITIN SERPL-MCNC: 211 NG/ML (ref 22–322)
GFR SERPLBLD CREATININE-BSD FMLA CKD-EPI: 89 ML/MIN/1.73 M 2
GLOBULIN SER CALC-MCNC: 3.5 G/DL (ref 1.9–3.5)
GLUCOSE SERPL-MCNC: 111 MG/DL (ref 65–99)
HCT VFR BLD AUTO: 30.8 % (ref 42–52)
HCT VFR BLD AUTO: 31.2 % (ref 42–52)
HGB BLD-MCNC: 10.7 G/DL (ref 14–18)
HGB BLD-MCNC: 10.8 G/DL (ref 14–18)
IMM GRANULOCYTES # BLD AUTO: 0.02 K/UL (ref 0–0.11)
IMM GRANULOCYTES NFR BLD AUTO: 0.4 % (ref 0–0.9)
LYMPHOCYTES # BLD AUTO: 0.89 K/UL (ref 1–4.8)
LYMPHOCYTES NFR BLD: 20 % (ref 22–41)
MCH RBC QN AUTO: 32.6 PG (ref 27–33)
MCHC RBC AUTO-ENTMCNC: 34.3 G/DL (ref 32.3–36.5)
MCV RBC AUTO: 95.1 FL (ref 81.4–97.8)
MONOCYTES # BLD AUTO: 0.5 K/UL (ref 0–0.85)
MONOCYTES NFR BLD AUTO: 11.2 % (ref 0–13.4)
NEUTROPHILS # BLD AUTO: 2.74 K/UL (ref 1.82–7.42)
NEUTROPHILS NFR BLD: 61.7 % (ref 44–72)
NRBC # BLD AUTO: 0 K/UL
NRBC BLD-RTO: 0 /100 WBC (ref 0–0.2)
PLATELET # BLD AUTO: 313 K/UL (ref 164–446)
PMV BLD AUTO: 9 FL (ref 9–12.9)
POTASSIUM SERPL-SCNC: 3.7 MMOL/L (ref 3.6–5.5)
PROT SERPL-MCNC: 7.5 G/DL (ref 6–8.2)
RBC # BLD AUTO: 3.28 M/UL (ref 4.7–6.1)
SODIUM SERPL-SCNC: 138 MMOL/L (ref 135–145)
WBC # BLD AUTO: 4.5 K/UL (ref 4.8–10.8)

## 2024-12-09 PROCEDURE — 85014 HEMATOCRIT: CPT

## 2024-12-09 PROCEDURE — 80053 COMPREHEN METABOLIC PANEL: CPT

## 2024-12-09 PROCEDURE — 86140 C-REACTIVE PROTEIN: CPT

## 2024-12-09 PROCEDURE — 82728 ASSAY OF FERRITIN: CPT

## 2024-12-09 PROCEDURE — 85018 HEMOGLOBIN: CPT

## 2024-12-09 PROCEDURE — 85025 COMPLETE CBC W/AUTO DIFF WBC: CPT

## 2024-12-09 PROCEDURE — 85652 RBC SED RATE AUTOMATED: CPT

## 2024-12-09 PROCEDURE — 82550 ASSAY OF CK (CPK): CPT

## 2024-12-12 ENCOUNTER — OFFICE VISIT (OUTPATIENT)
Dept: RHEUMATOLOGY | Facility: MEDICAL CENTER | Age: 59
End: 2024-12-12
Attending: INTERNAL MEDICINE
Payer: COMMERCIAL

## 2024-12-12 ENCOUNTER — HOSPITAL ENCOUNTER (OUTPATIENT)
Dept: HEMATOLOGY ONCOLOGY | Facility: MEDICAL CENTER | Age: 59
End: 2024-12-12
Attending: INTERNAL MEDICINE
Payer: COMMERCIAL

## 2024-12-12 VITALS
RESPIRATION RATE: 16 BRPM | SYSTOLIC BLOOD PRESSURE: 132 MMHG | HEIGHT: 70 IN | TEMPERATURE: 99 F | DIASTOLIC BLOOD PRESSURE: 90 MMHG | HEART RATE: 76 BPM | BODY MASS INDEX: 27.2 KG/M2 | WEIGHT: 190 LBS | OXYGEN SATURATION: 96 %

## 2024-12-12 VITALS
RESPIRATION RATE: 14 BRPM | WEIGHT: 190 LBS | HEART RATE: 84 BPM | TEMPERATURE: 97.6 F | OXYGEN SATURATION: 96 % | BODY MASS INDEX: 27.51 KG/M2 | SYSTOLIC BLOOD PRESSURE: 120 MMHG | DIASTOLIC BLOOD PRESSURE: 80 MMHG

## 2024-12-12 DIAGNOSIS — E83.110 HEREDITARY HEMOCHROMATOSIS (HCC): ICD-10-CM

## 2024-12-12 DIAGNOSIS — Z79.620 ENCOUNTER FOR MONITORING OF ETANERCEPT THERAPY: ICD-10-CM

## 2024-12-12 DIAGNOSIS — E83.119 HEMOCHROMATOSIS, UNSPECIFIED HEMOCHROMATOSIS TYPE: ICD-10-CM

## 2024-12-12 DIAGNOSIS — Z51.81 ENCOUNTER FOR MONITORING OF ETANERCEPT THERAPY: ICD-10-CM

## 2024-12-12 DIAGNOSIS — Z79.1 ENCOUNTER FOR LONG-TERM (CURRENT) USE OF NSAIDS: ICD-10-CM

## 2024-12-12 DIAGNOSIS — Z09 ENCOUNTER FOR HEMATOLOGY FOLLOW-UP: ICD-10-CM

## 2024-12-12 DIAGNOSIS — M05.79 RHEUMATOID ARTHRITIS INVOLVING MULTIPLE SITES WITH POSITIVE RHEUMATOID FACTOR (HCC): ICD-10-CM

## 2024-12-12 PROCEDURE — 99212 OFFICE O/P EST SF 10 MIN: CPT | Performed by: NURSE PRACTITIONER

## 2024-12-12 PROCEDURE — 3074F SYST BP LT 130 MM HG: CPT | Performed by: INTERNAL MEDICINE

## 2024-12-12 PROCEDURE — 3079F DIAST BP 80-89 MM HG: CPT | Performed by: INTERNAL MEDICINE

## 2024-12-12 PROCEDURE — 99213 OFFICE O/P EST LOW 20 MIN: CPT | Performed by: NURSE PRACTITIONER

## 2024-12-12 PROCEDURE — 99212 OFFICE O/P EST SF 10 MIN: CPT | Performed by: INTERNAL MEDICINE

## 2024-12-12 PROCEDURE — 99214 OFFICE O/P EST MOD 30 MIN: CPT | Performed by: INTERNAL MEDICINE

## 2024-12-12 RX ORDER — OXYCODONE HYDROCHLORIDE 5 MG/1
5 TABLET ORAL 2 TIMES DAILY
COMMUNITY

## 2024-12-12 RX ORDER — PANTOPRAZOLE SODIUM 40 MG/1
40 TABLET, DELAYED RELEASE ORAL
COMMUNITY
Start: 2024-11-27 | End: 2024-12-27

## 2024-12-12 RX ORDER — ASPIRIN 81 MG/1
81 TABLET ORAL
COMMUNITY
Start: 2024-11-26 | End: 2024-12-26

## 2024-12-12 RX ORDER — HYDROXYZINE HYDROCHLORIDE 25 MG/1
25 TABLET, FILM COATED ORAL
COMMUNITY
Start: 2024-11-26 | End: 2024-12-26

## 2024-12-12 ASSESSMENT — ENCOUNTER SYMPTOMS
TINGLING: 0
DIZZINESS: 0
SHORTNESS OF BREATH: 0
VOMITING: 0
CONSTIPATION: 0
MYALGIAS: 0
PALPITATIONS: 0
INSOMNIA: 0
COUGH: 0
FEVER: 0
DIARRHEA: 0
NAUSEA: 0
BLOOD IN STOOL: 0
WHEEZING: 0
CHILLS: 0
WEIGHT LOSS: 0
HEADACHES: 0

## 2024-12-12 ASSESSMENT — FIBROSIS 4 INDEX
FIB4 SCORE: 1.12
FIB4 SCORE: 1.12

## 2024-12-12 NOTE — PROGRESS NOTES
Chief Complaint- joint pain     Subjective:   Milton Aguila is a 59 y.o. male here today for follow up of rheumatological issues    This is a follow-up visit for this patient who we see in this clinic for serologically positive rheumatoid arthritis.  Patient had been on Enbrel 50 mg subcu every week with good benefit but patient's been off Enbrel because initially of an infection in the dorsal aspect of the left hand which resolved but now there is a question of possible infection in patient's right TKA.  Patient was found to have synovial cyst versus abscess right TKA resulting in removal of right TKA and now currently on 6 weeks of antibiotics for presumed infection although cultures have not proven any infection as of yet.     Patient also is getting treated for hereditary hemochromatosis currently followed by Dr. Hernandez in hematology and getting phlebotomy every 1 to 2 weeks.  Patient is having some achiness in his hands and thinks it may be the arthritis associated with the hereditary hemochromatosis.      Additional comorbidities include  occasional back issues and sciatica for which he takes a muscle relaxer when necessary, also sees [facility] spine for tender point injections.      Soc Hx  Pos MJ use       RUQ Abdominal Ultrasound 2/3/2023  IMPRESSION:  1.  Liver cysts in the left lobe and right lobe.  2.  3.4 x 2.7 x 3.1 cm right upper pole renal cortical cyst. Doubtful clinical significance.  3.  Hepatomegaly  4.  Hepatic median shear wave velocity is 1.71 m/sec. Indeterminate for liver fibrosis.     S/p nabumetome-rash  s/p celebrex-ineffective   S/p Rinvoq-lost efficacy with spasming in palmar aspect right hand and right wrist pain  S/p Humira  S/p Plaquenil  S/p MTX-contraindicated because of underlying liver disease secondary to hemochromatosis  S/p leflunamide-contraindicated because of underlying liver disease secondary to hemochromatosis   s/p sulfasalazine-elevated LFTs   S/p  xelgabbyz-patient did not want to try for fear of elevated cholesterol levels.     Bilateral HERVE     HEREDITARY HEMOCHROMOTOSIS  Order: [id number]        Component 8 d ago   C282Y Mutation Homozygous    H63D Mutation Negative    S65C Mutation Negative    Hemochrom Interp See Note    Comment: Indication for testing: Carrier screening or diagnostic testing   for hereditary hemochromatosis.     Hemochromatosis Interpretive Results:   Homozygous C282Y:   C282Y Homozygous - The patient is homozygous for the HFE C282Y   mutation and is at high risk for hereditary hemochromatosis.   Homozygosity for this mutation accounts for 80-90 percent of the   hemochromatosis patients of [place]  descent. The   frequency of this mutation in other populations is lower.   H63D: Negative - This patient is negative for the HFE H63D   mutation.          HBsAg/HBcAb neg 2/2024  HCV neg 2/2024  Quantiferon Gold neg 2/2024  Uric acid 6.7 6/2016 [facility]; Uric acid 7.3 12/2019  RF 14.2 (0-13.9) 10/2007 [facility]; RF 64 3/2018; RF 36 3/2021  CCP 18 ( 0-5) 10/2007 [facility];  3/2018;  3/2021  MRI pelvis 7/2007-indicates advanced inflammatory arthropathy compatible's rheumatoid arthritis involving both hips with hypertrophic synovitis-[facility]  MRI LS spine 9/2009-indicates disc desiccation L3-4, L4-5, and L5-S1, left-sided neural foraminal narrowing at L5-S1, mild central stenosis at L4, and L3-4-[facility]     MRI Right hand/wrist 11/2009-negative, no erosions, no synovitis, no pathology-[facility]      Right Hand x-ray 8/2022-Narrative  X-rays of the right hand 4 view AP lateral oblique and carpal tunnel view   reveal some degenerative changes at the MCP joint of the middle finger.     The wrist joint radiocarpal joint appears to be reasonably   well-maintained.  No acute fracture dislocations noted.     Corticosteroid Therapy Informed Consent reviewed and  signed 9/14/2023-copy given to patient        Current Outpatient Medications   Medication Sig Dispense Refill    aspirin 81 MG EC tablet Take 81 mg by mouth.      hydrOXYzine HCl (ATARAX) 25 MG Tab Take 25 mg by mouth.      pantoprazole (PROTONIX) 40 MG Tablet Delayed Response Take 40 mg by mouth.      oxyCODONE immediate-release (ROXICODONE) 5 MG Tab Take 5 mg by mouth 2 times a day.      meloxicam (MOBIC) 7.5 MG Tab Take 1 Tablet by mouth 2 times a day as needed for Moderate Pain. 60 Tablet 0    cyclobenzaprine (FLEXERIL) 10 mg Tab Take 1 Tablet by mouth every 8 hours. (Patient not taking: Reported on 12/12/2024) 30 Tablet 0    ENBREL SURECLICK 50 MG/ML Solution Auto-injector INJECT 1 PEN UNDER THE SKIN EVERY 7 DAYS (Patient not taking: Reported on 12/12/2024) 12 mL 0    testosterone cypionate (DEPO-TESTOSTERONE) 200 MG/ML injection Inject 200 mg into the shoulder, thigh, or buttocks every 7 days. (Patient not taking: Reported on 12/12/2024)      ibuprofen (MOTRIN) 200 MG Tab Take 800 mg by mouth 2 times a day.       No current facility-administered medications for this visit.     He  has a past medical history of Arthritis, rheumatic, acute or subacute and Hemochromatosis.    ROS   Other than what is mentioned in HPI or physical exam, there is no history of headaches, double vision or blurred vision.  No trouble swallowing difficulties .  No chest complaints including chest pain, cough or sputum production. No GI complaints including nausea, vomiting, change in bowel habits, or past peptic ulcer disease. No history of blood in the stools. No urinary complaints including dysuria or frequency. No history of alopecia, photosensitivity     Objective:     /80   Pulse 84   Temp 36.4 °C (97.6 °F) (Temporal)   Resp 14   Wt 86.2 kg (190 lb)   SpO2 96%  Body mass index is 27.51 kg/m².   Physical Exam:    Constitutional: Alert and oriented X3, patient is talkative with good eye contact.Skin: Warm, dry, good  turgor, no rashes in visible areas.Eye: Equal, round and reactive, conjunctiva clear, lids normal EOM intactENMT: Lips without lesions,  pinna without deformityNeck: Trachea midline, no masses, no thyromegaly.Lymph:  No cervical lymphadenopathy, no axillary lymphadenopathy, no supraclavicular lymphadenopathyRespiratory: Unlabored respiratory effort, lungs clear to auscultation, no wheezes, no ronchi.Cardiovascular: Normal S1, S2, Regular rate and rhythm, no murmurs rubs or gallops   .Abdomen: Soft, non-distended.Psych: Alert and oriented x3, normal affect and mood.Neuro: Cranial nerves 2-12 are grossly intact Ext:no joint laxity noted in bilateral arms, no joint laxity noted in bilateral legs, joints look great, no dactylitis, no sausage digits, patient is walking with crutches because of his right hip issue, shoulders good range of motion,    Lab Results   Component Value Date/Time    HEPBCORIGM Non-Reactive 02/18/2024 04:21 PM    HEPBSAG Non-Reactive 02/18/2024 04:21 PM     Lab Results   Component Value Date/Time    HEPCAB Non-Reactive 02/18/2024 04:21 PM     Lab Results   Component Value Date/Time    SODIUM 138 12/09/2024 01:40 PM    POTASSIUM 3.7 12/09/2024 01:40 PM    CHLORIDE 103 12/09/2024 01:40 PM    CO2 23 12/09/2024 01:40 PM    GLUCOSE 111 (H) 12/09/2024 01:40 PM    BUN 12 12/09/2024 01:40 PM    CREATININE 0.98 12/09/2024 01:40 PM    CREATININE 0.8 02/09/2008 08:50 AM    BUNCREATRAT 5.8 (L) 11/25/2024 04:20 AM      Lab Results   Component Value Date/Time    WBC 4.5 (L) 12/09/2024 01:40 PM    RBC 3.28 (L) 12/09/2024 01:40 PM    HEMOGLOBIN 10.8 (L) 12/09/2024 01:40 PM    HEMOGLOBIN 10.7 (L) 12/09/2024 01:40 PM    HEMATOCRIT 30.8 (L) 12/09/2024 01:40 PM    HEMATOCRIT 31.2 (L) 12/09/2024 01:40 PM    MCV 95.1 12/09/2024 01:40 PM    MCH 32.6 12/09/2024 01:40 PM    MCHC 34.3 12/09/2024 01:40 PM    MPV 9.0 12/09/2024 01:40 PM    NEUTSPOLYS 61.70 12/09/2024 01:40 PM    LYMPHOCYTES 20.00 (L) 12/09/2024 01:40 PM     MONOCYTES 11.20 12/09/2024 01:40 PM    EOSINOPHILS 5.80 12/09/2024 01:40 PM    BASOPHILS 0.90 12/09/2024 01:40 PM      Lab Results   Component Value Date/Time    CALCIUM 9.3 12/09/2024 01:40 PM    ASTSGOT 21 12/09/2024 01:40 PM    ALTSGPT 12 12/09/2024 01:40 PM    ALKPHOSPHAT 74 12/09/2024 01:40 PM    TBILIRUBIN 0.5 12/09/2024 01:40 PM    ALBUMIN 4.0 12/09/2024 01:40 PM    TOTPROTEIN 7.5 12/09/2024 01:40 PM     Lab Results   Component Value Date/Time    URICACID 5.9 08/16/2021 10:53 AM    RHEUMFACTN 36 (H) 03/11/2021 07:35 AM    CCPANTIBODY 144 (H) 03/11/2021 07:35 AM     Lab Results   Component Value Date/Time    SEDRATEWES 73 (H) 12/09/2024 01:40 PM     Lab Results   Component Value Date/Time    HBA1C 5.2 04/16/2022 09:24 AM     Lab Results   Component Value Date/Time    CPKTOTAL 95 12/09/2024 01:40 PM     Lab Results   Component Value Date/Time    ANTIMITOCHO 4.9 06/28/2019 11:03 AM     Lab Results   Component Value Date/Time    FLTYPE Synovial 11/14/2024 11:50 AM    CRYSTALSBDF None Seen 11/14/2024 11:50 AM     Assessment and Plan:     1. Rheumatoid arthritis involving multiple sites with positive rheumatoid factor (HCC)  Currently off DMARDs and Biologics for treatment of a possible right TKA infection currently 3 weeks into a 6-week course of IV antibiotics.  Long discussion with patient regarding treatment options, patient is understandably reluctant to restart Biologics, when patient gets clearance to restart DMARDs or Biologics may opt to move to Plaquenil trial    2. Encounter for long-term (current) use of NSAIDs  Patient currently on meloxicam as needed, patient will need monitoring labs but every 6 months if takes on a regular basis    4. Hemochromatosis, unspecified hemochromatosis type  Followed by hematology currently gets phlebotomy on a regular basis with benefit    Followup: Return in about 3 months (around 3/12/2025). or sooner prn      Please note that this dictation was created using voice  recognition software. I have made every reasonable attempt to correct obvious errors, but I expect that there are errors of grammar and possibly content that I did not discover before finalizing the note.

## 2024-12-12 NOTE — PROGRESS NOTES
"Subjective     Jan Aguila is a 59 y.o. male who presents with Hemochromatosis (David/Leandra /Hereditary hemochromatosis/ 3 mo fv)            HPI    HH  Unacc        Review of Systems   Constitutional:  Positive for malaise/fatigue (tired - helaing mid hip repair). Negative for chills, fever and weight loss (4# down - eating better).   Respiratory:  Negative for cough, shortness of breath and wheezing.    Cardiovascular:  Negative for chest pain, palpitations and leg swelling.   Gastrointestinal:  Negative for blood in stool, constipation (Last BM this am), diarrhea, melena, nausea and vomiting.   Genitourinary:  Negative for dysuria and hematuria.   Musculoskeletal:  Positive for joint pain (R hip - s.p [partial relplacement treating for potential infection, second part is due early Jan). Negative for myalgias.        PICC RUE   Neurological:  Negative for dizziness, tingling and headaches.   Psychiatric/Behavioral:  The patient does not have insomnia.               Objective     BP (!) 132/90 (BP Location: Left arm, Patient Position: Sitting, BP Cuff Size: Adult)   Pulse 76   Temp 37.2 °C (99 °F) (Temporal)   Resp 16   Ht 1.77 m (5' 9.69\")   Wt 86.2 kg (190 lb)   SpO2 96%   BMI 27.51 kg/m²      Physical Exam  Musculoskeletal:      Comments: PICC RUE - IV abx in place, [facility] to change dressing - home infusions                             Assessment & Plan     No diagnosis found.      Transfiuoin at [facility] duering hip issue   11/22 tierra  ]  Hold off on TP_ right now - will monitor an df/v according;ly      Will manage himself - likely resume TP in Feb    Rto in 6 mo  Sooner as needed      " "Negative for dysuria and hematuria.   Musculoskeletal:  Positive for joint pain (R hip - s/p partial replacement treating for potential infection, second part is due early Jan). Negative for myalgias.        PICC RUE   Neurological:  Negative for dizziness, tingling and headaches.   Psychiatric/Behavioral:  The patient does not have insomnia.        Not on File      Current Outpatient Medications on File Prior to Encounter   Medication Sig Dispense Refill    meloxicam (MOBIC) 7.5 MG Tab Take 1 Tablet by mouth 2 times a day as needed for Moderate Pain. 60 Tablet 0    cyclobenzaprine (FLEXERIL) 10 mg Tab Take 1 Tablet by mouth every 8 hours. 30 Tablet 0    ENBREL SURECLICK 50 MG/ML Solution Auto-injector INJECT 1 PEN UNDER THE SKIN EVERY 7 DAYS 12 mL 0    testosterone cypionate (DEPO-TESTOSTERONE) 200 MG/ML injection Inject 200 mg into the shoulder, thigh, or buttocks every 7 days.      ibuprofen (MOTRIN) 200 MG Tab Take 800 mg by mouth 2 times a day.       No current facility-administered medications on file prior to encounter.              Objective     BP (!) 132/90 (BP Location: Left arm, Patient Position: Sitting, BP Cuff Size: Adult)   Pulse 76   Temp 37.2 °C (99 °F) (Temporal)   Resp 16   Ht 1.77 m (5' 9.69\")   Wt 86.2 kg (190 lb)   SpO2 96%   BMI 27.51 kg/m²      Physical Exam  Vitals reviewed.   Constitutional:       General: He is not in acute distress.     Appearance: He is well-developed. He is not diaphoretic.   HENT:      Head: Normocephalic and atraumatic.   Eyes:      General: No scleral icterus.        Right eye: No discharge.         Left eye: No discharge.   Cardiovascular:      Rate and Rhythm: Normal rate and regular rhythm.      Heart sounds: Normal heart sounds. No murmur heard.     No friction rub. No gallop.   Pulmonary:      Effort: Pulmonary effort is normal. No respiratory distress.      Breath sounds: Normal breath sounds. No wheezing.   Abdominal:      General: There is no " distension.      Palpations: Abdomen is soft.      Tenderness: There is no abdominal tenderness.   Musculoskeletal:         General: Tenderness (RLE/hip) present. Normal range of motion.      Cervical back: Normal range of motion.      Comments: PICC RUE - IV abx in place, [facility] to change dressing - home infusions   Skin:     General: Skin is warm and dry.      Coloration: Skin is not pale.      Findings: No erythema or rash.   Neurological:      Mental Status: He is alert and oriented to person, place, and time.   Psychiatric:         Behavior: Behavior normal.                          Assessment & Plan     1. Hereditary hemochromatosis (HCC)        2. Encounter for hematology follow-up            1. HH: Diagnosed 2/13/23; continues with TP    Patient continues with periodic therapeutic phlebotomy, presently on hold while he is healing from partial hip replacement.  He received blood transfusion in November which is reflected in ferritin values.  He will continue IV antibiotics as per Ortho team.  Continue to monitor symptoms and delay therapeutic phlebotomy until after fully healed, patient will likely resume TP in approximately February.  He return for reevaluation in approximately 6 months, sooner as needed.        The patient verbalized agreement and understanding of current plan. All questions and concerns were addressed at time of visit.    Please note that this dictation was created using voice recognition software. I have made every reasonable attempt to correct obvious errors, but I expect that there are errors of grammar and possibly content that I did not discover before finalizing the note.

## 2024-12-15 ENCOUNTER — APPOINTMENT (OUTPATIENT)
Dept: ONCOLOGY | Facility: MEDICAL CENTER | Age: 59
End: 2024-12-15
Attending: NURSE PRACTITIONER

## 2024-12-15 ENCOUNTER — TELEPHONE (OUTPATIENT)
Dept: ONCOLOGY | Facility: MEDICAL CENTER | Age: 59
End: 2024-12-15
Payer: COMMERCIAL

## 2024-12-15 NOTE — TELEPHONE ENCOUNTER
Called Pt regarding 1700 appointment. Tp's held at this time per provider. Pt reports he thought he cancelled them. Ferritin to be check weekly at [facility].

## 2024-12-16 ENCOUNTER — HOSPITAL ENCOUNTER (OUTPATIENT)
Facility: MEDICAL CENTER | Age: 59
End: 2024-12-16
Attending: PHYSICIAN ASSISTANT
Payer: COMMERCIAL

## 2024-12-16 LAB
ALBUMIN SERPL BCP-MCNC: 3.9 G/DL (ref 3.2–4.9)
ALBUMIN/GLOB SERPL: 1.2 G/DL
ALP SERPL-CCNC: 78 U/L (ref 30–99)
ALT SERPL-CCNC: 13 U/L (ref 2–50)
ANION GAP SERPL CALC-SCNC: 12 MMOL/L (ref 7–16)
AST SERPL-CCNC: 23 U/L (ref 12–45)
BASOPHILS # BLD AUTO: 1.5 % (ref 0–1.8)
BASOPHILS # BLD: 0.05 K/UL (ref 0–0.12)
BILIRUB SERPL-MCNC: 1.4 MG/DL (ref 0.1–1.5)
BUN SERPL-MCNC: 9 MG/DL (ref 8–22)
CALCIUM ALBUM COR SERPL-MCNC: 9.3 MG/DL (ref 8.5–10.5)
CALCIUM SERPL-MCNC: 9.2 MG/DL (ref 8.4–10.2)
CHLORIDE SERPL-SCNC: 105 MMOL/L (ref 96–112)
CK SERPL-CCNC: 53 U/L (ref 0–154)
CO2 SERPL-SCNC: 21 MMOL/L (ref 20–33)
CREAT SERPL-MCNC: 0.74 MG/DL (ref 0.5–1.4)
CRP SERPL HS-MCNC: <0.3 MG/DL (ref 0–0.75)
EOSINOPHIL # BLD AUTO: 0.26 K/UL (ref 0–0.51)
EOSINOPHIL NFR BLD: 7.9 % (ref 0–6.9)
ERYTHROCYTE [DISTWIDTH] IN BLOOD BY AUTOMATED COUNT: 48.8 FL (ref 35.9–50)
ERYTHROCYTE [SEDIMENTATION RATE] IN BLOOD BY WESTERGREN METHOD: 21 MM/HOUR (ref 0–20)
GFR SERPLBLD CREATININE-BSD FMLA CKD-EPI: 105 ML/MIN/1.73 M 2
GLOBULIN SER CALC-MCNC: 3.3 G/DL (ref 1.9–3.5)
GLUCOSE SERPL-MCNC: 93 MG/DL (ref 65–99)
HCT VFR BLD AUTO: 33.9 % (ref 42–52)
HGB BLD-MCNC: 11.5 G/DL (ref 14–18)
IMM GRANULOCYTES # BLD AUTO: 0.01 K/UL (ref 0–0.11)
IMM GRANULOCYTES NFR BLD AUTO: 0.3 % (ref 0–0.9)
LYMPHOCYTES # BLD AUTO: 0.89 K/UL (ref 1–4.8)
LYMPHOCYTES NFR BLD: 27 % (ref 22–41)
MCH RBC QN AUTO: 32.4 PG (ref 27–33)
MCHC RBC AUTO-ENTMCNC: 33.9 G/DL (ref 32.3–36.5)
MCV RBC AUTO: 95.5 FL (ref 81.4–97.8)
MONOCYTES # BLD AUTO: 0.39 K/UL (ref 0–0.85)
MONOCYTES NFR BLD AUTO: 11.8 % (ref 0–13.4)
NEUTROPHILS # BLD AUTO: 1.7 K/UL (ref 1.82–7.42)
NEUTROPHILS NFR BLD: 51.5 % (ref 44–72)
NRBC # BLD AUTO: 0 K/UL
NRBC BLD-RTO: 0 /100 WBC (ref 0–0.2)
PLATELET # BLD AUTO: 229 K/UL (ref 164–446)
PMV BLD AUTO: 9.2 FL (ref 9–12.9)
POTASSIUM SERPL-SCNC: 4 MMOL/L (ref 3.6–5.5)
PROT SERPL-MCNC: 7.2 G/DL (ref 6–8.2)
RBC # BLD AUTO: 3.55 M/UL (ref 4.7–6.1)
SODIUM SERPL-SCNC: 138 MMOL/L (ref 135–145)
WBC # BLD AUTO: 3.3 K/UL (ref 4.8–10.8)

## 2024-12-16 PROCEDURE — 86140 C-REACTIVE PROTEIN: CPT

## 2024-12-16 PROCEDURE — 82550 ASSAY OF CK (CPK): CPT

## 2024-12-16 PROCEDURE — 80053 COMPREHEN METABOLIC PANEL: CPT

## 2024-12-16 PROCEDURE — 85652 RBC SED RATE AUTOMATED: CPT

## 2024-12-16 PROCEDURE — 85025 COMPLETE CBC W/AUTO DIFF WBC: CPT

## 2024-12-17 DIAGNOSIS — E83.110 HEREDITARY HEMOCHROMATOSIS (HCC): ICD-10-CM

## 2024-12-22 ENCOUNTER — APPOINTMENT (OUTPATIENT)
Dept: ONCOLOGY | Facility: MEDICAL CENTER | Age: 59
End: 2024-12-22
Attending: NURSE PRACTITIONER

## 2024-12-23 ENCOUNTER — HOSPITAL ENCOUNTER (OUTPATIENT)
Facility: MEDICAL CENTER | Age: 59
End: 2024-12-23
Attending: PHYSICIAN ASSISTANT
Payer: COMMERCIAL

## 2024-12-23 ENCOUNTER — HOSPITAL ENCOUNTER (OUTPATIENT)
Facility: MEDICAL CENTER | Age: 59
End: 2024-12-23
Attending: NURSE PRACTITIONER
Payer: COMMERCIAL

## 2024-12-23 DIAGNOSIS — E83.110 HEREDITARY HEMOCHROMATOSIS (HCC): ICD-10-CM

## 2024-12-23 LAB
ALBUMIN SERPL BCP-MCNC: 4.2 G/DL (ref 3.2–4.9)
ALBUMIN/GLOB SERPL: 1.2 G/DL
ALP SERPL-CCNC: 74 U/L (ref 30–99)
ALT SERPL-CCNC: 16 U/L (ref 2–50)
ANION GAP SERPL CALC-SCNC: 14 MMOL/L (ref 7–16)
AST SERPL-CCNC: 31 U/L (ref 12–45)
BASOPHILS # BLD AUTO: 1.6 % (ref 0–1.8)
BASOPHILS # BLD: 0.08 K/UL (ref 0–0.12)
BILIRUB SERPL-MCNC: 0.6 MG/DL (ref 0.1–1.5)
BUN SERPL-MCNC: 11 MG/DL (ref 8–22)
CALCIUM ALBUM COR SERPL-MCNC: 9.1 MG/DL (ref 8.5–10.5)
CALCIUM SERPL-MCNC: 9.3 MG/DL (ref 8.5–10.5)
CHLORIDE SERPL-SCNC: 103 MMOL/L (ref 96–112)
CK SERPL-CCNC: 45 U/L (ref 0–154)
CO2 SERPL-SCNC: 21 MMOL/L (ref 20–33)
CREAT SERPL-MCNC: 0.9 MG/DL (ref 0.5–1.4)
CRP SERPL HS-MCNC: <0.3 MG/DL (ref 0–0.75)
EOSINOPHIL # BLD AUTO: 0.34 K/UL (ref 0–0.51)
EOSINOPHIL NFR BLD: 6.6 % (ref 0–6.9)
ERYTHROCYTE [DISTWIDTH] IN BLOOD BY AUTOMATED COUNT: 45.1 FL (ref 35.9–50)
ERYTHROCYTE [SEDIMENTATION RATE] IN BLOOD BY WESTERGREN METHOD: 24 MM/HOUR (ref 0–20)
FERRITIN SERPL-MCNC: 129 NG/ML (ref 22–322)
GFR SERPLBLD CREATININE-BSD FMLA CKD-EPI: 99 ML/MIN/1.73 M 2
GLOBULIN SER CALC-MCNC: 3.4 G/DL (ref 1.9–3.5)
GLUCOSE SERPL-MCNC: 79 MG/DL (ref 65–99)
HCT VFR BLD AUTO: 36.7 % (ref 42–52)
HCT VFR BLD AUTO: 37.6 % (ref 42–52)
HGB BLD-MCNC: 12.7 G/DL (ref 14–18)
HGB BLD-MCNC: 12.9 G/DL (ref 14–18)
IMM GRANULOCYTES # BLD AUTO: 0.01 K/UL (ref 0–0.11)
IMM GRANULOCYTES NFR BLD AUTO: 0.2 % (ref 0–0.9)
LYMPHOCYTES # BLD AUTO: 0.89 K/UL (ref 1–4.8)
LYMPHOCYTES NFR BLD: 17.3 % (ref 22–41)
MCH RBC QN AUTO: 32.3 PG (ref 27–33)
MCHC RBC AUTO-ENTMCNC: 35.1 G/DL (ref 32.3–36.5)
MCV RBC AUTO: 91.8 FL (ref 81.4–97.8)
MONOCYTES # BLD AUTO: 0.68 K/UL (ref 0–0.85)
MONOCYTES NFR BLD AUTO: 13.2 % (ref 0–13.4)
NEUTROPHILS # BLD AUTO: 3.14 K/UL (ref 1.82–7.42)
NEUTROPHILS NFR BLD: 61.1 % (ref 44–72)
NRBC # BLD AUTO: 0 K/UL
NRBC BLD-RTO: 0 /100 WBC (ref 0–0.2)
PLATELET # BLD AUTO: 245 K/UL (ref 164–446)
PMV BLD AUTO: 9.6 FL (ref 9–12.9)
POTASSIUM SERPL-SCNC: 3.9 MMOL/L (ref 3.6–5.5)
PROT SERPL-MCNC: 7.6 G/DL (ref 6–8.2)
RBC # BLD AUTO: 4 M/UL (ref 4.7–6.1)
SODIUM SERPL-SCNC: 138 MMOL/L (ref 135–145)
WBC # BLD AUTO: 5.1 K/UL (ref 4.8–10.8)

## 2024-12-23 PROCEDURE — 80053 COMPREHEN METABOLIC PANEL: CPT

## 2024-12-23 PROCEDURE — 82728 ASSAY OF FERRITIN: CPT

## 2024-12-23 PROCEDURE — 85018 HEMOGLOBIN: CPT

## 2024-12-23 PROCEDURE — 85652 RBC SED RATE AUTOMATED: CPT

## 2024-12-23 PROCEDURE — 85025 COMPLETE CBC W/AUTO DIFF WBC: CPT

## 2024-12-23 PROCEDURE — 85014 HEMATOCRIT: CPT

## 2024-12-23 PROCEDURE — 82550 ASSAY OF CK (CPK): CPT

## 2024-12-23 PROCEDURE — 86140 C-REACTIVE PROTEIN: CPT

## 2024-12-29 ENCOUNTER — APPOINTMENT (OUTPATIENT)
Dept: ONCOLOGY | Facility: MEDICAL CENTER | Age: 59
End: 2024-12-29
Attending: NURSE PRACTITIONER

## 2024-12-30 ENCOUNTER — HOSPITAL ENCOUNTER (OUTPATIENT)
Facility: MEDICAL CENTER | Age: 59
End: 2024-12-30
Attending: PHYSICIAN ASSISTANT
Payer: COMMERCIAL

## 2024-12-30 LAB
ALBUMIN SERPL BCP-MCNC: 4.2 G/DL (ref 3.2–4.9)
ALBUMIN/GLOB SERPL: 1.3 G/DL
ALP SERPL-CCNC: 79 U/L (ref 30–99)
ALT SERPL-CCNC: 18 U/L (ref 2–50)
ANION GAP SERPL CALC-SCNC: 15 MMOL/L (ref 7–16)
AST SERPL-CCNC: 27 U/L (ref 12–45)
BASOPHILS # BLD AUTO: 1.3 % (ref 0–1.8)
BASOPHILS # BLD: 0.07 K/UL (ref 0–0.12)
BILIRUB SERPL-MCNC: 0.5 MG/DL (ref 0.1–1.5)
BUN SERPL-MCNC: 15 MG/DL (ref 8–22)
CALCIUM ALBUM COR SERPL-MCNC: 9.1 MG/DL (ref 8.5–10.5)
CALCIUM SERPL-MCNC: 9.3 MG/DL (ref 8.5–10.5)
CHLORIDE SERPL-SCNC: 104 MMOL/L (ref 96–112)
CK SERPL-CCNC: 58 U/L (ref 0–154)
CO2 SERPL-SCNC: 20 MMOL/L (ref 20–33)
CREAT SERPL-MCNC: 0.86 MG/DL (ref 0.5–1.4)
CRP SERPL HS-MCNC: <0.3 MG/DL (ref 0–0.75)
EOSINOPHIL # BLD AUTO: 0.24 K/UL (ref 0–0.51)
EOSINOPHIL NFR BLD: 4.3 % (ref 0–6.9)
ERYTHROCYTE [DISTWIDTH] IN BLOOD BY AUTOMATED COUNT: 44.5 FL (ref 35.9–50)
ERYTHROCYTE [SEDIMENTATION RATE] IN BLOOD BY WESTERGREN METHOD: 17 MM/HOUR (ref 0–20)
GFR SERPLBLD CREATININE-BSD FMLA CKD-EPI: 100 ML/MIN/1.73 M 2
GLOBULIN SER CALC-MCNC: 3.2 G/DL (ref 1.9–3.5)
GLUCOSE SERPL-MCNC: 80 MG/DL (ref 65–99)
HCT VFR BLD AUTO: 37.9 % (ref 42–52)
HGB BLD-MCNC: 12.9 G/DL (ref 14–18)
IMM GRANULOCYTES # BLD AUTO: 0.01 K/UL (ref 0–0.11)
IMM GRANULOCYTES NFR BLD AUTO: 0.2 % (ref 0–0.9)
LYMPHOCYTES # BLD AUTO: 0.97 K/UL (ref 1–4.8)
LYMPHOCYTES NFR BLD: 17.6 % (ref 22–41)
MCH RBC QN AUTO: 31.9 PG (ref 27–33)
MCHC RBC AUTO-ENTMCNC: 34 G/DL (ref 32.3–36.5)
MCV RBC AUTO: 93.8 FL (ref 81.4–97.8)
MONOCYTES # BLD AUTO: 0.61 K/UL (ref 0–0.85)
MONOCYTES NFR BLD AUTO: 11.1 % (ref 0–13.4)
NEUTROPHILS # BLD AUTO: 3.62 K/UL (ref 1.82–7.42)
NEUTROPHILS NFR BLD: 65.5 % (ref 44–72)
NRBC # BLD AUTO: 0 K/UL
NRBC BLD-RTO: 0 /100 WBC (ref 0–0.2)
PLATELET # BLD AUTO: 245 K/UL (ref 164–446)
PMV BLD AUTO: 9.8 FL (ref 9–12.9)
POTASSIUM SERPL-SCNC: 3.9 MMOL/L (ref 3.6–5.5)
PROT SERPL-MCNC: 7.4 G/DL (ref 6–8.2)
RBC # BLD AUTO: 4.04 M/UL (ref 4.7–6.1)
SODIUM SERPL-SCNC: 139 MMOL/L (ref 135–145)
WBC # BLD AUTO: 5.5 K/UL (ref 4.8–10.8)

## 2024-12-30 PROCEDURE — 85025 COMPLETE CBC W/AUTO DIFF WBC: CPT

## 2024-12-30 PROCEDURE — 86140 C-REACTIVE PROTEIN: CPT

## 2024-12-30 PROCEDURE — 80053 COMPREHEN METABOLIC PANEL: CPT

## 2024-12-30 PROCEDURE — 82550 ASSAY OF CK (CPK): CPT

## 2024-12-30 PROCEDURE — 85652 RBC SED RATE AUTOMATED: CPT

## 2025-01-05 ENCOUNTER — APPOINTMENT (OUTPATIENT)
Dept: ONCOLOGY | Facility: MEDICAL CENTER | Age: 60
End: 2025-01-05
Attending: NURSE PRACTITIONER

## 2025-01-06 ENCOUNTER — HOSPITAL ENCOUNTER (OUTPATIENT)
Dept: LAB | Facility: MEDICAL CENTER | Age: 60
End: 2025-01-06
Attending: ORTHOPAEDIC SURGERY
Payer: COMMERCIAL

## 2025-01-06 DIAGNOSIS — Z96.649 INFECTION OF PROSTHETIC TOTAL HIP JOINT, INITIAL ENCOUNTER (HCC): ICD-10-CM

## 2025-01-06 DIAGNOSIS — E83.119 HEMOCHROMATOSIS, UNSPECIFIED HEMOCHROMATOSIS TYPE: ICD-10-CM

## 2025-01-06 DIAGNOSIS — T84.59XA INFECTION OF PROSTHETIC TOTAL HIP JOINT, INITIAL ENCOUNTER (HCC): ICD-10-CM

## 2025-01-06 LAB
CRP SERPL HS-MCNC: <0.3 MG/DL (ref 0–0.75)
ERYTHROCYTE [SEDIMENTATION RATE] IN BLOOD BY WESTERGREN METHOD: 16 MM/HOUR (ref 0–20)
FERRITIN SERPL-MCNC: 75.5 NG/ML (ref 22–322)

## 2025-01-06 PROCEDURE — 36415 COLL VENOUS BLD VENIPUNCTURE: CPT

## 2025-01-06 PROCEDURE — 82728 ASSAY OF FERRITIN: CPT

## 2025-01-06 PROCEDURE — 86140 C-REACTIVE PROTEIN: CPT

## 2025-01-06 PROCEDURE — 85652 RBC SED RATE AUTOMATED: CPT

## 2025-01-10 ENCOUNTER — APPOINTMENT (OUTPATIENT)
Dept: ADMISSIONS | Facility: MEDICAL CENTER | Age: 60
DRG: 468 | End: 2025-01-10
Payer: COMMERCIAL

## 2025-01-11 PROBLEM — Z96.649 INFECTED PROSTHETIC HIP (HCC): Status: ACTIVE | Noted: 2024-11-22

## 2025-01-11 PROBLEM — T84.59XA INFECTED PROSTHETIC HIP (HCC): Status: ACTIVE | Noted: 2024-11-22

## 2025-01-14 ENCOUNTER — HOSPITAL ENCOUNTER (OUTPATIENT)
Dept: LAB | Facility: MEDICAL CENTER | Age: 60
End: 2025-01-14
Attending: ORTHOPAEDIC SURGERY
Payer: COMMERCIAL

## 2025-01-14 ENCOUNTER — PRE-ADMISSION TESTING (OUTPATIENT)
Dept: ADMISSIONS | Facility: MEDICAL CENTER | Age: 60
DRG: 468 | End: 2025-01-14
Attending: ORTHOPAEDIC SURGERY
Payer: COMMERCIAL

## 2025-01-14 VITALS — BODY MASS INDEX: 27.26 KG/M2 | HEIGHT: 70 IN

## 2025-01-14 DIAGNOSIS — Z01.812 PRE-OPERATIVE LABORATORY EXAMINATION: ICD-10-CM

## 2025-01-14 DIAGNOSIS — Z96.649 INFECTION OF PROSTHETIC TOTAL HIP JOINT, INITIAL ENCOUNTER (HCC): ICD-10-CM

## 2025-01-14 DIAGNOSIS — T84.59XA INFECTION OF PROSTHETIC TOTAL HIP JOINT, INITIAL ENCOUNTER (HCC): ICD-10-CM

## 2025-01-14 LAB
ANION GAP SERPL CALC-SCNC: 11 MMOL/L (ref 7–16)
BUN SERPL-MCNC: 15 MG/DL (ref 8–22)
CALCIUM SERPL-MCNC: 9.6 MG/DL (ref 8.4–10.2)
CHLORIDE SERPL-SCNC: 104 MMOL/L (ref 96–112)
CO2 SERPL-SCNC: 25 MMOL/L (ref 20–33)
CREAT SERPL-MCNC: 1 MG/DL (ref 0.5–1.4)
CRP SERPL HS-MCNC: <0.3 MG/DL (ref 0–0.75)
ERYTHROCYTE [DISTWIDTH] IN BLOOD BY AUTOMATED COUNT: 40.5 FL (ref 35.9–50)
ERYTHROCYTE [SEDIMENTATION RATE] IN BLOOD BY WESTERGREN METHOD: 11 MM/HOUR (ref 0–20)
GFR SERPLBLD CREATININE-BSD FMLA CKD-EPI: 87 ML/MIN/1.73 M 2
GLUCOSE SERPL-MCNC: 86 MG/DL (ref 65–99)
HCT VFR BLD AUTO: 40.3 % (ref 42–52)
HGB BLD-MCNC: 14.1 G/DL (ref 14–18)
MCH RBC QN AUTO: 31.1 PG (ref 27–33)
MCHC RBC AUTO-ENTMCNC: 35 G/DL (ref 32.3–36.5)
MCV RBC AUTO: 88.8 FL (ref 81.4–97.8)
PLATELET # BLD AUTO: 261 K/UL (ref 164–446)
PMV BLD AUTO: 9.3 FL (ref 9–12.9)
POTASSIUM SERPL-SCNC: 4.3 MMOL/L (ref 3.6–5.5)
RBC # BLD AUTO: 4.54 M/UL (ref 4.7–6.1)
SCCMEC + MECA PNL NOSE NAA+PROBE: NEGATIVE
SCCMEC + MECA PNL NOSE NAA+PROBE: NEGATIVE
SODIUM SERPL-SCNC: 140 MMOL/L (ref 135–145)
WBC # BLD AUTO: 4.5 K/UL (ref 4.8–10.8)

## 2025-01-14 PROCEDURE — 87641 MR-STAPH DNA AMP PROBE: CPT

## 2025-01-14 PROCEDURE — 80048 BASIC METABOLIC PNL TOTAL CA: CPT

## 2025-01-14 PROCEDURE — 87640 STAPH A DNA AMP PROBE: CPT

## 2025-01-14 PROCEDURE — 36415 COLL VENOUS BLD VENIPUNCTURE: CPT

## 2025-01-14 PROCEDURE — 86140 C-REACTIVE PROTEIN: CPT

## 2025-01-14 PROCEDURE — 85027 COMPLETE CBC AUTOMATED: CPT

## 2025-01-14 PROCEDURE — 85652 RBC SED RATE AUTOMATED: CPT

## 2025-01-14 RX ORDER — IBUPROFEN 200 MG
188 TABLET ORAL 2 TIMES DAILY PRN
COMMUNITY

## 2025-01-14 NOTE — DISCHARGE PLANNING
DISCHARGE PLANNING NOTE - TOTAL JOINT    Procedure: Procedure(s):  REVISION RIGHT TOTAL HIP ARTHROPLASTY  Procedure Date: 1/21/2025  Insurance: Payor: NICHOLAS / Plan: [place] BCBS / Product Type: *No Product type* /    Equipment currently available at home?  crutches, front-wheel walker, and Ice packs.  Steps into the home? 1  Steps within the home? 0  Toilet height? Standard  Type of shower? walk-in shower  Home Oxygen? No  Portable tank?    Oxygen Provider:  Planning same day discharge: No, spending the night    Is Outpatient Physical Therapy set up after surgery? Yes  Did you take the Total Joint Class and where? Yes, received [facility]ON book.  Who will be your transportation home on day of discharge? Shayy- S/O    Have you made arrangements to have someone stay with you at home for the first 3 days following discharge, and if so, whom? Shayy- S/O    Have you notified your surgeon that you do not have transportation or someone to help you after discharge? N/A    Are you planning on going to a transitional care facility, for example a skilled nursing facility, post operatively for rehab, and if so, have you contacted your insurance plan to see if they cover this? N/A    Met with the pt. Pt given a copy of Home Safety Checklist, Equipment Resource Guide, CHG scrub kit and instructions. Expected process in Recovery Room and dc criteria discussed with pt. All questions answered and verbalizes understanding of all instructions. Pt planning to spend the night after surgery. No dc needs identified at this time. Anticipate dc to home without barriers.

## 2025-01-14 NOTE — PREADMIT AVS NOTE
Current Medications   Medication Instructions    ibuprofen (MOTRIN) 200 MG Tab Stop 5 days before surgery

## 2025-01-14 NOTE — PREPROCEDURE INSTRUCTIONS
Pre-admit appointment completed. Reviewed the Preparing for your procedure handout with patient. Patient instructed per pharmacy guidelines regarding taking, holding, or contacting provider for instructions on regularly prescribed medications before surgery.     Pt given pre op handouts/brochures/map.   Copy of AVS (with medication instructions) handed to pt.    Confirmed check in location for DOS.     Pt states he is to get sed rate & CRP once a week prior to surgery per Dr Hernandez and he had one on 1/6/25. Per , order in epic was for one draw only. Pt states he has an order in his car, so he will go get it prior to PAT lab draw to be done today. Called Dr Hernandez MA and left message to inform, and if same order, pt will need another order for sed rate & CRP and pt in PAT now.     Pt had same order in epic. Case message sent to Dr Hernandez and his MA to please notify PAT RN and place order if want sed rate/ CRP added to labs today.

## 2025-01-15 RX ORDER — ASPIRIN 81 MG/1
81 TABLET, CHEWABLE ORAL 2 TIMES DAILY
Qty: 56 TABLET | Refills: 0
Start: 2025-01-15 | End: 2025-02-12

## 2025-01-15 RX ORDER — TRAMADOL HYDROCHLORIDE 50 MG/1
50-75 TABLET ORAL EVERY 6 HOURS PRN
Qty: 35 TABLET | Refills: 0 | Status: SHIPPED | OUTPATIENT
Start: 2025-01-15 | End: 2025-01-22

## 2025-01-15 RX ORDER — ACETAMINOPHEN 500 MG
1000 TABLET ORAL 3 TIMES DAILY PRN
Qty: 90 TABLET | Refills: 0
Start: 2025-01-15 | End: 2025-02-14

## 2025-01-15 RX ORDER — OXYCODONE HYDROCHLORIDE 5 MG/1
5-7.5 TABLET ORAL EVERY 6 HOURS PRN
Qty: 42 TABLET | Refills: 0 | Status: SHIPPED | OUTPATIENT
Start: 2025-01-15 | End: 2025-01-22

## 2025-01-15 RX ORDER — DOXYCYCLINE 100 MG/1
100 CAPSULE ORAL 2 TIMES DAILY
Qty: 180 CAPSULE | Refills: 1 | Status: SHIPPED | OUTPATIENT
Start: 2025-01-15 | End: 2025-07-14

## 2025-01-15 NOTE — DISCHARGE INSTRUCTIONS
Patient will be discharged home and follow up with Dr. Hernandez in 2 weeks, for which the patient already has scheduled.    You may call or text Dr. Hernandez' medical assistant during business hours at [phone].  You may call the emergency AYLIN line during nights/weekends/holidays if needed at [phone].    Instructions:  -Leave the bandage in place until your followup appointment in 2 weeks.  -May shower with bandage in place, if bandage becomes soaked underneath please remove and call the office immediately.  -No baths/tubs/pools/submersion of the wound for now.  -Call if there is significant drainage beneath the bandage    -Put as much weight as comfortable on the operative leg.  Use a walker to assist with balance to avoid any falls.  -It is important to start moving and stretching right away, otherwise the joint can stiffen.    -Take your blood clot prevention medication for 4 weeks after surgery (81 mg of Aspirin, twice daily). This is over the counter and will not be prescribed  -Use ice frequently to help with pain and swelling control  -Pain management:  Standard pain regimen should be:  Ice as much as possible.  Apply the ice anywhere you feel pain.  600 mg of ibuprofen 3 times a day for the first month minimal and can continue taking this as long as needed   Tylenol (acetaminophen) - 1000mg every 8 hours. Take this automatically on a scheduled basis. This is over the counter and will not be prescribed  Tramadol - 1-2 capsules every 6 hours. Take this automatically on a scheduled basis until no longer needed for pain.  Oxycodone - 1-2 tablets every 6 hours only if needed.  You would take the oxycodone 3 hours after the Tramadol, such that you are taking one or the other every 3 hours        -Try to limit the amount of oxycodone since it tends to cause constipation, drowsiness, etc.  But if you need it, take it.        -100mg of Colace (docusate) will be prescribed. Take this twice a day while still  taking Tramadol or Oxycodone. Can discontinue after opiates are no longer being taken.  If bowel movement has not occurred in 48 hours please add in a laxative called Senna (over the counter) twice daily in conjunction with the colace. If no bowel movement is produced 48 hours after adding in Senna please start Milk of Magnesia (over the counter).     AGGRESSIVE RANGE OF MOTION.  Do a quad set a million times per day.  Have a family member push down on the thigh and shin every 20 minutes with 2-3 pillows under the heel to help stretch the knee straight.  Flex the knee as far as possible every 20 minutes.       Doxycycline 100 mg was ordered.  He will take this twice a day for 6 months.

## 2025-01-21 ENCOUNTER — ANESTHESIA EVENT (OUTPATIENT)
Dept: SURGERY | Facility: MEDICAL CENTER | Age: 60
DRG: 468 | End: 2025-01-21
Payer: COMMERCIAL

## 2025-01-21 ENCOUNTER — ANESTHESIA (OUTPATIENT)
Dept: SURGERY | Facility: MEDICAL CENTER | Age: 60
DRG: 468 | End: 2025-01-21
Payer: COMMERCIAL

## 2025-01-21 ENCOUNTER — APPOINTMENT (OUTPATIENT)
Dept: RADIOLOGY | Facility: MEDICAL CENTER | Age: 60
DRG: 468 | End: 2025-01-21
Attending: STUDENT IN AN ORGANIZED HEALTH CARE EDUCATION/TRAINING PROGRAM
Payer: COMMERCIAL

## 2025-01-21 ENCOUNTER — HOSPITAL ENCOUNTER (INPATIENT)
Facility: MEDICAL CENTER | Age: 60
LOS: 1 days | DRG: 468 | End: 2025-01-22
Attending: ORTHOPAEDIC SURGERY | Admitting: ORTHOPAEDIC SURGERY
Payer: COMMERCIAL

## 2025-01-21 DIAGNOSIS — T84.59XA INFECTION OF PROSTHETIC TOTAL HIP JOINT, INITIAL ENCOUNTER (HCC): ICD-10-CM

## 2025-01-21 DIAGNOSIS — G89.18 POST-OPERATIVE PAIN: ICD-10-CM

## 2025-01-21 DIAGNOSIS — Z96.649 INFECTION OF PROSTHETIC TOTAL HIP JOINT, INITIAL ENCOUNTER (HCC): ICD-10-CM

## 2025-01-21 PROCEDURE — 700102 HCHG RX REV CODE 250 W/ 637 OVERRIDE(OP): Performed by: STUDENT IN AN ORGANIZED HEALTH CARE EDUCATION/TRAINING PROGRAM

## 2025-01-21 PROCEDURE — 700101 HCHG RX REV CODE 250: Performed by: ANESTHESIOLOGY

## 2025-01-21 PROCEDURE — 97607 NEG PRS WND THR NDME<=50SQCM: CPT | Mod: 78 | Performed by: ORTHOPAEDIC SURGERY

## 2025-01-21 PROCEDURE — 36415 COLL VENOUS BLD VENIPUNCTURE: CPT

## 2025-01-21 PROCEDURE — 160031 HCHG SURGERY MINUTES - 1ST 30 MINS LEVEL 5: Performed by: ORTHOPAEDIC SURGERY

## 2025-01-21 PROCEDURE — C1713 ANCHOR/SCREW BN/BN,TIS/BN: HCPCS | Performed by: ORTHOPAEDIC SURGERY

## 2025-01-21 PROCEDURE — 3E0U029 INTRODUCTION OF OTHER ANTI-INFECTIVE INTO JOINTS, OPEN APPROACH: ICD-10-PCS | Performed by: ORTHOPAEDIC SURGERY

## 2025-01-21 PROCEDURE — 700111 HCHG RX REV CODE 636 W/ 250 OVERRIDE (IP): Performed by: ORTHOPAEDIC SURGERY

## 2025-01-21 PROCEDURE — 700111 HCHG RX REV CODE 636 W/ 250 OVERRIDE (IP): Mod: JZ | Performed by: ORTHOPAEDIC SURGERY

## 2025-01-21 PROCEDURE — 87070 CULTURE OTHR SPECIMN AEROBIC: CPT

## 2025-01-21 PROCEDURE — 94760 N-INVAS EAR/PLS OXIMETRY 1: CPT

## 2025-01-21 PROCEDURE — 700111 HCHG RX REV CODE 636 W/ 250 OVERRIDE (IP): Mod: JZ | Performed by: ANESTHESIOLOGY

## 2025-01-21 PROCEDURE — 27134 REVISE HIP JOINT REPLACEMENT: CPT | Mod: ASROC,78,RT | Performed by: STUDENT IN AN ORGANIZED HEALTH CARE EDUCATION/TRAINING PROGRAM

## 2025-01-21 PROCEDURE — 700101 HCHG RX REV CODE 250: Performed by: ORTHOPAEDIC SURGERY

## 2025-01-21 PROCEDURE — C1776 JOINT DEVICE (IMPLANTABLE): HCPCS | Performed by: ORTHOPAEDIC SURGERY

## 2025-01-21 PROCEDURE — 0SR906A REPLACEMENT OF RIGHT HIP JOINT WITH OXIDIZED ZIRCONIUM ON POLYETHYLENE SYNTHETIC SUBSTITUTE, UNCEMENTED, OPEN APPROACH: ICD-10-PCS | Performed by: ORTHOPAEDIC SURGERY

## 2025-01-21 PROCEDURE — 700105 HCHG RX REV CODE 258: Performed by: ANESTHESIOLOGY

## 2025-01-21 PROCEDURE — 700111 HCHG RX REV CODE 636 W/ 250 OVERRIDE (IP): Performed by: ANESTHESIOLOGY

## 2025-01-21 PROCEDURE — 160048 HCHG OR STATISTICAL LEVEL 1-5: Performed by: ORTHOPAEDIC SURGERY

## 2025-01-21 PROCEDURE — 97607 NEG PRS WND THR NDME<=50SQCM: CPT | Mod: ASROC,78 | Performed by: STUDENT IN AN ORGANIZED HEALTH CARE EDUCATION/TRAINING PROGRAM

## 2025-01-21 PROCEDURE — 160002 HCHG RECOVERY MINUTES (STAT): Performed by: ORTHOPAEDIC SURGERY

## 2025-01-21 PROCEDURE — 502000 HCHG MISC OR IMPLANTS RC 0278: Performed by: ORTHOPAEDIC SURGERY

## 2025-01-21 PROCEDURE — 700102 HCHG RX REV CODE 250 W/ 637 OVERRIDE(OP): Performed by: ANESTHESIOLOGY

## 2025-01-21 PROCEDURE — 27134 REVISE HIP JOINT REPLACEMENT: CPT | Mod: 78,RT | Performed by: ORTHOPAEDIC SURGERY

## 2025-01-21 PROCEDURE — A9270 NON-COVERED ITEM OR SERVICE: HCPCS | Performed by: STUDENT IN AN ORGANIZED HEALTH CARE EDUCATION/TRAINING PROGRAM

## 2025-01-21 PROCEDURE — 700105 HCHG RX REV CODE 258: Performed by: STUDENT IN AN ORGANIZED HEALTH CARE EDUCATION/TRAINING PROGRAM

## 2025-01-21 PROCEDURE — 700105 HCHG RX REV CODE 258: Performed by: ORTHOPAEDIC SURGERY

## 2025-01-21 PROCEDURE — 160009 HCHG ANES TIME/MIN: Performed by: ORTHOPAEDIC SURGERY

## 2025-01-21 PROCEDURE — 700111 HCHG RX REV CODE 636 W/ 250 OVERRIDE (IP): Performed by: STUDENT IN AN ORGANIZED HEALTH CARE EDUCATION/TRAINING PROGRAM

## 2025-01-21 PROCEDURE — 770001 HCHG ROOM/CARE - MED/SURG/GYN PRIV*

## 2025-01-21 PROCEDURE — 160042 HCHG SURGERY MINUTES - EA ADDL 1 MIN LEVEL 5: Performed by: ORTHOPAEDIC SURGERY

## 2025-01-21 PROCEDURE — 0SP90JZ REMOVAL OF SYNTHETIC SUBSTITUTE FROM RIGHT HIP JOINT, OPEN APPROACH: ICD-10-PCS | Performed by: ORTHOPAEDIC SURGERY

## 2025-01-21 PROCEDURE — 502240 HCHG MISC OR SUPPLY RC 0272: Performed by: ORTHOPAEDIC SURGERY

## 2025-01-21 PROCEDURE — A9270 NON-COVERED ITEM OR SERVICE: HCPCS | Performed by: ANESTHESIOLOGY

## 2025-01-21 PROCEDURE — 160035 HCHG PACU - 1ST 60 MINS PHASE I: Performed by: ORTHOPAEDIC SURGERY

## 2025-01-21 PROCEDURE — 87205 SMEAR GRAM STAIN: CPT

## 2025-01-21 PROCEDURE — 72170 X-RAY EXAM OF PELVIS: CPT

## 2025-01-21 PROCEDURE — 87076 CULTURE ANAEROBE IDENT EACH: CPT

## 2025-01-21 PROCEDURE — 87075 CULTR BACTERIA EXCEPT BLOOD: CPT

## 2025-01-21 DEVICE — IMPLANT OXINIUM FEM HD 12/14 28MM +4 (1EA): Type: IMPLANTABLE DEVICE | Site: HIP | Status: FUNCTIONAL

## 2025-01-21 DEVICE — INSERT ACETABULAR OR3O DM XLPE OD44 MM ID28 MM (1EA): Type: IMPLANTABLE DEVICE | Site: HIP | Status: FUNCTIONAL

## 2025-01-21 DEVICE — STEM POLAR CEMENTLESS WITH COLLAR 4 (1EA): Type: IMPLANTABLE DEVICE | Site: HIP | Status: FUNCTIONAL

## 2025-01-21 DEVICE — GRAFT BONE CANCELLOUS FREEZE DRIED CHIPS ALLOSOURCE 15CC (1EA): Type: IMPLANTABLE DEVICE | Site: HIP | Status: FUNCTIONAL

## 2025-01-21 DEVICE — IMPLANTABLE DEVICE: Type: IMPLANTABLE DEVICE | Site: HIP | Status: FUNCTIONAL

## 2025-01-21 RX ORDER — TRANEXAMIC ACID 100 MG/ML
INJECTION, SOLUTION INTRAVENOUS PRN
Status: DISCONTINUED | OUTPATIENT
Start: 2025-01-21 | End: 2025-01-21 | Stop reason: SURG

## 2025-01-21 RX ORDER — OXYCODONE HYDROCHLORIDE 10 MG/1
10 TABLET ORAL
Status: DISCONTINUED | OUTPATIENT
Start: 2025-01-21 | End: 2025-01-22 | Stop reason: HOSPADM

## 2025-01-21 RX ORDER — HALOPERIDOL 5 MG/ML
1 INJECTION INTRAMUSCULAR
Status: DISCONTINUED | OUTPATIENT
Start: 2025-01-21 | End: 2025-01-21 | Stop reason: HOSPADM

## 2025-01-21 RX ORDER — SODIUM CHLORIDE, SODIUM LACTATE, POTASSIUM CHLORIDE, CALCIUM CHLORIDE 600; 310; 30; 20 MG/100ML; MG/100ML; MG/100ML; MG/100ML
INJECTION, SOLUTION INTRAVENOUS CONTINUOUS
Status: DISCONTINUED | OUTPATIENT
Start: 2025-01-21 | End: 2025-01-21 | Stop reason: HOSPADM

## 2025-01-21 RX ORDER — AMOXICILLIN 250 MG
1 CAPSULE ORAL
Status: DISCONTINUED | OUTPATIENT
Start: 2025-01-21 | End: 2025-01-22 | Stop reason: HOSPADM

## 2025-01-21 RX ORDER — GENTAMICIN 40 MG/ML
INJECTION, SOLUTION INTRAMUSCULAR; INTRAVENOUS
Status: DISCONTINUED | OUTPATIENT
Start: 2025-01-21 | End: 2025-01-21 | Stop reason: HOSPADM

## 2025-01-21 RX ORDER — LABETALOL HYDROCHLORIDE 5 MG/ML
5 INJECTION, SOLUTION INTRAVENOUS
Status: DISCONTINUED | OUTPATIENT
Start: 2025-01-21 | End: 2025-01-21 | Stop reason: HOSPADM

## 2025-01-21 RX ORDER — KETOROLAC TROMETHAMINE 15 MG/ML
15 INJECTION, SOLUTION INTRAMUSCULAR; INTRAVENOUS EVERY 6 HOURS
Status: DISCONTINUED | OUTPATIENT
Start: 2025-01-21 | End: 2025-01-22 | Stop reason: HOSPADM

## 2025-01-21 RX ORDER — DIPHENHYDRAMINE HYDROCHLORIDE 50 MG/ML
12.5 INJECTION INTRAMUSCULAR; INTRAVENOUS
Status: DISCONTINUED | OUTPATIENT
Start: 2025-01-21 | End: 2025-01-21 | Stop reason: HOSPADM

## 2025-01-21 RX ORDER — ONDANSETRON 2 MG/ML
4 INJECTION INTRAMUSCULAR; INTRAVENOUS EVERY 4 HOURS PRN
Status: DISCONTINUED | OUTPATIENT
Start: 2025-01-21 | End: 2025-01-22 | Stop reason: HOSPADM

## 2025-01-21 RX ORDER — ROCURONIUM BROMIDE 10 MG/ML
INJECTION, SOLUTION INTRAVENOUS PRN
Status: DISCONTINUED | OUTPATIENT
Start: 2025-01-21 | End: 2025-01-21 | Stop reason: SURG

## 2025-01-21 RX ORDER — HALOPERIDOL 5 MG/ML
1 INJECTION INTRAMUSCULAR EVERY 6 HOURS PRN
Status: DISCONTINUED | OUTPATIENT
Start: 2025-01-21 | End: 2025-01-22 | Stop reason: HOSPADM

## 2025-01-21 RX ORDER — SODIUM PHOSPHATE,MONO-DIBASIC 19G-7G/118
1 ENEMA (ML) RECTAL
Status: DISCONTINUED | OUTPATIENT
Start: 2025-01-21 | End: 2025-01-22 | Stop reason: HOSPADM

## 2025-01-21 RX ORDER — DEXAMETHASONE SODIUM PHOSPHATE 4 MG/ML
4 INJECTION, SOLUTION INTRA-ARTICULAR; INTRALESIONAL; INTRAMUSCULAR; INTRAVENOUS; SOFT TISSUE
Status: DISCONTINUED | OUTPATIENT
Start: 2025-01-21 | End: 2025-01-22 | Stop reason: HOSPADM

## 2025-01-21 RX ORDER — VANCOMYCIN HYDROCHLORIDE 1 G/20ML
INJECTION, POWDER, LYOPHILIZED, FOR SOLUTION INTRAVENOUS
Status: COMPLETED | OUTPATIENT
Start: 2025-01-21 | End: 2025-01-21

## 2025-01-21 RX ORDER — ONDANSETRON 2 MG/ML
INJECTION INTRAMUSCULAR; INTRAVENOUS PRN
Status: DISCONTINUED | OUTPATIENT
Start: 2025-01-21 | End: 2025-01-21 | Stop reason: SURG

## 2025-01-21 RX ORDER — SODIUM CHLORIDE, SODIUM LACTATE, POTASSIUM CHLORIDE, CALCIUM CHLORIDE 600; 310; 30; 20 MG/100ML; MG/100ML; MG/100ML; MG/100ML
INJECTION, SOLUTION INTRAVENOUS CONTINUOUS
Status: ACTIVE | OUTPATIENT
Start: 2025-01-21 | End: 2025-01-21

## 2025-01-21 RX ORDER — ALBUTEROL SULFATE 5 MG/ML
2.5 SOLUTION RESPIRATORY (INHALATION)
Status: DISCONTINUED | OUTPATIENT
Start: 2025-01-21 | End: 2025-01-21 | Stop reason: HOSPADM

## 2025-01-21 RX ORDER — DIPHENHYDRAMINE HYDROCHLORIDE 50 MG/ML
25 INJECTION INTRAMUSCULAR; INTRAVENOUS EVERY 6 HOURS PRN
Status: DISCONTINUED | OUTPATIENT
Start: 2025-01-21 | End: 2025-01-22 | Stop reason: HOSPADM

## 2025-01-21 RX ORDER — AMOXICILLIN 250 MG
1 CAPSULE ORAL NIGHTLY
Status: DISCONTINUED | OUTPATIENT
Start: 2025-01-21 | End: 2025-01-22 | Stop reason: HOSPADM

## 2025-01-21 RX ORDER — TRAMADOL HYDROCHLORIDE 50 MG/1
50 TABLET ORAL EVERY 4 HOURS PRN
Status: DISCONTINUED | OUTPATIENT
Start: 2025-01-21 | End: 2025-01-22 | Stop reason: HOSPADM

## 2025-01-21 RX ORDER — OXYCODONE HCL 5 MG/5 ML
10 SOLUTION, ORAL ORAL
Status: COMPLETED | OUTPATIENT
Start: 2025-01-21 | End: 2025-01-21

## 2025-01-21 RX ORDER — HYDROMORPHONE HYDROCHLORIDE 1 MG/ML
0.2 INJECTION, SOLUTION INTRAMUSCULAR; INTRAVENOUS; SUBCUTANEOUS
Status: DISCONTINUED | OUTPATIENT
Start: 2025-01-21 | End: 2025-01-21 | Stop reason: HOSPADM

## 2025-01-21 RX ORDER — MIDAZOLAM HYDROCHLORIDE 1 MG/ML
INJECTION INTRAMUSCULAR; INTRAVENOUS PRN
Status: DISCONTINUED | OUTPATIENT
Start: 2025-01-21 | End: 2025-01-21 | Stop reason: SURG

## 2025-01-21 RX ORDER — ONDANSETRON 2 MG/ML
4 INJECTION INTRAMUSCULAR; INTRAVENOUS
Status: DISCONTINUED | OUTPATIENT
Start: 2025-01-21 | End: 2025-01-21 | Stop reason: HOSPADM

## 2025-01-21 RX ORDER — DIPHENHYDRAMINE HCL 25 MG
25 TABLET ORAL EVERY 6 HOURS PRN
Status: DISCONTINUED | OUTPATIENT
Start: 2025-01-21 | End: 2025-01-22 | Stop reason: HOSPADM

## 2025-01-21 RX ORDER — PHENYLEPHRINE HCL IN 0.9% NACL 1 MG/10 ML
SYRINGE (ML) INTRAVENOUS PRN
Status: DISCONTINUED | OUTPATIENT
Start: 2025-01-21 | End: 2025-01-21 | Stop reason: SURG

## 2025-01-21 RX ORDER — ASPIRIN 81 MG/1
81 TABLET ORAL 2 TIMES DAILY
Status: DISCONTINUED | OUTPATIENT
Start: 2025-01-21 | End: 2025-01-22 | Stop reason: HOSPADM

## 2025-01-21 RX ORDER — EPHEDRINE SULFATE 50 MG/ML
5 INJECTION, SOLUTION INTRAVENOUS
Status: DISCONTINUED | OUTPATIENT
Start: 2025-01-21 | End: 2025-01-21 | Stop reason: HOSPADM

## 2025-01-21 RX ORDER — MIDAZOLAM HYDROCHLORIDE 1 MG/ML
1 INJECTION INTRAMUSCULAR; INTRAVENOUS
Status: DISCONTINUED | OUTPATIENT
Start: 2025-01-21 | End: 2025-01-21 | Stop reason: HOSPADM

## 2025-01-21 RX ORDER — HYDRALAZINE HYDROCHLORIDE 20 MG/ML
5 INJECTION INTRAMUSCULAR; INTRAVENOUS
Status: DISCONTINUED | OUTPATIENT
Start: 2025-01-21 | End: 2025-01-21 | Stop reason: HOSPADM

## 2025-01-21 RX ORDER — LIDOCAINE HYDROCHLORIDE 20 MG/ML
INJECTION, SOLUTION EPIDURAL; INFILTRATION; INTRACAUDAL; PERINEURAL PRN
Status: DISCONTINUED | OUTPATIENT
Start: 2025-01-21 | End: 2025-01-21 | Stop reason: SURG

## 2025-01-21 RX ORDER — TOBRAMYCIN 1.2 G/30ML
INJECTION, POWDER, LYOPHILIZED, FOR SOLUTION INTRAVENOUS
Status: DISCONTINUED | OUTPATIENT
Start: 2025-01-21 | End: 2025-01-21 | Stop reason: HOSPADM

## 2025-01-21 RX ORDER — SODIUM CHLORIDE, SODIUM LACTATE, POTASSIUM CHLORIDE, CALCIUM CHLORIDE 600; 310; 30; 20 MG/100ML; MG/100ML; MG/100ML; MG/100ML
INJECTION, SOLUTION INTRAVENOUS
Status: DISCONTINUED | OUTPATIENT
Start: 2025-01-21 | End: 2025-01-21 | Stop reason: SURG

## 2025-01-21 RX ORDER — BISACODYL 10 MG
10 SUPPOSITORY, RECTAL RECTAL
Status: DISCONTINUED | OUTPATIENT
Start: 2025-01-21 | End: 2025-01-22 | Stop reason: HOSPADM

## 2025-01-21 RX ORDER — SCOPOLAMINE 1 MG/3D
1 PATCH, EXTENDED RELEASE TRANSDERMAL
Status: DISCONTINUED | OUTPATIENT
Start: 2025-01-21 | End: 2025-01-22 | Stop reason: HOSPADM

## 2025-01-21 RX ORDER — HYDROMORPHONE HYDROCHLORIDE 1 MG/ML
0.5 INJECTION, SOLUTION INTRAMUSCULAR; INTRAVENOUS; SUBCUTANEOUS
Status: DISCONTINUED | OUTPATIENT
Start: 2025-01-21 | End: 2025-01-22 | Stop reason: HOSPADM

## 2025-01-21 RX ORDER — OXYCODONE HYDROCHLORIDE 5 MG/1
5 TABLET ORAL
Status: DISCONTINUED | OUTPATIENT
Start: 2025-01-21 | End: 2025-01-22 | Stop reason: HOSPADM

## 2025-01-21 RX ORDER — EPHEDRINE SULFATE 50 MG/ML
INJECTION, SOLUTION INTRAVENOUS PRN
Status: DISCONTINUED | OUTPATIENT
Start: 2025-01-21 | End: 2025-01-21 | Stop reason: SURG

## 2025-01-21 RX ORDER — CEFAZOLIN SODIUM 1 G/3ML
2 INJECTION, POWDER, FOR SOLUTION INTRAMUSCULAR; INTRAVENOUS ONCE
Status: COMPLETED | OUTPATIENT
Start: 2025-01-21 | End: 2025-01-21

## 2025-01-21 RX ORDER — DOCUSATE SODIUM 100 MG/1
100 CAPSULE, LIQUID FILLED ORAL 2 TIMES DAILY
Status: DISCONTINUED | OUTPATIENT
Start: 2025-01-21 | End: 2025-01-22 | Stop reason: HOSPADM

## 2025-01-21 RX ORDER — IBUPROFEN 400 MG/1
800 TABLET, FILM COATED ORAL 3 TIMES DAILY PRN
Status: DISCONTINUED | OUTPATIENT
Start: 2025-01-24 | End: 2025-01-22 | Stop reason: HOSPADM

## 2025-01-21 RX ORDER — POLYETHYLENE GLYCOL 3350 17 G/17G
1 POWDER, FOR SOLUTION ORAL 2 TIMES DAILY PRN
Status: DISCONTINUED | OUTPATIENT
Start: 2025-01-21 | End: 2025-01-22 | Stop reason: HOSPADM

## 2025-01-21 RX ORDER — ACETAMINOPHEN 500 MG
1000 TABLET ORAL EVERY 6 HOURS PRN
Status: DISCONTINUED | OUTPATIENT
Start: 2025-01-26 | End: 2025-01-22 | Stop reason: HOSPADM

## 2025-01-21 RX ORDER — DIPHENHYDRAMINE HCL 25 MG
25 TABLET ORAL NIGHTLY PRN
Status: DISCONTINUED | OUTPATIENT
Start: 2025-01-22 | End: 2025-01-22 | Stop reason: HOSPADM

## 2025-01-21 RX ORDER — HYDROMORPHONE HYDROCHLORIDE 1 MG/ML
0.1 INJECTION, SOLUTION INTRAMUSCULAR; INTRAVENOUS; SUBCUTANEOUS
Status: DISCONTINUED | OUTPATIENT
Start: 2025-01-21 | End: 2025-01-21 | Stop reason: HOSPADM

## 2025-01-21 RX ORDER — DEXAMETHASONE SODIUM PHOSPHATE 4 MG/ML
10 INJECTION, SOLUTION INTRA-ARTICULAR; INTRALESIONAL; INTRAMUSCULAR; INTRAVENOUS; SOFT TISSUE ONCE
Status: COMPLETED | OUTPATIENT
Start: 2025-01-22 | End: 2025-01-22

## 2025-01-21 RX ORDER — DEXAMETHASONE SODIUM PHOSPHATE 4 MG/ML
INJECTION, SOLUTION INTRA-ARTICULAR; INTRALESIONAL; INTRAMUSCULAR; INTRAVENOUS; SOFT TISSUE PRN
Status: DISCONTINUED | OUTPATIENT
Start: 2025-01-21 | End: 2025-01-21 | Stop reason: SURG

## 2025-01-21 RX ORDER — METOPROLOL TARTRATE 1 MG/ML
1 INJECTION, SOLUTION INTRAVENOUS
Status: DISCONTINUED | OUTPATIENT
Start: 2025-01-21 | End: 2025-01-21 | Stop reason: HOSPADM

## 2025-01-21 RX ORDER — OXYCODONE HCL 5 MG/5 ML
5 SOLUTION, ORAL ORAL
Status: COMPLETED | OUTPATIENT
Start: 2025-01-21 | End: 2025-01-21

## 2025-01-21 RX ORDER — HYDROMORPHONE HYDROCHLORIDE 1 MG/ML
0.4 INJECTION, SOLUTION INTRAMUSCULAR; INTRAVENOUS; SUBCUTANEOUS
Status: DISCONTINUED | OUTPATIENT
Start: 2025-01-21 | End: 2025-01-21 | Stop reason: HOSPADM

## 2025-01-21 RX ORDER — BUPIVACAINE HYDROCHLORIDE 2.5 MG/ML
INJECTION, SOLUTION EPIDURAL; INFILTRATION; INTRACAUDAL
Status: DISCONTINUED | OUTPATIENT
Start: 2025-01-21 | End: 2025-01-21 | Stop reason: HOSPADM

## 2025-01-21 RX ORDER — MEPERIDINE HYDROCHLORIDE 25 MG/ML
12.5 INJECTION INTRAMUSCULAR; INTRAVENOUS; SUBCUTANEOUS
Status: DISCONTINUED | OUTPATIENT
Start: 2025-01-21 | End: 2025-01-21 | Stop reason: HOSPADM

## 2025-01-21 RX ORDER — ACETAMINOPHEN 500 MG
1000 TABLET ORAL EVERY 6 HOURS
Status: DISCONTINUED | OUTPATIENT
Start: 2025-01-21 | End: 2025-01-22 | Stop reason: HOSPADM

## 2025-01-21 RX ORDER — KETOROLAC TROMETHAMINE 30 MG/ML
INJECTION, SOLUTION INTRAMUSCULAR; INTRAVENOUS
Status: DISCONTINUED | OUTPATIENT
Start: 2025-01-21 | End: 2025-01-21 | Stop reason: HOSPADM

## 2025-01-21 RX ADMIN — EPHEDRINE SULFATE 10 MG: 50 INJECTION, SOLUTION INTRAVENOUS at 07:10

## 2025-01-21 RX ADMIN — ACETAMINOPHEN 1000 MG: 500 TABLET ORAL at 10:08

## 2025-01-21 RX ADMIN — SODIUM CHLORIDE, POTASSIUM CHLORIDE, SODIUM LACTATE AND CALCIUM CHLORIDE: 600; 310; 30; 20 INJECTION, SOLUTION INTRAVENOUS at 06:29

## 2025-01-21 RX ADMIN — ASPIRIN 81 MG: 81 TABLET, COATED ORAL at 21:04

## 2025-01-21 RX ADMIN — ACETAMINOPHEN 1000 MG: 500 TABLET ORAL at 23:40

## 2025-01-21 RX ADMIN — CEFAZOLIN 2 G: 1 INJECTION, POWDER, FOR SOLUTION INTRAMUSCULAR; INTRAVENOUS at 07:02

## 2025-01-21 RX ADMIN — FENTANYL CITRATE 125 MCG: 50 INJECTION, SOLUTION INTRAMUSCULAR; INTRAVENOUS at 07:02

## 2025-01-21 RX ADMIN — OXYCODONE HYDROCHLORIDE 10 MG: 5 SOLUTION ORAL at 08:44

## 2025-01-21 RX ADMIN — OXYCODONE HYDROCHLORIDE 10 MG: 10 TABLET ORAL at 15:09

## 2025-01-21 RX ADMIN — KETOROLAC TROMETHAMINE 15 MG: 15 INJECTION, SOLUTION INTRAMUSCULAR; INTRAVENOUS at 17:50

## 2025-01-21 RX ADMIN — VANCOMYCIN HYDROCHLORIDE 1000 MG: 5 INJECTION, POWDER, LYOPHILIZED, FOR SOLUTION INTRAVENOUS at 15:09

## 2025-01-21 RX ADMIN — FENTANYL CITRATE 50 MCG: 50 INJECTION, SOLUTION INTRAMUSCULAR; INTRAVENOUS at 08:20

## 2025-01-21 RX ADMIN — FENTANYL CITRATE 50 MCG: 50 INJECTION, SOLUTION INTRAMUSCULAR; INTRAVENOUS at 08:44

## 2025-01-21 RX ADMIN — SUGAMMADEX 200 MG: 100 INJECTION, SOLUTION INTRAVENOUS at 08:24

## 2025-01-21 RX ADMIN — VANCOMYCIN HYDROCHLORIDE 1500 MG: 5 INJECTION, POWDER, LYOPHILIZED, FOR SOLUTION INTRAVENOUS at 06:29

## 2025-01-21 RX ADMIN — ROCURONIUM BROMIDE 80 MG: 50 INJECTION, SOLUTION INTRAVENOUS at 07:02

## 2025-01-21 RX ADMIN — FENTANYL CITRATE 75 MCG: 50 INJECTION, SOLUTION INTRAMUSCULAR; INTRAVENOUS at 07:18

## 2025-01-21 RX ADMIN — MIDAZOLAM HYDROCHLORIDE 2 MG: 1 INJECTION, SOLUTION INTRAMUSCULAR; INTRAVENOUS at 07:00

## 2025-01-21 RX ADMIN — FENTANYL CITRATE 50 MCG: 50 INJECTION, SOLUTION INTRAMUSCULAR; INTRAVENOUS at 07:43

## 2025-01-21 RX ADMIN — LIDOCAINE HYDROCHLORIDE 80 MG: 20 INJECTION, SOLUTION EPIDURAL; INFILTRATION; INTRACAUDAL; PERINEURAL at 07:02

## 2025-01-21 RX ADMIN — OXYCODONE HYDROCHLORIDE 10 MG: 10 TABLET ORAL at 19:40

## 2025-01-21 RX ADMIN — EPHEDRINE SULFATE 10 MG: 50 INJECTION, SOLUTION INTRAVENOUS at 07:36

## 2025-01-21 RX ADMIN — KETOROLAC TROMETHAMINE 15 MG: 15 INJECTION, SOLUTION INTRAMUSCULAR; INTRAVENOUS at 23:40

## 2025-01-21 RX ADMIN — CEFAZOLIN 2 G: 2 INJECTION, POWDER, FOR SOLUTION INTRAMUSCULAR; INTRAVENOUS at 13:27

## 2025-01-21 RX ADMIN — DEXAMETHASONE SODIUM PHOSPHATE 8 MG: 4 INJECTION INTRA-ARTICULAR; INTRALESIONAL; INTRAMUSCULAR; INTRAVENOUS; SOFT TISSUE at 07:02

## 2025-01-21 RX ADMIN — ACETAMINOPHEN 1000 MG: 500 TABLET ORAL at 17:50

## 2025-01-21 RX ADMIN — PROPOFOL 100 MG: 10 INJECTION, EMULSION INTRAVENOUS at 07:03

## 2025-01-21 RX ADMIN — DOCUSATE SODIUM 100 MG: 100 CAPSULE, LIQUID FILLED ORAL at 17:50

## 2025-01-21 RX ADMIN — SODIUM CHLORIDE, POTASSIUM CHLORIDE, SODIUM LACTATE AND CALCIUM CHLORIDE: 600; 310; 30; 20 INJECTION, SOLUTION INTRAVENOUS at 06:58

## 2025-01-21 RX ADMIN — CEFAZOLIN 2 G: 2 INJECTION, POWDER, FOR SOLUTION INTRAMUSCULAR; INTRAVENOUS at 21:08

## 2025-01-21 RX ADMIN — ONDANSETRON 8 MG: 2 INJECTION INTRAMUSCULAR; INTRAVENOUS at 08:12

## 2025-01-21 RX ADMIN — PROPOFOL 100 MG: 10 INJECTION, EMULSION INTRAVENOUS at 07:02

## 2025-01-21 RX ADMIN — Medication 100 MCG: at 08:12

## 2025-01-21 RX ADMIN — TRANEXAMIC ACID 1000 MG: 100 INJECTION, SOLUTION INTRAVENOUS at 07:02

## 2025-01-21 RX ADMIN — HYDROMORPHONE HYDROCHLORIDE 0.5 MG: 1 INJECTION, SOLUTION INTRAMUSCULAR; INTRAVENOUS; SUBCUTANEOUS at 10:08

## 2025-01-21 RX ADMIN — KETOROLAC TROMETHAMINE 15 MG: 15 INJECTION, SOLUTION INTRAMUSCULAR; INTRAVENOUS at 13:23

## 2025-01-21 SDOH — ECONOMIC STABILITY: TRANSPORTATION INSECURITY
IN THE PAST 12 MONTHS, HAS THE LACK OF TRANSPORTATION KEPT YOU FROM MEDICAL APPOINTMENTS OR FROM GETTING MEDICATIONS?: NO

## 2025-01-21 SDOH — ECONOMIC STABILITY: TRANSPORTATION INSECURITY
IN THE PAST 12 MONTHS, HAS LACK OF RELIABLE TRANSPORTATION KEPT YOU FROM MEDICAL APPOINTMENTS, MEETINGS, WORK OR FROM GETTING THINGS NEEDED FOR DAILY LIVING?: NO

## 2025-01-21 ASSESSMENT — LIFESTYLE VARIABLES
EVER FELT BAD OR GUILTY ABOUT YOUR DRINKING: NO
EVER HAD A DRINK FIRST THING IN THE MORNING TO STEADY YOUR NERVES TO GET RID OF A HANGOVER: NO
HAVE PEOPLE ANNOYED YOU BY CRITICIZING YOUR DRINKING: NO
TOTAL SCORE: 0
DOES PATIENT WANT TO STOP DRINKING: YES
HOW MANY TIMES IN THE PAST YEAR HAVE YOU HAD 5 OR MORE DRINKS IN A DAY: 0
HAVE YOU EVER FELT YOU SHOULD CUT DOWN ON YOUR DRINKING: NO
TOTAL SCORE: 0
ALCOHOL_USE: YES
TOTAL SCORE: 0
AVERAGE NUMBER OF DAYS PER WEEK YOU HAVE A DRINK CONTAINING ALCOHOL: 3
DOES PATIENT WANT TO TALK TO SOMEONE ABOUT QUITTING: NO
CONSUMPTION TOTAL: NEGATIVE
ON A TYPICAL DAY WHEN YOU DRINK ALCOHOL HOW MANY DRINKS DO YOU HAVE: 1

## 2025-01-21 ASSESSMENT — SOCIAL DETERMINANTS OF HEALTH (SDOH)
WITHIN THE PAST 12 MONTHS, YOU WORRIED THAT YOUR FOOD WOULD RUN OUT BEFORE YOU GOT THE MONEY TO BUY MORE: NEVER TRUE
WITHIN THE LAST YEAR, HAVE YOU BEEN HUMILIATED OR EMOTIONALLY ABUSED IN OTHER WAYS BY YOUR PARTNER OR EX-PARTNER?: NO
WITHIN THE LAST YEAR, HAVE YOU BEEN AFRAID OF YOUR PARTNER OR EX-PARTNER?: NO
WITHIN THE LAST YEAR, HAVE YOU BEEN KICKED, HIT, SLAPPED, OR OTHERWISE PHYSICALLY HURT BY YOUR PARTNER OR EX-PARTNER?: NO
IN THE PAST 12 MONTHS, HAS THE ELECTRIC, GAS, OIL, OR WATER COMPANY THREATENED TO SHUT OFF SERVICE IN YOUR HOME?: NO
WITHIN THE PAST 12 MONTHS, THE FOOD YOU BOUGHT JUST DIDN'T LAST AND YOU DIDN'T HAVE MONEY TO GET MORE: NEVER TRUE
WITHIN THE LAST YEAR, HAVE TO BEEN RAPED OR FORCED TO HAVE ANY KIND OF SEXUAL ACTIVITY BY YOUR PARTNER OR EX-PARTNER?: NO

## 2025-01-21 ASSESSMENT — PAIN DESCRIPTION - PAIN TYPE
TYPE: SURGICAL PAIN

## 2025-01-21 ASSESSMENT — FIBROSIS 4 INDEX
FIB4 SCORE: 1.414213562373095049
FIB4 SCORE: 1.414213562373095049

## 2025-01-21 ASSESSMENT — COGNITIVE AND FUNCTIONAL STATUS - GENERAL
MOBILITY SCORE: 21
SUGGESTED CMS G CODE MODIFIER MOBILITY: CJ
DRESSING REGULAR LOWER BODY CLOTHING: A LITTLE
MOVING TO AND FROM BED TO CHAIR: A LITTLE
STANDING UP FROM CHAIR USING ARMS: A LITTLE
HELP NEEDED FOR BATHING: A LITTLE
CLIMB 3 TO 5 STEPS WITH RAILING: A LITTLE
DAILY ACTIVITIY SCORE: 22
SUGGESTED CMS G CODE MODIFIER DAILY ACTIVITY: CJ

## 2025-01-21 ASSESSMENT — PATIENT HEALTH QUESTIONNAIRE - PHQ9
2. FEELING DOWN, DEPRESSED, IRRITABLE, OR HOPELESS: NOT AT ALL
SUM OF ALL RESPONSES TO PHQ9 QUESTIONS 1 AND 2: 0
1. LITTLE INTEREST OR PLEASURE IN DOING THINGS: NOT AT ALL

## 2025-01-21 ASSESSMENT — PAIN SCALES - GENERAL: PAIN_LEVEL: 4

## 2025-01-21 NOTE — OR NURSING
0832: Patient arrived to PACU from OR via [place]. Report received from anesthesia and RN. Cold pack applied.     0850: Patient stating he wishes to stay due to pain. family updated    0914: Report called to receiving RNShara.

## 2025-01-21 NOTE — ANESTHESIA PROCEDURE NOTES
Airway    Date/Time: 1/21/2025 7:04 AM    Performed by: Ross Sibley D.O.  Authorized by: Ross Sibley D.O.    Location:  OR  Urgency:  Elective  Indications for Airway Management:  Anesthesia      Spontaneous Ventilation: absent    Sedation Level:  Deep  Preoxygenated: Yes    Patient Position:  Sniffing  Mask Difficulty Assessment:  1 - vent by mask  Final Airway Type:  Endotracheal airway  Final Endotracheal Airway:  ETT  Cuffed: Yes    Technique Used for Successful ETT Placement:  Direct laryngoscopy    Insertion Site:  Oral  Blade Type:  Lee [place]  Laryngoscope Blade/Videolaryngoscope Blade Size:  4  ETT Size (mm):  7.5  Measured from:  Teeth  ETT to Teeth (cm):  23  Placement Verified by: auscultation and capnometry    Cormack-Lehane Classification:  Grade I - full view of glottis  Number of Attempts at Approach:  1

## 2025-01-21 NOTE — ANESTHESIA PREPROCEDURE EVALUATION
Case: [id number] Date/Time: 01/21/25 0645    Procedure: REVISION RIGHT TOTAL HIP ARTHROPLASTY    Diagnosis: Infection of prosthetic total hip joint, sequela [T84.59XS, Z96.649]    Pre-op diagnosis: Infection of prosthetic total hip joint, sequela [T84.59XS, Z96.649]    Location:  OR  / SURGERY [facility]    Surgeons: Yogi Hernandez M.D.            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)   (positive) Osteoarthritis of hip   (positive) Rheumatoid arthritis (HCC)   (positive) Seropositive rheumatoid arthritis (HCC)       Physical Exam    Airway   Mallampati: II  TM distance: >3 FB  Neck ROM: full       Cardiovascular - normal exam  Rhythm: regular  Rate: normal  (-) murmur     Dental - normal exam           Pulmonary - normal exam  Breath sounds clear to auscultation     Abdominal    Neurological - normal exam                   Anesthesia Plan    ASA 2       Plan - general       Airway plan will be ETT          Induction: intravenous    Postoperative Plan: Postoperative administration of opioids is intended.    Pertinent diagnostic labs and testing reviewed    Informed Consent:    Anesthetic plan and risks discussed with patient.    Use of blood products discussed with: patient whom consented to blood products.

## 2025-01-21 NOTE — H&P
Surgery Orthopedic History & Physical Note    Date  1/21/2025    Primary Care Physician  Paolo Barnhart M.D.      Pre-Op Diagnosis Codes:      * Infection of prosthetic total hip joint, sequela [T84.59XS, Z96.649]    HPI  This is a 59 y.o. male who presents for a revision/reimplantation HERVE.    Past Medical History:   Diagnosis Date    Arthritis     rheumatoid-follows with rheumatology    Chickenpox     as child. Has had 1st dose shingles vaccine    Hemochromatosis     Hereditary hemochromatosis (HCC) 03/10/2023    Recurrent genital HSV (herpes simplex virus) infection 08/22/2013    Right hip pain 01/14/2025    with activity 6/10       Past Surgical History:   Procedure Laterality Date    PB DRAINAGE OF HIP JOINT Right 11/22/2024    Procedure: Right hip irrigation and debridement with retention of implants, excise the pseudotumor and abscess, RIGHT TOTAL HIP ARTHROPLASTY REVISION;  Surgeon: Yogi Hernandez M.D.;  Location: [facility]  External [facility];  Service: Orthopedics    ME EXC TUMOR SOFT TISSUE PELVIS & HIP SUBFAS*  11/22/2024    Procedure: EXCISION, MASS, LOWER EXTREMITY, PEDIATRIC;  Surgeon: Yogi Hernandez M.D.;  Location: [facility]  External [facility];  Service: Orthopedics    PB REVISE TOTAL HIP REPLACEMENT  11/22/2024    Procedure: REVISION, TOTAL ARTHROPLASTY, HIP;  Surgeon: Yogi Hernandez M.D.;  Location: [facility];  Service: Orthopedics    IRRIGATION & DEBRIDEMENT GENERAL Left 08/20/2024    Procedure: IRRIGATION AND DEBRIDEMENT, WRIST;  Surgeon: Yuniel Sheehan M.D.;  Location: [facility];  Service: Orthopedics    LUMBAR LAMINECTOMY DISKECTOMY  02/07/2013    Performed by Mackenzie Cameron M.D. at [facility] ORS    APPENDECTOMY  01/01/2000    INGUINAL HERNIA REPAIR  1973    Hernia Repair, Inguinal-for ascending testicle    DENTAL SURGERY      dental implant x2    HIP ARTHROPLASTY TOTAL Right     HIP ARTHROPLASTY TOTAL Left        Current  Facility-Administered Medications   Medication Dose Route Frequency Provider Last Rate Last Admin    ceFAZolin (Ancef) injection 2 g  2 g Intravenous Once Yogi Hernandez M.D.        vancomycin 1500 mg/250mL NS IVPB premix  1,500 mg Intravenous Once Yogi Hernandez M.D. 166.7 mL/hr at 01/21/25 0629 1,500 mg at 01/21/25 0629    lidocaine (Xylocaine) 1 % injection 0.5 mL  0.5 mL Intradermal Once PRN Yogi Hernandez M.D.        lactated ringers infusion   Intravenous Continuous Yogi Hernandez M.D. 10 mL/hr at 01/21/25 0629 New Bag at 01/21/25 0629       Social History     Socioeconomic History    Marital status:      Spouse name: Not on file    Number of children: Not on file    Years of education: Not on file    Highest education level: Not on file   Occupational History    Not on file   Tobacco Use    Smoking status: Never     Passive exposure: Never    Smokeless tobacco: Never   Vaping Use    Vaping status: Never Used   Substance and Sexual Activity    Alcohol use: Not Currently     Alcohol/week: 2.4 oz     Types: 4 Glasses of wine per week     Comment: none since 1/2025    Drug use: Never    Sexual activity: Not on file   Other Topics Concern    Not on file   Social History Narrative    Not on file     Social Drivers of Health     Financial Resource Strain: Low Risk  (11/24/2024)    Received from [facility]    Overall Financial Resource Strain (CARDIA)     Difficulty of Paying Living Expenses: Not hard at all   Food Insecurity: No Food Insecurity (11/24/2024)    Received from [facility]    Hunger Vital Sign     Worried About Running Out of Food in the Last Year: Never true     Ran Out of Food in the Last Year: Never true   Transportation Needs: No Transportation Needs (11/24/2024)    Received from [facility]    PRAPARE - Transportation     Lack of Transportation (Medical): No     Lack of Transportation (Non-Medical): No   Physical Activity: Sufficiently Active (11/24/2024)    Received from [facility]    Exercise Vital Sign     Days of Exercise per Week: 3 days     Minutes of Exercise per Session: 60 min   Stress: Stress Concern Present (11/24/2024)    Received from [facility]    [place] [employer] - Occupational Stress Questionnaire     Feeling of Stress : To some extent   Social Connections: Moderately Isolated (11/24/2024)    Received from [facility]    Social Connection and Isolation Panel [NHANES]     Frequency of Communication with Friends and Family: More than three times a week     Frequency of Social Gatherings with Friends and Family: Once a week     Attends Oriental orthodox Services: Never     Active Member of Clubs or Organizations: No     Attends Club or Organization Meetings: Never     Marital Status: Living with partner   Intimate Partner Violence: Not At Risk (11/24/2024)    Received from [facility]    Humiliation, Afraid, Rape, and Kick questionnaire     Fear of Current or Ex-Partner: No     Emotionally Abused: No     Physically Abused: No     Sexually Abused: No   Housing Stability: Low Risk  (11/24/2024)    Received from [facility]    Housing Stability Vital Sign     Unable to Pay for Housing in the Last Year: No     Number of Times Moved in the Last Year: 0     Homeless in the Last Year: No       History reviewed. No pertinent family history.    Allergies  Patient has no known allergies.    Review of Systems  Negative    Physical Exam  Regular rate and rhythm  Nonlabored breathing  Abdomen soft and nontender   Neurovascular intact distally  Skin is in good condition    Vital Signs  Blood Pressure: 138/89   Temperature: 36.7 °C (98.1 °F)   Pulse: 75   Respiration: (!) 22   Pulse Oximetry: 96 %       Labs:                    Radiology:  No orders to display         Assessment/Plan:  Pre-Op Diagnosis Codes:      * Infection of prosthetic total hip joint, sequela [T84.59XS, Z96.649]  Procedure(s):  REVISION RIGHT TOTAL HIP ARTHROPLASTY

## 2025-01-21 NOTE — ANESTHESIA POSTPROCEDURE EVALUATION
Patient: Milton Aguila    Procedure Summary       Date: 01/21/25 Room / Location:  OR 06 / SURGERY [facility]    Anesthesia Start: 0658 Anesthesia Stop: 0835    Procedure: REVISION RIGHT TOTAL HIP ARTHROPLASTY (Right: Hip) Diagnosis:       Infection of prosthetic total hip joint, sequela      (Infection of prosthetic total hip joint, sequela [T84.59XS, Z96.649])    Surgeons: Yogi Hernandez M.D. Responsible Provider: Ross Sibley D.O.    Anesthesia Type: general ASA Status: 2            Final Anesthesia Type: general  Last vitals  BP   Blood Pressure: (!) 140/89    Temp   36.5 °C (97.7 °F)    Pulse   94   Resp   16    SpO2   93 %      Anesthesia Post Evaluation    Patient location during evaluation: PACU  Patient participation: complete - patient participated  Level of consciousness: awake and alert  Pain score: 4    Airway patency: patent  Anesthetic complications: no  Cardiovascular status: hemodynamically stable  Respiratory status: acceptable  Hydration status: euvolemic    PONV: none          No notable events documented.     Nurse Pain Score: 4 (NPRS)

## 2025-01-21 NOTE — LETTER
January 9, 2025    Patient Name: Milton Aguila  Surgeon Name: Yogi Hernandez M.D.  Surgery Facility: [facility] ([address] [place])  Surgery Date: 1/21/2025    The time of your surgery is not final and may change up to and until the day of your surgery. You will be contacted 24-48 hours prior to your surgery date with your check-in and surgery time.    If you will not be at one of the below numbers please call the surgery scheduler at [phone]  Preferred Phone: [phone]    BEFORE YOUR SURGERY   Pre Registration and/or Lab Work must be done within and no earlier than 28 days prior to your surgery date. Your scheduled facility will contact you regarding all required preregistration and/or lab work. If you have not been contacted within 7 days of your scheduled procedure please call [facility] at [phone] for an appointment as soon as possible.    DAY OF YOUR SURGERY  Nothing to eat or drink after midnight     Refrain from smoking any substance after midnight prior to surgery. Smoking may interfere with the anesthetic and frequently produces nausea during the recovery period.    Continue taking all lifesaving medications. Including the morning of your surgery with small sip of water.    Please do NOT take on the day of surgery:  Diuretics: examples- furosemide (Lasix), spironolactone, hydrochlorothiazide  ACE-inhibitors: examples- lisinopril, ramipril, enalapril  “ARBs”: examples- losartan, Olmesartan, valsartan    Please arrive at the hospital/surgery center at the check-in time provided.     An adult will need to bring you and take you home after your surgery.     AFTER YOUR SURGERY  Post op Appointment:   Date: 02/05/2025   Time: 01:30PM   With: Leon Tyson PA-C   Location: [address] [place] [address] [place] [address]    - No dental work for 3-6 months after your surgery.  - Post Surgery - You will need someone to drive you home  - Post Surgery  - You will need someone to stay with you for 24 hours  - You must have someone provide transportation post surgery and someone to monitor you for at least 24 hours post-surgery. If you don't have either of these your appointment will be canceled.     TIME OFF WORK  FMLA or Disability forms can be faxed directly to: [phone] or you may drop them off at [address] [place] [address]. Our office charges a $35.00 fee per form. Forms will be completed within 10 business days of drop off and payment received. For the status of your forms you may contact our disability office directly [phone].    MEDICATION INSTRUCTIONS **Please read section completely**  The following medications should be stopped a minimum of 10 days prior to surgery:  All over the counter, Supplements & Herbal medications    Anorectics: Phentermine (Adipex-P, Lomaira and Suprenza), Phentermine-topiramate (Qsymia), Bupropion-naltrexone (Contrave)    **If you are on Bupropion for anxiety/depression, please continue this medication up until the day of surgery.     Opiod Partial Agonists/Opioid Antagonists: Buprenorphine (Suboxone, Belbuca, Butrans, Probuphine Implant, Sublocade), Naltrexone (ReVia, Vivitrol), Naloxone    Amphetamines: Dextroamphetamine/Amphetamine (Adderall, Mydayis), Methylphenidate Hydrochloride (Concerta, Metadate, Methylin, Ritalin)    The following medications should be stopped 5 days prior to surgery:  Blood Thinners: Any Aspirin, Aspirin products, anti-inflammatories such as ibuprofen and any blood thinners such as Coumadin and Plavix. Please consult your prescribing physician if you are on life saving blood thinners, in regards to when to stop medications prior to surgery.     The following medications should be stopped a minimum of 3 days prior to surgery:  PDE-5 inhibitors: Sildenafil (Viagra), Tadalafil (Cialis), Vardenafil (Levitra), Avanafil (Stendra)    MAO Inhibitors: Rasagiline (Azilect), Selegiline  (Eldepryl, Emsam, Selapar), Isocarboxazid (Marplan), Phenelzine (Nardil)

## 2025-01-21 NOTE — OP REPORT
Pre-operative Dx: History of failed right hip resurfacing with infection, loosening, and metal reaction status post spacer placement and IV antibiotics  Post-operative Dx: same   Procedure:  Revision right total hip arthroplasty, Placement of a 30 cm incisional wound vac dressing  Surgeon:  Dr. Yogi Hernandez MD  Assistants: Leon Tyson PA-C  Anesthesia:  Dr. Sibley.  General  EBL:  250 mL  Drains:  None  Complications:  None    Findings: No purulence or other signs of infection.  Bone quality was solid other than those 2 lytic defects in the acetabulum which were noted at the previous surgery    Implants removed: Cemented femoral and acetabular components    Implants placed: 58 mm revision readapt Smith [employer] Nephew cup with an Oxinium dual mobility liner.  The stem was a size 4 standard polar cementless stem with a 28+4 Oxinium head inside of it polyethylene dual mobility head.  Antibiotic impregnated calcium phosphate beads were also utilized.     Description of Procedure:      Following appropriate pre-operative evaluation and consent, the patient was brought to the operative room. Pre-operative anesthesia was administered. Patient was positioned lateral on the operative table with appropriate padding of the extremities. Sterile prep and draping of the surgical field was completed. Pre-operative antibiotics were administered.      His previous incision was excised and the fascia split in line with its fibers.  There was some thin clear fluid present which was captured and sent for culture.  The posterior repair was taken down and the hip dislocated.  There is no purulence or other signs of obvious infection.  The head was disimpacted and then the stem removed using a stem removal system.  The cement from the femoral canal metaphysis were removed with backbiter's and curettes.  The canal was debrided and thoroughly irrigated with the irrigation brush.  Attention was turned to the acetabulum.  The existing acetabular  component was removed and then cement removed from the bone carefully to protect the underlying bone.  There was no additional bone loss other than that previously noted with his prior surgery.  The acetabulum was debrided with a Sanders and curettes.  There is 02 osteolytic defects were also debrided.  Betadine soaks performed and irrigation was performed.  We made sure we debrided the synovial layer and exposed tissue and hip.  Gloves were changed.  The acetabulum was reamed up to 57 mm.  Those 2 osteolytic defects were grafted using allograft chips combined with calcium phosphate beads which had vancomycin and tobramycin mixed in.  The cup had an excellent press-fit.  3 screws were placed with excellent purchase including 1 nonlocking and 2 locking screws.  The femur was exposed and broached up to a size 4 which had an excellent fit both axially and rotationally with the polar stem.  Calcar planar was utilized to freshen up the neck.  Trialed and was happy with the leg lengths, soft tissue tension, and stability with those implants.  The trials were removed.  Additional calcium phosphate beads containing antibiotics were placed into the femoral canal and then the real stem was impacted with excellent fit.  The real size +4 head was assembled and impacted onto a clean and dry trunnion and the hip was reduced and again found satisfactory.  The peritubular tissues were injected with local anesthetic cocktail.  Vancomycin and tobramycin powder were left deep.  The posterior capsule was repaired with Ethibond.  The fascia was closed with 2 layers running Quill.  The fat was closed with Quill.  The skin was closed with 2-0 Monocryl and staples.  An Acticoat and beto dressing which is a incisional wound VAC dressing was applied to help with wound healing and minimize risk of infection and wound problems.  He was then turned over to anesthesia in stable condition without any apparent complications.    Postoperative plan:  Oral doxycycline for 6 months pending culture results, posterior hip precautions, WBAT with a walker (which will need to be provided if they do not already have one due to weakness, imbalance, and fall risk), ASA 81mg BID DVT ppx for 4 weeks, PT/OT eval.  Followup in 2 weeks in clinic.

## 2025-01-21 NOTE — PROGRESS NOTES
4 Eyes Skin Assessment Completed by Shara RN and Sj RN.    Head WDL  Ears WDL  Nose WDL  Mouth WDL  Neck WDL  Breast/Chest WDL  Shoulder Blades WDL  Spine WDL  (R) Arm/Elbow/Hand WDL  (L) Arm/Elbow/Hand WDL  Abdomen WDL  Groin WDL  Scrotum/Coccyx/Buttocks WDL  (R) Leg Incision - Right hip dressing (JOYCE)  (L) Leg WDL  (R) Heel/Foot/Toe WDL  (L) Heel/Foot/Toe WDL      Devices In Places Blood Pressure Cuff, Pulse Ox, and SCD's      Interventions In Place Pillows    Possible Skin Injury No    Pictures Uploaded Into Epic N/A  Wound Consult Placed N/A  RN Wound Prevention Protocol Ordered No

## 2025-01-21 NOTE — ANESTHESIA TIME REPORT
Anesthesia Start and Stop Event Times       Date Time Event    1/21/2025 0647 Ready for Procedure     0658 Anesthesia Start     0835 Anesthesia Stop          Responsible Staff  01/21/25      Name Role Begin End    Ross Sibley D.O. Anesth 0658 0835          Overtime Reason:  no overtime (within assigned shift)    Comments:

## 2025-01-21 NOTE — CARE PLAN
The patient is Stable - Low risk of patient condition declining or worsening    Shift Goals  Clinical Goals: Be evaluated by PT/OT, Pain controlled to less than <4  Patient Goals: Be seen by therapy, pain control    Progress made toward(s) clinical / shift goals:  Patient ambulated from gurney to bed upon arriving to the floor. Tolerated well, walked with a steady gait.     Patient is not progressing towards the following goals:

## 2025-01-21 NOTE — PROGRESS NOTES
"Pharmacy Vancomycin Kinetics Note for 1/21/2025     59 y.o. male on Vancomycin day # 1     Vancomycin Indication (AUC Dosing):  (Suspected prosthetic joint infection)      Provider specified end date: 01/24/25    Active Antibiotics (From admission, onward)      Ordered     Ordering Provider       Tue Jan 21, 2025 10:06 AM    01/21/25 1006  vancomycin 1000 mg in 250 mL NS IVPB Premix  (vancomycin (VANCOCIN) IV (LD + Maintenance))  EVERY 12 HOURS         Yogi Hernandez M.D.       Tue Jan 21, 2025  9:34 AM    01/21/25 0934  ceFAZolin (Ancef) 2 g in  mL IVPB  (Empiric antibiotics for suspected prosthetic joint infection)  EVERY 8 HOURS        Placed in \"And\" Linked Group    Leon Tyson P.A.-C.    01/21/25 0934  MD Alert...Vancomycin per Pharmacy  (Empiric antibiotics for suspected prosthetic joint infection)  PHARMACY TO DOSE        Question:  Indication(s) for vancomycin?  Answer:  Other (comments)  Comment:  Empiric treatment for suspected prosthetic joint infection   Placed in \"And\" Linked Group    Leon Tyson P.A.-C.       Tue Jan 21, 2025  7:32 AM    01/21/25 0732  vancomycin (Vancocin) injection  INTRA-OP CONTINUOUS         Yogi Hernandez M.D.            Dosing Weight: 88.3 kg (194 lb 10.7 oz)      Admission History: Admitted on 1/21/2025 for Infection of prosthetic total hip joint, sequela [T84.59XS, Z96.649]  S/P revision of total hip [Z96.649]  Pertinent history: 60 yo male is on day 1 of Vancomycin and Ancef s/p right HERVE performed this morning. Vancomycin ordered for 72 hours with plan to transition to orals at discharge.    Allergies:     Patient has no known allergies.     Pertinent cultures to date:     Results       ** No results found for the last 336 hours. **            Labs:     Estimated Creatinine Clearance: 90.1 mL/min (by C-G formula based on SCr of 1 mg/dL).  No results for input(s): \"WBC\", \"NEUTSPOLYS\", \"BANDSSTABS\" in the last 72 hours.  No results for input(s): \"BUN\", \"CREATININE\", " "\"ALBUMIN\" in the last 72 hours.    Intake/Output Summary (Last 24 hours) at 2025 1006  Last data filed at 2025 0930  Gross per 24 hour   Intake 1240 ml   Output 150 ml   Net 1090 ml      /88   Pulse 91   Temp 36.2 °C (97.1 °F) (Temporal)   Resp 18   Ht 1.778 m (5' 10\")   Wt 88.3 kg (194 lb 10.7 oz)   SpO2 94%  Temp (24hrs), Av.5 °C (97.7 °F), Min:36.2 °C (97.1 °F), Max:36.7 °C (98.1 °F)      List concerns for Vancomycin clearance:     None    Pharmacokinetics:     AUC kinetics:   Ke (hr ^-1): 0.0791 hr^-1  Half life: 8.76 hr  Clearance: 4.54  Estimated TDD: 2270  Estimated Dose: 1022  Estimated interval: 10.8      A/P:     -  Vancomycin dose: 1.5 gm given pre-operatively, topically in OR, and will order maintenance as 1 gm q12h starting ~ 8 hours after pre-operative dose given it wasn't a full weight based loading dose given.    -  Next vancomycin level(s):    -  @ 0700   - Vt @ 1430     -  Predicted vancomycin AUC from initial AUC test calculator: 441 mg·hr/L      -  Comments: pharmacy to follow renal fx with scheduled vancomycin regimen. No accumulation risk factors noted. If renal remains stable pharmacy to evaluate levels  to ensure vancomycin is therapeutic.    Sher Tyson, PharmD    "

## 2025-01-22 VITALS
TEMPERATURE: 97.8 F | SYSTOLIC BLOOD PRESSURE: 135 MMHG | HEART RATE: 68 BPM | HEIGHT: 70 IN | BODY MASS INDEX: 27.87 KG/M2 | RESPIRATION RATE: 18 BRPM | DIASTOLIC BLOOD PRESSURE: 86 MMHG | OXYGEN SATURATION: 95 % | WEIGHT: 194.67 LBS

## 2025-01-22 LAB
ALBUMIN SERPL BCP-MCNC: 3.6 G/DL (ref 3.2–4.9)
BUN SERPL-MCNC: 16 MG/DL (ref 8–22)
CALCIUM ALBUM COR SERPL-MCNC: 8.9 MG/DL (ref 8.5–10.5)
CALCIUM SERPL-MCNC: 8.6 MG/DL (ref 8.4–10.2)
CHLORIDE SERPL-SCNC: 99 MMOL/L (ref 96–112)
CO2 SERPL-SCNC: 26 MMOL/L (ref 20–33)
CREAT SERPL-MCNC: 0.95 MG/DL (ref 0.5–1.4)
GFR SERPLBLD CREATININE-BSD FMLA CKD-EPI: 93 ML/MIN/1.73 M 2
GLUCOSE SERPL-MCNC: 142 MG/DL (ref 65–99)
GRAM STN SPEC: NORMAL
HCT VFR BLD AUTO: 27.3 % (ref 42–52)
HGB BLD-MCNC: 9.5 G/DL (ref 14–18)
PHOSPHATE SERPL-MCNC: 4.2 MG/DL (ref 2.5–4.5)
POTASSIUM SERPL-SCNC: 4.4 MMOL/L (ref 3.6–5.5)
SIGNIFICANT IND 70042: NORMAL
SITE SITE: NORMAL
SODIUM SERPL-SCNC: 134 MMOL/L (ref 135–145)
SOURCE SOURCE: NORMAL

## 2025-01-22 PROCEDURE — 97161 PT EVAL LOW COMPLEX 20 MIN: CPT

## 2025-01-22 PROCEDURE — 700105 HCHG RX REV CODE 258: Performed by: ORTHOPAEDIC SURGERY

## 2025-01-22 PROCEDURE — 700111 HCHG RX REV CODE 636 W/ 250 OVERRIDE (IP): Mod: JZ | Performed by: STUDENT IN AN ORGANIZED HEALTH CARE EDUCATION/TRAINING PROGRAM

## 2025-01-22 PROCEDURE — 97535 SELF CARE MNGMENT TRAINING: CPT

## 2025-01-22 PROCEDURE — A9270 NON-COVERED ITEM OR SERVICE: HCPCS | Performed by: STUDENT IN AN ORGANIZED HEALTH CARE EDUCATION/TRAINING PROGRAM

## 2025-01-22 PROCEDURE — 85014 HEMATOCRIT: CPT

## 2025-01-22 PROCEDURE — 700105 HCHG RX REV CODE 258: Performed by: STUDENT IN AN ORGANIZED HEALTH CARE EDUCATION/TRAINING PROGRAM

## 2025-01-22 PROCEDURE — 99024 POSTOP FOLLOW-UP VISIT: CPT | Performed by: ORTHOPAEDIC SURGERY

## 2025-01-22 PROCEDURE — 94760 N-INVAS EAR/PLS OXIMETRY 1: CPT

## 2025-01-22 PROCEDURE — 85018 HEMOGLOBIN: CPT

## 2025-01-22 PROCEDURE — 700101 HCHG RX REV CODE 250: Performed by: ORTHOPAEDIC SURGERY

## 2025-01-22 PROCEDURE — 700102 HCHG RX REV CODE 250 W/ 637 OVERRIDE(OP): Performed by: STUDENT IN AN ORGANIZED HEALTH CARE EDUCATION/TRAINING PROGRAM

## 2025-01-22 PROCEDURE — 700111 HCHG RX REV CODE 636 W/ 250 OVERRIDE (IP): Performed by: ORTHOPAEDIC SURGERY

## 2025-01-22 PROCEDURE — 97165 OT EVAL LOW COMPLEX 30 MIN: CPT

## 2025-01-22 PROCEDURE — 36415 COLL VENOUS BLD VENIPUNCTURE: CPT

## 2025-01-22 PROCEDURE — 80069 RENAL FUNCTION PANEL: CPT

## 2025-01-22 RX ORDER — CYCLOBENZAPRINE HCL 5 MG
5-10 TABLET ORAL 3 TIMES DAILY PRN
Qty: 30 TABLET | Refills: 0 | Status: SHIPPED | OUTPATIENT
Start: 2025-01-22

## 2025-01-22 RX ADMIN — OXYCODONE HYDROCHLORIDE 10 MG: 10 TABLET ORAL at 11:00

## 2025-01-22 RX ADMIN — DOCUSATE SODIUM 100 MG: 100 CAPSULE, LIQUID FILLED ORAL at 05:35

## 2025-01-22 RX ADMIN — ACETAMINOPHEN 1000 MG: 500 TABLET ORAL at 05:35

## 2025-01-22 RX ADMIN — OXYCODONE HYDROCHLORIDE 10 MG: 10 TABLET ORAL at 01:31

## 2025-01-22 RX ADMIN — VANCOMYCIN HYDROCHLORIDE 1000 MG: 5 INJECTION, POWDER, LYOPHILIZED, FOR SOLUTION INTRAVENOUS at 03:17

## 2025-01-22 RX ADMIN — ASPIRIN 81 MG: 81 TABLET, COATED ORAL at 05:35

## 2025-01-22 RX ADMIN — OXYCODONE HYDROCHLORIDE 10 MG: 10 TABLET ORAL at 05:35

## 2025-01-22 RX ADMIN — KETOROLAC TROMETHAMINE 15 MG: 15 INJECTION, SOLUTION INTRAMUSCULAR; INTRAVENOUS at 05:35

## 2025-01-22 RX ADMIN — CEFAZOLIN 2 G: 2 INJECTION, POWDER, FOR SOLUTION INTRAMUSCULAR; INTRAVENOUS at 05:41

## 2025-01-22 RX ADMIN — ONDANSETRON 4 MG: 2 INJECTION INTRAMUSCULAR; INTRAVENOUS at 05:44

## 2025-01-22 RX ADMIN — DEXAMETHASONE SODIUM PHOSPHATE 10 MG: 4 INJECTION, SOLUTION INTRAMUSCULAR; INTRAVENOUS at 05:35

## 2025-01-22 ASSESSMENT — PAIN DESCRIPTION - PAIN TYPE
TYPE: SURGICAL PAIN

## 2025-01-22 ASSESSMENT — COGNITIVE AND FUNCTIONAL STATUS - GENERAL
DRESSING REGULAR LOWER BODY CLOTHING: A LITTLE
DAILY ACTIVITIY SCORE: 21
TOILETING: A LITTLE
SUGGESTED CMS G CODE MODIFIER MOBILITY: CH
SUGGESTED CMS G CODE MODIFIER DAILY ACTIVITY: CJ
MOBILITY SCORE: 24
HELP NEEDED FOR BATHING: A LITTLE

## 2025-01-22 ASSESSMENT — GAIT ASSESSMENTS
GAIT LEVEL OF ASSIST: MODIFIED INDEPENDENT
DISTANCE (FEET): 200
ASSISTIVE DEVICE: CRUTCHES
DISTANCE (FEET): 15

## 2025-01-22 ASSESSMENT — PATIENT HEALTH QUESTIONNAIRE - PHQ9
2. FEELING DOWN, DEPRESSED, IRRITABLE, OR HOPELESS: NOT AT ALL
1. LITTLE INTEREST OR PLEASURE IN DOING THINGS: NOT AT ALL
SUM OF ALL RESPONSES TO PHQ9 QUESTIONS 1 AND 2: 0

## 2025-01-22 ASSESSMENT — ACTIVITIES OF DAILY LIVING (ADL): TOILETING: INDEPENDENT

## 2025-01-22 NOTE — CARE PLAN
The patient is Stable - Low risk of patient condition declining or worsening    Shift Goals  Clinical Goals: [place] pt's pain to tolerable level at 4/10 or less. Pt will remain free from falls or injury throughout the shift.  Patient Goals: pain control, rest and sleep  Family Goals: n/a    Progress made toward(s) clinical / shift goals:  Pt's pain managed with oral prn pain medications given as per MAR. Pt states relief post interventions. Pt seen resting comfortably on bed in between rounds; even and unlabored breathing noted. Pt is free from falls or injury throughout the shift. Hourly rounding in progress.       Patient is not progressing towards the following goals: n/a

## 2025-01-22 NOTE — DISCHARGE SUMMARY
Patient was admitted for a revision total hip arthroplasty.  There were no complications during the surgery. Did well post-operatively.     Recent Labs     01/22/25  0152   SODIUM 134*   POTASSIUM 4.4   CHLORIDE 99   CO2 26   GLUCOSE 142*   BUN 16   CREATININE 0.95   CALCIUM 8.6     Recent Labs     01/22/25  0152   HEMOGLOBIN 9.5*   HEMATOCRIT 27.3*       Active Hospital Problems    Diagnosis     S/P revision of total hip [Z96.649]     Infection of prosthetic total hip joint (HCC) [T84.59XA, Z96.649]        Uneventful hospital course.     Medication List        START taking these medications        Instructions   acetaminophen 500 MG Tabs  Commonly known as: Tylenol   Take 2 Tablets by mouth 3 times a day as needed for Mild Pain or Moderate Pain for up to 30 days.  Dose: 1,000 mg     aspirin 81 MG Chew chewable tablet  Commonly known as: Asa   Chew 1 Tablet 2 times a day for 28 days.  Dose: 81 mg     cyclobenzaprine 5 mg tablet  Commonly known as: Flexeril   Take 1-2 Tablets by mouth 3 times a day as needed for Muscle Spasms.  Dose: 5-10 mg     doxycycline 100 MG capsule  Commonly known as: Monodox   Take 1 Capsule by mouth 2 times a day for 180 days.  Dose: 100 mg     oxyCODONE immediate-release 5 MG Tabs  Commonly known as: Roxicodone   Take 1-1.5 Tablets by mouth every 6 hours as needed for Severe Pain for up to 7 days.  Dose: 5-7.5 mg     traMADol 50 MG Tabs  Commonly known as: Ultram   Take 1-1.5 Tablets by mouth every 6 hours as needed for Moderate Pain for up to 7 days. Max 5 tablet per day  Dose: 50-75 mg            CONTINUE taking these medications        Instructions   ibuprofen 200 MG Tabs  Commonly known as: Motrin   Take 188 mg by mouth 2 times a day as needed.  Dose: 188 mg

## 2025-01-22 NOTE — PROGRESS NOTES
"Educated patient on discharge instructions, rx medications and follow up with Leon miller P.A.-C.  [address] [place] [address] [phone]    Go in 2 week(s)  For suture removal, For wound re-check    patient voiced understanding . VS stable /86   Pulse 68   Temp 36.6 °C (97.8 °F) (Temporal)   Resp 18   Ht 1.778 m (5' 10\")   Wt 88.3 kg (194 lb 10.7 oz)   SpO2 94%   BMI 27.93 kg/m²    Patient alert and appropriate. All questions and concerns addressed. No further questions or concerns at this time. Copy of discharge paperwork provided.  Patient out of department with transport in stable condition.    "

## 2025-01-22 NOTE — THERAPY
Physical Therapy   Initial Evaluation     Patient Name: Milton Aguila  Age:  59 y.o., Sex:  male  Medical Record #: [id number]  Today's Date: 1/22/2025     Precautions  Precautions: (P) Posterior Hip Precautions;Weight Bearing As Tolerated Right Lower Extremity    Assessment  Patient is a 59 y.o male  who was recently seen s/p right HERVE revision with WBAT orders for right LE and posterior hip precautions.   Pt was agreeable to therapy evaluation and was able to return demo safe upright functional mobility after therapy session.   Pt was able to return demo safe mechanics with bed mobility/sit<>stands and was able to maintain posterior hip precautions during transitions.    Pt was able to return demo correct mechanics with right LE placement prior to standing in order avoid breaking posterior hip precautions and was able to maintain precautions throughout session.   Pt was able to return demo correct gait mechanics with appropriate heel to toe mechanics and appropriate use of Ax Cr.  Pt has no acute skilled PT needs at this time, anticipate pt to d/c home once medically clear with recs for OP therapy services and use of Ax Cr upon d/c to home.     Plan         DC Equipment Recommendations: (P) None  Discharge Recommendations: (P) Recommend outpatient physical therapy services to address higher level deficits         Initial Contact Note    Initial Contact Note Order Received and Verified, Physical Therapy Evaluation in Progress with Full Report to Follow.   Precautions   Precautions Posterior Hip Precautions;Weight Bearing As Tolerated Right Lower Extremity   Pain 0 - 10 Group   Location Hip   Location Orientation Right   Pain Rating Scale (NPRS) 5   Therapist Pain Assessment During Activity;5   Prior Living Situation   Prior Services None   Housing / Facility 1 Story House   Equipment Owned Crutches   Lives with - Patient's Self Care Capacity Significant Other   Prior Level of Functional Mobility   Bed Mobility  Independent   Transfer Status Independent   Ambulation Independent   Assistive Devices Used Crutches   History of Falls   History of Falls No   Cognition    Level of Consciousness Alert   Balance Assessment   Sitting Balance (Static) Good   Sitting Balance (Dynamic) Good   Standing Balance (Static) Good   Standing Balance (Dynamic) Good   Weight Shift Sitting Good   Weight Shift Standing Good   Comments w/ Ax cr   Bed Mobility    Supine to Sit Independent   Sit to Supine Independent   Scooting Independent   Gait Analysis   Gait Level Of Assist Modified Independent   Assistive Device Crutches   Distance (Feet) 200   Deviation No deviation;Antalgic   Weight Bearing Status WBAT   Comments good step length, weight transfer   Functional Mobility   Sit to Stand Modified Independent   Bed, Chair, Wheelchair Transfer Modified Independent   6 Clicks Assessment - How much HELP from another person do you currently need... (If the patient hasn't done an activity recently, how much help from another person do you think he/she would need if he/she tried?)   Turning from your back to your side while in a flat bed without using bedrails? 4   Moving from lying on your back to sitting on the side of a flat bed without using bedrails? 4   Moving to and from a bed to a chair (including a wheelchair)? 4   Standing up from a chair using your arms (e.g., wheelchair, or bedside chair)? 4   Walking in hospital room? 4   Climbing 3-5 steps with a railing? 4   6 clicks Mobility Score 24   Education Group   Education Provided Gait Training;Role of Physical Therapist;Use of Assistive Device   Role of Physical Therapist Patient Response Patient;Acceptance;Explanation;Verbal Demonstration   Gait Training Patient Response Patient;Acceptance;Explanation;Action Demonstration   Use of Assistive Device Patient Response Patient;Acceptance;Explanation;Action Demonstration;Verbal Demonstration   Anticipated Discharge Equipment and Recommendations   DC  Equipment Recommendations None   Discharge Recommendations Recommend outpatient physical therapy services to address higher level deficits   Interdisciplinary Plan of Care Collaboration   IDT Collaboration with  Nursing;Occupational Therapist   Patient Position at End of Therapy   (up ad aimee in room)   Collaboration Comments no further PT needs, cleared for DC home   Session Information   Date / Session Number  1/22 Eval  only.

## 2025-01-22 NOTE — PROGRESS NOTES
Received bedside report from day shift nurse, Shara GALLO. Assumed pt care at 1915.   Pt is A&Ox4, resting comfortably in bed. Pt on room air; no signs of SOB/respiratory distress. Labs noted, VSS. Pt complains of pain at 7/10; prn pain medication given as per MAR at this moment. Needs attended well. Fall precautions in place. Bed at lowest position. Call light and personal belongings within reach. Encouraged to call for assistance. Plan of care on going, no further concerns as of present.   Hourly rounding in progress.

## 2025-01-22 NOTE — THERAPY
"Occupational Therapy   Initial Evaluation     Patient Name: Milton Aguila  Age:  59 y.o., Sex:  male  Medical Record #: [id number]  Today's Date: 1/22/2025     Precautions  Precautions: Posterior Hip Precautions, Weight Bearing As Tolerated Right Lower Extremity  Comments: Right THRevision    Assessment  Patient is 59 y.o. male with a diagnosis of Right Posterior THRevision.  Additional factors influencing patient status / progress: Posterior hip precautions, No Weight Bearing Restrictions.    Pt and girlfriend reside in a [facility] in the [place], [place]. SO is available to assist as needed.  PLOF Mod I to Indep for ADL's, transfers and ambulation w/out a device - Use of \"Hip\" kit for LB clothing management.  Pt demonstrated Mod I bed mobility, Mod I sit/stand, Sup FWW, SBA toilet transfer, Indep UBCM, CGA LBCM, Sup vlad.  Therapist reviewed environmental/safety awareness, fall precautions, Posterior hip precautions, joint protection, AE/DME, ADL's and transfers.      Plan    Occupational Therapy Initial Treatment Plan   Duration: Evaluation only       Discharge Recommendations: Anticipate that the patient will have no further occupational therapy needs after discharge from the hospital     Subjective    Pt was alert and cooperative w/ tx.     Objective       01/22/25 0850    Services   Is patient using  services for this encounter? No   Initial Contact Note    Initial Contact Note Order Received and Verified, Evaluation Only - Patient Does Not Require Further Acute Occupational Therapy at this Time.  However, May Benefit from Post Acute Therapy for Higher Level Functional Deficits.   Prior Living Situation   Prior Services None   Housing / Facility 1 Story House   Steps Into Home 1   Steps In Home 0   Bathroom Set up Walk In Shower   Equipment Owned Crutches;Dressing Stick;Reacher;Sock Aid;Long Handled Shoehorn   Lives with - Patient's Self Care Capacity Significant Other   Comments Pt " "and girlfriend reside in a [facility] in the [facility] [place], [place]. SO is available to assist as needed.  PLOF Mod I to Indep for ADL's, transfers and ambulation w/out a device - Use of \"Hip\" kit for LB clothing management.   Prior Level of ADL Function   Self Feeding Independent   Grooming / Hygiene Independent   Bathing Independent   Dressing Independent  (\"Hip\" kit for LBCM)   Toileting Independent   Prior Level of IADL Function   Medication Management Independent   Laundry Independent   Kitchen Mobility Independent   Finances Independent   Home Management Independent   Prior Level Of Mobility Independent Without Device in Home;Independent With Steps in Home   Driving / Transportation Driving Independent   History of Falls   History of Falls No   Precautions   Precautions Posterior Hip Precautions;No Weight Bearing Restrictions Right Lower Extremity   Comments Right THRevision   Vitals   Pulse Oximetry 95 %   O2 (LPM) 0   O2 Delivery Device None - Room Air   Pain   Intervention Cold Pack;Rest;Repositioned   Pain 0 - 10 Group   Location Hip   Location Orientation Right   Comfort Goal Comfort with Movement;Perform Activity   Non Verbal Descriptors   Non Verbal Scale  Calm;Unlabored Breathing   Cognition    Level of Consciousness Alert   Passive ROM Upper Body   Passive ROM Upper Body WDL   Active ROM Upper Body   Active ROM Upper Body  WDL   Dominant Hand Right   Strength Upper Body   Upper Body Strength  WDL   Upper Body Muscle Tone   Upper Body Muscle Tone  WDL   Coordination Upper Body   Coordination WDL   Balance Assessment   Sitting Balance (Static) Good   Sitting Balance (Dynamic) Good   Standing Balance (Static) Good   Standing Balance (Dynamic) Fair +   Weight Shift Sitting Good   Weight Shift Standing Good   Comments OOB FWW   Bed Mobility    Supine to Sit Modified Independent   Sit to Supine Modified Independent   ADL Assessment   Upper Body Dressing Independent   Lower Body Dressing Contact Guard Assist "   Toileting Supervision   How much help from another person does the patient currently need...   Putting on and taking off regular lower body clothing? 3   Bathing (including washing, rinsing, and drying)? 3   Toileting, which includes using a toilet, bedpan, or urinal? 3   Putting on and taking off regular upper body clothing? 4   Taking care of personal grooming such as brushing teeth? 4   Eating meals? 4   6 Clicks Daily Activity Score 21   Functional Mobility   Sit to Stand Modified Independent   Bed, Chair, Wheelchair Transfer Modified Independent   Toilet Transfers Standby Assist   Transfer Method Stand Step   Mobility Sup FWW   Distance (Feet) 15   # of Times Distance was Traveled 2   Edema / Skin Assessment   Comments Surgical site/dressing clean, dry and intact.  Review of wear/care of JOYCE dressing.   Education Group   Education Provided Hip Precautions;Joint Protection;Home Safety;Transfers;Role of Occupational Therapist;Activities of Daily Living;Adaptive Equipment;Use of Call Light   Role of Occupational Therapist Patient Response Patient;Acceptance;Explanation;Verbal Demonstration   Hip Precautions Patient Response Patient;Acceptance;Explanation;Demonstration;Handout;Teach Back;Verbal Demonstration;Action Demonstration   Joint Protection Patient Response Patient;Acceptance;Explanation;Demonstration;Handout;Teach Back;Verbal Demonstration;Action Demonstration   Home Safety Patient Response Patient;Acceptance;Explanation;Demonstration;Verbal Demonstration   Transfers Patient Response Patient;Acceptance;Explanation;Demonstration;Handout;Teach Back;Verbal Demonstration;Action Demonstration   ADL Patient Response Patient;Acceptance;Explanation;Demonstration;Teach Back;Verbal Demonstration;Action Demonstration   Adaptive Equipment Patient Response Patient;Acceptance;Explanation;Demonstration;Handout;Teach Back;Verbal Demonstration;Action Demonstration   Use of Call Light Patient Response  Patient;Acceptance;Explanation;Demonstration;Verbal Demonstration   Occupational Therapy Initial Treatment Plan    Duration Evaluation only   Anticipated Discharge Equipment and Recommendations   Discharge Recommendations Anticipate that the patient will have no further occupational therapy needs after discharge from the hospital

## 2025-01-22 NOTE — PROGRESS NOTES
" Subjective:    No overnight events.  Mobilizing well.  Pain reasonably well controlled.    Objective:  /68   Pulse 80   Temp 36.9 °C (98.4 °F) (Temporal)   Resp 16   Ht 1.778 m (5' 10\")   Wt 88.3 kg (194 lb 10.7 oz)   SpO2 94%     Recent Labs     01/22/25  0152   HEMOGLOBIN 9.5*   HEMATOCRIT 27.3*     Recent Labs     01/22/25  0152   SODIUM 134*   POTASSIUM 4.4   CHLORIDE 99   CO2 26   GLUCOSE 142*   BUN 16   CREATININE 0.95   CALCIUM 8.6         Intake/Output Summary (Last 24 hours) at 1/22/2025 0702  Last data filed at 1/22/2025 0640  Gross per 24 hour   Intake 2969.45 ml   Output 1730 ml   Net 1239.45 ml       Comfortable, no distress  Neurologically and vascularly intact with palpable pedal pulses bilaterally.  Dressing C/D/I    Assessment:    Doing well s/p total hip arthroplasty revision (2nd stage for infection).    Plan:    Diet as tolerated  WBAT with posterior precautions  OT/PT  Pain control/Ice   DVT ppx - ASA BID + Sequential Compression Devices  Plan to discharge home  Follow-up approximately 2 weeks post-op. [phone]    "

## 2025-02-11 ENCOUNTER — HOSPITAL ENCOUNTER (OUTPATIENT)
Dept: LAB | Facility: MEDICAL CENTER | Age: 60
End: 2025-02-11
Attending: STUDENT IN AN ORGANIZED HEALTH CARE EDUCATION/TRAINING PROGRAM
Payer: COMMERCIAL

## 2025-02-11 LAB
ALBUMIN SERPL BCP-MCNC: 4.2 G/DL (ref 3.2–4.9)
ALBUMIN/GLOB SERPL: 1.4 G/DL
ALP SERPL-CCNC: 78 U/L (ref 30–99)
ALT SERPL-CCNC: 15 U/L (ref 2–50)
ANION GAP SERPL CALC-SCNC: 12 MMOL/L (ref 7–16)
AST SERPL-CCNC: 21 U/L (ref 12–45)
BACTERIA SPEC ANAEROBE CULT: ABNORMAL
BACTERIA SPEC ANAEROBE CULT: ABNORMAL
BASOPHILS # BLD AUTO: 1.3 % (ref 0–1.8)
BASOPHILS # BLD: 0.05 K/UL (ref 0–0.12)
BILIRUB SERPL-MCNC: 0.5 MG/DL (ref 0.1–1.5)
BUN SERPL-MCNC: 11 MG/DL (ref 8–22)
CALCIUM ALBUM COR SERPL-MCNC: 9.1 MG/DL (ref 8.5–10.5)
CALCIUM SERPL-MCNC: 9.3 MG/DL (ref 8.5–10.5)
CHLORIDE SERPL-SCNC: 103 MMOL/L (ref 96–112)
CO2 SERPL-SCNC: 23 MMOL/L (ref 20–33)
CREAT SERPL-MCNC: 0.97 MG/DL (ref 0.5–1.4)
EOSINOPHIL # BLD AUTO: 0.2 K/UL (ref 0–0.51)
EOSINOPHIL NFR BLD: 5.3 % (ref 0–6.9)
ERYTHROCYTE [DISTWIDTH] IN BLOOD BY AUTOMATED COUNT: 41.1 FL (ref 35.9–50)
GFR SERPLBLD CREATININE-BSD FMLA CKD-EPI: 90 ML/MIN/1.73 M 2
GLOBULIN SER CALC-MCNC: 2.9 G/DL (ref 1.9–3.5)
GLUCOSE SERPL-MCNC: 98 MG/DL (ref 65–99)
HCT VFR BLD AUTO: 33.3 % (ref 42–52)
HGB BLD-MCNC: 11.2 G/DL (ref 14–18)
IMM GRANULOCYTES # BLD AUTO: 0.01 K/UL (ref 0–0.11)
IMM GRANULOCYTES NFR BLD AUTO: 0.3 % (ref 0–0.9)
INR PPP: 1.08 (ref 0.87–1.13)
LYMPHOCYTES # BLD AUTO: 1.31 K/UL (ref 1–4.8)
LYMPHOCYTES NFR BLD: 34.7 % (ref 22–41)
MCH RBC QN AUTO: 29.9 PG (ref 27–33)
MCHC RBC AUTO-ENTMCNC: 33.6 G/DL (ref 32.3–36.5)
MCV RBC AUTO: 88.8 FL (ref 81.4–97.8)
MONOCYTES # BLD AUTO: 0.44 K/UL (ref 0–0.85)
MONOCYTES NFR BLD AUTO: 11.6 % (ref 0–13.4)
NEUTROPHILS # BLD AUTO: 1.77 K/UL (ref 1.82–7.42)
NEUTROPHILS NFR BLD: 46.8 % (ref 44–72)
NRBC # BLD AUTO: 0 K/UL
NRBC BLD-RTO: 0 /100 WBC (ref 0–0.2)
PLATELET # BLD AUTO: 282 K/UL (ref 164–446)
PMV BLD AUTO: 9.5 FL (ref 9–12.9)
POTASSIUM SERPL-SCNC: 4.2 MMOL/L (ref 3.6–5.5)
PROT SERPL-MCNC: 7.1 G/DL (ref 6–8.2)
PROTHROMBIN TIME: 14 SEC (ref 12–14.6)
RBC # BLD AUTO: 3.75 M/UL (ref 4.7–6.1)
SIGNIFICANT IND 70042: ABNORMAL
SITE SITE: ABNORMAL
SODIUM SERPL-SCNC: 138 MMOL/L (ref 135–145)
SOURCE SOURCE: ABNORMAL
TEST NAME 95000: NORMAL
WBC # BLD AUTO: 3.8 K/UL (ref 4.8–10.8)

## 2025-02-11 PROCEDURE — 80053 COMPREHEN METABOLIC PANEL: CPT

## 2025-02-11 PROCEDURE — 85610 PROTHROMBIN TIME: CPT

## 2025-02-11 PROCEDURE — 36415 COLL VENOUS BLD VENIPUNCTURE: CPT

## 2025-02-11 PROCEDURE — 85025 COMPLETE CBC W/AUTO DIFF WBC: CPT

## 2025-02-13 ENCOUNTER — HOSPITAL ENCOUNTER (OUTPATIENT)
Dept: RADIOLOGY | Facility: MEDICAL CENTER | Age: 60
End: 2025-02-13
Attending: STUDENT IN AN ORGANIZED HEALTH CARE EDUCATION/TRAINING PROGRAM
Payer: COMMERCIAL

## 2025-02-13 DIAGNOSIS — T84.51XA INFECTION AND INFLAMMATORY REACTION DUE TO INTERNAL RIGHT HIP PROSTHESIS, INITIAL ENCOUNTER (HCC): ICD-10-CM

## 2025-02-13 PROCEDURE — C1751 CATH, INF, PER/CENT/MIDLINE: HCPCS

## 2025-02-13 NOTE — PROGRESS NOTES
Vascular Access Team     Date of Insertion: 02/13/2025  Arm Circumference: 32cm  Internal length: 50cm  External Length: 2cm  Vein Occupancy %: 28%   Reason for PICC: 6 weeks abx  Labs: WBC 3.8, , BUN 11, Cr 0.97, GFR 90, INR 1.08     Consents confirmed, vessel patency confirmed with ultrasound. Risks and benefits of procedure explained to patient and education regarding central line associated bloodstream infections provided. Questions answered.      PICC placed in [facility] per licensed provider order with ultrasound guidance.  5 Fr, double lumen PICC placed in basilic vein after 1 attempt(s). 2 mL of 1% lidocaine injected intradermally at the insertion site. A 21 gauge microintroducer needle was visualized entering the vein and modified Seldinger technique was used to obtain access to the vein. 52 cm catheter inserted and brisk blood return was observed from each lumen upon aspiration. Line secured at the 2 cm marker. TCS stylet removed and observed to be fully intact. Each lumen flushed using pulsatile method without resistance with 10 mL 0.9% normal saline. PICC line secured with Biopatch and Tegaderm.     PICC tip placement location is confirmed by nurse to be in the Superior Vena Cava (SVC) utilizing 3CG technology. PICC line is appropriate for use at this time. Patient tolerated procedure well, without complications.  Patient condition relayed to primary RN or ordering physician via this post procedure note in the EMR.      Ultrasound images uploaded to PACS and viewable in the EMR - yes  Ultrasound imaged printed and placed in paper chart - no     [employer] PICC ref # [id number], Lot # [id number], Expiration Date 10/31/2025

## 2025-02-18 ENCOUNTER — HOSPITAL ENCOUNTER (OUTPATIENT)
Facility: MEDICAL CENTER | Age: 60
End: 2025-02-18
Attending: STUDENT IN AN ORGANIZED HEALTH CARE EDUCATION/TRAINING PROGRAM
Payer: COMMERCIAL

## 2025-02-18 LAB
BASOPHILS # BLD AUTO: 0.5 % (ref 0–1.8)
BASOPHILS # BLD: 0.02 K/UL (ref 0–0.12)
EOSINOPHIL # BLD AUTO: 0.06 K/UL (ref 0–0.51)
EOSINOPHIL NFR BLD: 1.5 % (ref 0–6.9)
ERYTHROCYTE [DISTWIDTH] IN BLOOD BY AUTOMATED COUNT: 43.1 FL (ref 35.9–50)
HCT VFR BLD AUTO: 35.8 % (ref 42–52)
HGB BLD-MCNC: 11.7 G/DL (ref 14–18)
IMM GRANULOCYTES # BLD AUTO: 0.01 K/UL (ref 0–0.11)
IMM GRANULOCYTES NFR BLD AUTO: 0.3 % (ref 0–0.9)
LYMPHOCYTES # BLD AUTO: 0.45 K/UL (ref 1–4.8)
LYMPHOCYTES NFR BLD: 11.4 % (ref 22–41)
MCH RBC QN AUTO: 28.9 PG (ref 27–33)
MCHC RBC AUTO-ENTMCNC: 32.7 G/DL (ref 32.3–36.5)
MCV RBC AUTO: 88.4 FL (ref 81.4–97.8)
MONOCYTES # BLD AUTO: 0.11 K/UL (ref 0–0.85)
MONOCYTES NFR BLD AUTO: 2.8 % (ref 0–13.4)
NEUTROPHILS # BLD AUTO: 3.29 K/UL (ref 1.82–7.42)
NEUTROPHILS NFR BLD: 83.5 % (ref 44–72)
NRBC # BLD AUTO: 0 K/UL
NRBC BLD-RTO: 0 /100 WBC (ref 0–0.2)
PLATELET # BLD AUTO: 220 K/UL (ref 164–446)
PMV BLD AUTO: 10 FL (ref 9–12.9)
RBC # BLD AUTO: 4.05 M/UL (ref 4.7–6.1)
WBC # BLD AUTO: 3.9 K/UL (ref 4.8–10.8)

## 2025-02-18 PROCEDURE — 80053 COMPREHEN METABOLIC PANEL: CPT

## 2025-02-18 PROCEDURE — 85025 COMPLETE CBC W/AUTO DIFF WBC: CPT

## 2025-02-19 LAB
ALBUMIN SERPL BCP-MCNC: 4.1 G/DL (ref 3.2–4.9)
ALBUMIN/GLOB SERPL: 1.5 G/DL
ALP SERPL-CCNC: 73 U/L (ref 30–99)
ALT SERPL-CCNC: 14 U/L (ref 2–50)
ANION GAP SERPL CALC-SCNC: 12 MMOL/L (ref 7–16)
AST SERPL-CCNC: 25 U/L (ref 12–45)
BILIRUB SERPL-MCNC: 0.5 MG/DL (ref 0.1–1.5)
BUN SERPL-MCNC: 8 MG/DL (ref 8–22)
CALCIUM ALBUM COR SERPL-MCNC: 9.3 MG/DL (ref 8.5–10.5)
CALCIUM SERPL-MCNC: 9.4 MG/DL (ref 8.5–10.5)
CHLORIDE SERPL-SCNC: 105 MMOL/L (ref 96–112)
CO2 SERPL-SCNC: 22 MMOL/L (ref 20–33)
CREAT SERPL-MCNC: 0.93 MG/DL (ref 0.5–1.4)
GFR SERPLBLD CREATININE-BSD FMLA CKD-EPI: 95 ML/MIN/1.73 M 2
GLOBULIN SER CALC-MCNC: 2.8 G/DL (ref 1.9–3.5)
GLUCOSE SERPL-MCNC: 120 MG/DL (ref 65–99)
POTASSIUM SERPL-SCNC: 4.5 MMOL/L (ref 3.6–5.5)
PROT SERPL-MCNC: 6.9 G/DL (ref 6–8.2)
SODIUM SERPL-SCNC: 139 MMOL/L (ref 135–145)

## 2025-02-26 ENCOUNTER — HOSPITAL ENCOUNTER (OUTPATIENT)
Facility: MEDICAL CENTER | Age: 60
End: 2025-02-26
Attending: PHYSICIAN ASSISTANT
Payer: COMMERCIAL

## 2025-02-26 ENCOUNTER — HOSPITAL ENCOUNTER (OUTPATIENT)
Facility: MEDICAL CENTER | Age: 60
End: 2025-02-26
Attending: NURSE PRACTITIONER
Payer: COMMERCIAL

## 2025-02-26 LAB
ALBUMIN SERPL BCP-MCNC: 4 G/DL (ref 3.2–4.9)
ALBUMIN/GLOB SERPL: 1.4 G/DL
ALP SERPL-CCNC: 80 U/L (ref 30–99)
ALT SERPL-CCNC: 19 U/L (ref 2–50)
ANION GAP SERPL CALC-SCNC: 17 MMOL/L (ref 7–16)
AST SERPL-CCNC: 35 U/L (ref 12–45)
BASOPHILS # BLD AUTO: 0.9 % (ref 0–1.8)
BASOPHILS # BLD: 0.05 K/UL (ref 0–0.12)
BILIRUB SERPL-MCNC: 0.4 MG/DL (ref 0.1–1.5)
BUN SERPL-MCNC: 12 MG/DL (ref 8–22)
CALCIUM ALBUM COR SERPL-MCNC: 9.5 MG/DL (ref 8.5–10.5)
CALCIUM SERPL-MCNC: 9.5 MG/DL (ref 8.5–10.5)
CHLORIDE SERPL-SCNC: 102 MMOL/L (ref 96–112)
CO2 SERPL-SCNC: 17 MMOL/L (ref 20–33)
CREAT SERPL-MCNC: 0.79 MG/DL (ref 0.5–1.4)
EOSINOPHIL # BLD AUTO: 0.2 K/UL (ref 0–0.51)
EOSINOPHIL NFR BLD: 3.7 % (ref 0–6.9)
ERYTHROCYTE [DISTWIDTH] IN BLOOD BY AUTOMATED COUNT: 44.8 FL (ref 35.9–50)
FERRITIN SERPL-MCNC: 32.4 NG/ML (ref 22–322)
GFR SERPLBLD CREATININE-BSD FMLA CKD-EPI: 103 ML/MIN/1.73 M 2
GLOBULIN SER CALC-MCNC: 2.9 G/DL (ref 1.9–3.5)
GLUCOSE SERPL-MCNC: 74 MG/DL (ref 65–99)
HCT VFR BLD AUTO: 36.1 % (ref 42–52)
HGB BLD-MCNC: 11.6 G/DL (ref 14–18)
IMM GRANULOCYTES # BLD AUTO: 0.03 K/UL (ref 0–0.11)
IMM GRANULOCYTES NFR BLD AUTO: 0.6 % (ref 0–0.9)
LYMPHOCYTES # BLD AUTO: 1.18 K/UL (ref 1–4.8)
LYMPHOCYTES NFR BLD: 22 % (ref 22–41)
MCH RBC QN AUTO: 28.7 PG (ref 27–33)
MCHC RBC AUTO-ENTMCNC: 32.1 G/DL (ref 32.3–36.5)
MCV RBC AUTO: 89.4 FL (ref 81.4–97.8)
MONOCYTES # BLD AUTO: 0.71 K/UL (ref 0–0.85)
MONOCYTES NFR BLD AUTO: 13.2 % (ref 0–13.4)
NEUTROPHILS # BLD AUTO: 3.19 K/UL (ref 1.82–7.42)
NEUTROPHILS NFR BLD: 59.6 % (ref 44–72)
NRBC # BLD AUTO: 0 K/UL
NRBC BLD-RTO: 0 /100 WBC (ref 0–0.2)
PLATELET # BLD AUTO: 204 K/UL (ref 164–446)
PMV BLD AUTO: 9.5 FL (ref 9–12.9)
POTASSIUM SERPL-SCNC: 4.2 MMOL/L (ref 3.6–5.5)
PROT SERPL-MCNC: 6.9 G/DL (ref 6–8.2)
RBC # BLD AUTO: 4.04 M/UL (ref 4.7–6.1)
SODIUM SERPL-SCNC: 136 MMOL/L (ref 135–145)
WBC # BLD AUTO: 5.4 K/UL (ref 4.8–10.8)

## 2025-02-26 PROCEDURE — 82728 ASSAY OF FERRITIN: CPT

## 2025-02-26 PROCEDURE — 80053 COMPREHEN METABOLIC PANEL: CPT

## 2025-02-26 PROCEDURE — 85025 COMPLETE CBC W/AUTO DIFF WBC: CPT

## 2025-03-04 ENCOUNTER — HOSPITAL ENCOUNTER (OUTPATIENT)
Facility: MEDICAL CENTER | Age: 60
End: 2025-03-04
Attending: STUDENT IN AN ORGANIZED HEALTH CARE EDUCATION/TRAINING PROGRAM
Payer: COMMERCIAL

## 2025-03-04 LAB
ALBUMIN SERPL BCP-MCNC: 4.1 G/DL (ref 3.2–4.9)
ALBUMIN/GLOB SERPL: 1.5 G/DL
ALP SERPL-CCNC: 84 U/L (ref 30–99)
ALT SERPL-CCNC: 12 U/L (ref 2–50)
ANION GAP SERPL CALC-SCNC: 13 MMOL/L (ref 7–16)
AST SERPL-CCNC: 22 U/L (ref 12–45)
BASOPHILS # BLD AUTO: 1 % (ref 0–1.8)
BASOPHILS # BLD: 0.07 K/UL (ref 0–0.12)
BILIRUB SERPL-MCNC: 0.5 MG/DL (ref 0.1–1.5)
BUN SERPL-MCNC: 10 MG/DL (ref 8–22)
CALCIUM ALBUM COR SERPL-MCNC: 8.7 MG/DL (ref 8.5–10.5)
CALCIUM SERPL-MCNC: 8.8 MG/DL (ref 8.4–10.2)
CHLORIDE SERPL-SCNC: 105 MMOL/L (ref 96–112)
CO2 SERPL-SCNC: 20 MMOL/L (ref 20–33)
CREAT SERPL-MCNC: 0.84 MG/DL (ref 0.5–1.4)
EOSINOPHIL # BLD AUTO: 0.18 K/UL (ref 0–0.51)
EOSINOPHIL NFR BLD: 2.7 % (ref 0–6.9)
ERYTHROCYTE [DISTWIDTH] IN BLOOD BY AUTOMATED COUNT: 44.4 FL (ref 35.9–50)
GFR SERPLBLD CREATININE-BSD FMLA CKD-EPI: 101 ML/MIN/1.73 M 2
GLOBULIN SER CALC-MCNC: 2.7 G/DL (ref 1.9–3.5)
GLUCOSE SERPL-MCNC: 79 MG/DL (ref 65–99)
HCT VFR BLD AUTO: 36.1 % (ref 42–52)
HGB BLD-MCNC: 11.4 G/DL (ref 14–18)
IMM GRANULOCYTES # BLD AUTO: 0.03 K/UL (ref 0–0.11)
IMM GRANULOCYTES NFR BLD AUTO: 0.4 % (ref 0–0.9)
LYMPHOCYTES # BLD AUTO: 0.95 K/UL (ref 1–4.8)
LYMPHOCYTES NFR BLD: 14.2 % (ref 22–41)
MCH RBC QN AUTO: 28.4 PG (ref 27–33)
MCHC RBC AUTO-ENTMCNC: 31.6 G/DL (ref 32.3–36.5)
MCV RBC AUTO: 89.8 FL (ref 81.4–97.8)
MONOCYTES # BLD AUTO: 0.69 K/UL (ref 0–0.85)
MONOCYTES NFR BLD AUTO: 10.3 % (ref 0–13.4)
NEUTROPHILS # BLD AUTO: 4.78 K/UL (ref 1.82–7.42)
NEUTROPHILS NFR BLD: 71.4 % (ref 44–72)
NRBC # BLD AUTO: 0 K/UL
NRBC BLD-RTO: 0 /100 WBC (ref 0–0.2)
PLATELET # BLD AUTO: 245 K/UL (ref 164–446)
PMV BLD AUTO: 9.3 FL (ref 9–12.9)
POTASSIUM SERPL-SCNC: 4.1 MMOL/L (ref 3.6–5.5)
PROT SERPL-MCNC: 6.8 G/DL (ref 6–8.2)
RBC # BLD AUTO: 4.02 M/UL (ref 4.7–6.1)
SODIUM SERPL-SCNC: 138 MMOL/L (ref 135–145)
WBC # BLD AUTO: 6.7 K/UL (ref 4.8–10.8)

## 2025-03-04 PROCEDURE — 36415 COLL VENOUS BLD VENIPUNCTURE: CPT

## 2025-03-04 PROCEDURE — 85025 COMPLETE CBC W/AUTO DIFF WBC: CPT

## 2025-03-04 PROCEDURE — 80053 COMPREHEN METABOLIC PANEL: CPT

## 2025-03-11 ENCOUNTER — HOSPITAL ENCOUNTER (OUTPATIENT)
Facility: MEDICAL CENTER | Age: 60
End: 2025-03-11
Attending: PHYSICIAN ASSISTANT
Payer: COMMERCIAL

## 2025-03-11 LAB
ALBUMIN SERPL BCP-MCNC: 4 G/DL (ref 3.2–4.9)
ALBUMIN/GLOB SERPL: 1.7 G/DL
ALP SERPL-CCNC: 73 U/L (ref 30–99)
ALT SERPL-CCNC: 13 U/L (ref 2–50)
ANION GAP SERPL CALC-SCNC: 11 MMOL/L (ref 7–16)
AST SERPL-CCNC: 25 U/L (ref 12–45)
BASOPHILS # BLD AUTO: 0.9 % (ref 0–1.8)
BASOPHILS # BLD: 0.04 K/UL (ref 0–0.12)
BILIRUB SERPL-MCNC: 0.6 MG/DL (ref 0.1–1.5)
BUN SERPL-MCNC: 14 MG/DL (ref 8–22)
CALCIUM ALBUM COR SERPL-MCNC: 8.9 MG/DL (ref 8.5–10.5)
CALCIUM SERPL-MCNC: 8.9 MG/DL (ref 8.4–10.2)
CHLORIDE SERPL-SCNC: 105 MMOL/L (ref 96–112)
CO2 SERPL-SCNC: 21 MMOL/L (ref 20–33)
CREAT SERPL-MCNC: 0.84 MG/DL (ref 0.5–1.4)
EOSINOPHIL # BLD AUTO: 0.16 K/UL (ref 0–0.51)
EOSINOPHIL NFR BLD: 3.8 % (ref 0–6.9)
ERYTHROCYTE [DISTWIDTH] IN BLOOD BY AUTOMATED COUNT: 43.8 FL (ref 35.9–50)
GFR SERPLBLD CREATININE-BSD FMLA CKD-EPI: 101 ML/MIN/1.73 M 2
GLOBULIN SER CALC-MCNC: 2.3 G/DL (ref 1.9–3.5)
GLUCOSE SERPL-MCNC: 90 MG/DL (ref 65–99)
HCT VFR BLD AUTO: 35.6 % (ref 42–52)
HGB BLD-MCNC: 11.3 G/DL (ref 14–18)
IMM GRANULOCYTES # BLD AUTO: 0.01 K/UL (ref 0–0.11)
IMM GRANULOCYTES NFR BLD AUTO: 0.2 % (ref 0–0.9)
LYMPHOCYTES # BLD AUTO: 0.9 K/UL (ref 1–4.8)
LYMPHOCYTES NFR BLD: 21.2 % (ref 22–41)
MCH RBC QN AUTO: 28 PG (ref 27–33)
MCHC RBC AUTO-ENTMCNC: 31.7 G/DL (ref 32.3–36.5)
MCV RBC AUTO: 88.3 FL (ref 81.4–97.8)
MONOCYTES # BLD AUTO: 0.51 K/UL (ref 0–0.85)
MONOCYTES NFR BLD AUTO: 12 % (ref 0–13.4)
NEUTROPHILS # BLD AUTO: 2.63 K/UL (ref 1.82–7.42)
NEUTROPHILS NFR BLD: 61.9 % (ref 44–72)
NRBC # BLD AUTO: 0 K/UL
NRBC BLD-RTO: 0 /100 WBC (ref 0–0.2)
PLATELET # BLD AUTO: 259 K/UL (ref 164–446)
PMV BLD AUTO: 9.1 FL (ref 9–12.9)
POTASSIUM SERPL-SCNC: 4.1 MMOL/L (ref 3.6–5.5)
PROT SERPL-MCNC: 6.3 G/DL (ref 6–8.2)
RBC # BLD AUTO: 4.03 M/UL (ref 4.7–6.1)
SODIUM SERPL-SCNC: 137 MMOL/L (ref 135–145)
WBC # BLD AUTO: 4.3 K/UL (ref 4.8–10.8)

## 2025-03-11 PROCEDURE — 80053 COMPREHEN METABOLIC PANEL: CPT

## 2025-03-11 PROCEDURE — 85025 COMPLETE CBC W/AUTO DIFF WBC: CPT

## 2025-03-18 ENCOUNTER — HOSPITAL ENCOUNTER (OUTPATIENT)
Facility: MEDICAL CENTER | Age: 60
End: 2025-03-18
Attending: PHYSICIAN ASSISTANT
Payer: COMMERCIAL

## 2025-03-18 LAB
ALBUMIN SERPL BCP-MCNC: 4.4 G/DL (ref 3.2–4.9)
ALBUMIN/GLOB SERPL: 1.6 G/DL
ALP SERPL-CCNC: 70 U/L (ref 30–99)
ALT SERPL-CCNC: 14 U/L (ref 2–50)
ANION GAP SERPL CALC-SCNC: 14 MMOL/L (ref 7–16)
AST SERPL-CCNC: 25 U/L (ref 12–45)
BASOPHILS # BLD AUTO: 0.1 % (ref 0–1.8)
BASOPHILS # BLD: 0.01 K/UL (ref 0–0.12)
BILIRUB SERPL-MCNC: 0.6 MG/DL (ref 0.1–1.5)
BUN SERPL-MCNC: 13 MG/DL (ref 8–22)
CALCIUM ALBUM COR SERPL-MCNC: 9 MG/DL (ref 8.5–10.5)
CALCIUM SERPL-MCNC: 9.3 MG/DL (ref 8.4–10.2)
CHLORIDE SERPL-SCNC: 102 MMOL/L (ref 96–112)
CO2 SERPL-SCNC: 20 MMOL/L (ref 20–33)
CREAT SERPL-MCNC: 0.83 MG/DL (ref 0.5–1.4)
EOSINOPHIL # BLD AUTO: 0.01 K/UL (ref 0–0.51)
EOSINOPHIL NFR BLD: 0.1 % (ref 0–6.9)
ERYTHROCYTE [DISTWIDTH] IN BLOOD BY AUTOMATED COUNT: 42.8 FL (ref 35.9–50)
GFR SERPLBLD CREATININE-BSD FMLA CKD-EPI: 101 ML/MIN/1.73 M 2
GLOBULIN SER CALC-MCNC: 2.7 G/DL (ref 1.9–3.5)
GLUCOSE SERPL-MCNC: 92 MG/DL (ref 65–99)
HCT VFR BLD AUTO: 38.1 % (ref 42–52)
HGB BLD-MCNC: 12.2 G/DL (ref 14–18)
IMM GRANULOCYTES # BLD AUTO: 0.05 K/UL (ref 0–0.11)
IMM GRANULOCYTES NFR BLD AUTO: 0.5 % (ref 0–0.9)
LYMPHOCYTES # BLD AUTO: 1.03 K/UL (ref 1–4.8)
LYMPHOCYTES NFR BLD: 11.2 % (ref 22–41)
MCH RBC QN AUTO: 27.7 PG (ref 27–33)
MCHC RBC AUTO-ENTMCNC: 32 G/DL (ref 32.3–36.5)
MCV RBC AUTO: 86.6 FL (ref 81.4–97.8)
MONOCYTES # BLD AUTO: 0.78 K/UL (ref 0–0.85)
MONOCYTES NFR BLD AUTO: 8.5 % (ref 0–13.4)
NEUTROPHILS # BLD AUTO: 7.32 K/UL (ref 1.82–7.42)
NEUTROPHILS NFR BLD: 79.6 % (ref 44–72)
NRBC # BLD AUTO: 0 K/UL
NRBC BLD-RTO: 0 /100 WBC (ref 0–0.2)
PLATELET # BLD AUTO: 301 K/UL (ref 164–446)
PMV BLD AUTO: 9.5 FL (ref 9–12.9)
POTASSIUM SERPL-SCNC: 4.2 MMOL/L (ref 3.6–5.5)
PROT SERPL-MCNC: 7.1 G/DL (ref 6–8.2)
RBC # BLD AUTO: 4.4 M/UL (ref 4.7–6.1)
SODIUM SERPL-SCNC: 136 MMOL/L (ref 135–145)
WBC # BLD AUTO: 9.2 K/UL (ref 4.8–10.8)

## 2025-03-18 PROCEDURE — 82728 ASSAY OF FERRITIN: CPT

## 2025-03-18 PROCEDURE — 85025 COMPLETE CBC W/AUTO DIFF WBC: CPT

## 2025-03-18 PROCEDURE — 80053 COMPREHEN METABOLIC PANEL: CPT

## 2025-03-19 LAB — FERRITIN SERPL-MCNC: 27.9 NG/ML (ref 22–322)

## 2025-03-26 ENCOUNTER — HOSPITAL ENCOUNTER (OUTPATIENT)
Facility: MEDICAL CENTER | Age: 60
End: 2025-03-26
Attending: STUDENT IN AN ORGANIZED HEALTH CARE EDUCATION/TRAINING PROGRAM
Payer: COMMERCIAL

## 2025-03-26 LAB
ALBUMIN SERPL BCP-MCNC: 4.1 G/DL (ref 3.2–4.9)
ALBUMIN/GLOB SERPL: 1.6 G/DL
ALP SERPL-CCNC: 69 U/L (ref 30–99)
ALT SERPL-CCNC: 19 U/L (ref 2–50)
ANION GAP SERPL CALC-SCNC: 14 MMOL/L (ref 7–16)
AST SERPL-CCNC: 26 U/L (ref 12–45)
BASOPHILS # BLD AUTO: 0.9 % (ref 0–1.8)
BASOPHILS # BLD: 0.05 K/UL (ref 0–0.12)
BILIRUB SERPL-MCNC: 0.5 MG/DL (ref 0.1–1.5)
BUN SERPL-MCNC: 7 MG/DL (ref 8–22)
CALCIUM ALBUM COR SERPL-MCNC: 8.9 MG/DL (ref 8.5–10.5)
CALCIUM SERPL-MCNC: 9 MG/DL (ref 8.5–10.5)
CHLORIDE SERPL-SCNC: 104 MMOL/L (ref 96–112)
CO2 SERPL-SCNC: 19 MMOL/L (ref 20–33)
CREAT SERPL-MCNC: 0.81 MG/DL (ref 0.5–1.4)
EOSINOPHIL # BLD AUTO: 0.14 K/UL (ref 0–0.51)
EOSINOPHIL NFR BLD: 2.5 % (ref 0–6.9)
ERYTHROCYTE [DISTWIDTH] IN BLOOD BY AUTOMATED COUNT: 43.4 FL (ref 35.9–50)
GFR SERPLBLD CREATININE-BSD FMLA CKD-EPI: 102 ML/MIN/1.73 M 2
GLOBULIN SER CALC-MCNC: 2.5 G/DL (ref 1.9–3.5)
GLUCOSE SERPL-MCNC: 81 MG/DL (ref 65–99)
HCT VFR BLD AUTO: 36.7 % (ref 42–52)
HGB BLD-MCNC: 11.7 G/DL (ref 14–18)
IMM GRANULOCYTES # BLD AUTO: 0.03 K/UL (ref 0–0.11)
IMM GRANULOCYTES NFR BLD AUTO: 0.5 % (ref 0–0.9)
LYMPHOCYTES # BLD AUTO: 0.89 K/UL (ref 1–4.8)
LYMPHOCYTES NFR BLD: 15.7 % (ref 22–41)
MCH RBC QN AUTO: 27.1 PG (ref 27–33)
MCHC RBC AUTO-ENTMCNC: 31.9 G/DL (ref 32.3–36.5)
MCV RBC AUTO: 85.2 FL (ref 81.4–97.8)
MONOCYTES # BLD AUTO: 0.72 K/UL (ref 0–0.85)
MONOCYTES NFR BLD AUTO: 12.7 % (ref 0–13.4)
NEUTROPHILS # BLD AUTO: 3.84 K/UL (ref 1.82–7.42)
NEUTROPHILS NFR BLD: 67.7 % (ref 44–72)
NRBC # BLD AUTO: 0 K/UL
NRBC BLD-RTO: 0 /100 WBC (ref 0–0.2)
PLATELET # BLD AUTO: 272 K/UL (ref 164–446)
PMV BLD AUTO: 9.8 FL (ref 9–12.9)
POTASSIUM SERPL-SCNC: 7.9 MMOL/L (ref 3.6–5.5)
PROT SERPL-MCNC: 6.6 G/DL (ref 6–8.2)
RBC # BLD AUTO: 4.31 M/UL (ref 4.7–6.1)
SODIUM SERPL-SCNC: 137 MMOL/L (ref 135–145)
WBC # BLD AUTO: 5.7 K/UL (ref 4.8–10.8)

## 2025-03-26 PROCEDURE — 80053 COMPREHEN METABOLIC PANEL: CPT

## 2025-03-26 PROCEDURE — 36415 COLL VENOUS BLD VENIPUNCTURE: CPT

## 2025-03-26 PROCEDURE — 85025 COMPLETE CBC W/AUTO DIFF WBC: CPT

## 2025-03-27 ENCOUNTER — HOSPITAL ENCOUNTER (EMERGENCY)
Facility: MEDICAL CENTER | Age: 60
End: 2025-03-27
Attending: EMERGENCY MEDICINE
Payer: COMMERCIAL

## 2025-03-27 VITALS
TEMPERATURE: 97.9 F | DIASTOLIC BLOOD PRESSURE: 85 MMHG | BODY MASS INDEX: 29.92 KG/M2 | HEART RATE: 68 BPM | SYSTOLIC BLOOD PRESSURE: 120 MMHG | RESPIRATION RATE: 12 BRPM | OXYGEN SATURATION: 96 % | WEIGHT: 209 LBS | HEIGHT: 70 IN

## 2025-03-27 DIAGNOSIS — R89.9 ABNORMAL LABORATORY TEST: ICD-10-CM

## 2025-03-27 LAB
ALBUMIN SERPL BCP-MCNC: 4.2 G/DL (ref 3.2–4.9)
ALBUMIN/GLOB SERPL: 1.6 G/DL
ALP SERPL-CCNC: 84 U/L (ref 30–99)
ALT SERPL-CCNC: 20 U/L (ref 2–50)
ANION GAP SERPL CALC-SCNC: 11 MMOL/L (ref 7–16)
AST SERPL-CCNC: 23 U/L (ref 12–45)
BASOPHILS # BLD AUTO: 1.3 % (ref 0–1.8)
BASOPHILS # BLD: 0.06 K/UL (ref 0–0.12)
BILIRUB SERPL-MCNC: 0.5 MG/DL (ref 0.1–1.5)
BUN SERPL-MCNC: 8 MG/DL (ref 8–22)
CALCIUM ALBUM COR SERPL-MCNC: 9 MG/DL (ref 8.5–10.5)
CALCIUM SERPL-MCNC: 9.2 MG/DL (ref 8.5–10.5)
CHLORIDE SERPL-SCNC: 105 MMOL/L (ref 96–112)
CO2 SERPL-SCNC: 22 MMOL/L (ref 20–33)
CREAT SERPL-MCNC: 0.86 MG/DL (ref 0.5–1.4)
EKG IMPRESSION: NORMAL
EOSINOPHIL # BLD AUTO: 0.17 K/UL (ref 0–0.51)
EOSINOPHIL NFR BLD: 3.7 % (ref 0–6.9)
ERYTHROCYTE [DISTWIDTH] IN BLOOD BY AUTOMATED COUNT: 42.2 FL (ref 35.9–50)
FERRITIN SERPL-MCNC: 22.5 NG/ML (ref 22–322)
GFR SERPLBLD CREATININE-BSD FMLA CKD-EPI: 100 ML/MIN/1.73 M 2
GLOBULIN SER CALC-MCNC: 2.6 G/DL (ref 1.9–3.5)
GLUCOSE SERPL-MCNC: 95 MG/DL (ref 65–99)
HCT VFR BLD AUTO: 37.8 % (ref 42–52)
HGB BLD-MCNC: 12.2 G/DL (ref 14–18)
IMM GRANULOCYTES # BLD AUTO: 0.02 K/UL (ref 0–0.11)
IMM GRANULOCYTES NFR BLD AUTO: 0.4 % (ref 0–0.9)
LYMPHOCYTES # BLD AUTO: 0.89 K/UL (ref 1–4.8)
LYMPHOCYTES NFR BLD: 19.4 % (ref 22–41)
MCH RBC QN AUTO: 27.1 PG (ref 27–33)
MCHC RBC AUTO-ENTMCNC: 32.3 G/DL (ref 32.3–36.5)
MCV RBC AUTO: 84 FL (ref 81.4–97.8)
MONOCYTES # BLD AUTO: 0.93 K/UL (ref 0–0.85)
MONOCYTES NFR BLD AUTO: 20.3 % (ref 0–13.4)
NEUTROPHILS # BLD AUTO: 2.52 K/UL (ref 1.82–7.42)
NEUTROPHILS NFR BLD: 54.9 % (ref 44–72)
NRBC # BLD AUTO: 0 K/UL
NRBC BLD-RTO: 0 /100 WBC (ref 0–0.2)
PLATELET # BLD AUTO: 251 K/UL (ref 164–446)
PMV BLD AUTO: 9 FL (ref 9–12.9)
POTASSIUM SERPL-SCNC: 4.1 MMOL/L (ref 3.6–5.5)
PROT SERPL-MCNC: 6.8 G/DL (ref 6–8.2)
RBC # BLD AUTO: 4.5 M/UL (ref 4.7–6.1)
SODIUM SERPL-SCNC: 138 MMOL/L (ref 135–145)
WBC # BLD AUTO: 4.6 K/UL (ref 4.8–10.8)

## 2025-03-27 PROCEDURE — 80053 COMPREHEN METABOLIC PANEL: CPT

## 2025-03-27 PROCEDURE — 93005 ELECTROCARDIOGRAM TRACING: CPT | Mod: TC | Performed by: EMERGENCY MEDICINE

## 2025-03-27 PROCEDURE — 85025 COMPLETE CBC W/AUTO DIFF WBC: CPT

## 2025-03-27 PROCEDURE — 82728 ASSAY OF FERRITIN: CPT

## 2025-03-27 PROCEDURE — 99283 EMERGENCY DEPT VISIT LOW MDM: CPT

## 2025-03-27 PROCEDURE — 93005 ELECTROCARDIOGRAM TRACING: CPT | Mod: TC

## 2025-03-27 PROCEDURE — 36415 COLL VENOUS BLD VENIPUNCTURE: CPT

## 2025-03-27 ASSESSMENT — FIBROSIS 4 INDEX: FIB4 SCORE: 1.27

## 2025-03-27 NOTE — ED TRIAGE NOTES
"Chief Complaint   Patient presents with    Abnormal Labs     Pt received results from blood work done yesterday, K 7.9. Denies chest pain/SOB. Denies palpitations.   PICC line to JENNIFER, states on long term abx for hip infection.        Pt ambulated with steady gait to triage. Pt A&Ox4, on room air. Pt states on hx of RA, R hip surgery 2 months ago.     Charge RN notified, pt to RD11.     BP (!) 124/107   Pulse 78   Temp 36.7 °C (98 °F) (Temporal)   Resp 16   Ht 1.778 m (5' 10\")   Wt 94.8 kg (208 lb 15.9 oz)   SpO2 96%   BMI 29.99 kg/m²     "

## 2025-03-27 NOTE — DISCHARGE INSTRUCTIONS
Your potassium level was normal, the lab you had checked as an outpatient was likely spurious.  If you develop significant weakness numbness cannot stop vomiting or any other concerns return to the emergency department.  Your workup today was very reassuring

## 2025-03-27 NOTE — ED PROVIDER NOTES
ED Provider Note    CHIEF COMPLAINT  Chief Complaint   Patient presents with    Abnormal Labs     Pt received results from blood work done yesterday, K 7.9. Denies chest pain/SOB. Denies palpitations.   PICC line to JENNIFER,  on long term abx for hip infection.        EXTERNAL RECORDS REVIEWED  Outpatient labs & studies outpatient labs performed yesterday, potassium of 7.9, creatinine was normal.  Of note patient with labs 3/18/2025 with normal potassium    HPI/ROS      Milton Aguila is a 59 y.o. male who presents with possible hyperkalemia.  Patient has a PICC line in place for IV antibiotics for joint infection of replaced right hip.  He has been doing very well from the standpoint and does not have any fevers or significant pain..  He has routine labs checked weekly.  He had labs checked yesterday which revealed a potassium of 7.9 but normal hemoglobin, he was sent here for further evaluation.  Patient denies any current symptoms.  No vomiting, no weakness, no chest pain or shortness of breath.  Patient also asking for a ferritin level to be checked as this is a standing order for him and he would like this checked here so he does not have to go back to the lab later today    PAST MEDICAL HISTORY   has a past medical history of Arthritis, Chickenpox, Hemochromatosis, Hereditary hemochromatosis (HCC) (03/10/2023), Recurrent genital HSV (herpes simplex virus) infection (08/22/2013), and Right hip pain (01/14/2025).    SURGICAL HISTORY   has a past surgical history that includes inguinal hernia repair (1973); appendectomy (01/01/2000); lumbar laminectomy diskectomy (02/07/2013); irrigation & debridement general (Left, 08/20/2024); drainage of hip joint (Right, 11/22/2024); exc tumor soft tissue pelvis & hip subfas* (11/22/2024); revise total hip replacement (11/22/2024); hip arthroplasty total (Right); hip arthroplasty total (Left); dental surgery; and hip revision total (Right, 1/21/2025).    FAMILY  "HISTORY  History reviewed. No pertinent family history.    SOCIAL HISTORY  Social History     Tobacco Use    Smoking status: Never     Passive exposure: Never    Smokeless tobacco: Never   Vaping Use    Vaping status: Never Used   Substance and Sexual Activity    Alcohol use: Not Currently     Alcohol/week: 2.4 oz     Types: 4 Glasses of wine per week     Comment: sometimes    Drug use: Yes     Types: Oral     Comment: marijuana    Sexual activity: Not on file       CURRENT MEDICATIONS  Home Medications       Reviewed by Wanda Jimenez R.N. (Registered Nurse) on 25 at 0644  Med List Status: Partial     Medication Last Dose Status   amoxicillin (AMOXIL) 500 MG Cap  Active   cyclobenzaprine (FLEXERIL) 5 mg tablet  Active   doxycycline (MONODOX) 100 MG capsule  Active   ibuprofen (MOTRIN) 200 MG Tab  Active   ondansetron (ZOFRAN) 8 MG Tab  Active                    ALLERGIES  No Known Allergies    PHYSICAL EXAM  VITAL SIGNS: BP (!) 124/107   Pulse 78   Temp 36.7 °C (98 °F) (Temporal)   Resp 16   Ht 1.778 m (5' 10\")   Wt 94.8 kg (208 lb 15.9 oz)   SpO2 96%   BMI 29.99 kg/m²    Physical Exam  Constitutional:       Appearance: Normal appearance.   HENT:      Mouth/Throat:      Mouth: Mucous membranes are moist.   Pulmonary:      Effort: Pulmonary effort is normal.   Neurological:      General: No focal deficit present.      Mental Status: He is alert and oriented to person, place, and time.   Psychiatric:         Mood and Affect: Mood normal.           EKG/LABS  Results for orders placed or performed during the hospital encounter of 25   EKG    Collection Time: 25  6:39 AM   Result Value Ref Range    Report       [facility] Emergency Dept.    Test Date:  2025  Pt Name:    NELDA RIVER                 Department: ER  MRN:        [id number]                      Room:  Gender:     Male                         Technician: [id number]  :        1965                   Requested " By:ER TRIAGE PROTOCOL  Order #:    [id number]                    Reading MD:    Measurements  Intervals                                Axis  Rate:       69                           P:          38  VA:         179                          QRS:        -59  QRSD:       101                          T:          62  QT:         397  QTc:        426    Interpretive Statements  Sinus rhythm  Left anterior fascicular block  Minimal ST elevation, anterior leads  Compared to ECG 01/30/2013 11:33:09  ST (T wave) deviation now present     CBC WITH DIFFERENTIAL    Collection Time: 03/27/25  6:59 AM   Result Value Ref Range    WBC 4.6 (L) 4.8 - 10.8 K/uL    RBC 4.50 (L) 4.70 - 6.10 M/uL    Hemoglobin 12.2 (L) 14.0 - 18.0 g/dL    Hematocrit 37.8 (L) 42.0 - 52.0 %    MCV 84.0 81.4 - 97.8 fL    MCH 27.1 27.0 - 33.0 pg    MCHC 32.3 32.3 - 36.5 g/dL    RDW 42.2 35.9 - 50.0 fL    Platelet Count 251 164 - 446 K/uL    MPV 9.0 9.0 - 12.9 fL    Neutrophils-Polys 54.90 44.00 - 72.00 %    Lymphocytes 19.40 (L) 22.00 - 41.00 %    Monocytes 20.30 (H) 0.00 - 13.40 %    Eosinophils 3.70 0.00 - 6.90 %    Basophils 1.30 0.00 - 1.80 %    Immature Granulocytes 0.40 0.00 - 0.90 %    Nucleated RBC 0.00 0.00 - 0.20 /100 WBC    Neutrophils (Absolute) 2.52 1.82 - 7.42 K/uL    Lymphs (Absolute) 0.89 (L) 1.00 - 4.80 K/uL    Monos (Absolute) 0.93 (H) 0.00 - 0.85 K/uL    Eos (Absolute) 0.17 0.00 - 0.51 K/uL    Baso (Absolute) 0.06 0.00 - 0.12 K/uL    Immature Granulocytes (abs) 0.02 0.00 - 0.11 K/uL    NRBC (Absolute) 0.00 K/uL   CMP    Collection Time: 03/27/25  6:59 AM   Result Value Ref Range    Sodium 138 135 - 145 mmol/L    Potassium 4.1 3.6 - 5.5 mmol/L    Chloride 105 96 - 112 mmol/L    Co2 22 20 - 33 mmol/L    Anion Gap 11.0 7.0 - 16.0    Glucose 95 65 - 99 mg/dL    Bun 8 8 - 22 mg/dL    Creatinine 0.86 0.50 - 1.40 mg/dL    Calcium 9.2 8.5 - 10.5 mg/dL    Correct Calcium 9.0 8.5 - 10.5 mg/dL    AST(SGOT) 23 12 - 45 U/L    ALT(SGPT) 20 2 - 50 U/L     Alkaline Phosphatase 84 30 - 99 U/L    Total Bilirubin 0.5 0.1 - 1.5 mg/dL    Albumin 4.2 3.2 - 4.9 g/dL    Total Protein 6.8 6.0 - 8.2 g/dL    Globulin 2.6 1.9 - 3.5 g/dL    A-G Ratio 1.6 g/dL   FERRITIN    Collection Time: 03/27/25  6:59 AM   Result Value Ref Range    Ferritin 22.5 22.0 - 322.0 ng/mL   ESTIMATED GFR    Collection Time: 03/27/25  6:59 AM   Result Value Ref Range    GFR (CKD-EPI) 100 >60 mL/min/1.73 m 2     *Note: Due to a large number of results and/or encounters for the requested time period, some results have not been displayed. A complete set of results can be found in Results Review.           COURSE & MEDICAL DECISION MAKING    ASSESSMENT, COURSE AND PLAN  Care Narrative: Very well-appearing patient here with possible hyperkalemia.  My suspicion of this is low, he is entirely asymptomatic and has normal kidney function on his laboratory workup which would make hyperkalemia significantly less likely.  Patient will have his labs rechecked to confirm.  Check the ferritin level per patient's request, he understands this will not return during the ER visit and can follow-up as an outpatient regarding results.  His EKG fails reveal any evidence of changes of hyperkalemia  Potassium is normal on repeat labs.  Initial lab almost assuredly spurious.  Patient remains asymptomatic.  Patient discharged in good condition              FINAL DIAGNOSIS  1. Abnormal laboratory test

## 2025-03-27 NOTE — ED NOTES
Report rec'd from CLEO Abrams.  Pt resting in [facility].  Pt wife to bedside.  Pt on monitors and on RA.   Warm blanket provided.  Call light in reach.

## 2025-06-02 PROBLEM — M21.612 BUNION, LEFT FOOT: Status: ACTIVE | Noted: 2025-06-02

## 2025-06-10 ENCOUNTER — HOSPITAL ENCOUNTER (OUTPATIENT)
Dept: LAB | Facility: MEDICAL CENTER | Age: 60
End: 2025-06-10
Attending: NURSE PRACTITIONER
Payer: COMMERCIAL

## 2025-06-10 ENCOUNTER — HOSPITAL ENCOUNTER (OUTPATIENT)
Dept: LAB | Facility: MEDICAL CENTER | Age: 60
End: 2025-06-10
Attending: INTERNAL MEDICINE
Payer: COMMERCIAL

## 2025-06-10 DIAGNOSIS — E83.110 HEREDITARY HEMOCHROMATOSIS (HCC): ICD-10-CM

## 2025-06-10 DIAGNOSIS — M05.79 RHEUMATOID ARTHRITIS INVOLVING MULTIPLE SITES WITH POSITIVE RHEUMATOID FACTOR (HCC): ICD-10-CM

## 2025-06-10 DIAGNOSIS — Z79.620 ENCOUNTER FOR MONITORING OF ETANERCEPT THERAPY: ICD-10-CM

## 2025-06-10 DIAGNOSIS — Z79.52 LONG TERM CURRENT USE OF SYSTEMIC STEROIDS: ICD-10-CM

## 2025-06-10 DIAGNOSIS — Z51.81 ENCOUNTER FOR MONITORING OF ETANERCEPT THERAPY: ICD-10-CM

## 2025-06-10 LAB
ALBUMIN SERPL BCP-MCNC: 4.3 G/DL (ref 3.2–4.9)
ALBUMIN/GLOB SERPL: 1.7 G/DL
ALP SERPL-CCNC: 66 U/L (ref 30–99)
ALT SERPL-CCNC: 17 U/L (ref 2–50)
ANION GAP SERPL CALC-SCNC: 11 MMOL/L (ref 7–16)
AST SERPL-CCNC: 22 U/L (ref 12–45)
BASOPHILS # BLD AUTO: 0.7 % (ref 0–1.8)
BASOPHILS # BLD AUTO: 0.7 % (ref 0–1.8)
BASOPHILS # BLD: 0.03 K/UL (ref 0–0.12)
BASOPHILS # BLD: 0.03 K/UL (ref 0–0.12)
BILIRUB SERPL-MCNC: 1.1 MG/DL (ref 0.1–1.5)
BUN SERPL-MCNC: 16 MG/DL (ref 8–22)
CALCIUM ALBUM COR SERPL-MCNC: 9 MG/DL (ref 8.5–10.5)
CALCIUM SERPL-MCNC: 9.2 MG/DL (ref 8.5–10.5)
CHLORIDE SERPL-SCNC: 103 MMOL/L (ref 96–112)
CO2 SERPL-SCNC: 23 MMOL/L (ref 20–33)
CREAT SERPL-MCNC: 1.01 MG/DL (ref 0.5–1.4)
EOSINOPHIL # BLD AUTO: 0.17 K/UL (ref 0–0.51)
EOSINOPHIL # BLD AUTO: 0.18 K/UL (ref 0–0.51)
EOSINOPHIL NFR BLD: 3.9 % (ref 0–6.9)
EOSINOPHIL NFR BLD: 4 % (ref 0–6.9)
ERYTHROCYTE [DISTWIDTH] IN BLOOD BY AUTOMATED COUNT: 51 FL (ref 35.9–50)
ERYTHROCYTE [DISTWIDTH] IN BLOOD BY AUTOMATED COUNT: 51.7 FL (ref 35.9–50)
FERRITIN SERPL-MCNC: 29.1 NG/ML (ref 22–322)
GFR SERPLBLD CREATININE-BSD FMLA CKD-EPI: 86 ML/MIN/1.73 M 2
GLOBULIN SER CALC-MCNC: 2.5 G/DL (ref 1.9–3.5)
GLUCOSE SERPL-MCNC: 84 MG/DL (ref 65–99)
HCT VFR BLD AUTO: 43.3 % (ref 42–52)
HCT VFR BLD AUTO: 43.7 % (ref 42–52)
HGB BLD-MCNC: 14.2 G/DL (ref 14–18)
HGB BLD-MCNC: 14.3 G/DL (ref 14–18)
IMM GRANULOCYTES # BLD AUTO: 0.01 K/UL (ref 0–0.11)
IMM GRANULOCYTES # BLD AUTO: 0.02 K/UL (ref 0–0.11)
IMM GRANULOCYTES NFR BLD AUTO: 0.2 % (ref 0–0.9)
IMM GRANULOCYTES NFR BLD AUTO: 0.5 % (ref 0–0.9)
LYMPHOCYTES # BLD AUTO: 1.4 K/UL (ref 1–4.8)
LYMPHOCYTES # BLD AUTO: 1.41 K/UL (ref 1–4.8)
LYMPHOCYTES NFR BLD: 31.5 % (ref 22–41)
LYMPHOCYTES NFR BLD: 32.3 % (ref 22–41)
MCH RBC QN AUTO: 27.5 PG (ref 27–33)
MCH RBC QN AUTO: 27.8 PG (ref 27–33)
MCHC RBC AUTO-ENTMCNC: 32.7 G/DL (ref 32.3–36.5)
MCHC RBC AUTO-ENTMCNC: 32.8 G/DL (ref 32.3–36.5)
MCV RBC AUTO: 83.9 FL (ref 81.4–97.8)
MCV RBC AUTO: 84.9 FL (ref 81.4–97.8)
MONOCYTES # BLD AUTO: 0.56 K/UL (ref 0–0.85)
MONOCYTES # BLD AUTO: 0.63 K/UL (ref 0–0.85)
MONOCYTES NFR BLD AUTO: 12.8 % (ref 0–13.4)
MONOCYTES NFR BLD AUTO: 14.2 % (ref 0–13.4)
NEUTROPHILS # BLD AUTO: 2.18 K/UL (ref 1.82–7.42)
NEUTROPHILS # BLD AUTO: 2.2 K/UL (ref 1.82–7.42)
NEUTROPHILS NFR BLD: 49.4 % (ref 44–72)
NEUTROPHILS NFR BLD: 49.8 % (ref 44–72)
NRBC # BLD AUTO: 0 K/UL
NRBC # BLD AUTO: 0 K/UL
NRBC BLD-RTO: 0 /100 WBC (ref 0–0.2)
NRBC BLD-RTO: 0 /100 WBC (ref 0–0.2)
PLATELET # BLD AUTO: 236 K/UL (ref 164–446)
PLATELET # BLD AUTO: 242 K/UL (ref 164–446)
PMV BLD AUTO: 9.7 FL (ref 9–12.9)
PMV BLD AUTO: 9.7 FL (ref 9–12.9)
POTASSIUM SERPL-SCNC: 4 MMOL/L (ref 3.6–5.5)
PROT SERPL-MCNC: 6.8 G/DL (ref 6–8.2)
RBC # BLD AUTO: 5.15 M/UL (ref 4.7–6.1)
RBC # BLD AUTO: 5.16 M/UL (ref 4.7–6.1)
SODIUM SERPL-SCNC: 137 MMOL/L (ref 135–145)
WBC # BLD AUTO: 4.4 K/UL (ref 4.8–10.8)
WBC # BLD AUTO: 4.5 K/UL (ref 4.8–10.8)

## 2025-06-10 PROCEDURE — 85025 COMPLETE CBC W/AUTO DIFF WBC: CPT

## 2025-06-10 PROCEDURE — 80053 COMPREHEN METABOLIC PANEL: CPT

## 2025-06-10 PROCEDURE — 36415 COLL VENOUS BLD VENIPUNCTURE: CPT

## 2025-06-10 PROCEDURE — 85025 COMPLETE CBC W/AUTO DIFF WBC: CPT | Mod: 91

## 2025-06-10 PROCEDURE — 82728 ASSAY OF FERRITIN: CPT

## 2025-06-12 ENCOUNTER — HOSPITAL ENCOUNTER (OUTPATIENT)
Dept: HEMATOLOGY ONCOLOGY | Facility: MEDICAL CENTER | Age: 60
End: 2025-06-12
Attending: NURSE PRACTITIONER
Payer: COMMERCIAL

## 2025-06-12 VITALS
SYSTOLIC BLOOD PRESSURE: 115 MMHG | DIASTOLIC BLOOD PRESSURE: 80 MMHG | HEIGHT: 70 IN | RESPIRATION RATE: 24 BRPM | WEIGHT: 195 LBS | BODY MASS INDEX: 27.92 KG/M2 | OXYGEN SATURATION: 96 % | HEART RATE: 100 BPM

## 2025-06-12 DIAGNOSIS — Z09 ENCOUNTER FOR HEMATOLOGY FOLLOW-UP: ICD-10-CM

## 2025-06-12 DIAGNOSIS — E83.110 HEREDITARY HEMOCHROMATOSIS (HCC): ICD-10-CM

## 2025-06-12 DIAGNOSIS — Z79.2 CHRONIC ANTIBIOTIC SUPPRESSION: ICD-10-CM

## 2025-06-12 DIAGNOSIS — M05.9 SEROPOSITIVE RHEUMATOID ARTHRITIS (HCC): ICD-10-CM

## 2025-06-12 PROCEDURE — 99214 OFFICE O/P EST MOD 30 MIN: CPT | Performed by: NURSE PRACTITIONER

## 2025-06-12 PROCEDURE — 99212 OFFICE O/P EST SF 10 MIN: CPT | Performed by: NURSE PRACTITIONER

## 2025-06-12 ASSESSMENT — ENCOUNTER SYMPTOMS
INSOMNIA: 0
PALPITATIONS: 0
MYALGIAS: 0
DIZZINESS: 0
TINGLING: 0
FEVER: 0
VOMITING: 0
CHILLS: 0
NAUSEA: 0
DIARRHEA: 0
WHEEZING: 0
SHORTNESS OF BREATH: 0
HEADACHES: 0
BLOOD IN STOOL: 0
WEIGHT LOSS: 0
CONSTIPATION: 0
COUGH: 0

## 2025-06-12 ASSESSMENT — FIBROSIS 4 INDEX: FIB4 SCORE: 1.31

## 2025-06-12 NOTE — PROGRESS NOTES
Subjective     Jan Aguila is a 59 y.o. male who presents with Follow-Up (BATISTA/6 month fv)            HPI  Mr. Aguila presents for evaluation of hereditary hemochromatosis: Homozygous for C282Y. He is unaccompanied for today's visit.     Patient with history of RA, on Enbrel. He was referred per rheumatology for further evaluation and treatment of hereditary hemochromatosis, diagnosed 2/13/23. Further hematology workup was facilitated by Dr. Batista, mild neutropenia is attributed to RA medication, ferritin 4179; on supplemental testosterone. Patient initiated therapeutic phlebotomy; MRI has shown iron deposits in hands likely related to hemochromatosis.    Patient has undergone 2 hip surgeries associated infection, one hand surgery.  He is on chronic amoxicillin twice daily per ID.  Patient continues with weekly testosterone injections, as well as weekly Enbrel for RA, mild-moderate EtOH during the summer, last therapeutic phlebotomy was 11/17/24.  He is engaging in his usual activity, working out without issue, and otherwise at his baseline.    Review of Systems   Constitutional:  Negative for chills, fever, malaise/fatigue (usual activity - working out without restriction) and weight loss (stable).   Respiratory:  Negative for cough, shortness of breath and wheezing.    Cardiovascular:  Negative for chest pain, palpitations and leg swelling.   Gastrointestinal:  Negative for blood in stool, constipation (Last BM this am), diarrhea, melena, nausea and vomiting.   Genitourinary:  Negative for dysuria and hematuria.   Musculoskeletal:  Positive for joint pain (s/p R hip repalcemnt w/ infection; temp then replaced 1/2025 w/ abx). Negative for myalgias.        Baseline RA - Enbrel weekly, advil daily   Neurological:  Negative for dizziness, tingling and headaches.   Psychiatric/Behavioral:  Substance abuse: wine daily, ciders in the summer. The patient does not have insomnia.      Allergies[1]      Medications  "Ordered Prior to Encounter[2]           Objective     /80 (BP Location: Right arm, Patient Position: Sitting, BP Cuff Size: Adult)   Pulse 100   Resp (!) 24   Ht 1.778 m (5' 10\")   Wt 88.5 kg (195 lb)   SpO2 96%   BMI 27.98 kg/m²      Physical Exam  Vitals reviewed.   Constitutional:       General: He is not in acute distress.     Appearance: He is well-developed. He is not diaphoretic.   HENT:      Head: Normocephalic and atraumatic.   Eyes:      General: No scleral icterus.        Right eye: No discharge.         Left eye: No discharge.   Cardiovascular:      Rate and Rhythm: Normal rate and regular rhythm.      Heart sounds: Normal heart sounds. No murmur heard.     No friction rub. No gallop.   Pulmonary:      Effort: Pulmonary effort is normal. No respiratory distress.      Breath sounds: Normal breath sounds. No wheezing.   Abdominal:      General: There is no distension.      Palpations: Abdomen is soft.      Tenderness: There is no abdominal tenderness.   Musculoskeletal:         General: Normal range of motion.      Cervical back: Normal range of motion.   Skin:     General: Skin is warm and dry.      Coloration: Skin is not pale.      Findings: No erythema or rash.   Neurological:      Mental Status: He is alert and oriented to person, place, and time.   Psychiatric:         Behavior: Behavior normal.       No visits with results within 1 Day(s) from this visit.   Latest known visit with results is:   Hospital Outpatient Visit on 06/10/2025   Component Date Value Ref Range Status    Ferritin 06/10/2025 29.1  22.0 - 322.0 ng/mL Final    WBC 06/10/2025 4.4 (L)  4.8 - 10.8 K/uL Final    RBC 06/10/2025 5.16  4.70 - 6.10 M/uL Final    Hemoglobin 06/10/2025 14.2  14.0 - 18.0 g/dL Final    Hematocrit 06/10/2025 43.3  42.0 - 52.0 % Final    MCV 06/10/2025 83.9  81.4 - 97.8 fL Final    MCH 06/10/2025 27.5  27.0 - 33.0 pg Final    MCHC 06/10/2025 32.8  32.3 - 36.5 g/dL Final    RDW 06/10/2025 51.0 (H)  " 35.9 - 50.0 fL Final    Platelet Count 06/10/2025 236  164 - 446 K/uL Final    MPV 06/10/2025 9.7  9.0 - 12.9 fL Final    Neutrophils-Polys 06/10/2025 49.80  44.00 - 72.00 % Final    Lymphocytes 06/10/2025 32.30  22.00 - 41.00 % Final    Monocytes 06/10/2025 12.80  0.00 - 13.40 % Final    Eosinophils 06/10/2025 3.90  0.00 - 6.90 % Final    Basophils 06/10/2025 0.70  0.00 - 1.80 % Final    Immature Granulocytes 06/10/2025 0.50  0.00 - 0.90 % Final    Nucleated RBC 06/10/2025 0.00  0.00 - 0.20 /100 WBC Final    Neutrophils (Absolute) 06/10/2025 2.18  1.82 - 7.42 K/uL Final    Includes immature neutrophils, if present.    Lymphs (Absolute) 06/10/2025 1.41  1.00 - 4.80 K/uL Final    Monos (Absolute) 06/10/2025 0.56  0.00 - 0.85 K/uL Final    Eos (Absolute) 06/10/2025 0.17  0.00 - 0.51 K/uL Final    Baso (Absolute) 06/10/2025 0.03  0.00 - 0.12 K/uL Final    Immature Granulocytes (abs) 06/10/2025 0.02  0.00 - 0.11 K/uL Final    NRBC (Absolute) 06/10/2025 0.00  K/uL Final                    Assessment & Plan     1. Hereditary hemochromatosis (HCC)  CBC WITH DIFFERENTIAL    FERRITIN      2. Encounter for hematology follow-up        3. Seropositive rheumatoid arthritis (HCC)        4. Chronic antibiotic suppression             1. Chronic abx: Patient w/ h/o infection at hip replacement late 2024-early 2025, continues on amoxicillin BID per ID.    2.  RA: Weekly Enbrel per Rheum.    3.  HH: Diagnosed 2/13/23; continues with TP     Patient continues with periodic therapeutic phlebotomy, last completed 11/2024, ferritin remains within acceptable limits despite weekly testosterone and continued EtOH intake.  Patient will continue to monitor symptoms, standing labs are available every 3 months he will complete them at least every 6 months, and return in 1 year for reevaluation, sooner as needed.      The patient verbalized agreement and understanding of current plan. All questions and concerns were addressed at time of  visit.    Please note that this dictation was created using voice recognition software. I have made every reasonable attempt to correct obvious errors, but I expect that there are errors of grammar and possibly content that I did not discover before finalizing the note.            [1] No Known Allergies  [2]   Current Outpatient Medications on File Prior to Encounter   Medication Sig Dispense Refill    amoxicillin (AMOXIL) 500 MG Cap        No current facility-administered medications on file prior to encounter.

## 2025-06-22 ASSESSMENT — RHEUMATOLOGY NEW PATIENT QUESTIONNAIRE
DURATION: 30-60 MINS
CHARACTERISTIC: BETTER WITH ACTIVITY
CHIEF_COMPLAINT: CONTINUE CARE
MARK ALL THE AREAS OF PAIN: 121563090
JOINT INSTABILITY: Y
TENDON PAIN: Y
JOINT SWELLING: Y
JOINT PAIN: SAME WITH ACTIVITY

## 2025-06-23 ENCOUNTER — OFFICE VISIT (OUTPATIENT)
Dept: RHEUMATOLOGY | Facility: MEDICAL CENTER | Age: 60
End: 2025-06-23
Attending: STUDENT IN AN ORGANIZED HEALTH CARE EDUCATION/TRAINING PROGRAM
Payer: COMMERCIAL

## 2025-06-23 VITALS
WEIGHT: 199 LBS | RESPIRATION RATE: 12 BRPM | HEIGHT: 70 IN | TEMPERATURE: 98.6 F | OXYGEN SATURATION: 94 % | DIASTOLIC BLOOD PRESSURE: 70 MMHG | SYSTOLIC BLOOD PRESSURE: 120 MMHG | BODY MASS INDEX: 28.49 KG/M2 | HEART RATE: 90 BPM

## 2025-06-23 DIAGNOSIS — E83.110 HEREDITARY HEMOCHROMATOSIS (HCC): ICD-10-CM

## 2025-06-23 DIAGNOSIS — Z79.1 NSAID LONG-TERM USE: ICD-10-CM

## 2025-06-23 DIAGNOSIS — M05.9 SEROPOSITIVE RHEUMATOID ARTHRITIS (HCC): Primary | ICD-10-CM

## 2025-06-23 DIAGNOSIS — Z96.649 INFECTION OF PROSTHETIC TOTAL HIP JOINT, INITIAL ENCOUNTER (HCC): ICD-10-CM

## 2025-06-23 DIAGNOSIS — D84.9 IMMUNOSUPPRESSED STATUS (HCC): ICD-10-CM

## 2025-06-23 DIAGNOSIS — T84.59XA INFECTION OF PROSTHETIC TOTAL HIP JOINT, INITIAL ENCOUNTER (HCC): ICD-10-CM

## 2025-06-23 PROCEDURE — 99213 OFFICE O/P EST LOW 20 MIN: CPT | Performed by: STUDENT IN AN ORGANIZED HEALTH CARE EDUCATION/TRAINING PROGRAM

## 2025-06-23 PROCEDURE — 99214 OFFICE O/P EST MOD 30 MIN: CPT | Performed by: STUDENT IN AN ORGANIZED HEALTH CARE EDUCATION/TRAINING PROGRAM

## 2025-06-23 PROCEDURE — 3078F DIAST BP <80 MM HG: CPT | Performed by: STUDENT IN AN ORGANIZED HEALTH CARE EDUCATION/TRAINING PROGRAM

## 2025-06-23 PROCEDURE — 3074F SYST BP LT 130 MM HG: CPT | Performed by: STUDENT IN AN ORGANIZED HEALTH CARE EDUCATION/TRAINING PROGRAM

## 2025-06-23 RX ORDER — MEDROXYPROGESTERONE ACETATE 150 MG/ML
INJECTION, SUSPENSION INTRAMUSCULAR
COMMUNITY
End: 2025-06-23 | Stop reason: SDUPTHER

## 2025-06-23 RX ORDER — CELECOXIB 100 MG/1
100 CAPSULE ORAL
Qty: 60 CAPSULE | Refills: 2 | Status: SHIPPED | OUTPATIENT
Start: 2025-06-23

## 2025-06-23 RX ORDER — AMOXICILLIN 500 MG/1
TABLET, FILM COATED ORAL
COMMUNITY
Start: 2025-06-17 | End: 2025-06-23

## 2025-06-23 RX ORDER — MEDROXYPROGESTERONE ACETATE 150 MG/ML
50 INJECTION, SUSPENSION INTRAMUSCULAR
Qty: 4 EACH | Refills: 5 | Status: SHIPPED | OUTPATIENT
Start: 2025-06-23

## 2025-06-23 ASSESSMENT — FIBROSIS 4 INDEX: FIB4 SCORE: 1.31

## 2025-06-23 NOTE — PROGRESS NOTES
[facility] RHEUMATOLOGY  [address] [facility], [address], [place] [address]  Phone: [phone] ? Fax: [phone]  [facility]/Health-Services/Rheumatology    NEW PATIENT VISIT NOTE      DATE OF SERVICE: 06/23/2025         Subjective     REFERRING PRACTITIONER:  No referring provider defined for this encounter.    PATIENT IDENTIFICATION:  Milton Aguila  [address]  [place] [address]    YOB: 1965    MEDICAL RECORD NUMBER: [id number]         CHIEF COMPLAINT:   Chief Complaint   Patient presents with    New Patient     Rheumatoid arthritis involving multiple sites with positive rheumatoid factor       HISTORY OF PRESENT ILLNESS:  Milton Aguila is a 59 y.o. male with pertinent history notable for hereditary hemochromatosis, hip OA s/p bilateral HERVE (2007), right HERVE revision complicated by Propionibacterium infection (2025), lumbar spine DJD s/p discectomy, and R CTS, who presents to establish care for management of rheumatoid arthritis.    Rheumatoid arthritis was diagnosed in 1991 when he presented with polyarthralgia involving the feet (pain on the plantar aspect of the toes) and wrist (unsure if R or L), in addition to joint swelling of the feet.  He has been on multiple DMARD therapy and was off from [id number] as he was in remission but had recurrent joint symptoms so was restarted on etanercept which has been very effective. Last seen by previous rheumatologist Dr. Barkley in 12/2024 at which time, was off all DMARDs therapy due to concerns for possible R HERVE infection.  He completed 2 months of IV antibiotics early 1/2025 however, required a 4-month course of IV antibiotics due to Propionibacterium infection of the right hip HERVE revision.  Now off IV antibiotics and on amoxicillin.  He resumed etanercept injections in 3/2025.  He did not have any major RA flares while off DMARD therapy.  He gets occasional steroid injection to the R third MCP joint which are helpful, last was done on 6/11/25  (also recently had R ankle steroid injection earlier this month).  Ortho is considering either silicone or metal arthroplasty of the joint and probably arthrodesis of the right wrist versus PRC.  Presently with no obvious joint swelling.  Reports 20-30 minutes of morning stiffness in the fingers (CMC joints), right ankle, and left toes.  He has chronic dry eyes (lens replaced) and uses Refresh eyedrops as needed. No history of pleural/pericardial effusions, interstitial lung disease, episodes of red painful photophobic eyes concerning for inflammatory eye disease, or cutaneous vasculitis.  No history of stroke, DVT/PE, heart failure, diverticulitis, bowel perforation, demyelinating diseases, lymphoma, leukemia, melanoma, or other skin cancers.  No Raynaud's, mucosal ulcerations, or history of psoriasis.  He is a never smoker, and has a couple of glasses of alcoholic beverages nightly.  There is a family history of RA in his mother.  Previously was getting phlebotomy once a week for HH however, this has been on hold given low ferritin.  Previously evaluated by GI, no liver biopsy indicated.  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      Reports other aspects of symptoms and medical history as noted on the questionnaire below or scanned under media tab.    Pertinent treatments:  Hydroxychloroquine: ineffective   Sulfasalazine: DC'd due to elevated LFTs  Methotrexate: DC'd due to underlying liver disease secondary to HH  Leflunomide: DC'd due to underlying liver disease secondary to HH  Adalimumab: Unsure if it was effective or not but had severe hip OA while on it  Upadacitinib: effective, also experienced spasm of the right 2/3 MCP joints  Etanercept 50 mg subcu q. 7 days: Restarted in 3/2024  Pertinent positive labs: 3/2021, RF (36), anti-CCP (144); 2/2023, homozygous for C282Y mutation, 3/2018, RF (64), anti-CCP (120)  Pertinent negative labs: 2/2024, hep B/C/QFN gold  Pertinent imaging:    4/2025 right foot MRI: Severe OA changes of the first MTP joint with hallux valgus deformity, prominent underlying marrow edema without acute bony abnormalities, mild OA changes throughout the PIP and DIP joints  4/2025 R hand XR: Severe erosive changes of the third MCP joints, STT arthritis  9/2024 L foot XR: Hallux rigidus of the left great MTP joint  8/2024 left hand XR: No bony erosions.  Periarticular sclerosis and marginal spurring noted  3/2024 right ankle XR: Joint space narrowing of the medial aspect of the tibiotalar joint  9/2022 L-spine XR: No significant degenerative changes  10/2020 bilateral hand XR: Mild OA, narrowing of R third MCP joint with osteophyte formation, no evidence of inflammatory arthropathy  10/2020 feet XR: Mild OA of bilateral MTP joints, no evidence of inflammatory arthropathy      [facility] New Patient History Form    6/22/2025  5:31 PM PDT - Filed by Patient   Demographic Information   Marital Status:    Occupation:    Highest Level of Education: College   MAIN REASON FOR VISIT Continue care   HISTORY OF PRESENT ILLNESS   Date of symptom onset: 1991   Preceding incident/ailment:    Describe/list your symptoms:    Exacerbating factors:    Alleviating factors:    Helpful medications:    Ineffective medications:    Severity of pain (scale of 1-10):    Personal/emotional stressors:    Zeke All The Areas Of Pain    REVIEW OF SYMPTOMS    General   Fevers    Chills    Night sweats    Malaise    Fatigue    Unintentional weight loss    Musculoskeletal   Joint pain Same with activity   Morning stiffness duration 30-60 mins   Morning stiffness characteristic Better with activity   Joint swelling Yes   Joint instability Yes   Tendon pain Yes   Muscle pain    Body aches    Dermatologic   Hair loss with bald spots    Hair shedding    Skin thickening    Skin plaques    Sunlight-induced skin rash    Cold-induced color changes (white, purple, red on rewarming)     Neurologic/Psychiatric   Weakness    Spasms    Tingling    Burning    Numbness    Insomnia    Anxiety    Depression    Head/Eyes   Headaches    Temple pain    Dizziness    Dry eyes    Eye pain    Eye redness    Blurry vision    Vision loss    Ears/Nose   Ear pain    Ringing in ears    Vertigo    Hearing loss    Nasal ulcers    Nosebleeds    Sinus pain    Nasal congestion    Snoring    Mouth/Throat   Oral ulcers    Bleeding gums    Dry mouth    Cavities    Sore throat    Sticking in throat    Difficulty speaking    Neck/Lymphatics   Thyroid pain    Thyroid swelling    Lymph node swelling    Cardiac/Respiratory   Chest pain with breathing    Dry cough    Cough with bloody phlegm    Shortness of breath    Fast heartbeats    Irregular heartbeats    Gastrointestinal   Nausea    Vomiting    Difficulty swallowing    Heartburn    Abdominal pain    Bloody stool    Mucus stool    Genitourinary   Pelvic pain    Genital ulcers    Abnormal discharge    Burning urination    Frothy urine    Blood in urine    MEDICAL/PERSONAL HISTORY DETAILS   Current Medical Problems:    Past/Resolved Medical Problems:    Surgeries/Procedures (include month/year):    Prescription/Over-The-Counter/Herbal Medications:    Medication/Food/Material Allergies (include reactions):    Immunizations (include month/year)    Alcohol/Tobacco/Recreational Drugs:    Family Medical History (father, mother, siblings only):        REVIEW OF SYSTEMS:  Except as noted in the history above, relevant review of systems with emphasis on autoimmune rheumatic diseases was otherwise negative.      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) 01/06/2025    Infected prosthetic hip (HCC) 11/22/2024    Infection and inflammatory reaction due to internal right hip prosthesis, initial encounter ([facility]) 11/19/2024    Leukocytosis  08/21/2024    Cellulitis and abscess of hand 08/20/2024    Hereditary hemochromatosis (HCC) 03/10/2023    Osteoarthritis of right hand 08/19/2022    Carpal tunnel syndrome of right wrist 08/19/2022    Narcotic drug use 03/07/2017    Hypogonadism in male 03/28/2016    Insomnia 12/15/2015    S/P bilateral hip replacements 12/15/2015    Lumbar disc disease 04/01/2014    Seropositive rheumatoid arthritis (HCC) 03/31/2014    Hypogonadism 03/31/2014    Osteoarthritis of hip 08/22/2013    S/P discectomy for herniated nucleus pulposus 08/22/2013    Recurrent genital HSV (herpes simplex virus) infection 08/22/2013       PAST MEDICAL HISTORY:  Past Medical History[1]    PAST SURGICAL HISTORY:  Past Surgical History[2]    MEDICATIONS:  Current Outpatient Medications   Medication Sig    Etanercept (ENBREL SURECLICK) 50 MG/ML Solution Auto-injector Inject 50 mg under the skin every 7 days.    celecoxib (CELEBREX) 100 MG Cap Take 1 Capsule by mouth 2 times daily with meals as needed for Moderate Pain (for arthritis).    amoxicillin (AMOXIL) 500 MG Cap        ALLERGIES:   Allergies[3]    IMMUNIZATIONS:   Immunization History   Administered Date(s) Administered    COVID-19, mRNA, LNP-S, PF, kim-sucrose, 30 mcg/0.3 mL 01/03/2025    Hep A/HEP B Combined Vaccine (TwinRix) 02/25/2004    INFLUENZA TIV (IM) 10/01/2007    Influenza Seasonal Injectable - Historical Data 10/01/2007, 11/28/2012, 11/29/2013, 11/05/2014    Influenza Vac Subunit Quad Inj (Pf) 01/02/2020, 10/26/2022    Influenza Vaccine H1N1 - HISTORICAL DATA 01/22/2010    Influenza Vaccine Quad Inj (Pf) 09/20/2020, 10/05/2023    Influenza split virus trivalent (PF) 01/22/2010, 12/22/2015, 12/13/2016, 12/11/2017    PFIZER BIVALENT SARS-COV-2 VACCINE (12+) 10/26/2022    PFIZER HUTCHINSON CAP SARS-COV-2 VACCINATION (12+) 05/11/2022    PFIZER PURPLE CAP SARS-COV-2 VACCINATION (12+) 01/15/2021, 02/05/2021, 08/14/2021    Pneumococcal Conjugate Vaccine (Prevnar/PCV-13) 01/02/2020     "Pneumococcal Conjugate, unspecified formulation 10/01/2007    Pneumococcal polysaccharide vaccine (PPSV-23) 10/01/2007    TD Vaccine 02/25/2004    Tdap Vaccine 09/29/2019, 08/12/2024    Typhoid Vaccine 02/25/2004    Yellow Fever Vaccine 02/03/2016       SOCIAL HISTORY:   Social History[4]    FAMILY HISTORY:  No family history on file.         Objective     Vital Signs: /70   Pulse 90   Temp 37 °C (98.6 °F) (Temporal)   Resp 12   Ht 1.778 m (5' 10\")   Wt 90.3 kg (199 lb)   SpO2 94% Body mass index is 28.55 kg/m².    General: Appears well and comfortable  Eyes: No scleral or conjunctival lesions  ENT: No apparent oral or nasal lesions  Head/Neck: No apparent scalp or neck lesions  Cardiovascular: Regular rate and rhythm; no pericardial rubs  Respiratory: Breathing quiet and unlabored; no rales or pleural rubs  Gastrointestinal: No apparent organomegaly or abdominal masses  Integumentary: No significant cutaneous lesions such as rheumatoid nodules  Musculoskeletal:   L  hand: Mild tenderness at 3rd MCP joint, no significant swelling or restrictions in ROM  No significant swelling, tenderness, warmth or limitations of motion at other joints examined  Neurologic: Grossly nonfocal  Psychiatric: Mood and affect appropriate    LABORATORY RESULTS REVIEWED AND INTERPRETED BY ME:  Lab Results   Component Value Date/Time    CREACTPROT <0.30 01/14/2025 05:27 PM    SEDRATEWES 11 01/14/2025 05:27 PM    URICACID 5.9 08/16/2021 10:53 AM     Lab Results   Component Value Date/Time    RHEUMFACTN 36 (H) 03/11/2021 07:35 AM    CCPANTIBODY 144 (H) 03/11/2021 07:35 AM     Lab Results   Component Value Date/Time    ANTIMITOCHO 4.9 06/28/2019 11:03 AM     Lab Results   Component Value Date/Time    TSHULTRASEN 2.310 04/16/2022 09:24 AM     Lab Results   Component Value Date/Time    CPKTOTAL 58 12/30/2024 12:30 PM     Lab Results   Component Value Date/Time    25HYDROXY 35 12/06/2019 01:00 PM     Lab Results   Component Value " Date/Time    FERRITIN 29.1 06/10/2025 09:44 AM    IRON 243 (H) 02/10/2023 01:25 PM    PROTHROMBTM 14.0 02/11/2025 01:20 PM    INR 1.08 02/11/2025 01:20 PM     Lab Results   Component Value Date/Time    WBC 4.5 (L) 06/10/2025 09:44 AM    WBC 4.4 (L) 06/10/2025 09:44 AM    RBC 5.15 06/10/2025 09:44 AM    RBC 5.16 06/10/2025 09:44 AM    HEMOGLOBIN 14.3 06/10/2025 09:44 AM    HEMOGLOBIN 14.2 06/10/2025 09:44 AM    HEMATOCRIT 43.7 06/10/2025 09:44 AM    HEMATOCRIT 43.3 06/10/2025 09:44 AM    MCV 84.9 06/10/2025 09:44 AM    MCV 83.9 06/10/2025 09:44 AM    MCH 27.8 06/10/2025 09:44 AM    MCH 27.5 06/10/2025 09:44 AM    MCHC 32.7 06/10/2025 09:44 AM    MCHC 32.8 06/10/2025 09:44 AM    RDW 51.7 (H) 06/10/2025 09:44 AM    RDW 51.0 (H) 06/10/2025 09:44 AM    PLATELETCT 242 06/10/2025 09:44 AM    PLATELETCT 236 06/10/2025 09:44 AM    MPV 9.7 06/10/2025 09:44 AM    MPV 9.7 06/10/2025 09:44 AM    NEUTS 2.20 06/10/2025 09:44 AM    NEUTS 2.18 06/10/2025 09:44 AM    POLYS 95 11/14/2024 11:50 AM    LYMPHOCYTES 31.50 06/10/2025 09:44 AM    LYMPHOCYTES 32.30 06/10/2025 09:44 AM    MONOCYTES 14.20 (H) 06/10/2025 09:44 AM    MONOCYTES 12.80 06/10/2025 09:44 AM    EOSINOPHILS 4.00 06/10/2025 09:44 AM    EOSINOPHILS 3.90 06/10/2025 09:44 AM    BASOPHILS 0.70 06/10/2025 09:44 AM    BASOPHILS 0.70 06/10/2025 09:44 AM     Lab Results   Component Value Date/Time    ASTSGOT 22 06/10/2025 09:44 AM    ALTSGPT 17 06/10/2025 09:44 AM    ALKPHOSPHAT 66 06/10/2025 09:44 AM    TBILIRUBIN 1.1 06/10/2025 09:44 AM    TOTPROTEIN 6.8 06/10/2025 09:44 AM    ALBUMIN 4.3 06/10/2025 09:44 AM     Lab Results   Component Value Date/Time    SODIUM 137 06/10/2025 09:44 AM    POTASSIUM 4.0 06/10/2025 09:44 AM    CHLORIDE 103 06/10/2025 09:44 AM    CO2 23 06/10/2025 09:44 AM    GLUCOSE 84 06/10/2025 09:44 AM    BUN 16 06/10/2025 09:44 AM    CREATININE 1.01 06/10/2025 09:44 AM    CREATININE 0.8 02/09/2008 08:50 AM    BUNCREATRAT 5.8 (L) 11/25/2024 04:20 AM    CALCIUM  9.2 06/10/2025 09:44 AM     Lab Results   Component Value Date/Time    COLORURINE See Comment 06/26/2018 08:50 AM    SPECGRAVITY RR 06/26/2018 08:50 AM    PHURINE RR 06/26/2018 08:50 AM    GLUCOSEUR RR 06/26/2018 08:50 AM    KETONES RR 06/26/2018 08:50 AM    PROTEINURIN RR 06/26/2018 08:50 AM     Lab Results   Component Value Date/Time    FLTYPE Synovial 11/14/2024 11:50 AM    CRYSTALSBDF None Seen 11/14/2024 11:50 AM     Lab Results   Component Value Date/Time    HEPBSAG Non-Reactive 02/18/2024 04:21 PM    HEPBCORIGM Non-Reactive 02/18/2024 04:21 PM    HEPCAB Non-Reactive 02/18/2024 04:21 PM     Lab Results   Component Value Date/Time    CHOLSTRLTOT 181 11/11/2022 08:23 AM     (H) 11/11/2022 08:23 AM    HDL 49 11/11/2022 08:23 AM    TRIGLYCERIDE 102 11/11/2022 08:23 AM    HBA1C 5.2 04/16/2022 09:24 AM       RADIOLOGY RESULTS REVIEWED AND INTERPRETED BY ME: See above      All relevant laboratory and imaging results reported on this note were reviewed and interpreted by me.         Assessment & Plan     Milton Aguila is a 59 y.o. male with history as noted above whose presentation merits the following clinical impressions and recommendations:    1. Seropositive rheumatoid arthritis (HCC)  RF (36), anti-CCP (144)  Longstanding history of rheumatoid arthritis diagnosed in 1991 when he presented with polyarthritis primarily affecting the feet.  He has been on multiple DMARD therapy but was on etanercept the longest.  This medication was held for several months (late 2024-early 2025) due to postsurgical infection of right hip HERVE revision, s/p 2 rounds of IV antibiotics, currently on amoxicillin.  He resumed etanercept injections in 3/2024.  He did not have any significant RA flares while on DMARD therapy.  Presently with no signs of active synovitis on exam so will continue with current treatment plan.  - Continue etanercept (ENBREL SURECLICK) 50 MG/ML Solution Auto-injector; Inject 50 mg under the skin every  7 days.  Dispense: 4 Each; Refill: 5  - Comp Metabolic Panel; Future  - CBC WITH DIFFERENTIAL; Future    2. Immunosuppressed status (HCC)  Presently with no history, physical, or laboratory evidence to suggest significant adverse drug effects but need routine monitoring per guidelines.    3. Infection of prosthetic total hip joint, initial encounter (HCC)  On chronic amoxicillin.    4. Hereditary hemochromatosis (HCC)  Patient had hook-like osteophytes on hand x-rays consistent with hemochromatosis.HH has been treated with phlebotomy but now on hold due to optimal ferritin levels.  Treatment of arthropathy of H&H is symptom alleviation with analgesics and nonsteroidal anti-inflammatory drugs.  - celecoxib (CELEBREX) 100 MG Cap; Take 1 Capsule by mouth 2 times daily with meals as needed for Moderate Pain (for arthritis).  Dispense: 60 Capsule; Refill: 2    5. NSAID long-term use  Counseled on the potential adverse effects of long-term NSAID use, especially on the stomach, kidneys, and hematopoietic systems, and the need to take them with food and stay hydrated with enough water.  - Consider PUD prophylaxis with OTC antacids, H2 blocker or PPI if that becomes necessary     The above assessment and plan were discussed with the patient who acknowledged understanding of the plan.    FOLLOW-UP: Return in about 6 months (around 12/23/2025).         Thank you for the opportunity to participate in the care of Milton Aguila.    Mariangel Hood D.O.  Rheumatologist, [facility], [facility]          [1]   Past Medical History:  Diagnosis Date    Arthritis     rheumatoid-follows with rheumatology    Chickenpox     as child. Has had 1st dose shingles vaccine    Hemochromatosis     Hereditary hemochromatosis (HCC) 03/10/2023    Recurrent genital HSV (herpes simplex virus) infection 08/22/2013    Right hip pain 01/14/2025    with activity 6/10   [2]   Past Surgical History:  Procedure Laterality  Date    HIP REVISION TOTAL Right 1/21/2025    Procedure: REVISION RIGHT TOTAL HIP ARTHROPLASTY;  Surgeon: Ygoi Hernandez M.D.;  Location: SURGERY [facility];  Service: Orthopedics    PB DRAINAGE OF HIP JOINT Right 11/22/2024    Procedure: Right hip irrigation and debridement with retention of implants, excise the pseudotumor and abscess, RIGHT TOTAL HIP ARTHROPLASTY REVISION;  Surgeon: Yogi Hernandez M.D.;  Location: [facility];  Service: Orthopedics    CA EXC TUMOR SOFT TISSUE PELVIS & HIP SUBFAS*  11/22/2024    Procedure: EXCISION, MASS, LOWER EXTREMITY, PEDIATRIC;  Surgeon: Yogi Hernandez M.D.;  Location: [facility];  Service: Orthopedics    PB REVISE TOTAL HIP REPLACEMENT  11/22/2024    Procedure: REVISION, TOTAL ARTHROPLASTY, HIP;  Surgeon: Yogi Hernandez M.D.;  Location: [facility];  Service: Orthopedics    WOUND IRRIGATION & DEBRIDEMENT Left 08/20/2024    Procedure: IRRIGATION AND DEBRIDEMENT, WRIST;  Surgeon: Yuniel Sheehan M.D.;  Location: [facility];  Service: Orthopedics    LUMBAR LAMINECTOMY DISKECTOMY  02/07/2013    Performed by Mackenzie Cameron M.D. at [facility] ORS    APPENDECTOMY  01/01/2000    INGUINAL HERNIA REPAIR  1973    Hernia Repair, Inguinal-for ascending testicle    DENTAL SURGERY      dental implant x2    HIP ARTHROPLASTY TOTAL Right     HIP ARTHROPLASTY TOTAL Left    [3] No Known Allergies  [4]   Social History  Socioeconomic History    Marital status:    Tobacco Use    Smoking status: Never     Passive exposure: Never    Smokeless tobacco: Never   Vaping Use    Vaping status: Never Used   Substance and Sexual Activity    Alcohol use: Not Currently     Alcohol/week: 2.4 oz     Types: 4 Glasses of wine per week     Comment: sometimes    Drug use: Yes     Types: Oral     Comment: marijuana     Social Drivers of Health     Financial Resource Strain: Low Risk  (11/24/2024)     Received from [facility]    Overall Financial Resource Strain (CARDIA)     Difficulty of Paying Living Expenses: Not hard at all   Transportation Needs: No Transportation Needs (5/4/2025)    Received from [facility] (and [facility] [place], [place], and [place] prior to 7/1/2021)    Transportation Needs     Patient needs follow up regarding:: 1   Physical Activity: Sufficiently Active (11/24/2024)    Received from [facility]    Exercise Vital Sign     Days of Exercise per Week: 3 days     Minutes of Exercise per Session: 60 min   Stress: Stress Concern Present (11/24/2024)    Received from [facility]    [place] [employer] - Occupational Stress Questionnaire     Feeling of Stress : To some extent   Social Connections: Moderately Isolated (11/24/2024)    Received from [facility]    Social Connection and Isolation Panel [NHANES]     Frequency of Communication with Friends and Family: More than three times a week     Frequency of Social Gatherings with Friends and Family: Once a week     Attends Zoroastrianism Services: Never     Active Member of Clubs or Organizations: No     Attends Club or Organization Meetings: Never     Marital Status: Living with partner   Intimate Partner Violence: Not At Risk (1/21/2025)    Humiliation, Afraid, Rape, and Kick questionnaire     Fear of Current or Ex-Partner: No     Emotionally Abused: No     Physically Abused: No     Sexually Abused: No   Housing Stability: Low Risk  (1/21/2025)    Housing Stability Vital Sign     Unable to Pay for Housing in the Last Year: No     Number of Times Moved in the Last Year: 0     Homeless in the Last Year: No

## 2025-06-23 NOTE — PATIENT INSTRUCTIONS
Thank you for visiting our clinic today.     Summary of your visit:      Follow up in 6 months.     [facility] RHEUMATOLOGY AFTER VISIT GUIDE  Below are important guidelines to help you navigate your health care needs and assist us in caring for you safely and  effectively. We encourage you to carefully read and understand this information and adhere to them accordingly.    [employer] Messaging and Phone Calls:   Diagnosis and Treatment - For a detailed explanation of your condition and treatment plan from today’s visit, refer  to the visit note on [employer] via the following steps:  o Log in to [employer] and click on “Visits” at the top.  o Scroll down to “Past Visits” under Appointments.  o Click on “View Notes” under the appropriate visit date.   Questions or Concerns - [employer] messaging is for non-urgent matters that do not require immediate attention and  should be brief with no more than two questions or concerns. If you have multiple questions or concerns, we ask that  you schedule an appointment to have them properly addressed.   Response to Messages - [employer] messages are addressed throughout the week depending on clinical availability,  so we ask that you allow up to one week for a response.   Phone Calls and Voicemails - Phone calls and voicemail messages are reserved for time-sensitive matters that  cannot wait to be addressed via [employer]. We ask that you refrain from calling the office multiple times or leaving  multiple voicemails regarding the same issue as doing so may lead to delays in response time.   Urgent Issues - For urgent medical matters or medical emergencies that cannot wait, you are advised to go to your  nearest Urgent Care or Emergency Department for immediate attention.    Laboratory Tests and Imaging Studies:   Future Lab and Imaging Orders - We ask that you get your lab tests and imaging studies done no later than one  week before your follow-up visit unless instructed otherwise.   Results  Communication - You may see some test results marked as “abnormal” that are not necessarily significant  or concerning. If there are significant abnormalities on your test results that warrant further action, you will be notified  via MyChart or phone call, otherwise they will be addressed at your follow-up visit.    Prescriptions and Refill Requests:   General Prescriptions (e.g. prednisone, hydroxychloroquine, leflunomide, methotrexate, etc.) - These are sent  to Retail Pharmacies, so all refill requests of these medications should be directed to your local pharmacy.   Specialty Prescriptions (e.g. Enbrel, Humira, Cosentyx, Xeljanz, etc.) - These are sent to Specialty Pharmacies,  so all refill requests of these medications should be directed to your designated specialty pharmacy.   Infusion Prescriptions (e.g. Remicade, Simponi Aria, Rituxan, Saphnelo, etc.) - These are sent to Outpatient  Infusion Centers, so all scheduling requests of these medications should be directed to your local infusion center.    Medication Risks and Adverse Effects:   Immunosuppressed Status - Steroids and antirheumatic drugs are immunosuppressants, so they increase the risk  of infections and can have side effects on various organ systems in your body, though most of them are uncommon.   Potential Side Effects - Be sure to read the drug package inserts to learn about the potential side effects of your  medications before you start taking them and take them exactly as prescribed unless instructed otherwise.   In Case of Side Effects - If you experience any significant side effects, stop taking the medication immediately and  promptly notify the prescriber. Depending on the severity of the side effects, consider going to an Urgent Care or  Emergency Department for immediate attention.    Immunizations and Health Screening:   Vaccinations - If you are on immunosuppressive therapy, it is important that you are up to date on  age-appropriate  immunizations, particularly shingles and pneumonia vaccines, which you can request from your primary care provider  or from us at your next appointment.   Screening Tests - It is also important that you are up to date on age-appropriate screening tests, such as pap  smear, mammography, and colonoscopy, which you can request from your primary care provider.    Educational and Supportive Resources:   [employer] Rheumatology ([email or web address]) - Visit our website to learn more about  your condition and other rheumatic diseases, and gain access to many helpful resources for them.   Disposal of Old Medications (www.[place].gov/everyday-takeback-day) - Visit the Drug Enforcement Administration  website to find a nearby location where you can properly dispose of old medications you no longer need.   Disposal of Used [place] (www.safeneedledisposal.org) - Visit the Safe Needle Disposal Organization website to  find a nearby location where you can properly dispose of used needles from your injectable medications.  Revised 6/14/2024

## 2025-06-29 PROBLEM — T84.51XA INFECTION AND INFLAMMATORY REACTION DUE TO INTERNAL RIGHT HIP PROSTHESIS, INITIAL ENCOUNTER (HCC): Status: RESOLVED | Noted: 2024-11-19 | Resolved: 2025-06-29

## 2025-07-01 ENCOUNTER — APPOINTMENT (OUTPATIENT)
Dept: RHEUMATOLOGY | Facility: MEDICAL CENTER | Age: 60
End: 2025-07-01
Payer: COMMERCIAL

## 2025-07-23 ENCOUNTER — TELEPHONE (OUTPATIENT)
Dept: RHEUMATOLOGY | Facility: MEDICAL CENTER | Age: 60
End: 2025-07-23
Payer: COMMERCIAL

## 2025-07-23 NOTE — TELEPHONE ENCOUNTER
Prior Authorization for ENBREL SURECLICK 50 MG/ML Solution Auto-injector  has been approved for a quantity of 4 , day supply 28    Prior Authorization reference number: [id number]  Insurance: [employer]  Effective dates: 07/23/2025 to 07/23/2026

## 2025-07-23 NOTE — TELEPHONE ENCOUNTER
Prior Authorization for ENBREL SURECLICK 50 MG/ML Solution Auto-injector  (Quantity: 4, Days: 28) has been submitted via Phone: [phone].    Insurance: [facility]    Will follow up in 24-48 business hours.

## 2025-08-12 ENCOUNTER — APPOINTMENT (OUTPATIENT)
Dept: RADIOLOGY | Facility: MEDICAL CENTER | Age: 60
End: 2025-08-12
Attending: EMERGENCY MEDICINE
Payer: COMMERCIAL

## 2025-08-12 ENCOUNTER — HOSPITAL ENCOUNTER (EMERGENCY)
Facility: MEDICAL CENTER | Age: 60
End: 2025-08-12
Attending: EMERGENCY MEDICINE
Payer: COMMERCIAL

## 2025-08-12 VITALS
DIASTOLIC BLOOD PRESSURE: 90 MMHG | OXYGEN SATURATION: 94 % | HEIGHT: 70 IN | BODY MASS INDEX: 25.77 KG/M2 | WEIGHT: 180 LBS | HEART RATE: 101 BPM | SYSTOLIC BLOOD PRESSURE: 170 MMHG | RESPIRATION RATE: 18 BRPM | TEMPERATURE: 99 F

## 2025-08-12 DIAGNOSIS — T50.901A ACCIDENTAL DRUG OVERDOSE, INITIAL ENCOUNTER: Primary | ICD-10-CM

## 2025-08-12 LAB
ACTION RANGE TRIGGERED IACRT: NO
ALBUMIN SERPL BCP-MCNC: 4.2 G/DL (ref 3.2–4.9)
ALBUMIN/GLOB SERPL: 1.6 G/DL
ALP SERPL-CCNC: 61 U/L (ref 30–99)
ALT SERPL-CCNC: 11 U/L (ref 2–50)
AMMONIA PLAS-SCNC: 20 UMOL/L (ref 11–45)
AMPHET UR QL SCN: NEGATIVE
ANION GAP SERPL CALC-SCNC: 15 MMOL/L (ref 7–16)
APAP SERPL-MCNC: <5 UG/ML (ref 10–30)
AST SERPL-CCNC: 22 U/L (ref 12–45)
BARBITURATES UR QL SCN: NEGATIVE
BASE EXCESS BLDA CALC-SCNC: -4 MMOL/L (ref -4–3)
BASOPHILS # BLD AUTO: 0.4 % (ref 0–1.8)
BASOPHILS # BLD: 0.04 K/UL (ref 0–0.12)
BENZODIAZ UR QL SCN: POSITIVE
BILIRUB SERPL-MCNC: 0.8 MG/DL (ref 0.1–1.5)
BREATHS SETTING VENT: 20
BUN SERPL-MCNC: 7 MG/DL (ref 8–22)
BZE UR QL SCN: POSITIVE
CALCIUM ALBUM COR SERPL-MCNC: 8.9 MG/DL (ref 8.5–10.5)
CALCIUM SERPL-MCNC: 9.1 MG/DL (ref 8.5–10.5)
CANNABINOIDS UR QL SCN: NEGATIVE
CHLORIDE SERPL-SCNC: 106 MMOL/L (ref 96–112)
CO2 BLDA-SCNC: 24 MMOL/L (ref 20–33)
CO2 SERPL-SCNC: 20 MMOL/L (ref 20–33)
COHGB MFR BLD: 2.9 % (ref 0–4.9)
CREAT SERPL-MCNC: 0.84 MG/DL (ref 0.5–1.4)
DELSYS IDSYS: ABNORMAL
EKG IMPRESSION: NORMAL
EOSINOPHIL # BLD AUTO: 0.18 K/UL (ref 0–0.51)
EOSINOPHIL NFR BLD: 1.9 % (ref 0–6.9)
ERYTHROCYTE [DISTWIDTH] IN BLOOD BY AUTOMATED COUNT: 52.8 FL (ref 35.9–50)
ETHANOL BLD-MCNC: <10.1 MG/DL
FENTANYL UR QL: NEGATIVE
GFR SERPLBLD CREATININE-BSD FMLA CKD-EPI: 101 ML/MIN/1.73 M 2
GLOBULIN SER CALC-MCNC: 2.6 G/DL (ref 1.9–3.5)
GLUCOSE SERPL-MCNC: 97 MG/DL (ref 65–99)
HBV CORE AB SERPL QL IA: NONREACTIVE
HBV SURFACE AB SERPL IA-ACNC: 39.2 MIU/ML (ref 0–10)
HBV SURFACE AG SER QL: ABNORMAL
HCO3 BLDA-SCNC: 22 MMOL/L (ref 21–28)
HCT VFR BLD AUTO: 43.6 % (ref 42–52)
HCV AB SER QL: ABNORMAL
HGB BLD-MCNC: 14.8 G/DL (ref 14–18)
HIV 1+2 AB+HIV1 P24 AG SERPL QL IA: ABNORMAL
IMM GRANULOCYTES # BLD AUTO: 0.06 K/UL (ref 0–0.11)
IMM GRANULOCYTES NFR BLD AUTO: 0.6 % (ref 0–0.9)
INST. QUALIFIED PATIENT IIQPT: YES
LACTATE BLD-SCNC: 0.37 MMOL/L (ref 0.5–2)
LYMPHOCYTES # BLD AUTO: 0.94 K/UL (ref 1–4.8)
LYMPHOCYTES NFR BLD: 9.9 % (ref 22–41)
Lab: ABNORMAL
MCH RBC QN AUTO: 30.3 PG (ref 27–33)
MCHC RBC AUTO-ENTMCNC: 33.9 G/DL (ref 32.3–36.5)
MCV RBC AUTO: 89.3 FL (ref 81.4–97.8)
METHADONE UR QL SCN: NEGATIVE
MODE IMODE: ABNORMAL
MONOCYTES # BLD AUTO: 0.91 K/UL (ref 0–0.85)
MONOCYTES NFR BLD AUTO: 9.6 % (ref 0–13.4)
NEUTROPHILS # BLD AUTO: 7.32 K/UL (ref 1.82–7.42)
NEUTROPHILS NFR BLD: 77.6 % (ref 44–72)
NRBC # BLD AUTO: 0 K/UL
NRBC BLD-RTO: 0 /100 WBC (ref 0–0.2)
O2/TOTAL GAS SETTING VFR VENT: 50 %
O2/TOTAL GAS SETTING VFR VENT: 50 %
OPIATES UR QL SCN: NEGATIVE
OXYCODONE UR QL SCN: NEGATIVE
PCO2 BLDA: 43 MMHG (ref 32–48)
PCP UR QL SCN: NEGATIVE
PEEP END EXPIRATORY PRESSURE IPEEP: 8
PH BLDA: 7.32 [PH] (ref 7.35–7.45)
PLATELET # BLD AUTO: 237 K/UL (ref 164–446)
PMV BLD AUTO: 9 FL (ref 9–12.9)
PO2 BLDA: 162 MMHG (ref 83–108)
POTASSIUM SERPL-SCNC: 4 MMOL/L (ref 3.6–5.5)
PROPOXYPH UR QL SCN: NEGATIVE
PROT SERPL-MCNC: 6.8 G/DL (ref 6–8.2)
RBC # BLD AUTO: 4.88 M/UL (ref 4.7–6.1)
SALICYLATES SERPL-MCNC: <1 MG/DL (ref 15–25)
SAO2 % BLDA: 99 % (ref 93–99)
SODIUM SERPL-SCNC: 141 MMOL/L (ref 135–145)
SPECIMEN DRAWN FROM PATIENT: ABNORMAL
TIDAL VOLUME IVT: 450
TRIGL SERPL-MCNC: 154 MG/DL (ref 0–149)
TROPONIN T SERPL-MCNC: 7 NG/L (ref 6–19)
WBC # BLD AUTO: 9.5 K/UL (ref 4.8–10.8)

## 2025-08-12 PROCEDURE — 86704 HEP B CORE ANTIBODY TOTAL: CPT

## 2025-08-12 PROCEDURE — 80143 DRUG ASSAY ACETAMINOPHEN: CPT

## 2025-08-12 PROCEDURE — 84484 ASSAY OF TROPONIN QUANT: CPT

## 2025-08-12 PROCEDURE — 96365 THER/PROPH/DIAG IV INF INIT: CPT

## 2025-08-12 PROCEDURE — 303105 HCHG CATHETER EXTRA

## 2025-08-12 PROCEDURE — 83605 ASSAY OF LACTIC ACID: CPT | Performed by: EMERGENCY MEDICINE

## 2025-08-12 PROCEDURE — 36600 WITHDRAWAL OF ARTERIAL BLOOD: CPT

## 2025-08-12 PROCEDURE — 96375 TX/PRO/DX INJ NEW DRUG ADDON: CPT

## 2025-08-12 PROCEDURE — 87389 HIV-1 AG W/HIV-1&-2 AB AG IA: CPT

## 2025-08-12 PROCEDURE — 82803 BLOOD GASES ANY COMBINATION: CPT | Performed by: EMERGENCY MEDICINE

## 2025-08-12 PROCEDURE — 87340 HEPATITIS B SURFACE AG IA: CPT

## 2025-08-12 PROCEDURE — 84478 ASSAY OF TRIGLYCERIDES: CPT

## 2025-08-12 PROCEDURE — 36415 COLL VENOUS BLD VENIPUNCTURE: CPT

## 2025-08-12 PROCEDURE — 51702 INSERT TEMP BLADDER CATH: CPT

## 2025-08-12 PROCEDURE — 82140 ASSAY OF AMMONIA: CPT

## 2025-08-12 PROCEDURE — 700105 HCHG RX REV CODE 258: Performed by: EMERGENCY MEDICINE

## 2025-08-12 PROCEDURE — 82375 ASSAY CARBOXYHB QUANT: CPT

## 2025-08-12 PROCEDURE — 82077 ASSAY SPEC XCP UR&BREATH IA: CPT

## 2025-08-12 PROCEDURE — 94002 VENT MGMT INPAT INIT DAY: CPT

## 2025-08-12 PROCEDURE — 86803 HEPATITIS C AB TEST: CPT

## 2025-08-12 PROCEDURE — 85025 COMPLETE CBC W/AUTO DIFF WBC: CPT

## 2025-08-12 PROCEDURE — 700111 HCHG RX REV CODE 636 W/ 250 OVERRIDE (IP): Performed by: EMERGENCY MEDICINE

## 2025-08-12 PROCEDURE — 71045 X-RAY EXAM CHEST 1 VIEW: CPT

## 2025-08-12 PROCEDURE — 99285 EMERGENCY DEPT VISIT HI MDM: CPT

## 2025-08-12 PROCEDURE — 80307 DRUG TEST PRSMV CHEM ANLYZR: CPT

## 2025-08-12 PROCEDURE — 80179 DRUG ASSAY SALICYLATE: CPT

## 2025-08-12 PROCEDURE — 86706 HEP B SURFACE ANTIBODY: CPT

## 2025-08-12 PROCEDURE — 31500 INSERT EMERGENCY AIRWAY: CPT

## 2025-08-12 PROCEDURE — 80053 COMPREHEN METABOLIC PANEL: CPT

## 2025-08-12 PROCEDURE — 93005 ELECTROCARDIOGRAM TRACING: CPT | Mod: TC | Performed by: EMERGENCY MEDICINE

## 2025-08-12 RX ORDER — SODIUM CHLORIDE 9 MG/ML
1000 INJECTION, SOLUTION INTRAVENOUS ONCE
Status: COMPLETED | OUTPATIENT
Start: 2025-08-12 | End: 2025-08-12

## 2025-08-12 RX ORDER — PROPOFOL 10 MG/ML
50 INJECTION, EMULSION INTRAVENOUS ONCE
Status: COMPLETED | OUTPATIENT
Start: 2025-08-12 | End: 2025-08-12

## 2025-08-12 RX ORDER — NALOXONE HYDROCHLORIDE 1 MG/ML
2 INJECTION INTRAMUSCULAR; INTRAVENOUS; SUBCUTANEOUS ONCE
Status: DISCONTINUED | OUTPATIENT
Start: 2025-08-12 | End: 2025-08-12

## 2025-08-12 RX ORDER — HYDROXYZINE HYDROCHLORIDE 25 MG/1
25 TABLET, FILM COATED ORAL 3 TIMES DAILY PRN
COMMUNITY

## 2025-08-12 RX ORDER — NALOXONE HYDROCHLORIDE 1 MG/ML
1 INJECTION INTRAMUSCULAR; INTRAVENOUS; SUBCUTANEOUS ONCE
Status: COMPLETED | OUTPATIENT
Start: 2025-08-12 | End: 2025-08-12

## 2025-08-12 RX ORDER — SUCCINYLCHOLINE CHLORIDE 20 MG/ML
100 INJECTION INTRAMUSCULAR; INTRAVENOUS ONCE
Status: COMPLETED | OUTPATIENT
Start: 2025-08-12 | End: 2025-08-12

## 2025-08-12 RX ADMIN — SODIUM CHLORIDE 1000 ML: 9 INJECTION, SOLUTION INTRAVENOUS at 11:41

## 2025-08-12 RX ADMIN — NALOXONE HYDROCHLORIDE 1 MG: 1 INJECTION INTRAMUSCULAR; INTRAVENOUS; SUBCUTANEOUS at 11:38

## 2025-08-12 RX ADMIN — PROPOFOL 5 MCG/KG/MIN: 10 INJECTION, EMULSION INTRAVENOUS at 12:04

## 2025-08-12 RX ADMIN — SUCCINYLCHOLINE CHLORIDE 100 MG: 20 INJECTION, SOLUTION INTRAMUSCULAR; INTRAVENOUS at 11:51

## 2025-08-12 RX ADMIN — PROPOFOL 50 MG: 10 INJECTION, EMULSION INTRAVENOUS at 11:50

## 2025-08-12 ASSESSMENT — FIBROSIS 4 INDEX: FIB4 SCORE: 1.31

## (undated) DEVICE — GLOVE SZ 7.5 BIOGEL PI MICRO - PF LF (50PR/BX)

## (undated) DEVICE — SYRINGE SAFETY 10 ML 18 GA X 1 1/2 BLUNT LL (100/BX 4BX/CA)

## (undated) DEVICE — GLOVE BIOGEL ECLIPSE PF LATEX SIZE 8.0 (50PR/BX)

## (undated) DEVICE — SUCTION INSTRUMENT YANKAUER BULBOUS TIP W/O VENT (50EA/CA)

## (undated) DEVICE — IMPLANT FLEXIBLE DRILL 25MM (1EA)

## (undated) DEVICE — HUMID-VENT HEAT AND MOISTURE EXCHANGE- (50/BX)

## (undated) DEVICE — BRUSH FMCNL TIP IRR STRL - (12/PKG) FOR SURGILAV

## (undated) DEVICE — GLOVE SIZE 8.0 SURGEON ACCELERATOR FREE GREEN (50PR/BX)

## (undated) DEVICE — SODIUM CHL IRRIGATION 0.9% 1000ML (12EA/CA)

## (undated) DEVICE — CANISTER SUCTION RIGID RED 1500CC (40EA/CA)

## (undated) DEVICE — GLOVE BIOGEL INDICATOR SZ 8 SURGICAL PF LTX - (50/BX 4BX/CA)

## (undated) DEVICE — CANISTER SUCTION 3000ML MECHANICAL FILTER AUTO SHUTOFF MEDI-VAC NONSTERILE LF DISP (40EA/CA)

## (undated) DEVICE — SENSOR OXIMETER ADULT SPO2 RD SET (20EA/BX)

## (undated) DEVICE — PACK TOTAL HIP - (1/CA)

## (undated) DEVICE — SUTURE GENERAL

## (undated) DEVICE — CONTAINER SPECIMEN BAG OR - STERILE 4 OZ W/LID (100EA/CA)

## (undated) DEVICE — SET LEADWIRE 5 LEAD BEDSIDE DISPOSABLE ECG (1SET OF 5/EA)

## (undated) DEVICE — SLEEVE, VASO, THIGH, MED

## (undated) DEVICE — SODIUM CHL. IRRIGATION 0.9% 3000ML (4EA/CA 65CA/PF)

## (undated) DEVICE — PAD PREP 24 X 48 CUFFED - (100/CA)

## (undated) DEVICE — GOWN WARMING STANDARD FLEX - (30/CA)

## (undated) DEVICE — LACTATED RINGERS INJ 1000 ML - (14EA/CA 60CA/PF)

## (undated) DEVICE — SYRINGE SAFETY 3 ML 18 GA X 1 1/2 BLUNT LL (100/BX 8BX/CA)

## (undated) DEVICE — DISPOSABLE WOUND VAC PICO 10 X 30 CM - WOUND CARE (3/CA)

## (undated) DEVICE — SUTURE 1 VICRYL PLUS CTX - 8 X 18 INCH (12/BX)

## (undated) DEVICE — TUBING CLEARLINK DUO-VENT - C-FLO (48EA/CA)

## (undated) DEVICE — SOD. CHL. INJ. 0.9% 1000 ML - (14EA/CA 60CA/PF)

## (undated) DEVICE — GOWN SURGEONS X-LARGE - DISP. (30/CA)

## (undated) DEVICE — DRESSING TRANSPARENT FILM TEGADERM 4 X 4.75" (50EA/BX)"

## (undated) DEVICE — SUTURE 2-0 MONOCRYL PLUS UNDYED CT-1 1 X 36 (36EA/BX)"

## (undated) DEVICE — ELECTRODE DUAL RETURN W/ CORD - (50/PK)

## (undated) DEVICE — CHLORAPREP 26 ML APPLICATOR - ORANGE TINT(25/CA)

## (undated) DEVICE — DRAPE STRLE REG TOWEL 18X24 - (10/BX 4BX/CA)"

## (undated) DEVICE — TOWEL STOP TIMEOUT SAFETY FLAG (40EA/CA)

## (undated) DEVICE — PACK LOWER EXTREMITY - (2/CA)

## (undated) DEVICE — WATER IRRIGATION STERILE 1000ML (12EA/CA)

## (undated) DEVICE — HANDPIECE 10FT INTPLS SCT PLS IRRIGATION HAND CONTROL SET (6/PK)

## (undated) DEVICE — SUTURE 5 TI-CRON HOS-14 - (36/BX)

## (undated) DEVICE — TIP INTPLS HFLO ML ORFC BTRY - (12/CS) FOR SURGILAV

## (undated) DEVICE — GLOVE BIOGEL INDICATOR SZ 7SURGICAL PF LTX - (50/BX 4BX/CA)

## (undated) DEVICE — BLADE SAGITTAL SAW DUAL CUT 75.0 X 25.0MM (1/EA)

## (undated) DEVICE — LENS/HOOD FOR SPACESUIT - (32/PK) PEEL AWAY FACE

## (undated) DEVICE — SET EXTENSION WITH 2 PORTS (48EA/CA) ***PART #2C8610 IS A SUBSTITUTE*****

## (undated) DEVICE — COVER LIGHT HANDLE ALC PLUS DISP (18EA/BX)